# Patient Record
Sex: FEMALE | Race: WHITE | NOT HISPANIC OR LATINO | Employment: OTHER | URBAN - METROPOLITAN AREA
[De-identification: names, ages, dates, MRNs, and addresses within clinical notes are randomized per-mention and may not be internally consistent; named-entity substitution may affect disease eponyms.]

---

## 2023-09-07 ENCOUNTER — HOSPITAL ENCOUNTER (OUTPATIENT)
Dept: DATA CONVERSION | Facility: HOSPITAL | Age: 88
Discharge: HOME | End: 2023-09-07
Payer: MEDICARE

## 2023-09-07 DIAGNOSIS — R30.0 DYSURIA: ICD-10-CM

## 2023-09-07 DIAGNOSIS — E03.9 HYPOTHYROIDISM, UNSPECIFIED: ICD-10-CM

## 2023-09-07 DIAGNOSIS — E11.65 TYPE 2 DIABETES MELLITUS WITH HYPERGLYCEMIA (MULTI): ICD-10-CM

## 2023-09-07 DIAGNOSIS — Z01.89 ENCOUNTER FOR OTHER SPECIFIED SPECIAL EXAMINATIONS: ICD-10-CM

## 2023-09-07 LAB
ALBUMIN SERPL-MCNC: 4.2 GM/DL (ref 3.5–5)
ALBUMIN/GLOB SERPL: 1.6 RATIO (ref 1.5–3)
ALP BLD-CCNC: 58 U/L (ref 35–125)
ALT SERPL-CCNC: 10 U/L (ref 5–40)
ANION GAP SERPL CALCULATED.3IONS-SCNC: 15 MMOL/L (ref 0–19)
AST SERPL-CCNC: 15 U/L (ref 5–40)
BASOPHILS # BLD AUTO: 0.12 K/UL (ref 0–0.22)
BASOPHILS NFR BLD AUTO: 1.1 % (ref 0–1)
BILIRUB DIRECT SERPL-MCNC: 0.2 MG/DL (ref 0–0.2)
BILIRUB INDIRECT SERPL-MCNC: 0.5 MG/DL (ref 0–0.8)
BILIRUB SERPL-MCNC: 0.7 MG/DL (ref 0.1–1.2)
BUN SERPL-MCNC: 17 MG/DL (ref 8–25)
BUN/CREAT SERPL: 21.3 RATIO (ref 8–21)
CALCIUM SERPL-MCNC: 9.8 MG/DL (ref 8.5–10.4)
CHLORIDE SERPL-SCNC: 98 MMOL/L (ref 97–107)
CO2 SERPL-SCNC: 25 MMOL/L (ref 24–31)
CREAT SERPL-MCNC: 0.8 MG/DL (ref 0.4–1.6)
DEPRECATED RDW RBC AUTO: 46.3 FL (ref 37–54)
DIFFERENTIAL METHOD BLD: ABNORMAL
EOSINOPHIL # BLD AUTO: 0.4 K/UL (ref 0–0.45)
EOSINOPHIL NFR BLD: 3.5 % (ref 0–3)
ERYTHROCYTE [DISTWIDTH] IN BLOOD BY AUTOMATED COUNT: 13.7 % (ref 11.7–15)
GFR SERPL CREATININE-BSD FRML MDRD: 68 ML/MIN/1.73 M2
GLOBULIN SER-MCNC: 2.6 G/DL (ref 1.9–3.7)
GLUCOSE SERPL-MCNC: 144 MG/DL (ref 65–99)
HBA1C MFR BLD: 6.7 % (ref 4–6)
HCT VFR BLD AUTO: 41 % (ref 36–44)
HGB BLD-MCNC: 12.9 GM/DL (ref 12–15)
IMM GRANULOCYTES # BLD AUTO: 0.05 K/UL (ref 0–0.1)
LYMPHOCYTES # BLD AUTO: 1.2 K/UL (ref 1.2–3.2)
LYMPHOCYTES NFR BLD MANUAL: 10.6 % (ref 20–40)
MCH RBC QN AUTO: 28.7 PG (ref 26–34)
MCHC RBC AUTO-ENTMCNC: 31.5 % (ref 31–37)
MCV RBC AUTO: 91.1 FL (ref 80–100)
MONOCYTES # BLD AUTO: 0.57 K/UL (ref 0–0.8)
MONOCYTES NFR BLD MANUAL: 5 % (ref 0–8)
NEUTROPHILS # BLD AUTO: 8.99 K/UL
NEUTROPHILS # BLD AUTO: 8.99 K/UL (ref 1.8–7.7)
NEUTROPHILS.IMMATURE NFR BLD: 0.4 % (ref 0–1)
NEUTS SEG NFR BLD: 79.4 % (ref 50–70)
NRBC BLD-RTO: 0 /100 WBC
PLATELET # BLD AUTO: 181 K/UL (ref 150–450)
PMV BLD AUTO: 12.6 CU (ref 7–12.6)
POTASSIUM SERPL-SCNC: 4.4 MMOL/L (ref 3.4–5.1)
PROT SERPL-MCNC: 6.8 G/DL (ref 5.9–7.9)
RBC # BLD AUTO: 4.5 M/UL (ref 4–4.9)
SODIUM SERPL-SCNC: 138 MMOL/L (ref 133–145)
TSH SERPL DL<=0.05 MIU/L-ACNC: 1.7 MIU/L (ref 0.27–4.2)
WBC # BLD AUTO: 11.3 K/UL (ref 4.5–11)

## 2023-09-08 LAB
AMOXICILLIN+CLAV SUSC ISLT: NORMAL
AMPICILLIN+SULBAC SUSC ISLT: NORMAL
BACTERIA ISLT: NORMAL
BACTERIA SPEC CULT: NORMAL
BACTERIA UR QL AUTO: POSITIVE
BILIRUB UR QL STRIP.AUTO: NEGATIVE
CC # UR: NORMAL /UL
CEFAZOLIN SUSC ISLT: NORMAL
CIPROFLOXACIN SUSC ISLT: NORMAL
CLARITY UR: ABNORMAL
COLOR UR: ABNORMAL
GENTAMICIN ISLT MLC: NORMAL
GLUCOSE UR STRIP.AUTO-MCNC: NEGATIVE MG/DL
HGB UR QL STRIP.AUTO: 3 /HPF (ref 0–3)
HGB UR QL: ABNORMAL
HYALINE CASTS UR QL AUTO: ABNORMAL /LPF
KETONES UR QL STRIP.AUTO: NEGATIVE
LEUKOCYTE ESTERASE UR QL STRIP.AUTO: ABNORMAL
LEVOFLOXACIN SUSC ISLT: NORMAL
MEROPENEM SUSC ISLT: NORMAL
MIC (SUSCEPTIBILITY): NORMAL
NITRITE UR QL STRIP.AUTO: POSITIVE
NITROFURANTOIN SUSC ISLT: NORMAL
PH UR STRIP.AUTO: 6 [PH] (ref 4.6–8)
PIP+TAZO SUSC ISLT: NORMAL
PROT UR STRIP.AUTO-MCNC: ABNORMAL MG/DL
REFLEX MICROSCOPIC (UA): ABNORMAL
REPORT STATUS -LH SQ DATA CONVERSION: NORMAL
SERVICE CMNT-IMP: NORMAL
SP GR UR STRIP.AUTO: 1.01 (ref 1–1.03)
SPECIMEN SOURCE: NORMAL
SQUAMOUS UR QL AUTO: ABNORMAL /HPF
TETRACYCLINE SUSC ISLT: NORMAL
TMP SMX SUSC ISLT: NORMAL
UROBILINOGEN UR QL STRIP.AUTO: NORMAL MG/DL (ref 0–1)
WBC #/AREA URNS AUTO: 578 /HPF (ref 0–3)

## 2023-09-19 ENCOUNTER — DOCUMENTATION (OUTPATIENT)
Dept: PRIMARY CARE | Facility: CLINIC | Age: 88
End: 2023-09-19
Payer: MEDICARE

## 2023-09-21 ENCOUNTER — HOSPITAL ENCOUNTER (OUTPATIENT)
Dept: DATA CONVERSION | Facility: HOSPITAL | Age: 88
Discharge: HOME | End: 2023-09-21
Payer: MEDICARE

## 2023-09-21 DIAGNOSIS — R30.0 DYSURIA: ICD-10-CM

## 2023-09-21 LAB
AMOXICILLIN+CLAV SUSC ISLT: NORMAL
AMPICILLIN+SULBAC SUSC ISLT: NORMAL
BACTERIA ISLT: NORMAL
BACTERIA SPEC CULT: NORMAL
BACTERIA UR QL AUTO: POSITIVE
BILIRUB UR QL STRIP.AUTO: NEGATIVE
CC # UR: NORMAL /UL
CEFAZOLIN SUSC ISLT: NORMAL
CEFOTETAN SUSC ISLT: NORMAL
CEFTAZIDIME SUSC ISLT: NORMAL
CEFTRIAXONE SUSC ISLT: NORMAL
CIPROFLOXACIN SUSC ISLT: NORMAL
CLARITY UR: ABNORMAL
COLOR UR: ABNORMAL
GENTAMICIN ISLT MLC: NORMAL
GLUCOSE UR STRIP.AUTO-MCNC: NEGATIVE MG/DL
HGB UR QL STRIP.AUTO: 6 /HPF (ref 0–3)
HGB UR QL: ABNORMAL
HYALINE CASTS UR QL AUTO: ABNORMAL /LPF
KETONES UR QL STRIP.AUTO: NEGATIVE
LEUKOCYTE ESTERASE UR QL STRIP.AUTO: ABNORMAL
LEVOFLOXACIN SUSC ISLT: NORMAL
MEROPENEM SUSC ISLT: NORMAL
MIC (SUSCEPTIBILITY): NORMAL
MICROSCOPIC (UA): ABNORMAL
NITRITE UR QL STRIP.AUTO: NEGATIVE
NITROFURANTOIN SUSC ISLT: NORMAL
PH UR STRIP.AUTO: 6.5 [PH] (ref 4.6–8)
PIP+TAZO SUSC ISLT: NORMAL
PROT UR STRIP.AUTO-MCNC: ABNORMAL MG/DL
REPORT STATUS -LH SQ DATA CONVERSION: NORMAL
SERVICE CMNT-IMP: NORMAL
SP GR UR STRIP.AUTO: 1.01 (ref 1–1.03)
SPECIMEN SOURCE: NORMAL
SQUAMOUS UR QL AUTO: ABNORMAL /HPF
TETRACYCLINE SUSC ISLT: NORMAL
TMP SMX SUSC ISLT: NORMAL
URINE CULTURE: ABNORMAL
UROBILINOGEN UR QL STRIP.AUTO: NORMAL MG/DL (ref 0–1)
WBC #/AREA URNS AUTO: 83 /HPF (ref 0–3)

## 2023-10-31 ENCOUNTER — DOCUMENTATION (OUTPATIENT)
Dept: PRIMARY CARE | Facility: CLINIC | Age: 88
End: 2023-10-31
Payer: MEDICARE

## 2023-10-31 DIAGNOSIS — E13.9 DIABETES 1.5, MANAGED AS TYPE 2 (MULTI): ICD-10-CM

## 2023-10-31 DIAGNOSIS — I10 HYPERTENSION, UNSPECIFIED TYPE: ICD-10-CM

## 2023-10-31 DIAGNOSIS — H35.30 MACULAR DEGENERATION, UNSPECIFIED LATERALITY, UNSPECIFIED TYPE: ICD-10-CM

## 2023-10-31 PROCEDURE — 99490 CHRNC CARE MGMT STAFF 1ST 20: CPT | Performed by: STUDENT IN AN ORGANIZED HEALTH CARE EDUCATION/TRAINING PROGRAM

## 2023-11-06 DIAGNOSIS — E13.9 DIABETES 1.5, MANAGED AS TYPE 2 (MULTI): ICD-10-CM

## 2023-11-07 RX ORDER — GLIMEPIRIDE 2 MG/1
2 TABLET ORAL
Qty: 180 TABLET | Refills: 3 | Status: SHIPPED | OUTPATIENT
Start: 2023-11-07 | End: 2024-03-21 | Stop reason: SDUPTHER

## 2023-11-28 ENCOUNTER — PATIENT OUTREACH (OUTPATIENT)
Dept: PRIMARY CARE | Facility: CLINIC | Age: 88
End: 2023-11-28
Payer: MEDICARE

## 2023-11-28 NOTE — PROGRESS NOTES
Attempted to reach patient for monthly follow up call & was not successful. Left voicemail message with  info & request to return my call. If no response, will call again within the week.

## 2023-12-04 ENCOUNTER — DOCUMENTATION (OUTPATIENT)
Dept: PRIMARY CARE | Facility: CLINIC | Age: 88
End: 2023-12-04
Payer: MEDICARE

## 2023-12-04 DIAGNOSIS — E13.9 DIABETES 1.5, MANAGED AS TYPE 2 (MULTI): ICD-10-CM

## 2023-12-04 DIAGNOSIS — H35.30 MACULAR DEGENERATION, UNSPECIFIED LATERALITY, UNSPECIFIED TYPE: ICD-10-CM

## 2023-12-04 DIAGNOSIS — I10 HYPERTENSION, UNSPECIFIED TYPE: ICD-10-CM

## 2023-12-04 NOTE — PROGRESS NOTES
Patient returned my call & states is doing fairly well. Mentioned an upcoming appointment with Dermatology for 2 areas of skin cancer on cheeks, appointment is Thursday, 12/7. Has been treated for skin cancer before & does regular follow up with Dermatology. Continues to take all medications as prescribed & has not had any falls. Good family support & provide transportation to all appointments. Very hard of hearing, remains alert, lives independently & manages all ADL's. Agreeable to monthly follow up calls.

## 2023-12-04 NOTE — PROGRESS NOTES
Second attempt to reach patient for monthly outreach & was not successful. Left voicemail message with request to return my call. Awaiting response.

## 2024-01-05 ENCOUNTER — PATIENT OUTREACH (OUTPATIENT)
Dept: PRIMARY CARE | Facility: CLINIC | Age: 89
End: 2024-01-05
Payer: MEDICARE

## 2024-01-05 DIAGNOSIS — E13.9 DIABETES 1.5, MANAGED AS TYPE 2 (MULTI): ICD-10-CM

## 2024-01-05 DIAGNOSIS — I10 HYPERTENSION, UNSPECIFIED TYPE: ICD-10-CM

## 2024-01-05 DIAGNOSIS — H35.30 MACULAR DEGENERATION, UNSPECIFIED LATERALITY, UNSPECIFIED TYPE: ICD-10-CM

## 2024-01-05 PROCEDURE — 99490 CHRNC CARE MGMT STAFF 1ST 20: CPT | Performed by: STUDENT IN AN ORGANIZED HEALTH CARE EDUCATION/TRAINING PROGRAM

## 2024-01-05 NOTE — PROGRESS NOTES
Attempted to reach patient for monthly outreach & was not successful. Left voicemail message with this 's contact info & request to return my call. If no response, will try again within a week. Patient does have visit scheduled in March with primary care physician.

## 2024-01-05 NOTE — PROGRESS NOTES
Patient returned my call &  is doing fine with the exception of the area on her face recently treated for skin cancer.  has been using cream prescribed by Dermatology & thinks one area looks worse. This  encouraged patient to contact provider to report concerns. \[facility]\"" will wait one more week. Offered to assist with contacting provider if needed. No falls, remains independent with good family support. Agreeable to follow up calls.

## 2024-02-05 ENCOUNTER — PATIENT OUTREACH (OUTPATIENT)
Dept: PRIMARY CARE | Facility: CLINIC | Age: 89
End: 2024-02-05
Payer: MEDICARE

## 2024-02-05 DIAGNOSIS — E13.9 DIABETES 1.5, MANAGED AS TYPE 2 (MULTI): ICD-10-CM

## 2024-02-05 DIAGNOSIS — I10 HYPERTENSION, UNSPECIFIED TYPE: ICD-10-CM

## 2024-02-05 DIAGNOSIS — H35.30 MACULAR DEGENERATION, UNSPECIFIED LATERALITY, UNSPECIFIED TYPE: ICD-10-CM

## 2024-02-05 PROCEDURE — 99490 CHRNC CARE MGMT STAFF 1ST 20: CPT | Performed by: STUDENT IN AN ORGANIZED HEALTH CARE EDUCATION/TRAINING PROGRAM

## 2024-02-05 NOTE — PROGRESS NOTES
Attempted to reach patient for monthly outreach & was not successful. Left voicemail message with  information & request to return my call. If no response, will reach out again within a week.
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
## 2024-03-05 ENCOUNTER — LAB (OUTPATIENT)
Dept: LAB | Facility: LAB | Age: 89
End: 2024-03-05
Payer: MEDICARE

## 2024-03-05 DIAGNOSIS — E03.9 HYPOTHYROIDISM, UNSPECIFIED: ICD-10-CM

## 2024-03-05 DIAGNOSIS — Z01.89 ENCOUNTER FOR OTHER SPECIFIED SPECIAL EXAMINATIONS: Primary | ICD-10-CM

## 2024-03-05 DIAGNOSIS — E11.65 TYPE 2 DIABETES MELLITUS WITH HYPERGLYCEMIA (MULTI): ICD-10-CM

## 2024-03-05 LAB
ALBUMIN SERPL-MCNC: 4.7 G/DL (ref 3.5–5)
ALP BLD-CCNC: 57 U/L (ref 35–125)
ALT SERPL-CCNC: 10 U/L (ref 5–40)
ANION GAP SERPL CALC-SCNC: 12 MMOL/L
AST SERPL-CCNC: 16 U/L (ref 5–40)
BASOPHILS # BLD AUTO: 0.12 X10*3/UL (ref 0–0.1)
BASOPHILS NFR BLD AUTO: 1.2 %
BILIRUB DIRECT SERPL-MCNC: 0.2 MG/DL (ref 0–0.2)
BILIRUB SERPL-MCNC: 0.7 MG/DL (ref 0.1–1.2)
BUN SERPL-MCNC: 17 MG/DL (ref 8–25)
CALCIUM SERPL-MCNC: 9.7 MG/DL (ref 8.5–10.4)
CHLORIDE SERPL-SCNC: 100 MMOL/L (ref 97–107)
CO2 SERPL-SCNC: 28 MMOL/L (ref 24–31)
CREAT SERPL-MCNC: 0.8 MG/DL (ref 0.4–1.6)
EGFRCR SERPLBLD CKD-EPI 2021: 68 ML/MIN/1.73M*2
EOSINOPHIL # BLD AUTO: 0.25 X10*3/UL (ref 0–0.4)
EOSINOPHIL NFR BLD AUTO: 2.5 %
ERYTHROCYTE [DISTWIDTH] IN BLOOD BY AUTOMATED COUNT: 14 % (ref 11.5–14.5)
EST. AVERAGE GLUCOSE BLD GHB EST-MCNC: 143 MG/DL
GLUCOSE SERPL-MCNC: 123 MG/DL (ref 65–99)
HBA1C MFR BLD: 6.6 %
HCT VFR BLD AUTO: 41.5 % (ref 36–46)
HGB BLD-MCNC: 13.1 G/DL (ref 12–16)
IMM GRANULOCYTES # BLD AUTO: 0.03 X10*3/UL (ref 0–0.5)
IMM GRANULOCYTES NFR BLD AUTO: 0.3 % (ref 0–0.9)
LYMPHOCYTES # BLD AUTO: 1.41 X10*3/UL (ref 0.8–3)
LYMPHOCYTES NFR BLD AUTO: 13.9 %
MCH RBC QN AUTO: 28.9 PG (ref 26–34)
MCHC RBC AUTO-ENTMCNC: 31.6 G/DL (ref 32–36)
MCV RBC AUTO: 92 FL (ref 80–100)
MONOCYTES # BLD AUTO: 0.62 X10*3/UL (ref 0.05–0.8)
MONOCYTES NFR BLD AUTO: 6.1 %
NEUTROPHILS # BLD AUTO: 7.71 X10*3/UL (ref 1.6–5.5)
NEUTROPHILS NFR BLD AUTO: 76 %
NRBC BLD-RTO: 0 /100 WBCS (ref 0–0)
PLATELET # BLD AUTO: 198 X10*3/UL (ref 150–450)
POTASSIUM SERPL-SCNC: 5.2 MMOL/L (ref 3.4–5.1)
PROT SERPL-MCNC: 6.9 G/DL (ref 5.9–7.9)
RBC # BLD AUTO: 4.53 X10*6/UL (ref 4–5.2)
SODIUM SERPL-SCNC: 140 MMOL/L (ref 133–145)
WBC # BLD AUTO: 10.1 X10*3/UL (ref 4.4–11.3)

## 2024-03-05 PROCEDURE — 80053 COMPREHEN METABOLIC PANEL: CPT

## 2024-03-05 PROCEDURE — 36415 COLL VENOUS BLD VENIPUNCTURE: CPT

## 2024-03-05 PROCEDURE — 82248 BILIRUBIN DIRECT: CPT

## 2024-03-05 PROCEDURE — 83036 HEMOGLOBIN GLYCOSYLATED A1C: CPT

## 2024-03-05 PROCEDURE — 85025 COMPLETE CBC W/AUTO DIFF WBC: CPT

## 2024-03-11 ENCOUNTER — PATIENT OUTREACH (OUTPATIENT)
Dept: PRIMARY CARE | Facility: CLINIC | Age: 89
End: 2024-03-11
Payer: MEDICARE

## 2024-03-11 DIAGNOSIS — I10 HYPERTENSION, UNSPECIFIED TYPE: ICD-10-CM

## 2024-03-11 DIAGNOSIS — E13.9 DIABETES 1.5, MANAGED AS TYPE 2 (MULTI): ICD-10-CM

## 2024-03-11 DIAGNOSIS — H35.30 MACULAR DEGENERATION, UNSPECIFIED LATERALITY, UNSPECIFIED TYPE: ICD-10-CM

## 2024-03-11 PROCEDURE — 99490 CHRNC CARE MGMT STAFF 1ST 20: CPT | Performed by: STUDENT IN AN ORGANIZED HEALTH CARE EDUCATION/TRAINING PROGRAM

## 2024-03-11 NOTE — PROGRESS NOTES
Contacted patient for monthly outreach & patient states is doing well. Some work to kitchen has been done, still more to do. Is anxious to get the rest of work done. Continues to live independently with help from son who lives a block away & daughter who lives locally. Patient does not drive & depends on family for transportation. Taking all medications as prescribed, biggest concern is losing her vision. Being treated for Glaucoma & has vision out of one eye due to previous history of detached retina. Ophthalmology appointment scheduled later this month as well as primary care appointment next week. Looking forward to going to lunch with neighbors on [employer], tries to keep busy.  No falls & states moves slowly especially since house has been under construction. Agreeable to monthly calls & has  information for additional questions/concerns.

## 2024-03-19 PROBLEM — I10 HYPERTENSIVE DISORDER: Status: ACTIVE | Noted: 2024-03-19

## 2024-03-19 PROBLEM — E11.9 DIABETES MELLITUS (MULTI): Status: ACTIVE | Noted: 2022-07-03
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
## 2024-03-19 PROBLEM — H40.9 UNSPECIFIED GLAUCOMA: Status: ACTIVE | Noted: 2022-07-03

## 2024-03-19 PROBLEM — R60.0 EDEMA OF LOWER EXTREMITY: Status: ACTIVE | Noted: 2024-03-19

## 2024-03-19 PROBLEM — R53.81 PHYSICAL DEBILITY: Status: ACTIVE | Noted: 2024-03-19

## 2024-03-19 PROBLEM — R32 URINARY INCONTINENCE: Status: ACTIVE | Noted: 2024-03-19

## 2024-03-19 PROBLEM — I99.8 VASCULAR INSUFFICIENCY: Status: ACTIVE | Noted: 2024-03-19

## 2024-03-19 PROBLEM — M79.604 PAIN IN RIGHT LEG: Status: ACTIVE | Noted: 2024-03-19

## 2024-03-19 PROBLEM — Z78.9 MEDICAL HOME PATIENT: Status: ACTIVE | Noted: 2024-03-19

## 2024-03-19 PROBLEM — H35.30 MACULAR DEGENERATION: Status: ACTIVE | Noted: 2024-03-19

## 2024-03-19 PROBLEM — L03.116 CELLULITIS OF LEFT LOWER LIMB: Status: ACTIVE | Noted: 2024-03-19

## 2024-03-19 PROBLEM — S32.9XXA FRACTURE OF PELVIS (MULTI): Status: RESOLVED | Noted: 2024-03-19 | Resolved: 2024-03-19

## 2024-03-19 PROBLEM — S09.90XA INJURY OF HEAD: Status: RESOLVED | Noted: 2024-03-19 | Resolved: 2024-03-19

## 2024-03-19 PROBLEM — R55 SYNCOPE: Status: RESOLVED | Noted: 2024-03-19 | Resolved: 2024-03-19

## 2024-03-19 PROBLEM — Z66 DO NOT RESUSCITATE: Status: ACTIVE | Noted: 2022-07-03

## 2024-03-19 PROBLEM — E78.2 MIXED HYPERLIPIDEMIA: Status: ACTIVE | Noted: 2024-03-19

## 2024-03-19 PROBLEM — M79.89 SYMPTOM OF LEG SWELLING: Status: ACTIVE | Noted: 2024-03-19

## 2024-03-19 PROBLEM — R26.9 ABNORMAL GAIT: Status: ACTIVE | Noted: 2024-03-19

## 2024-03-19 PROBLEM — H91.90 HEARING DISORDER: Status: ACTIVE | Noted: 2024-03-19

## 2024-03-19 PROBLEM — E11.65 HYPERGLYCEMIA DUE TO TYPE 2 DIABETES MELLITUS (MULTI): Status: ACTIVE | Noted: 2024-03-19

## 2024-03-19 PROBLEM — R30.0 DYSURIA: Status: ACTIVE | Noted: 2023-05-01
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
## 2024-03-19 PROBLEM — M62.82 RHABDOMYOLYSIS: Status: ACTIVE | Noted: 2024-03-19

## 2024-03-19 PROBLEM — E03.9 HYPOTHYROIDISM: Status: ACTIVE | Noted: 2024-03-19

## 2024-03-19 PROBLEM — S32.592A: Status: RESOLVED | Noted: 2022-07-03 | Resolved: 2024-03-19

## 2024-03-19 PROBLEM — R29.6 REPEATED FALLS: Status: RESOLVED | Noted: 2022-07-03 | Resolved: 2024-03-19

## 2024-03-19 RX ORDER — SIMVASTATIN 40 MG/1
TABLET, FILM COATED ORAL EVERY 24 HOURS
COMMUNITY
End: 2024-03-21 | Stop reason: SDUPTHER

## 2024-03-19 RX ORDER — LEVOTHYROXINE SODIUM 100 UG/1
TABLET ORAL EVERY 24 HOURS
COMMUNITY
End: 2024-03-21 | Stop reason: SDUPTHER

## 2024-03-19 RX ORDER — FUROSEMIDE 40 MG/1
TABLET ORAL EVERY 24 HOURS
COMMUNITY
Start: 2021-08-09 | End: 2024-03-21 | Stop reason: SDUPTHER

## 2024-03-19 RX ORDER — TIMOLOL MALEATE 5 MG/ML
SOLUTION/ DROPS OPHTHALMIC
COMMUNITY
Start: 2023-11-22

## 2024-03-19 RX ORDER — ATENOLOL 100 MG/1
TABLET ORAL EVERY 24 HOURS
COMMUNITY
Start: 2018-04-04 | End: 2024-03-21 | Stop reason: SDUPTHER

## 2024-03-19 RX ORDER — METFORMIN HYDROCHLORIDE 500 MG/1
TABLET ORAL EVERY 12 HOURS
COMMUNITY
Start: 2017-07-18 | End: 2024-03-21 | Stop reason: ALTCHOICE

## 2024-03-19 RX ORDER — HYDROCHLOROTHIAZIDE 12.5 MG/1
TABLET ORAL
COMMUNITY
End: 2024-03-21 | Stop reason: SDUPTHER

## 2024-03-19 RX ORDER — PREDNISOLONE ACETATE 10 MG/ML
SUSPENSION/ DROPS OPHTHALMIC
COMMUNITY
Start: 2023-07-27

## 2024-03-19 RX ORDER — BRIMONIDINE TARTRATE 2 MG/ML
SOLUTION/ DROPS OPHTHALMIC EVERY 8 HOURS
COMMUNITY

## 2024-03-19 RX ORDER — CLONIDINE HYDROCHLORIDE 0.2 MG/1
TABLET ORAL
COMMUNITY
Start: 2011-09-21 | End: 2024-03-21 | Stop reason: SDUPTHER

## 2024-03-19 RX ORDER — FERROUS SULFATE, DRIED 160(50) MG
TABLET, EXTENDED RELEASE ORAL EVERY 12 HOURS
COMMUNITY

## 2024-03-19 RX ORDER — FLUOROURACIL 50 MG/G
CREAM TOPICAL
COMMUNITY
Start: 2023-12-07

## 2024-03-19 RX ORDER — LATANOPROST 50 UG/ML
SOLUTION/ DROPS OPHTHALMIC EVERY 24 HOURS
COMMUNITY

## 2024-03-19 RX ORDER — AMLODIPINE BESYLATE 5 MG/1
TABLET ORAL
COMMUNITY
End: 2024-03-21 | Stop reason: SDUPTHER

## 2024-03-21 ENCOUNTER — OFFICE VISIT (OUTPATIENT)
Dept: PRIMARY CARE | Facility: CLINIC | Age: 89
End: 2024-03-21
Payer: MEDICARE

## 2024-03-21 VITALS
DIASTOLIC BLOOD PRESSURE: 72 MMHG | HEIGHT: 67 IN | WEIGHT: 122 LBS | BODY MASS INDEX: 19.15 KG/M2 | HEART RATE: 68 BPM | SYSTOLIC BLOOD PRESSURE: 124 MMHG | TEMPERATURE: 97.2 F | OXYGEN SATURATION: 98 %

## 2024-03-21 DIAGNOSIS — Z71.89 COUNSELING REGARDING ADVANCED CARE PLANNING AND GOALS OF CARE: ICD-10-CM

## 2024-03-21 DIAGNOSIS — I99.8 VASCULAR INSUFFICIENCY: ICD-10-CM

## 2024-03-21 DIAGNOSIS — E11.69 TYPE 2 DIABETES MELLITUS WITH OTHER SPECIFIED COMPLICATION, WITHOUT LONG-TERM CURRENT USE OF INSULIN (MULTI): ICD-10-CM

## 2024-03-21 DIAGNOSIS — E03.8 HYPOTHYROIDISM DUE TO HASHIMOTO'S THYROIDITIS: ICD-10-CM

## 2024-03-21 DIAGNOSIS — Z00.00 ANNUAL PHYSICAL EXAM: Primary | ICD-10-CM

## 2024-03-21 DIAGNOSIS — I10 PRIMARY HYPERTENSION: Primary | ICD-10-CM

## 2024-03-21 DIAGNOSIS — I10 PRIMARY HYPERTENSION: ICD-10-CM

## 2024-03-21 DIAGNOSIS — Z01.89 ENCOUNTER FOR ROUTINE LABORATORY TESTING: ICD-10-CM

## 2024-03-21 DIAGNOSIS — R53.81 PHYSICAL DEBILITY: ICD-10-CM

## 2024-03-21 DIAGNOSIS — E06.3 HYPOTHYROIDISM DUE TO HASHIMOTO'S THYROIDITIS: ICD-10-CM

## 2024-03-21 DIAGNOSIS — Z23 ENCOUNTER FOR IMMUNIZATION: ICD-10-CM

## 2024-03-21 DIAGNOSIS — E78.2 MIXED HYPERLIPIDEMIA: ICD-10-CM

## 2024-03-21 PROBLEM — R30.0 DYSURIA: Status: RESOLVED | Noted: 2023-05-01 | Resolved: 2024-03-21

## 2024-03-21 PROBLEM — M62.82 RHABDOMYOLYSIS: Status: RESOLVED | Noted: 2024-03-19 | Resolved: 2024-03-21

## 2024-03-21 PROBLEM — R60.0 EDEMA OF LOWER EXTREMITY: Status: RESOLVED | Noted: 2024-03-19 | Resolved: 2024-03-21

## 2024-03-21 PROBLEM — M79.89 SYMPTOM OF LEG SWELLING: Status: RESOLVED | Noted: 2024-03-19 | Resolved: 2024-03-21

## 2024-03-21 PROBLEM — M79.604 PAIN IN RIGHT LEG: Status: RESOLVED | Noted: 2024-03-19 | Resolved: 2024-03-21

## 2024-03-21 PROBLEM — E78.5 HLD (HYPERLIPIDEMIA): Status: ACTIVE | Noted: 2024-03-19

## 2024-03-21 PROBLEM — E11.65 HYPERGLYCEMIA DUE TO TYPE 2 DIABETES MELLITUS (MULTI): Status: RESOLVED | Noted: 2024-03-19 | Resolved: 2024-03-21

## 2024-03-21 PROBLEM — R26.9 ABNORMAL GAIT: Status: RESOLVED | Noted: 2024-03-19 | Resolved: 2024-03-21

## 2024-03-21 PROBLEM — Z78.9 MEDICAL HOME PATIENT: Status: RESOLVED | Noted: 2024-03-19 | Resolved: 2024-03-21

## 2024-03-21 PROBLEM — L03.116 CELLULITIS OF LEFT LOWER LIMB: Status: RESOLVED | Noted: 2024-03-19 | Resolved: 2024-03-21

## 2024-03-21 PROCEDURE — 1126F AMNT PAIN NOTED NONE PRSNT: CPT | Performed by: STUDENT IN AN ORGANIZED HEALTH CARE EDUCATION/TRAINING PROGRAM

## 2024-03-21 PROCEDURE — 99214 OFFICE O/P EST MOD 30 MIN: CPT | Performed by: STUDENT IN AN ORGANIZED HEALTH CARE EDUCATION/TRAINING PROGRAM

## 2024-03-21 PROCEDURE — 1036F TOBACCO NON-USER: CPT | Performed by: STUDENT IN AN ORGANIZED HEALTH CARE EDUCATION/TRAINING PROGRAM

## 2024-03-21 PROCEDURE — G0439 PPPS, SUBSEQ VISIT: HCPCS | Performed by: STUDENT IN AN ORGANIZED HEALTH CARE EDUCATION/TRAINING PROGRAM

## 2024-03-21 PROCEDURE — 1123F ACP DISCUSS/DSCN MKR DOCD: CPT | Performed by: STUDENT IN AN ORGANIZED HEALTH CARE EDUCATION/TRAINING PROGRAM

## 2024-03-21 PROCEDURE — 99215 OFFICE O/P EST HI 40 MIN: CPT | Performed by: STUDENT IN AN ORGANIZED HEALTH CARE EDUCATION/TRAINING PROGRAM

## 2024-03-21 PROCEDURE — 3074F SYST BP LT 130 MM HG: CPT | Performed by: STUDENT IN AN ORGANIZED HEALTH CARE EDUCATION/TRAINING PROGRAM

## 2024-03-21 PROCEDURE — 1160F RVW MEDS BY RX/DR IN RCRD: CPT | Performed by: STUDENT IN AN ORGANIZED HEALTH CARE EDUCATION/TRAINING PROGRAM

## 2024-03-21 PROCEDURE — 1158F ADVNC CARE PLAN TLK DOCD: CPT | Performed by: STUDENT IN AN ORGANIZED HEALTH CARE EDUCATION/TRAINING PROGRAM

## 2024-03-21 PROCEDURE — 3078F DIAST BP <80 MM HG: CPT | Performed by: STUDENT IN AN ORGANIZED HEALTH CARE EDUCATION/TRAINING PROGRAM

## 2024-03-21 PROCEDURE — 1159F MED LIST DOCD IN RCRD: CPT | Performed by: STUDENT IN AN ORGANIZED HEALTH CARE EDUCATION/TRAINING PROGRAM

## 2024-03-21 RX ORDER — LEVOTHYROXINE SODIUM 100 UG/1
100 TABLET ORAL
Qty: 90 TABLET | Refills: 3 | Status: SHIPPED | OUTPATIENT
Start: 2024-03-21 | End: 2025-03-21

## 2024-03-21 RX ORDER — FUROSEMIDE 40 MG/1
40 TABLET ORAL DAILY
Qty: 90 TABLET | Refills: 3 | Status: SHIPPED | OUTPATIENT
Start: 2024-03-21 | End: 2025-03-21

## 2024-03-21 RX ORDER — CLONIDINE HYDROCHLORIDE 0.2 MG/1
0.4 TABLET ORAL NIGHTLY
Qty: 180 TABLET | Refills: 3 | Status: SHIPPED | OUTPATIENT
Start: 2024-03-21 | End: 2025-03-21

## 2024-03-21 RX ORDER — ATENOLOL 100 MG/1
100 TABLET ORAL DAILY
Qty: 90 TABLET | Refills: 3 | Status: SHIPPED | OUTPATIENT
Start: 2024-03-21 | End: 2025-03-21

## 2024-03-21 RX ORDER — CLONIDINE HYDROCHLORIDE 0.2 MG/1
0.4 TABLET ORAL NIGHTLY
Qty: 180 TABLET | Refills: 3 | Status: SHIPPED | OUTPATIENT
Start: 2024-03-21

## 2024-03-21 RX ORDER — SIMVASTATIN 40 MG/1
40 TABLET, FILM COATED ORAL NIGHTLY
Qty: 90 TABLET | Refills: 3 | Status: SHIPPED | OUTPATIENT
Start: 2024-03-21 | End: 2025-03-21

## 2024-03-21 RX ORDER — METFORMIN HYDROCHLORIDE 500 MG/1
500 TABLET, EXTENDED RELEASE ORAL
Qty: 90 TABLET | Refills: 3 | Status: SHIPPED | OUTPATIENT
Start: 2024-03-21 | End: 2025-03-21

## 2024-03-21 RX ORDER — AMLODIPINE BESYLATE 5 MG/1
5 TABLET ORAL DAILY
Qty: 90 TABLET | Refills: 3 | Status: SHIPPED | OUTPATIENT
Start: 2024-03-21 | End: 2024-03-27 | Stop reason: ALTCHOICE

## 2024-03-21 RX ORDER — HYDROCHLOROTHIAZIDE 12.5 MG/1
12.5 TABLET ORAL DAILY
Qty: 90 TABLET | Refills: 3 | Status: SHIPPED | OUTPATIENT
Start: 2024-03-21 | End: 2024-03-27 | Stop reason: ALTCHOICE

## 2024-03-21 RX ORDER — GLIMEPIRIDE 2 MG/1
2 TABLET ORAL
Qty: 180 TABLET | Refills: 3 | Status: SHIPPED | OUTPATIENT
Start: 2024-03-21 | End: 2025-03-21

## 2024-03-21 ASSESSMENT — ENCOUNTER SYMPTOMS
DEPRESSION: 0
HEMATOLOGIC/LYMPHATIC NEGATIVE: 1
LOSS OF SENSATION IN FEET: 0
CARDIOVASCULAR NEGATIVE: 1
EYES NEGATIVE: 1
GASTROINTESTINAL NEGATIVE: 1
NEUROLOGICAL NEGATIVE: 1
RESPIRATORY NEGATIVE: 1
PSYCHIATRIC NEGATIVE: 1
CONSTITUTIONAL NEGATIVE: 1
MUSCULOSKELETAL NEGATIVE: 1
ALLERGIC/IMMUNOLOGIC NEGATIVE: 1
ENDOCRINE NEGATIVE: 1
OCCASIONAL FEELINGS OF UNSTEADINESS: 1

## 2024-03-21 ASSESSMENT — PAIN SCALES - GENERAL: PAINLEVEL: 0-NO PAIN

## 2024-03-21 ASSESSMENT — PATIENT HEALTH QUESTIONNAIRE - PHQ9
1. LITTLE INTEREST OR PLEASURE IN DOING THINGS: NOT AT ALL
2. FEELING DOWN, DEPRESSED OR HOPELESS: NOT AT ALL
SUM OF ALL RESPONSES TO PHQ9 QUESTIONS 1 AND 2: 0

## 2024-03-21 NOTE — PROGRESS NOTES
[facility]: [facility]  MEDICARE WELLNESS EXAM      Apoorva Sanchez is a 89 y.o. female that is presenting today for Annual Exam.    Assessment/Plan    Diagnoses and all orders for this visit:  Annual physical exam  -     Follow Up In Primary Care; Future  Counseling regarding advanced care planning and goals of care  Encounter for routine laboratory testing  -     CBC and Auto Differential; Future  -     Basic Metabolic Panel; Future  -     Hepatic Function Panel; Future  -     Hemoglobin A1C; Future  -     TSH with reflex to Free T4 if abnormal; Future  -     Albumin , Urine Random; Future  -     Lipid Panel; Future  Encounter for immunization  Type 2 diabetes mellitus with other specified complication, without long-term current use of insulin (CMS/[facility])  -     glimepiride (Amaryl) 2 mg tablet; Take 1 tablet (2 mg) by mouth 2 times a day with meals.  -     metFORMIN XR (Glucophage-XR) 500 mg 24 hr tablet; Take 1 tablet (500 mg) by mouth once daily in the evening. Take with meals. Do not crush, chew, or split.  -     Hemoglobin A1C; Future  -     Albumin , Urine Random; Future  Hypothyroidism due to Hashimoto's thyroiditis  -     levothyroxine (Synthroid, Levoxyl) 100 mcg tablet; Take 1 tablet (100 mcg) by mouth once daily in the morning. Take before meals.  -     TSH with reflex to Free T4 if abnormal; Future  Mixed hyperlipidemia  -     Hepatic Function Panel; Future  -     simvastatin (Zocor) 40 mg tablet; Take 1 tablet (40 mg) by mouth once daily at bedtime.  -     Lipid Panel; Future  Primary hypertension  -     amLODIPine (Norvasc) 5 mg tablet; Take 1 tablet (5 mg) by mouth once daily.  -     cloNIDine (Catapres) 0.2 mg tablet; Take 2 tablets (0.4 mg) by mouth once daily at bedtime.  -     hydroCHLOROthiazide (Microzide) 12.5 mg tablet; Take 1 tablet (12.5 mg) by mouth once daily.  -     CBC and Auto Differential; Future  -     Basic Metabolic Panel; Future  -     atenolol (Tenormin) 100 mg  tablet; Take 1 tablet (100 mg) by mouth once daily.  Vascular insufficiency  -     furosemide (Lasix) 40 mg tablet; Take 1 tablet (40 mg) by mouth once daily.  Physical debility    Discussed routine and/or preventative care with the patient as outlined below:  - Labwork:   - Patient had labwork done for this appointment. Discussed today.   - Patient's labs mostly look great. Only abnormality is that the patient's potassium was very mildly elevated. Does not require further evaluation at this time.  - Will order labwork for the patient to get one week prior to the next appointment.  - Imaging:   - Patient does not appear to be due for routine imaging at this time.  - Immunizations:   - Discussed seasonal immunizations, including the influenza and COVID-19 immunizations.  - Patient does not appear to be due for routine immunizations at this time.   - Significant medication and problem list reconciliation done today.  - Patient's sugars look great. Do not need to make changes at this time.  - Patient's blood pressure looks great. Do not need to make any changes at this time.   - Overall, the patient feels well. Do not need to make significant changes at this time.    ADVANCED CARE PLANNING  Advanced Care Planning was discussed with patient:  The patient has an active advanced care plan on file. The patient has an active surrogate decision-maker on file.  Encouraged the patient to confirm that Living Will and Healthcare Power of  (HCPoA) are accurate and up to date.  Encouraged the patient to confirm that our office be provided a copy of any documentation in the event that anything changes.    ACTIVITIES OF DAILY LIVING  Basic ADLs:  Bathing: Independent, Dressing: Independent, Toileting: Independent, Transferring: Independent, Continence: Independent, Feeding: Independent.    Instrumental ADLs:  Ability to use phone: Independent, Shopping: Independent, Cooking: Independent, House-keeping: Independent, Laundry:  Independent, Transportation: Independent, Medication Management: Independent, Finance Management: Independent.    Subjective   - The patient otherwise feels well and denies any acute symptoms or concerns at this time.  - The patient denies any changes or progression of their chronic medical problems.  - The patient denies any problems or concerns with their medications.      Review of Systems   Constitutional: Negative.    HENT: Negative.     Eyes: Negative.    Respiratory: Negative.     Cardiovascular: Negative.    Gastrointestinal: Negative.    Endocrine: Negative.    Genitourinary: Negative.    Musculoskeletal: Negative.    Skin: Negative.    Allergic/Immunologic: Negative.    Neurological: Negative.    Hematological: Negative.    Psychiatric/Behavioral: Negative.     All other systems reviewed and are negative.    Objective   Vitals:    03/21/24 1303   BP: 124/72   Pulse: 68   Temp: 36.2 °C (97.2 °F)   SpO2: 98%      Body mass index is 19.11 kg/m².  Physical Exam  Vitals and nursing note reviewed.   Constitutional:       General: She is not in acute distress.     Appearance: Normal appearance. She is not ill-appearing.   HENT:      Head: Normocephalic and atraumatic.      Right Ear: Tympanic membrane, ear canal and external ear normal. There is no impacted cerumen.      Left Ear: Tympanic membrane, ear canal and external ear normal. There is no impacted cerumen.      Nose: Nose normal.      Mouth/Throat:      Mouth: Mucous membranes are moist.      Pharynx: Oropharynx is clear. No oropharyngeal exudate or posterior oropharyngeal erythema.   Eyes:      General: No scleral icterus.        Right eye: No discharge.         Left eye: No discharge.      Extraocular Movements: Extraocular movements intact.      Conjunctiva/sclera: Conjunctivae normal.      Pupils: Pupils are equal, round, and reactive to light.   Neck:      Vascular: No carotid bruit.   Cardiovascular:      Rate and Rhythm: Normal rate and regular  rhythm.      Pulses: Normal pulses.      Heart sounds: Normal heart sounds. No murmur heard.     No friction rub. No gallop.   Pulmonary:      Effort: Pulmonary effort is normal. No respiratory distress.      Breath sounds: Normal breath sounds.   Abdominal:      General: Abdomen is flat. Bowel sounds are normal. There is no distension.      Palpations: Abdomen is soft.      Tenderness: There is no abdominal tenderness.      Hernia: No hernia is present.   Musculoskeletal:         General: No swelling or tenderness. Normal range of motion.   Lymphadenopathy:      Cervical: No cervical adenopathy.   Skin:     General: Skin is warm and dry.      Capillary Refill: Capillary refill takes less than 2 seconds.      Coloration: Skin is not jaundiced.      Findings: No rash.   Neurological:      General: No focal deficit present.      Mental Status: She is alert and oriented to person, place, and time. Mental status is at baseline.   Psychiatric:         Mood and Affect: Mood normal.         Behavior: Behavior normal.       Diagnostic Results   Lab Results   Component Value Date    GLUCOSE 123 (H) 03/05/2024    CALCIUM 9.7 03/05/2024     03/05/2024    K 5.2 (H) 03/05/2024    CO2 28 03/05/2024     03/05/2024    BUN 17 03/05/2024    CREATININE 0.80 03/05/2024     Lab Results   Component Value Date    ALT 10 03/05/2024    AST 16 03/05/2024    ALKPHOS 57 03/05/2024    BILITOT 0.7 03/05/2024     Lab Results   Component Value Date    WBC 10.1 03/05/2024    HGB 13.1 03/05/2024    HCT 41.5 03/05/2024    MCV 92 03/05/2024     03/05/2024     Lab Results   Component Value Date    CHOL 94 (L) 03/06/2023    CHOL 96 (L) 07/01/2022    CHOL 99 (L) 05/02/2022     Lab Results   Component Value Date    HDL 48 (L) 03/06/2023    HDL 56 07/01/2022    HDL 51 05/02/2022     Lab Results   Component Value Date    LDLCALC 32 (L) 03/06/2023    LDLCALC 29 (L) 07/01/2022    LDLCALC 35 (L) 05/02/2022     Lab Results   Component Value  "Date    TRIG 72 03/06/2023    TRIG 53 07/01/2022    TRIG 67 05/02/2022     No components found for: \"CHOLHDL\"  Lab Results   Component Value Date    HGBA1C 6.6 (H) 03/05/2024     Other labs not included in the list above reviewed either before or during this encounter.    History   Past Medical History:   Diagnosis Date    Accidental fall 03/19/2024    Fracture of pelvis (CMS/[facility]) 03/19/2024    Injury of head 03/19/2024    Oth fracture of left pubis, init encntr for closed fracture (CMS/[facility]) 07/03/2022    Syncope 03/19/2024     History reviewed. No pertinent surgical history.  No family history on file.  Social History     Socioeconomic History    Marital status:      Spouse name: Not on file    Number of children: Not on file    Years of education: Not on file    Highest education level: Not on file   Occupational History    Not on file   Tobacco Use    Smoking status: Never     Passive exposure: Never    Smokeless tobacco: Never   Vaping Use    Vaping Use: Never used   Substance and Sexual Activity    Alcohol use: Never    Drug use: Never    Sexual activity: Not on file   Other Topics Concern    Not on file   Social History Narrative    Not on file     Social Determinants of Health     Financial Resource Strain: Not on file   Food Insecurity: Not on file   Transportation Needs: Not on file   Physical Activity: Not on file   Stress: Not on file   Social Connections: Not on file   Intimate Partner Violence: Not on file   Housing Stability: Not on file     Allergies   Allergen Reactions    Sulfa (Sulfonamide Antibiotics) Hives     Current Outpatient Medications on File Prior to Visit   Medication Sig Dispense Refill    brimonidine (AlphaGAN) 0.2 % ophthalmic solution Administer into affected eye(s) every 8 hours.      calcium carbonate-vitamin D3 (Oyster Shell Calcium-Vit D3) 500 mg-5 mcg (200 unit) tablet Take by mouth every 12 hours.      fluorouracil (Efudex) 5 % cream APPLY TWICE DAILY TO AFFECTED AREA FOR " 4 WEEKS. WASH HANDS IMMEDIATELY AFTER APPLICATION      folic acid/multivit-min/lutein (CENTRUM SILVER ORAL) Take by mouth once every 24 hours.      latanoprost (Xalatan) 0.005 % ophthalmic solution Administer into affected eye(s) once every 24 hours.      metroNIDAZOLE (Metrogel) 0.75 % gel APPLY EXTERNALLY TWICE DAILY 45 g 3    multivit,calc,mins/iron/folic (MULTIVIT-IRON-FA-CALCIUM-MINS ORAL) Take by mouth.      prednisoLONE acetate (Pred-Forte) 1 % ophthalmic suspension INSTILL 1 DROP INTO LEFT EYE TWICE DAILY FOR 7 DAYS FOLLOWING LASER PROCEDURE      timolol (Timoptic) 0.5 % ophthalmic solution       vits A,C,E/lutein/minerals (VIT A,C AND E-LUTEIN-MINERALS ORAL) Take by mouth once every 24 hours.      [DISCONTINUED] amLODIPine (Norvasc) 5 mg tablet Take by mouth.      [DISCONTINUED] atenolol (Tenormin) 100 mg tablet once every 24 hours.      [DISCONTINUED] cloNIDine (Catapres) 0.2 mg tablet TAKE 2 TABLETS AT BEDTIME for 90      [DISCONTINUED] furosemide (Lasix) 40 mg tablet once every 24 hours.      [DISCONTINUED] glimepiride (Amaryl) 2 mg tablet TAKE 1 TABLET TWICE DAILY WITH FOOD 180 tablet 3    [DISCONTINUED] hydroCHLOROthiazide (Microzide) 12.5 mg tablet Take by mouth.      [DISCONTINUED] levothyroxine (Synthroid, Levoxyl) 100 mcg tablet once every 24 hours.      [DISCONTINUED] metFORMIN (Glucophage) 500 mg tablet every 12 hours.      [DISCONTINUED] simvastatin (Zocor) 40 mg tablet once every 24 hours.      [DISCONTINUED] [facility] Years as directed for 5 years 09 Aug, 2019 Active       No current facility-administered medications on file prior to visit.     Immunization History   Administered Date(s) Administered    Flu vaccine, quadrivalent, high-dose, preservative free, age 65y+ (FLUZONE) 10/22/2020, 10/20/2021, 11/07/2022, 09/18/2023    Influenza, High Dose Seasonal, Preservative Free 10/31/2018, 10/03/2019    Influenza, injectable, quadrivalent 11/22/2016,  11/28/2017    Influenza, seasonal, injectable 01/07/2011, 09/21/2012, 12/03/2013, 10/07/2014, 10/01/2015    Moderna SARS-CoV-2 Vaccination 03/25/2021, 04/22/2021    Pfizer Purple Cap SARS-CoV-2 12/20/2021    Pneumococcal conjugate vaccine, 13-valent (PREVNAR 13) 11/28/2017    Pneumococcal polysaccharide vaccine, 23-valent, age 2 years and older (PNEUMOVAX 23) 10/31/2018    Td (adult), unspecified 08/23/2002     Patient's medical history was reviewed and updated either before or during this encounter.     Man Mccracken MD

## 2024-03-21 NOTE — PATIENT INSTRUCTIONS
Diagnoses and all orders for this visit:  Annual physical exam  -     Follow Up In Primary Care; Future  Counseling regarding advanced care planning and goals of care  Comments:  Encouraged the patient to confirm that Living Will and Healthcare Power of  (HCPoA) are accurate and up to date.  Encounter for routine laboratory testing  -     CBC and Auto Differential; Future  -     Basic Metabolic Panel; Future  -     Hepatic Function Panel; Future  -     Hemoglobin A1C; Future  -     TSH with reflex to Free T4 if abnormal; Future  -     Albumin , Urine Random; Future  -     Lipid Panel; Future  Encounter for immunization  Type 2 diabetes mellitus with other specified complication, without long-term current use of insulin (CMS/[facility])  -     glimepiride (Amaryl) 2 mg tablet; Take 1 tablet (2 mg) by mouth 2 times a day with meals.  -     metFORMIN XR (Glucophage-XR) 500 mg 24 hr tablet; Take 1 tablet (500 mg) by mouth once daily in the evening. Take with meals. Do not crush, chew, or split.  -     Hemoglobin A1C; Future  -     Albumin , Urine Random; Future  Hypothyroidism due to Hashimoto's thyroiditis  -     levothyroxine (Synthroid, Levoxyl) 100 mcg tablet; Take 1 tablet (100 mcg) by mouth once daily in the morning. Take before meals.  -     TSH with reflex to Free T4 if abnormal; Future  Mixed hyperlipidemia  -     Hepatic Function Panel; Future  -     simvastatin (Zocor) 40 mg tablet; Take 1 tablet (40 mg) by mouth once daily at bedtime.  -     Lipid Panel; Future  Primary hypertension  -     amLODIPine (Norvasc) 5 mg tablet; Take 1 tablet (5 mg) by mouth once daily.  -     cloNIDine (Catapres) 0.2 mg tablet; Take 2 tablets (0.4 mg) by mouth once daily at bedtime.  -     hydroCHLOROthiazide (Microzide) 12.5 mg tablet; Take 1 tablet (12.5 mg) by mouth once daily.  -     CBC and Auto Differential; Future  -     Basic Metabolic Panel; Future  -     atenolol (Tenormin) 100 mg tablet; Take 1 tablet (100 mg) by  mouth once daily.  Vascular insufficiency  -     furosemide (Lasix) 40 mg tablet; Take 1 tablet (40 mg) by mouth once daily.  Physical debility     Discussed routine and/or preventative care with the patient as outlined below:  - Labwork:   - Patient had labwork done for this appointment. Discussed today.   - Patient's labs mostly look great. Only abnormality is that the patient's potassium was very mildly elevated. Does not require further evaluation at this time.  - Will order labwork for the patient to get one week prior to the next appointment.  - Imaging:   - Patient does not appear to be due for routine imaging at this time.  - Immunizations:   - Discussed seasonal immunizations, including the influenza and COVID-19 immunizations.  - Patient does not appear to be due for routine immunizations at this time.   - Significant medication and problem list reconciliation done today.  - Patient's sugars look great. Do not need to make changes at this time.  - Patient's blood pressure looks great. Do not need to make any changes at this time.   - Overall, the patient feels well. Do not need to make significant changes at this time.

## 2024-03-27 ENCOUNTER — TELEPHONE (OUTPATIENT)
Dept: PRIMARY CARE | Facility: CLINIC | Age: 89
End: 2024-03-27
Payer: MEDICARE

## 2024-03-27 DIAGNOSIS — I10 PRIMARY HYPERTENSION: ICD-10-CM

## 2024-03-27 NOTE — TELEPHONE ENCOUNTER
Patient states that she has been taking clonidine and atenolol but has never taken amlodipine or hydrochlorothiazide. Patient would like to clarify whether dr would like her to start these new meds? Please advise.

## 2024-04-12 ENCOUNTER — PATIENT OUTREACH (OUTPATIENT)
Dept: PRIMARY CARE | Facility: CLINIC | Age: 89
End: 2024-04-12
Payer: MEDICARE

## 2024-04-12 DIAGNOSIS — E11.69 TYPE 2 DIABETES MELLITUS WITH OTHER SPECIFIED COMPLICATION, WITHOUT LONG-TERM CURRENT USE OF INSULIN (MULTI): ICD-10-CM

## 2024-04-12 DIAGNOSIS — I10 PRIMARY HYPERTENSION: ICD-10-CM

## 2024-04-12 PROCEDURE — 99490 CHRNC CARE MGMT STAFF 1ST 20: CPT | Performed by: STUDENT IN AN ORGANIZED HEALTH CARE EDUCATION/TRAINING PROGRAM

## 2024-04-12 NOTE — LETTER
April 12, 2024      Dear Apoorva:      Enclosed please find a some dietary information I think you may find useful in following a low potassium diet.    If you should have any questions, please feel to contact me at [phone]    Sincerely,      Dneae Zhao LPN  [facility] Care Management Program
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
## 2024-05-13 ENCOUNTER — PATIENT OUTREACH (OUTPATIENT)
Dept: PRIMARY CARE | Facility: CLINIC | Age: 89
End: 2024-05-13
Payer: MEDICARE

## 2024-05-13 DIAGNOSIS — E78.2 MIXED HYPERLIPIDEMIA: ICD-10-CM

## 2024-05-13 DIAGNOSIS — I10 PRIMARY HYPERTENSION: ICD-10-CM

## 2024-05-13 DIAGNOSIS — E11.69 TYPE 2 DIABETES MELLITUS WITH OTHER SPECIFIED COMPLICATION, WITHOUT LONG-TERM CURRENT USE OF INSULIN (MULTI): ICD-10-CM

## 2024-05-13 PROCEDURE — 99490 CHRNC CARE MGMT STAFF 1ST 20: CPT | Performed by: STUDENT IN AN ORGANIZED HEALTH CARE EDUCATION/TRAINING PROGRAM

## 2024-05-13 NOTE — PROGRESS NOTES
Contacted patient for monthly outreach & patient reports is doing well. Patient received copy of of low potassium diet sent to her last month & mentioned that she has limited some items from her diet, orange juice, bananas & broccoli. States favorite vegetables are peas, green beans & enjoys eating salads. Will be interested if her Potassium levels decrease based on these changes to her diet. Next appointment with primary care physician is 10/1. Today, daughter is visiting to celebrate Mother's day a day late. Patient will be celebrating her 89th birthday on Thursday, 5/16. States is amazed that she is reaching this age. Apoorva remains independent in all daily activities & has good family support. Taking all other medications as prescribed  including Vitamin D & Centrum Silver. No falls or recent inpatient admits or ER visits. Agreeable to follow up calls & has contact information for this .

## 2024-06-24 ENCOUNTER — PATIENT OUTREACH (OUTPATIENT)
Dept: PRIMARY CARE | Facility: CLINIC | Age: 89
End: 2024-06-24
Payer: MEDICARE

## 2024-06-24 DIAGNOSIS — I10 PRIMARY HYPERTENSION: ICD-10-CM

## 2024-06-24 DIAGNOSIS — E11.69 TYPE 2 DIABETES MELLITUS WITH OTHER SPECIFIED COMPLICATION, WITHOUT LONG-TERM CURRENT USE OF INSULIN (MULTI): ICD-10-CM

## 2024-06-24 NOTE — PROGRESS NOTES
Contacted patient for monthly outreach & patient reports is doing well.  Only concern is that she noted some type of on her arm, then looked like a boil, was itchy, & now is round & tan in color. Previous history of skin cancer & has Dermatology appointment scheduled for tomorrow to assess. Daughter will transport although patient still drives short distances. Apoorva remains independent in all daily activities & has good family support. Taking all medications as prescribed including Vitamin D & Centrum Silver. No falls, recent inpatient admits or ER visits. Next appointment with primary care physician is 10/1. Agreeable to follow up calls & has contact information for this .

## 2024-07-01 ENCOUNTER — APPOINTMENT (OUTPATIENT)
Dept: RADIOLOGY | Facility: HOSPITAL | Age: 89
End: 2024-07-01
Payer: MEDICARE

## 2024-07-01 ENCOUNTER — HOSPITAL ENCOUNTER (EMERGENCY)
Facility: HOSPITAL | Age: 89
Discharge: HOME | End: 2024-07-01
Payer: MEDICARE

## 2024-07-01 VITALS
HEART RATE: 56 BPM | OXYGEN SATURATION: 100 % | TEMPERATURE: 97.4 F | WEIGHT: 120 LBS | BODY MASS INDEX: 18.79 KG/M2 | DIASTOLIC BLOOD PRESSURE: 104 MMHG | RESPIRATION RATE: 16 BRPM | SYSTOLIC BLOOD PRESSURE: 189 MMHG
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
## 2024-07-01 DIAGNOSIS — S62.514A NONDISPLACED FRACTURE OF PROXIMAL PHALANX OF RIGHT THUMB, INITIAL ENCOUNTER FOR CLOSED FRACTURE: ICD-10-CM

## 2024-07-01 DIAGNOSIS — S09.90XA CLOSED HEAD INJURY, INITIAL ENCOUNTER: ICD-10-CM

## 2024-07-01 PROCEDURE — 73130 X-RAY EXAM OF HAND: CPT | Mod: RT

## 2024-07-01 PROCEDURE — 90471 IMMUNIZATION ADMIN: CPT

## 2024-07-01 PROCEDURE — 70450 CT HEAD/BRAIN W/O DYE: CPT

## 2024-07-01 PROCEDURE — 2500000004 HC RX 250 GENERAL PHARMACY W/ HCPCS (ALT 636 FOR OP/ED)

## 2024-07-01 PROCEDURE — 73590 X-RAY EXAM OF LOWER LEG: CPT | Mod: RIGHT SIDE | Performed by: STUDENT IN AN ORGANIZED HEALTH CARE EDUCATION/TRAINING PROGRAM

## 2024-07-01 PROCEDURE — 72125 CT NECK SPINE W/O DYE: CPT

## 2024-07-01 PROCEDURE — 99285 EMERGENCY DEPT VISIT HI MDM: CPT | Mod: 25

## 2024-07-01 PROCEDURE — 90715 TDAP VACCINE 7 YRS/> IM: CPT

## 2024-07-01 PROCEDURE — 73590 X-RAY EXAM OF LOWER LEG: CPT | Mod: RT

## 2024-07-01 RX ORDER — ACETAMINOPHEN 325 MG/1
650 TABLET ORAL ONCE
Status: COMPLETED | OUTPATIENT
Start: 2024-07-01 | End: 2024-07-01

## 2024-07-01 ASSESSMENT — PAIN SCALES - GENERAL: PAINLEVEL_OUTOF10: 5 - MODERATE PAIN

## 2024-07-01 ASSESSMENT — COLUMBIA-SUICIDE SEVERITY RATING SCALE - C-SSRS
1. IN THE PAST MONTH, HAVE YOU WISHED YOU WERE DEAD OR WISHED YOU COULD GO TO SLEEP AND NOT WAKE UP?: NO
2. HAVE YOU ACTUALLY HAD ANY THOUGHTS OF KILLING YOURSELF?: NO
6. HAVE YOU EVER DONE ANYTHING, STARTED TO DO ANYTHING, OR PREPARED TO DO ANYTHING TO END YOUR LIFE?: NO

## 2024-07-01 ASSESSMENT — PAIN - FUNCTIONAL ASSESSMENT: PAIN_FUNCTIONAL_ASSESSMENT: 0-10

## 2024-07-01 NOTE — ED PROVIDER NOTES
HPI   Chief Complaint   Patient presents with    Fall     Patient has come to the ED via [facility] for a mechanical fall at home. Patient alert and oriented x 4. Right lower extremity scrape observed. Dressing applied by medics and is intact upon arrival        Patient is a 89-year-old female presenting via EMS with a mechanical fall.  She states that she was cleaning her home when her  showed up.  States that she did have a mechanical trip and fall when she turned to answer the door.  States she did hit her head on the pillow.  Endorsing right hand pain.  There is a superficial abrasion present on the right leg.  She states she did not lose consciousness.  Patient is not currently on blood thinners.  Patient denies fevers, chills, cough, sore throat, runny nose, chest pain, shortness of breath, abdominal pain, nausea, vomiting, diarrhea or urinary complaints.                          Douglas Coma Scale Score: 15                     Patient History   Past Medical History:   Diagnosis Date    Accidental fall 03/19/2024    Fracture of pelvis (Multi) 03/19/2024    Injury of head 03/19/2024    Oth fracture of left pubis, init encntr for closed fracture (Multi) 07/03/2022    Syncope 03/19/2024     History reviewed. No pertinent surgical history.  No family history on file.  Social History     Tobacco Use    Smoking status: Never     Passive exposure: Never    Smokeless tobacco: Never   Vaping Use    Vaping status: Never Used   Substance Use Topics    Alcohol use: Never    Drug use: Never       Physical Exam   ED Triage Vitals [07/01/24 1344]   Temperature Heart Rate Respirations BP   36.3 °C (97.4 °F) 63 16 (!) 183/114      Pulse Ox Temp Source Heart Rate Source Patient Position   99 % Oral Monitor --      BP Location FiO2 (%)     -- --       Physical Exam  Vitals and nursing note reviewed.   Constitutional:       General: She is not in acute distress.     Appearance: Normal appearance. She is well-developed.       Comments: Awake, sitting in examination chair   HENT:      Head: Normocephalic and atraumatic.      Nose: Nose normal.      Mouth/Throat:      Mouth: Mucous membranes are moist.      Pharynx: Oropharynx is clear.   Eyes:      Extraocular Movements: Extraocular movements intact.      Conjunctiva/sclera: Conjunctivae normal.      Pupils: Pupils are equal, round, and reactive to light.   Cardiovascular:      Rate and Rhythm: Normal rate and regular rhythm.      Pulses: Normal pulses.      Heart sounds: Normal heart sounds. No murmur heard.  Pulmonary:      Effort: Pulmonary effort is normal. No respiratory distress.      Breath sounds: Normal breath sounds.   Abdominal:      General: Abdomen is flat.      Palpations: Abdomen is soft.      Tenderness: There is no abdominal tenderness.   Musculoskeletal:         General: Swelling present.      Cervical back: Normal range of motion and neck supple.      Comments: Ecchymosis present around the right thumb.  Swelling present around the right thumb.   Skin:     General: Skin is warm and dry.      Capillary Refill: Capillary refill takes less than 2 seconds.      Comments: Superficial abrasions, actively bleeding on the right lower extremity   Neurological:      General: No focal deficit present.      Mental Status: She is alert and oriented to person, place, and time.   Psychiatric:         Mood and Affect: Mood normal.         Behavior: Behavior normal.         ED Course & MDM   Diagnoses as of 07/01/24 1621   Fall, initial encounter   Closed head injury, initial encounter   Nondisplaced fracture of proximal phalanx of right thumb, initial encounter for closed fracture       Medical Decision Making  Patient is a 89-year-old female presenting via EMS with a mechanical fall.  CT head, CT C-spine, x-ray right hand x-ray right tib-fib ordered.  Tdap ordered, Tylenol ordered.  Condition considered include but are not limited to: Contusion, fracture, hemorrhage.    X-ray of  right hand does show an acute minimally displaced first metacarpal base fracture.  X-ray of right tib-fib is without acute findings.  CT head is without acute findings.  CT C-spine without acute findings.  I believe this patient is at low risk for complication, and a disoposition of discharge is acceptable.  Return to the Emergency Department if new or worsening symptoms including headache, fever, chills, chest pain, shortness of breath, syncope, near syncope, abdominal pain, nausea, vomiting,  diarrhea, or worsening pain.  Patient placed in a thumb spica splint by nursing staff.  Neurovascular exam after splint application was within normal limits.  Of note, patient's neurovascular exam prior to splint function was also within normal limits.  Patient is agreeable to disposition discharge with follow-up with primary care and orthopedics.  I did recommend taking Tylenol as needed for pain control.    Portions of this note made with Dragon software, please be mindful of potential grammatical errors.        Medications   diphth,pertus(acell),tetanus (BoostRIX) 2.5-8-5 Lf-mcg-Lf/0.5mL vaccine 0.5 mL (0.5 mL intramuscular Given 7/1/24 1548)   acetaminophen (Tylenol) tablet 650 mg (650 mg oral Given 7/1/24 1548)         XR hand right 3+ views   Final Result   Acute minimally displaced 1st metacarpal base fracture. 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        MACRO   None        Signed by: José Yan 7/1/2024 2:57 PM   Dictation workstation:   [id number]      XR tibia fibula right 2 views   Final Result   No acute fracture in the tibia or fibula.        MACRO   None        Signed by: José Yan 7/1/2024 2:59 PM   Dictation workstation:   [id number]      CT head wo IV contrast   Final Result   No acute intracranial hemorrhage or calvarial fracture.        MACRO   None        Signed by: José Yan 7/1/2024 2:48 PM   Dictation workstation:   [id number]      CT cervical spine wo IV contrast   Final Result   No  acute fracture or traumatic malalignment.        Stable 2.3 cm left thyroid nodule. Consider outpatient ultrasound   evaluation if not already performed.        Signed by: José Yan 7/1/2024 2:55 PM   Dictation workstation:   [id number]            Procedure  Procedures     Eligio Valdez PA-C  07/01/24 4603

## 2024-07-05 ENCOUNTER — DOCUMENTATION (OUTPATIENT)
Dept: PRIMARY CARE | Facility: CLINIC | Age: 89
End: 2024-07-05
Payer: MEDICARE

## 2024-07-05 NOTE — PROGRESS NOTES
Attempted to reach patient to follow up on recent ER visit on 7/1 to [facility] ([place], [place]) after sustaining a fall at home. Left voicemail message with my contact information & request to return my call. If no response, will follow up within a week.  Update: patient returned my call & states is doing fair, frustrated with limitations due to fractured thumb & is on right hand. Son has to come over to put eye drops in. Also difficulty getting in & out of shower & signing name on checks. Has follow up ortho appointment on 7/15 & states splint they put on in the emergency room is causing more pain. Removes it from time to time. Mentioned a wound on right lower leg that keeps draining & she must reapply dressing.. Advised the importance of follow up with primary care. Apoorva stated she tried to reach office 2x after her ER visit & phone was disconnected both times, frustrated & states was not going to call again. Advised that I would send message to her physician & have them contact patient. Appreciated the follow up call. Has my contact information for additional questions/concerns.   Update: patient called to report that the wound on rt lower leg is bleeding profusely & she is unable to stop it. Advised to go to Urgent Care or Emergency room for assessment & treatment. States son will be coming to take her. Will continue to monitor.

## 2024-07-08 ENCOUNTER — DOCUMENTATION (OUTPATIENT)
Dept: PRIMARY CARE | Facility: CLINIC | Age: 89
End: 2024-07-08
Payer: MEDICARE

## 2024-07-08 NOTE — PROGRESS NOTES
Received call from patient & daughter stating that Apoorva was seen on Friday, 7/5 at the Urgent Care due to a wound from a fall on 7/1. Right lower leg was bleeding, & per patient would not stop. Advised to go to Urgent care or Emergency room for treatment. Daughter got on the phone & states that mom need daily complex dressing changes which she is unable to do. Has fractured right thumb from fall & hand is in a splint. Asking about homecare services to assist with needed dressing changes. Will message PCP to request.

## 2024-07-09 NOTE — PROGRESS NOTES
Attempted to contact patient to follow up on concerns regarding dressing changes to right lower leg. This  sent message to primary care physician yesterday. Appointment is now scheduled for patient on Thursday, 7/11, with Nurse Practitioner to assess. Left voicemail message advising patient to bring paperwork she received from Urgent Care visit on 7/5/24.

## 2024-07-11 ENCOUNTER — OFFICE VISIT (OUTPATIENT)
Dept: PRIMARY CARE | Facility: CLINIC | Age: 89
End: 2024-07-11
Payer: MEDICARE

## 2024-07-11 ENCOUNTER — TELEPHONE (OUTPATIENT)
Dept: HOME HEALTH SERVICES | Facility: HOME HEALTH | Age: 89
End: 2024-07-11

## 2024-07-11 VITALS
OXYGEN SATURATION: 99 % | RESPIRATION RATE: 18 BRPM | HEART RATE: 61 BPM | SYSTOLIC BLOOD PRESSURE: 148 MMHG | WEIGHT: 120 LBS | DIASTOLIC BLOOD PRESSURE: 70 MMHG | TEMPERATURE: 97.2 F | BODY MASS INDEX: 18.79 KG/M2

## 2024-07-11 DIAGNOSIS — S81.801A WOUND OF RIGHT LOWER EXTREMITY, INITIAL ENCOUNTER: Primary | ICD-10-CM

## 2024-07-11 PROCEDURE — 1036F TOBACCO NON-USER: CPT | Performed by: FAMILY MEDICINE

## 2024-07-11 PROCEDURE — 3078F DIAST BP <80 MM HG: CPT | Performed by: FAMILY MEDICINE

## 2024-07-11 PROCEDURE — 1160F RVW MEDS BY RX/DR IN RCRD: CPT | Performed by: FAMILY MEDICINE

## 2024-07-11 PROCEDURE — 99213 OFFICE O/P EST LOW 20 MIN: CPT | Performed by: FAMILY MEDICINE

## 2024-07-11 PROCEDURE — 1159F MED LIST DOCD IN RCRD: CPT | Performed by: FAMILY MEDICINE

## 2024-07-11 PROCEDURE — 3077F SYST BP >= 140 MM HG: CPT | Performed by: FAMILY MEDICINE

## 2024-07-11 ASSESSMENT — ENCOUNTER SYMPTOMS
OCCASIONAL FEELINGS OF UNSTEADINESS: 1
WEAKNESS: 0
COLOR CHANGE: 0
APPETITE CHANGE: 0
MYALGIAS: 0
WOUND: 1
DEPRESSION: 0
FATIGUE: 0
BRUISES/BLEEDS EASILY: 1
ACTIVITY CHANGE: 0
FEVER: 0
LOSS OF SENSATION IN FEET: 0

## 2024-07-11 ASSESSMENT — PATIENT HEALTH QUESTIONNAIRE - PHQ9
SUM OF ALL RESPONSES TO PHQ9 QUESTIONS 1 AND 2: 0
2. FEELING DOWN, DEPRESSED OR HOPELESS: NOT AT ALL
1. LITTLE INTEREST OR PLEASURE IN DOING THINGS: NOT AT ALL

## 2024-07-11 NOTE — PATIENT INSTRUCTIONS
-referral for home health care  -education on daily drsg changes given.   -follow up if wound becomes red, hot, green or purulent drainage, develop fever.

## 2024-07-11 NOTE — TELEPHONE ENCOUNTER
Unfortunately, [facility] does not provide non-skilled services. Due to lack of skilled needs, we have made the referral for Apoorva Sanchez a Non-Admit. If the patient requires skilled services (SN, PT, ST), please resubmit the referral indicating the patient's needs. Other services (OT, HHA, MSW, RD) can be added if one of these qualifying services are ordered.     Thank you,  Intake department.

## 2024-07-11 NOTE — PROGRESS NOTES
Subjective   Patient ID: Apoorva Sanchez is a 89 y.o. female who presents for Wound Check (7/1 fell and scraped leg on walker.  Seen at ED wrapped at ED.  Went to  7/5 and instructed to use surgifoam until it stopped draining.  ).    Pt presents with daughter for concerns of right leg wound. Was seen in ED. Given wound care instructions and sent home. Daughter concerned because pt is unable to care for wound on her own and daughter lives far away. Denied fever, chills, warmth at wound site. Serous drainage. Tender. Denies numbness or tingling     Wound Check  She was originally treated 10 to 14 days ago. Previous treatment included wound cleansing or irrigation. There has been colored (pale yellow) discharge from the wound. Redness Status: mild. Swelling Status: mild. Pain course: right shin. She has no difficulty moving the affected extremity or digit.        Review of Systems   Constitutional:  Negative for activity change, appetite change, fatigue and fever.   Musculoskeletal:  Negative for myalgias.   Skin:  Positive for wound. Negative for color change.   Neurological:  Negative for weakness.   Hematological:  Bruises/bleeds easily.       Objective   /70   Pulse 61   Temp 36.2 °C (97.2 °F)   Resp 18   Wt 54.4 kg (120 lb)   SpO2 99%   BMI 18.79 kg/m²     Physical Exam  Vitals and nursing note reviewed.   Constitutional:       Appearance: She is normal weight.   Skin:     General: Skin is warm.      Capillary Refill: Capillary refill takes less than 2 seconds.      Findings: Lesion (large skin tear to right shin. serosanguanous drainage. surrounding skin mild erythema.) present.   Neurological:      General: No focal deficit present.      Mental Status: She is alert. Mental status is at baseline.   Psychiatric:         Mood and Affect: Mood normal.         Behavior: Behavior normal.         Thought Content: Thought content normal.         Judgment: Judgment normal.         Assessment/Plan   Diagnoses and  all orders for this visit:  Wound of right lower extremity, initial encounter  -     Referral to Home Care; Future    -referral for home health care  -education on daily drsg changes given.   -follow up if wound becomes red, hot, green or purulent drainage, develop fever.

## 2024-07-16 ENCOUNTER — DOCUMENTATION (OUTPATIENT)
Dept: PRIMARY CARE | Facility: CLINIC | Age: 89
End: 2024-07-16
Payer: MEDICARE

## 2024-07-16 DIAGNOSIS — S81.811D NONINFECTED SKIN TEAR OF RIGHT LOWER EXTREMITY, SUBSEQUENT ENCOUNTER: Primary | ICD-10-CM

## 2024-07-16 NOTE — PROGRESS NOTES
Received a message from Patient yesterday asking when a nurse would be coming to help with dressing changes for skin tear wound Rt lower extremity. Based on a review of medical records,  Home Care did not approve referral stating it does not meet criteria as a skilled service.  Apoorva also has a fracture of the right thumb making it difficult to manage dressing changes. Family is providing some support but according to patient, dressing keeps falling off. This  contacted the Wound Care Center at [facility] & explained the situation to the Director. She suggested having patient's wound assessed by their staff who can offer multiple options providing a better solution for patient. I contacted son Glenn with the information & number for the Wound Center. Also notified patient that son would be willing to provide transportation. Will send message to Leda Renee CNP who saw patient on 7/11 to assess injury & advise of Wound Center recommendation.

## 2024-07-22 ENCOUNTER — APPOINTMENT (OUTPATIENT)
Dept: WOUND CARE | Facility: HOSPITAL | Age: 89
End: 2024-07-22
Payer: MEDICARE

## 2024-07-23 ENCOUNTER — DOCUMENTATION (OUTPATIENT)
Dept: PRIMARY CARE | Facility: CLINIC | Age: 89
End: 2024-07-23
Payer: MEDICARE

## 2024-07-23 ENCOUNTER — APPOINTMENT (OUTPATIENT)
Dept: RADIOLOGY | Facility: HOSPITAL | Age: 89
DRG: 573 | End: 2024-07-23
Payer: MEDICARE

## 2024-07-23 ENCOUNTER — HOSPITAL ENCOUNTER (INPATIENT)
Facility: HOSPITAL | Age: 89
DRG: 573 | End: 2024-07-23
Attending: EMERGENCY MEDICINE | Admitting: STUDENT IN AN ORGANIZED HEALTH CARE EDUCATION/TRAINING PROGRAM
Payer: MEDICARE

## 2024-07-23 DIAGNOSIS — L97.913 ULCER OF RIGHT LEG, WITH NECROSIS OF MUSCLE (MULTI): ICD-10-CM

## 2024-07-23 DIAGNOSIS — L03.115 CELLULITIS OF RIGHT LOWER EXTREMITY: Primary | ICD-10-CM

## 2024-07-23 DIAGNOSIS — I73.9 PERIPHERAL VASCULAR DISEASE (CMS-HCC): ICD-10-CM

## 2024-07-23 DIAGNOSIS — I99.8 VASCULAR INSUFFICIENCY: ICD-10-CM

## 2024-07-23 DIAGNOSIS — E11.621 TYPE 2 DIABETES MELLITUS WITH FOOT ULCER (CODE): ICD-10-CM

## 2024-07-23 DIAGNOSIS — S81.801A WOUND OF RIGHT LOWER EXTREMITY, INITIAL ENCOUNTER: ICD-10-CM

## 2024-07-23 LAB
ALBUMIN SERPL-MCNC: 3.9 G/DL (ref 3.5–5)
ALP BLD-CCNC: 71 U/L (ref 35–125)
ALT SERPL-CCNC: 10 U/L (ref 5–40)
ANION GAP SERPL CALC-SCNC: 10 MMOL/L
APPEARANCE UR: CLEAR
AST SERPL-CCNC: 16 U/L (ref 5–40)
BASOPHILS # BLD AUTO: 0.06 X10*3/UL (ref 0–0.1)
BASOPHILS NFR BLD AUTO: 0.7 %
BILIRUB SERPL-MCNC: 0.3 MG/DL (ref 0.1–1.2)
BILIRUB UR STRIP.AUTO-MCNC: NEGATIVE MG/DL
BUN SERPL-MCNC: 16 MG/DL (ref 8–25)
CALCIUM SERPL-MCNC: 9.3 MG/DL (ref 8.5–10.4)
CHLORIDE SERPL-SCNC: 94 MMOL/L (ref 97–107)
CO2 SERPL-SCNC: 27 MMOL/L (ref 24–31)
COLOR UR: ABNORMAL
CREAT SERPL-MCNC: 0.8 MG/DL (ref 0.4–1.6)
EGFRCR SERPLBLD CKD-EPI 2021: 68 ML/MIN/1.73M*2
EOSINOPHIL # BLD AUTO: 0.08 X10*3/UL (ref 0–0.4)
EOSINOPHIL NFR BLD AUTO: 0.9 %
ERYTHROCYTE [DISTWIDTH] IN BLOOD BY AUTOMATED COUNT: 13.9 % (ref 11.5–14.5)
GLUCOSE BLD MANUAL STRIP-MCNC: 108 MG/DL (ref 74–99)
GLUCOSE SERPL-MCNC: 199 MG/DL (ref 65–99)
GLUCOSE UR STRIP.AUTO-MCNC: NORMAL MG/DL
HCT VFR BLD AUTO: 33.4 % (ref 36–46)
HGB BLD-MCNC: 10.7 G/DL (ref 12–16)
IMM GRANULOCYTES # BLD AUTO: 0.04 X10*3/UL (ref 0–0.5)
IMM GRANULOCYTES NFR BLD AUTO: 0.4 % (ref 0–0.9)
KETONES UR STRIP.AUTO-MCNC: NEGATIVE MG/DL
LACTATE BLDV-SCNC: 1.2 MMOL/L (ref 0.4–2)
LEUKOCYTE ESTERASE UR QL STRIP.AUTO: ABNORMAL
LYMPHOCYTES # BLD AUTO: 1.17 X10*3/UL (ref 0.8–3)
LYMPHOCYTES NFR BLD AUTO: 13.1 %
MCH RBC QN AUTO: 28 PG (ref 26–34)
MCHC RBC AUTO-ENTMCNC: 32 G/DL (ref 32–36)
MCV RBC AUTO: 87 FL (ref 80–100)
MONOCYTES # BLD AUTO: 0.65 X10*3/UL (ref 0.05–0.8)
MONOCYTES NFR BLD AUTO: 7.3 %
NEUTROPHILS # BLD AUTO: 6.94 X10*3/UL (ref 1.6–5.5)
NEUTROPHILS NFR BLD AUTO: 77.6 %
NITRITE UR QL STRIP.AUTO: NEGATIVE
NRBC BLD-RTO: 0 /100 WBCS (ref 0–0)
PH UR STRIP.AUTO: 7 [PH]
PLATELET # BLD AUTO: 312 X10*3/UL (ref 150–450)
POTASSIUM SERPL-SCNC: 4.2 MMOL/L (ref 3.4–5.1)
PROT SERPL-MCNC: 7.1 G/DL (ref 5.9–7.9)
PROT UR STRIP.AUTO-MCNC: NEGATIVE MG/DL
RBC # BLD AUTO: 3.82 X10*6/UL (ref 4–5.2)
RBC # UR STRIP.AUTO: ABNORMAL /UL
RBC #/AREA URNS AUTO: ABNORMAL /HPF
SODIUM SERPL-SCNC: 131 MMOL/L (ref 133–145)
SP GR UR STRIP.AUTO: 1.01
TRANS CELLS #/AREA UR COMP ASSIST: ABNORMAL /HPF
UROBILINOGEN UR STRIP.AUTO-MCNC: NORMAL MG/DL
WBC # BLD AUTO: 8.9 X10*3/UL (ref 4.4–11.3)
WBC #/AREA URNS AUTO: >50 /HPF
WBC CLUMPS #/AREA URNS AUTO: ABNORMAL /HPF

## 2024-07-23 PROCEDURE — 99285 EMERGENCY DEPT VISIT HI MDM: CPT | Mod: 25

## 2024-07-23 PROCEDURE — 73701 CT LOWER EXTREMITY W/DYE: CPT | Mod: RIGHT SIDE | Performed by: RADIOLOGY

## 2024-07-23 PROCEDURE — 1100000001 HC PRIVATE ROOM DAILY

## 2024-07-23 PROCEDURE — 36415 COLL VENOUS BLD VENIPUNCTURE: CPT | Performed by: PHYSICIAN ASSISTANT

## 2024-07-23 PROCEDURE — 80053 COMPREHEN METABOLIC PANEL: CPT | Performed by: PHYSICIAN ASSISTANT

## 2024-07-23 PROCEDURE — 82947 ASSAY GLUCOSE BLOOD QUANT: CPT

## 2024-07-23 PROCEDURE — 73590 X-RAY EXAM OF LOWER LEG: CPT | Mod: RT

## 2024-07-23 PROCEDURE — 87040 BLOOD CULTURE FOR BACTERIA: CPT | Mod: TRILAB | Performed by: PHYSICIAN ASSISTANT

## 2024-07-23 PROCEDURE — 2500000002 HC RX 250 W HCPCS SELF ADMINISTERED DRUGS (ALT 637 FOR MEDICARE OP, ALT 636 FOR OP/ED): Performed by: NURSE PRACTITIONER

## 2024-07-23 PROCEDURE — 2550000001 HC RX 255 CONTRASTS: Performed by: EMERGENCY MEDICINE

## 2024-07-23 PROCEDURE — 96365 THER/PROPH/DIAG IV INF INIT: CPT

## 2024-07-23 PROCEDURE — 83605 ASSAY OF LACTIC ACID: CPT | Performed by: PHYSICIAN ASSISTANT

## 2024-07-23 PROCEDURE — 96361 HYDRATE IV INFUSION ADD-ON: CPT

## 2024-07-23 PROCEDURE — 2500000001 HC RX 250 WO HCPCS SELF ADMINISTERED DRUGS (ALT 637 FOR MEDICARE OP): Performed by: NURSE PRACTITIONER

## 2024-07-23 PROCEDURE — 81003 URINALYSIS AUTO W/O SCOPE: CPT | Performed by: PHYSICIAN ASSISTANT

## 2024-07-23 PROCEDURE — 93971 EXTREMITY STUDY: CPT

## 2024-07-23 PROCEDURE — 87070 CULTURE OTHR SPECIMN AEROBIC: CPT | Mod: TRILAB,WESLAB | Performed by: PHYSICIAN ASSISTANT

## 2024-07-23 PROCEDURE — 73701 CT LOWER EXTREMITY W/DYE: CPT | Mod: RT

## 2024-07-23 PROCEDURE — 2500000004 HC RX 250 GENERAL PHARMACY W/ HCPCS (ALT 636 FOR OP/ED): Performed by: NURSE PRACTITIONER

## 2024-07-23 PROCEDURE — 85025 COMPLETE CBC W/AUTO DIFF WBC: CPT | Performed by: PHYSICIAN ASSISTANT

## 2024-07-23 PROCEDURE — 87086 URINE CULTURE/COLONY COUNT: CPT | Mod: TRILAB,WESLAB | Performed by: PHYSICIAN ASSISTANT

## 2024-07-23 PROCEDURE — 2500000004 HC RX 250 GENERAL PHARMACY W/ HCPCS (ALT 636 FOR OP/ED): Performed by: PHYSICIAN ASSISTANT

## 2024-07-23 PROCEDURE — 93971 EXTREMITY STUDY: CPT | Performed by: RADIOLOGY

## 2024-07-23 PROCEDURE — 2500000004 HC RX 250 GENERAL PHARMACY W/ HCPCS (ALT 636 FOR OP/ED): Mod: JZ

## 2024-07-23 PROCEDURE — 73590 X-RAY EXAM OF LOWER LEG: CPT | Mod: RIGHT SIDE | Performed by: RADIOLOGY

## 2024-07-23 RX ORDER — INSULIN LISPRO 100 [IU]/ML
0-5 INJECTION, SOLUTION INTRAVENOUS; SUBCUTANEOUS
Status: DISCONTINUED | OUTPATIENT
Start: 2024-07-24 | End: 2024-07-29 | Stop reason: HOSPADM

## 2024-07-23 RX ORDER — VANCOMYCIN 1 G/200ML
1000 INJECTION, SOLUTION INTRAVENOUS EVERY 24 HOURS
Status: DISCONTINUED | OUTPATIENT
Start: 2024-07-24 | End: 2024-07-27

## 2024-07-23 RX ORDER — CLONIDINE HYDROCHLORIDE 0.2 MG/1
0.4 TABLET ORAL NIGHTLY
Status: DISCONTINUED | OUTPATIENT
Start: 2024-07-23 | End: 2024-07-24

## 2024-07-23 RX ORDER — TIMOLOL MALEATE 5 MG/ML
1 SOLUTION/ DROPS OPHTHALMIC DAILY
Status: DISCONTINUED | OUTPATIENT
Start: 2024-07-24 | End: 2024-07-24

## 2024-07-23 RX ORDER — SIMVASTATIN 40 MG/1
40 TABLET, FILM COATED ORAL NIGHTLY
Status: DISCONTINUED | OUTPATIENT
Start: 2024-07-23 | End: 2024-07-29 | Stop reason: HOSPADM

## 2024-07-23 RX ORDER — LATANOPROST 50 UG/ML
1 SOLUTION/ DROPS OPHTHALMIC EVERY 24 HOURS
Status: DISCONTINUED | OUTPATIENT
Start: 2024-07-23 | End: 2024-07-29 | Stop reason: HOSPADM

## 2024-07-23 RX ORDER — GLIMEPIRIDE 2 MG/1
2 TABLET ORAL
Status: DISCONTINUED | OUTPATIENT
Start: 2024-07-24 | End: 2024-07-29 | Stop reason: HOSPADM

## 2024-07-23 RX ORDER — ATENOLOL 50 MG/1
100 TABLET ORAL DAILY
Status: DISCONTINUED | OUTPATIENT
Start: 2024-07-24 | End: 2024-07-24

## 2024-07-23 RX ORDER — PREDNISOLONE ACETATE 10 MG/ML
1 SUSPENSION/ DROPS OPHTHALMIC 2 TIMES DAILY
Status: DISCONTINUED | OUTPATIENT
Start: 2024-07-23 | End: 2024-07-24

## 2024-07-23 RX ORDER — ACETAMINOPHEN 325 MG/1
650 TABLET ORAL EVERY 6 HOURS PRN
Status: DISCONTINUED | OUTPATIENT
Start: 2024-07-23 | End: 2024-07-29 | Stop reason: HOSPADM

## 2024-07-23 RX ORDER — VANCOMYCIN 1 G/200ML
1 INJECTION, SOLUTION INTRAVENOUS ONCE
Status: COMPLETED | OUTPATIENT
Start: 2024-07-23 | End: 2024-07-23

## 2024-07-23 RX ORDER — LEVOTHYROXINE SODIUM 100 UG/1
100 TABLET ORAL
Status: DISCONTINUED | OUTPATIENT
Start: 2024-07-24 | End: 2024-07-29 | Stop reason: HOSPADM

## 2024-07-23 RX ORDER — VANCOMYCIN HYDROCHLORIDE 1 G/20ML
INJECTION, POWDER, LYOPHILIZED, FOR SOLUTION INTRAVENOUS DAILY PRN
Status: DISCONTINUED | OUTPATIENT
Start: 2024-07-23 | End: 2024-07-23 | Stop reason: ALTCHOICE

## 2024-07-23 RX ORDER — BRIMONIDINE TARTRATE 2 MG/ML
1 SOLUTION/ DROPS OPHTHALMIC 3 TIMES DAILY
Status: DISCONTINUED | OUTPATIENT
Start: 2024-07-23 | End: 2024-07-24

## 2024-07-23 RX ORDER — DEXTROSE 50 % IN WATER (D50W) INTRAVENOUS SYRINGE
12.5
Status: DISCONTINUED | OUTPATIENT
Start: 2024-07-23 | End: 2024-07-29 | Stop reason: HOSPADM

## 2024-07-23 RX ORDER — VANCOMYCIN HYDROCHLORIDE 1 G/20ML
INJECTION, POWDER, LYOPHILIZED, FOR SOLUTION INTRAVENOUS DAILY PRN
Status: DISCONTINUED | OUTPATIENT
Start: 2024-07-23 | End: 2024-07-27

## 2024-07-23 RX ORDER — DEXTROSE 50 % IN WATER (D50W) INTRAVENOUS SYRINGE
25
Status: DISCONTINUED | OUTPATIENT
Start: 2024-07-23 | End: 2024-07-29 | Stop reason: HOSPADM

## 2024-07-23 RX ORDER — FUROSEMIDE 40 MG/1
40 TABLET ORAL DAILY
Status: DISCONTINUED | OUTPATIENT
Start: 2024-07-24 | End: 2024-07-29 | Stop reason: HOSPADM

## 2024-07-23 RX ORDER — HEPARIN SODIUM 5000 [USP'U]/ML
5000 INJECTION, SOLUTION INTRAVENOUS; SUBCUTANEOUS EVERY 12 HOURS SCHEDULED
Status: DISCONTINUED | OUTPATIENT
Start: 2024-07-23 | End: 2024-07-29 | Stop reason: HOSPADM

## 2024-07-23 RX ADMIN — LATANOPROST 1 DROP: 50 SOLUTION OPHTHALMIC at 22:51

## 2024-07-23 RX ADMIN — SODIUM CHLORIDE 500 ML: 900 INJECTION, SOLUTION INTRAVENOUS at 18:58

## 2024-07-23 RX ADMIN — PIPERACILLIN SODIUM AND TAZOBACTAM SODIUM 3.38 G: 3; .375 INJECTION, SOLUTION INTRAVENOUS at 18:35

## 2024-07-23 RX ADMIN — VANCOMYCIN 1 G: 1 INJECTION, SOLUTION INTRAVENOUS at 20:53

## 2024-07-23 RX ADMIN — HEPARIN SODIUM 5000 UNITS: 5000 INJECTION, SOLUTION INTRAVENOUS; SUBCUTANEOUS at 22:37

## 2024-07-23 RX ADMIN — ACETAMINOPHEN 650 MG: 325 TABLET ORAL at 22:27

## 2024-07-23 RX ADMIN — BRIMONIDINE TARTRATE 1 DROP: 2 SOLUTION/ DROPS OPHTHALMIC at 22:38

## 2024-07-23 RX ADMIN — CLONIDINE HYDROCHLORIDE 0.4 MG: 0.2 TABLET ORAL at 22:37

## 2024-07-23 RX ADMIN — SIMVASTATIN 40 MG: 40 TABLET, FILM COATED ORAL at 22:37

## 2024-07-23 RX ADMIN — IOHEXOL 75 ML: 350 INJECTION, SOLUTION INTRAVENOUS at 21:18

## 2024-07-23 SDOH — SOCIAL STABILITY: SOCIAL INSECURITY: WERE YOU ABLE TO COMPLETE ALL THE BEHAVIORAL HEALTH SCREENINGS?: YES

## 2024-07-23 SDOH — SOCIAL STABILITY: SOCIAL INSECURITY: ARE THERE ANY APPARENT SIGNS OF INJURIES/BEHAVIORS THAT COULD BE RELATED TO ABUSE/NEGLECT?: NO

## 2024-07-23 SDOH — SOCIAL STABILITY: SOCIAL INSECURITY: ABUSE: ADULT

## 2024-07-23 SDOH — SOCIAL STABILITY: SOCIAL INSECURITY: HAS ANYONE EVER THREATENED TO HURT YOUR FAMILY OR YOUR PETS?: NO

## 2024-07-23 SDOH — SOCIAL STABILITY: SOCIAL INSECURITY: ARE YOU OR HAVE YOU BEEN THREATENED OR ABUSED PHYSICALLY, EMOTIONALLY, OR SEXUALLY BY ANYONE?: NO

## 2024-07-23 SDOH — SOCIAL STABILITY: SOCIAL INSECURITY: HAVE YOU HAD THOUGHTS OF HARMING ANYONE ELSE?: NO

## 2024-07-23 SDOH — SOCIAL STABILITY: SOCIAL INSECURITY: DOES ANYONE TRY TO KEEP YOU FROM HAVING/CONTACTING OTHER FRIENDS OR DOING THINGS OUTSIDE YOUR HOME?: NO

## 2024-07-23 SDOH — SOCIAL STABILITY: SOCIAL INSECURITY: DO YOU FEEL UNSAFE GOING BACK TO THE PLACE WHERE YOU ARE LIVING?: NO

## 2024-07-23 SDOH — SOCIAL STABILITY: SOCIAL INSECURITY: DO YOU FEEL ANYONE HAS EXPLOITED OR TAKEN ADVANTAGE OF YOU FINANCIALLY OR OF YOUR PERSONAL PROPERTY?: NO

## 2024-07-23 ASSESSMENT — ENCOUNTER SYMPTOMS
CARDIOVASCULAR NEGATIVE: 1
ENDOCRINE NEGATIVE: 1
CONSTITUTIONAL NEGATIVE: 1
ALLERGIC/IMMUNOLOGIC NEGATIVE: 1
MUSCULOSKELETAL NEGATIVE: 1
ROS SKIN COMMENTS: RIGHT SHIN WOUND
NEUROLOGICAL NEGATIVE: 1
GASTROINTESTINAL NEGATIVE: 1
PSYCHIATRIC NEGATIVE: 1
HEMATOLOGIC/LYMPHATIC NEGATIVE: 1
RESPIRATORY NEGATIVE: 1
EYES NEGATIVE: 1

## 2024-07-23 ASSESSMENT — LIFESTYLE VARIABLES
AUDIT-C TOTAL SCORE: 0
AUDIT-C TOTAL SCORE: 0
HOW OFTEN DO YOU HAVE A DRINK CONTAINING ALCOHOL: NEVER
SKIP TO QUESTIONS 9-10: 1
HOW MANY STANDARD DRINKS CONTAINING ALCOHOL DO YOU HAVE ON A TYPICAL DAY: PATIENT DOES NOT DRINK
HOW OFTEN DO YOU HAVE 6 OR MORE DRINKS ON ONE OCCASION: NEVER

## 2024-07-23 ASSESSMENT — ACTIVITIES OF DAILY LIVING (ADL)
WALKS IN HOME: INDEPENDENT
TOILETING: INDEPENDENT
ASSISTIVE_DEVICE: WALKER
LACK_OF_TRANSPORTATION: NO
GROOMING: INDEPENDENT
DRESSING YOURSELF: INDEPENDENT
FEEDING YOURSELF: INDEPENDENT
BATHING: INDEPENDENT
ADEQUATE_TO_COMPLETE_ADL: NO
PATIENT'S MEMORY ADEQUATE TO SAFELY COMPLETE DAILY ACTIVITIES?: NO
JUDGMENT_ADEQUATE_SAFELY_COMPLETE_DAILY_ACTIVITIES: YES
HEARING - LEFT EAR: HEARING AID
HEARING - RIGHT EAR: HEARING AID

## 2024-07-23 ASSESSMENT — PAIN SCALES - GENERAL
PAINLEVEL_OUTOF10: 0 - NO PAIN
PAINLEVEL_OUTOF10: 3
PAINLEVEL_OUTOF10: 0 - NO PAIN

## 2024-07-23 ASSESSMENT — COGNITIVE AND FUNCTIONAL STATUS - GENERAL
STANDING UP FROM CHAIR USING ARMS: A LITTLE
WALKING IN HOSPITAL ROOM: A LITTLE
TOILETING: A LITTLE
MOBILITY SCORE: 19
DRESSING REGULAR LOWER BODY CLOTHING: A LITTLE
CLIMB 3 TO 5 STEPS WITH RAILING: A LITTLE
MOVING TO AND FROM BED TO CHAIR: A LITTLE
PATIENT BASELINE BEDBOUND: NO
TURNING FROM BACK TO SIDE WHILE IN FLAT BAD: A LITTLE
HELP NEEDED FOR BATHING: A LITTLE
DAILY ACTIVITIY SCORE: 21

## 2024-07-23 ASSESSMENT — PATIENT HEALTH QUESTIONNAIRE - PHQ9
1. LITTLE INTEREST OR PLEASURE IN DOING THINGS: NOT AT ALL
SUM OF ALL RESPONSES TO PHQ9 QUESTIONS 1 & 2: 0
2. FEELING DOWN, DEPRESSED OR HOPELESS: NOT AT ALL

## 2024-07-23 ASSESSMENT — PAIN DESCRIPTION - DESCRIPTORS: DESCRIPTORS: ACHING;DISCOMFORT

## 2024-07-23 ASSESSMENT — PAIN - FUNCTIONAL ASSESSMENT
PAIN_FUNCTIONAL_ASSESSMENT: 0-10

## 2024-07-23 NOTE — ED PROVIDER NOTES
HPI   Chief Complaint   Patient presents with    Wound Infection     Pt has wound to LLE that is draining copious amount of fluid.        HPI  This is a 89-year-old female presenting to the emergency room with complaints of right lower extremity wound, and redness.  Patient states that she fell on July 1.  She scraped her right anterior aspect of her leg.  Of note, patient was seen in the emergency department on July 1, 2024.  ED chart was reviewed.  CT scan of head and cervical spine showed no acute findings at that time.  She did have fracture of her right thumb.  At the time she had a scrape on her right leg.  Patient states that since she has been having increasing wound of the right lower anterior aspect of her leg that is draining.  She has not been seen by a provider for this.  She was never started on the oral antibiotics.  Patient states that over the last few days she has been feeling weak.  No subjective fever or chills.  Patient did go to urgent care today who prompted her to go to the emergency department for evaluation.    Please see HPI for pertinent positive and negative ROS.     Patient History   Past Medical History:   Diagnosis Date    Accidental fall 03/19/2024    Diabetes mellitus (Multi)     Disease of thyroid gland     Fracture of pelvis (Multi) 03/19/2024    Hypertension     Injury of head 03/19/2024    Oth fracture of left pubis, init encntr for closed fracture (Multi) 07/03/2022    Syncope 03/19/2024     History reviewed. No pertinent surgical history.  No family history on file.  Social History     Tobacco Use    Smoking status: Never     Passive exposure: Never    Smokeless tobacco: Never   Vaping Use    Vaping status: Never Used   Substance Use Topics    Alcohol use: Never    Drug use: Never       Physical Exam   ED Triage Vitals [07/23/24 1701]   Temperature Heart Rate Respirations BP   36.9 °C (98.4 °F) 70 15 148/64      Pulse Ox Temp Source Heart Rate Source Patient Position   94 %  Oral Monitor Lying      BP Location FiO2 (%)     Left arm --       Physical Exam  GENERAL APPEARANCE: Awake and alert. No acute respiratory distress.   VITAL SIGNS: As per the nurses' triage record.  HEENT: Normocephalic, atraumatic. Extraocular muscles are intact. Conjunctiva are pink. Negative scleral icterus. Mucous membranes are moist.   NECK: Soft, nontender and supple  CHEST: Nontender to palpation. Clear to auscultation bilaterally. Symmetric rise and fall of chest wall.   HEART: Clear S1 and S2. Regular rate and rhythm. Strong and equal pulses in the extremities.  2+ pedal pulses bilaterally.  ABDOMEN: Soft, nontender, nondistended  MUSCULOSKELETAL: Patient does have calf tenderness of the right calf.  Full gross active range of motion. Ambulating on own with no acute difficulties  NEUROLOGICAL: Awake, alert and oriented x 3. Motor power intact in the upper and lower extremities. Sensation is intact to light touch in the upper and lower extremities. Patient answering questions appropriately.   IMMUNOLOGICAL: No lymphatic streaking noted  DERMATOLOGIC: Patient has a large wound with granulation type tissue over the anterior aspect of her right leg with surrounding erythema and edema extending from right knee down to right foot.  PYSCH: Cooperative with appropriate mood and affect.    ED Course & MDM   ED Course as of 07/23/24 2047   Tue Jul 23, 2024 1959 The patient does have a history of congestive heart failure.  She does not have leukocytosis, her lactic acid is 1.2, she has no tachycardia, no tachypnea or fever.  Therefore, patient is not meeting SIRS criteria.  Upon initial evaluation, due to impressive right leg wound with cellulitis, I did initiate sepsis workup including lactic acid and blood cultures.  She was treated with broad-spectrum antibiotics including IV Zosyn and IV vancomycin.  She does have a history of congestive heart failure, therefore the 30 mL/kg IV fluid bolus would be more harm  than benefit to patient.  Therefore I did order IV normal saline 500 mL.  She is not in shock. [SC]      ED Course User Index  [SC] Shira Ochoa PA-C         Diagnoses as of 07/23/24 2047   Cellulitis of right lower extremity   Wound of right lower extremity, initial encounter                       Vielka Coma Scale Score: 15                        Medical Decision Making  Parts of this chart have been completed using voice recognition software. Please excuse any errors of transcription.  My thought process and reason for plan has been formulated from the time that I saw the patient until the time of disposition and is not specific to one specific moment during their visit and furthermore my MDM encompasses this entire chart and not only this text box.      HPI: Detailed above.    Exam: A medically appropriate exam performed, outlined above, given the known history and presentation.    History obtained from: Patient    Medications given during visit:  Medications   vancomycin (Xellia) 1 g in diluent combination  mL (has no administration in time range)   piperacillin-tazobactam (Zosyn) 3.375 g in dextrose (iso) IV 50 mL (0 g intravenous Stopped 7/23/24 1854)   sodium chloride 0.9 % bolus 500 mL (500 mL intravenous New Bag 7/23/24 1858)        Diagnostic/tests  Labs Reviewed   CBC WITH AUTO DIFFERENTIAL - Abnormal       Result Value    WBC 8.9      nRBC 0.0      RBC 3.82 (*)     Hemoglobin 10.7 (*)     Hematocrit 33.4 (*)     MCV 87      MCH 28.0      MCHC 32.0      RDW 13.9      Platelets 312      Neutrophils % 77.6      Immature Granulocytes %, Automated 0.4      Lymphocytes % 13.1      Monocytes % 7.3      Eosinophils % 0.9      Basophils % 0.7      Neutrophils Absolute 6.94 (*)     Immature Granulocytes Absolute, Automated 0.04      Lymphocytes Absolute 1.17      Monocytes Absolute 0.65      Eosinophils Absolute 0.08      Basophils Absolute 0.06     COMPREHENSIVE METABOLIC PANEL - Abnormal     Glucose 199 (*)     Sodium 131 (*)     Potassium 4.2      Chloride 94 (*)     Bicarbonate 27      Urea Nitrogen 16      Creatinine 0.80      eGFR 68      Calcium 9.3      Albumin 3.9      Alkaline Phosphatase 71      Total Protein 7.1      AST 16      Bilirubin, Total 0.3      ALT 10      Anion Gap 10     URINALYSIS WITH REFLEX CULTURE AND MICROSCOPIC - Abnormal    Color, Urine Light-Yellow      Appearance, Urine Clear      Specific Gravity, Urine 1.009      pH, Urine 7.0      Protein, Urine NEGATIVE      Glucose, Urine Normal      Blood, Urine 0.06 (1+) (*)     Ketones, Urine NEGATIVE      Bilirubin, Urine NEGATIVE      Urobilinogen, Urine Normal      Nitrite, Urine NEGATIVE      Leukocyte Esterase, Urine 500 Valerie/µL (*)    MICROSCOPIC ONLY, URINE - Abnormal    WBC, Urine >50 (*)     WBC Clumps, Urine OCCASIONAL      RBC, Urine 6-10 (*)     Transitional Epithelial Cells, Urine 1-2 (FEW)     BLOOD GAS LACTIC ACID, VENOUS - Normal    POCT Lactate, Venous 1.2     BLOOD CULTURE   BLOOD CULTURE   TISSUE/WOUND CULTURE/SMEAR   URINE CULTURE   URINALYSIS WITH REFLEX CULTURE AND MICROSCOPIC    Narrative:     The following orders were created for panel order Urinalysis with Reflex Culture and Microscopic.  Procedure                               Abnormality         Status                     ---------                               -----------         ------                     Urinalysis with Reflex C...[[id number]]  Abnormal            Final result               Extra Urine Gray Tube[[id number]]                                                         Please view results for these tests on the individual orders.   EXTRA URINE GRAY TUBE      Lower extremity venous duplex right   Final Result   Suboptimal visualization of the calf veins. No sonographic evidence   of acute DVT in the visualized vessels of the right lower extremity.        MACRO:   None        Signed by: Angelica Aguila 7/23/2024 6:44 PM   Dictation workstation:    [id number]      XR tibia fibula right 2 views   Final Result   No radiographic evidence of osteomyelitis. Soft tissue edema and soft   tissue wound at the anterior aspect of the cuff             MACRO:   None        Signed by: Hira Lawrence 7/23/2024 6:05 PM   Dictation workstation:   [id number]           Considerations/further MDM:  Patient was seen in conjucntion with my supervising physician,  Dr. Harvey. Please refer to her note.    Patient presents with stable vital signs.  Differential diagnosis includes was not limited to sepsis versus cellulitis versus DVT versus osteomyelitis    CBC shows no leukocytosis, lactic acid 1.2, urinalysis does show evidence of urinary tract infection.  CMP shows mild hyponatremia but otherwise unremarkable.  Ultrasound of the right lower extremity shows no evidence of DVT.  X-ray of the right tibia-fibula shows no radiographic evidence of osteomyelitis.     Patient was treated with IV fluids, IV vancomycin and IV Zosyn.  Due to patient having impressive cellulitis with wound on the right lower extremity, plan for admission    The patient was evaluated in the emergency department and an etiology requiring emergent treatment or admission to hospital was identified.  The patient/family was counseled on clinical expression, expectations, and plan along with recommendations for admission.  All questions were answered and involved parties were understanding and agreeable to course of treatment.  Case was discussed with admitting physician, Dr. Marie.  Bed type ED treatment and further ED work-up decided by joint decision making with admitting team and any consultants.  Patient stable for admission per my assessment and further management of patient will be deferred to the inpatient setting.      Procedure  Procedures     Shira Ochoa PA-C  07/23/24 2017       Shira Ochoa PA-C  07/23/24 2047

## 2024-07-23 NOTE — PROGRESS NOTES
Patient called to report that she was supposed to have Wound Care appointment today at [facility]. Was very unhappy with waiting area, was crowded & extremely long wait times. After almost an hour with no one assessing her, left without being seen. States daughter issued a complaint & now patient will be seen at Urgent Care in [place] this afternoon. Patient states continues to have significant drainage from rt. lower leg & has significant difficulty applying dressing due to fractured thumb. Will send message to PCP & Karla Renee CNP with update.   UPDATE: Received call from patient stating she went to Urgent Care & was referred to emergency room due to wound being badly infected. Will message PCP & CNP.

## 2024-07-23 NOTE — CONSULTS
Vancomycin Dosing by Pharmacy- EMERGENCY DEPARTMENT    Apoorva Sanchez is a 89 y.o. year old female who Pharmacy has been consulted to give a ONE TIME ONLY vancomycin dose in the Emergency Department for cellulitis, skin and soft tissue.     Visit Vitals  /64 (BP Location: Left arm, Patient Position: Lying)   Pulse 70   Temp 36.9 °C (98.4 °F) (Oral)   Resp 15        Lab Results   Component Value Date    CREATININE 0.80 03/05/2024    CREATININE 0.8 09/07/2023    CREATININE 0.8 03/06/2023    CREATININE 0.8 10/05/2022    CREATININE 0.7 07/05/2022        Patient weight is   Wt Readings from Last 1 Encounters:   07/23/24 54.5 kg (120 lb 2.4 oz)        Assessment/Plan     Patient will be given a one time dose of 1000 mg based on a 15-20 mg/kg/dose dosing regimen.  Pharmacy will sign off at this time. If vancomycin is to be continued, please re-consult Pharmacy.       Schuyler Ridley, Jorge AlbertoD

## 2024-07-24 ENCOUNTER — APPOINTMENT (OUTPATIENT)
Dept: CARDIOLOGY | Facility: HOSPITAL | Age: 89
DRG: 573 | End: 2024-07-24
Payer: MEDICARE

## 2024-07-24 ENCOUNTER — APPOINTMENT (OUTPATIENT)
Dept: RADIOLOGY | Facility: HOSPITAL | Age: 89
DRG: 573 | End: 2024-07-24
Payer: MEDICARE

## 2024-07-24 LAB
ANION GAP SERPL CALC-SCNC: 8 MMOL/L
BUN SERPL-MCNC: 11 MG/DL (ref 8–25)
CALCIUM SERPL-MCNC: 8.8 MG/DL (ref 8.5–10.4)
CHLORIDE SERPL-SCNC: 99 MMOL/L (ref 97–107)
CO2 SERPL-SCNC: 29 MMOL/L (ref 24–31)
CREAT SERPL-MCNC: 0.8 MG/DL (ref 0.4–1.6)
EGFRCR SERPLBLD CKD-EPI 2021: 68 ML/MIN/1.73M*2
ERYTHROCYTE [DISTWIDTH] IN BLOOD BY AUTOMATED COUNT: 13.9 % (ref 11.5–14.5)
EST. AVERAGE GLUCOSE BLD GHB EST-MCNC: 157 MG/DL
GLUCOSE BLD MANUAL STRIP-MCNC: 100 MG/DL (ref 74–99)
GLUCOSE BLD MANUAL STRIP-MCNC: 112 MG/DL (ref 74–99)
GLUCOSE BLD MANUAL STRIP-MCNC: 159 MG/DL (ref 74–99)
GLUCOSE BLD MANUAL STRIP-MCNC: 173 MG/DL (ref 74–99)
GLUCOSE SERPL-MCNC: 120 MG/DL (ref 65–99)
HBA1C MFR BLD: 7.1 %
HCT VFR BLD AUTO: 28.8 % (ref 36–46)
HGB BLD-MCNC: 9.1 G/DL (ref 12–16)
MCH RBC QN AUTO: 28 PG (ref 26–34)
MCHC RBC AUTO-ENTMCNC: 31.6 G/DL (ref 32–36)
MCV RBC AUTO: 89 FL (ref 80–100)
NRBC BLD-RTO: 0 /100 WBCS (ref 0–0)
PLATELET # BLD AUTO: 272 X10*3/UL (ref 150–450)
POTASSIUM SERPL-SCNC: 4.3 MMOL/L (ref 3.4–5.1)
RBC # BLD AUTO: 3.25 X10*6/UL (ref 4–5.2)
SODIUM SERPL-SCNC: 136 MMOL/L (ref 133–145)
VANCOMYCIN SERPL-MCNC: 8 UG/ML (ref 10–20)
WBC # BLD AUTO: 8.5 X10*3/UL (ref 4.4–11.3)

## 2024-07-24 PROCEDURE — 93005 ELECTROCARDIOGRAM TRACING: CPT

## 2024-07-24 PROCEDURE — 1100000001 HC PRIVATE ROOM DAILY

## 2024-07-24 PROCEDURE — 2500000001 HC RX 250 WO HCPCS SELF ADMINISTERED DRUGS (ALT 637 FOR MEDICARE OP): Performed by: NURSE PRACTITIONER

## 2024-07-24 PROCEDURE — 83036 HEMOGLOBIN GLYCOSYLATED A1C: CPT | Performed by: NURSE PRACTITIONER

## 2024-07-24 PROCEDURE — 2500000001 HC RX 250 WO HCPCS SELF ADMINISTERED DRUGS (ALT 637 FOR MEDICARE OP): Performed by: REGISTERED NURSE

## 2024-07-24 PROCEDURE — 97535 SELF CARE MNGMENT TRAINING: CPT | Mod: GO

## 2024-07-24 PROCEDURE — 85027 COMPLETE CBC AUTOMATED: CPT | Performed by: NURSE PRACTITIONER

## 2024-07-24 PROCEDURE — 93922 UPR/L XTREMITY ART 2 LEVELS: CPT

## 2024-07-24 PROCEDURE — 99222 1ST HOSP IP/OBS MODERATE 55: CPT | Performed by: INTERNAL MEDICINE

## 2024-07-24 PROCEDURE — 36415 COLL VENOUS BLD VENIPUNCTURE: CPT | Performed by: NURSE PRACTITIONER

## 2024-07-24 PROCEDURE — 2500000004 HC RX 250 GENERAL PHARMACY W/ HCPCS (ALT 636 FOR OP/ED): Mod: JZ | Performed by: NURSE PRACTITIONER

## 2024-07-24 PROCEDURE — 97530 THERAPEUTIC ACTIVITIES: CPT | Mod: GP

## 2024-07-24 PROCEDURE — 97161 PT EVAL LOW COMPLEX 20 MIN: CPT | Mod: GP

## 2024-07-24 PROCEDURE — 2500000001 HC RX 250 WO HCPCS SELF ADMINISTERED DRUGS (ALT 637 FOR MEDICARE OP): Performed by: INTERNAL MEDICINE

## 2024-07-24 PROCEDURE — 80048 BASIC METABOLIC PNL TOTAL CA: CPT | Performed by: NURSE PRACTITIONER

## 2024-07-24 PROCEDURE — 80202 ASSAY OF VANCOMYCIN: CPT | Performed by: NURSE PRACTITIONER

## 2024-07-24 PROCEDURE — 97165 OT EVAL LOW COMPLEX 30 MIN: CPT | Mod: GO

## 2024-07-24 PROCEDURE — 99223 1ST HOSP IP/OBS HIGH 75: CPT | Performed by: NURSE PRACTITIONER

## 2024-07-24 PROCEDURE — 2500000002 HC RX 250 W HCPCS SELF ADMINISTERED DRUGS (ALT 637 FOR MEDICARE OP, ALT 636 FOR OP/ED): Performed by: NURSE PRACTITIONER

## 2024-07-24 PROCEDURE — 82947 ASSAY GLUCOSE BLOOD QUANT: CPT

## 2024-07-24 PROCEDURE — 87205 SMEAR GRAM STAIN: CPT | Mod: TRILAB,WESLAB | Performed by: STUDENT IN AN ORGANIZED HEALTH CARE EDUCATION/TRAINING PROGRAM

## 2024-07-24 RX ORDER — ATENOLOL 50 MG/1
50 TABLET ORAL DAILY
Status: DISCONTINUED | OUTPATIENT
Start: 2024-07-25 | End: 2024-07-29 | Stop reason: HOSPADM

## 2024-07-24 RX ORDER — CLONIDINE HYDROCHLORIDE 0.2 MG/1
0.2 TABLET ORAL NIGHTLY
Status: DISCONTINUED | OUTPATIENT
Start: 2024-07-24 | End: 2024-07-29 | Stop reason: HOSPADM

## 2024-07-24 RX ORDER — BRIMONIDINE TARTRATE 2 MG/ML
1 SOLUTION/ DROPS OPHTHALMIC 2 TIMES DAILY
Status: DISCONTINUED | OUTPATIENT
Start: 2024-07-24 | End: 2024-07-29 | Stop reason: HOSPADM

## 2024-07-24 RX ORDER — TIMOLOL MALEATE 5 MG/ML
1 SOLUTION/ DROPS OPHTHALMIC 2 TIMES DAILY
Status: DISCONTINUED | OUTPATIENT
Start: 2024-07-24 | End: 2024-07-29 | Stop reason: HOSPADM

## 2024-07-24 RX ADMIN — BRIMONIDINE TARTRATE 1 DROP: 2 SOLUTION/ DROPS OPHTHALMIC at 21:15

## 2024-07-24 RX ADMIN — TIMOLOL MALEATE 1 DROP: 5 SOLUTION/ DROPS OPHTHALMIC at 09:00

## 2024-07-24 RX ADMIN — HEPARIN SODIUM 5000 UNITS: 5000 INJECTION, SOLUTION INTRAVENOUS; SUBCUTANEOUS at 09:01

## 2024-07-24 RX ADMIN — SIMVASTATIN 40 MG: 40 TABLET, FILM COATED ORAL at 21:15

## 2024-07-24 RX ADMIN — BRIMONIDINE TARTRATE 1 DROP: 2 SOLUTION/ DROPS OPHTHALMIC at 09:00

## 2024-07-24 RX ADMIN — PIPERACILLIN SODIUM AND TAZOBACTAM SODIUM 3.38 G: 3; .375 INJECTION, SOLUTION INTRAVENOUS at 12:00

## 2024-07-24 RX ADMIN — TIMOLOL MALEATE 1 DROP: 5 SOLUTION/ DROPS OPHTHALMIC at 02:30

## 2024-07-24 RX ADMIN — GLIMEPIRIDE 2 MG: 2 TABLET ORAL at 17:14

## 2024-07-24 RX ADMIN — VANCOMYCIN 1000 MG: 1 INJECTION, SOLUTION INTRAVENOUS at 21:15

## 2024-07-24 RX ADMIN — CLONIDINE HYDROCHLORIDE 0.2 MG: 0.2 TABLET ORAL at 21:15

## 2024-07-24 RX ADMIN — PIPERACILLIN SODIUM AND TAZOBACTAM SODIUM 3.38 G: 3; .375 INJECTION, SOLUTION INTRAVENOUS at 17:14

## 2024-07-24 RX ADMIN — LATANOPROST 1 DROP: 50 SOLUTION OPHTHALMIC at 21:18

## 2024-07-24 RX ADMIN — PIPERACILLIN SODIUM AND TAZOBACTAM SODIUM 3.38 G: 3; .375 INJECTION, SOLUTION INTRAVENOUS at 00:02

## 2024-07-24 RX ADMIN — HEPARIN SODIUM 5000 UNITS: 5000 INJECTION, SOLUTION INTRAVENOUS; SUBCUTANEOUS at 21:15

## 2024-07-24 RX ADMIN — ATENOLOL 100 MG: 50 TABLET ORAL at 09:01

## 2024-07-24 RX ADMIN — LEVOTHYROXINE SODIUM 100 MCG: 0.1 TABLET ORAL at 05:27

## 2024-07-24 RX ADMIN — TIMOLOL MALEATE 1 DROP: 5 SOLUTION/ DROPS OPHTHALMIC at 21:15

## 2024-07-24 RX ADMIN — PIPERACILLIN SODIUM AND TAZOBACTAM SODIUM 3.38 G: 3; .375 INJECTION, SOLUTION INTRAVENOUS at 05:27

## 2024-07-24 RX ADMIN — GLIMEPIRIDE 2 MG: 2 TABLET ORAL at 09:01

## 2024-07-24 RX ADMIN — FUROSEMIDE 40 MG: 40 TABLET ORAL at 09:01

## 2024-07-24 SDOH — ECONOMIC STABILITY: INCOME INSECURITY: IN THE LAST 12 MONTHS, WAS THERE A TIME WHEN YOU WERE NOT ABLE TO PAY THE MORTGAGE OR RENT ON TIME?: NO

## 2024-07-24 SDOH — HEALTH STABILITY: MENTAL HEALTH: HOW OFTEN DO YOU HAVE A DRINK CONTAINING ALCOHOL?: NEVER

## 2024-07-24 SDOH — SOCIAL STABILITY: SOCIAL INSECURITY
WITHIN THE LAST YEAR, HAVE YOU BEEN RAPED OR FORCED TO HAVE ANY KIND OF SEXUAL ACTIVITY BY YOUR PARTNER OR EX-PARTNER?: NO

## 2024-07-24 SDOH — HEALTH STABILITY: MENTAL HEALTH
HOW OFTEN DO YOU NEED TO HAVE SOMEONE HELP YOU WHEN YOU READ INSTRUCTIONS, PAMPHLETS, OR OTHER WRITTEN MATERIAL FROM YOUR DOCTOR OR PHARMACY?: NEVER

## 2024-07-24 SDOH — ECONOMIC STABILITY: HOUSING INSECURITY: IN THE PAST 12 MONTHS, HOW MANY TIMES HAVE YOU MOVED WHERE YOU WERE LIVING?: 1

## 2024-07-24 SDOH — ECONOMIC STABILITY: FOOD INSECURITY: HOW HARD IS IT FOR YOU TO PAY FOR THE VERY BASICS LIKE FOOD, HOUSING, MEDICAL CARE, AND HEATING?: NOT HARD AT ALL

## 2024-07-24 SDOH — HEALTH STABILITY: MENTAL HEALTH: HOW MANY STANDARD DRINKS CONTAINING ALCOHOL DO YOU HAVE ON A TYPICAL DAY?: PATIENT DOES NOT DRINK

## 2024-07-24 SDOH — SOCIAL STABILITY: SOCIAL INSECURITY: WITHIN THE LAST YEAR, HAVE YOU BEEN AFRAID OF YOUR PARTNER OR EX-PARTNER?: NO

## 2024-07-24 SDOH — ECONOMIC STABILITY: FOOD INSECURITY: WITHIN THE PAST 12 MONTHS, YOU WORRIED THAT YOUR FOOD WOULD RUN OUT BEFORE YOU GOT THE MONEY TO BUY MORE.: NEVER TRUE

## 2024-07-24 SDOH — HEALTH STABILITY: MENTAL HEALTH: HOW OFTEN DO YOU HAVE 6 OR MORE DRINKS ON ONE OCCASION?: NEVER

## 2024-07-24 SDOH — SOCIAL STABILITY: SOCIAL NETWORK
DO YOU BELONG TO ANY CLUBS OR ORGANIZATIONS SUCH AS CHURCH GROUPS UNIONS, FRATERNAL OR ATHLETIC GROUPS, OR SCHOOL GROUPS?: NO

## 2024-07-24 SDOH — HEALTH STABILITY: PHYSICAL HEALTH: ON AVERAGE, HOW MANY DAYS PER WEEK DO YOU ENGAGE IN MODERATE TO STRENUOUS EXERCISE (LIKE A BRISK WALK)?: 5 DAYS

## 2024-07-24 SDOH — SOCIAL STABILITY: SOCIAL INSECURITY
WITHIN THE LAST YEAR, HAVE YOU BEEN KICKED, HIT, SLAPPED, OR OTHERWISE PHYSICALLY HURT BY YOUR PARTNER OR EX-PARTNER?: NO

## 2024-07-24 SDOH — ECONOMIC STABILITY: HOUSING INSECURITY: AT ANY TIME IN THE PAST 12 MONTHS, WERE YOU HOMELESS OR LIVING IN A SHELTER (INCLUDING NOW)?: NO

## 2024-07-24 SDOH — ECONOMIC STABILITY: FOOD INSECURITY: WITHIN THE PAST 12 MONTHS, THE FOOD YOU BOUGHT JUST DIDN'T LAST AND YOU DIDN'T HAVE MONEY TO GET MORE.: NEVER TRUE

## 2024-07-24 SDOH — SOCIAL STABILITY: SOCIAL NETWORK
IN A TYPICAL WEEK, HOW MANY TIMES DO YOU TALK ON THE PHONE WITH FAMILY, FRIENDS, OR NEIGHBORS?: MORE THAN THREE TIMES A WEEK

## 2024-07-24 SDOH — HEALTH STABILITY: MENTAL HEALTH
DO YOU FEEL STRESS - TENSE, RESTLESS, NERVOUS, OR ANXIOUS, OR UNABLE TO SLEEP AT NIGHT BECAUSE YOUR MIND IS TROUBLED ALL THE TIME - THESE DAYS?: NOT AT ALL

## 2024-07-24 SDOH — ECONOMIC STABILITY: TRANSPORTATION INSECURITY
IN THE PAST 12 MONTHS, HAS THE LACK OF TRANSPORTATION KEPT YOU FROM MEDICAL APPOINTMENTS OR FROM GETTING MEDICATIONS?: NO

## 2024-07-24 SDOH — ECONOMIC STABILITY: INCOME INSECURITY: IN THE PAST 12 MONTHS HAS THE ELECTRIC, GAS, OIL, OR WATER COMPANY THREATENED TO SHUT OFF SERVICES IN YOUR HOME?: NO

## 2024-07-24 SDOH — HEALTH STABILITY: MENTAL HEALTH: HOW MANY DRINKS CONTAINING ALCOHOL DO YOU HAVE ON A TYPICAL DAY WHEN YOU ARE DRINKING?: PATIENT DOES NOT DRINK

## 2024-07-24 SDOH — SOCIAL STABILITY: SOCIAL INSECURITY: WITHIN THE LAST YEAR, HAVE YOU BEEN HUMILIATED OR EMOTIONALLY ABUSED IN OTHER WAYS BY YOUR PARTNER OR EX-PARTNER?: NO

## 2024-07-24 SDOH — SOCIAL STABILITY: SOCIAL NETWORK
DO YOU BELONG TO ANY CLUBS OR ORGANIZATIONS SUCH AS CHURCH GROUPS, UNIONS, FRATERNAL OR ATHLETIC GROUPS, OR SCHOOL GROUPS?: NO

## 2024-07-24 SDOH — SOCIAL STABILITY: SOCIAL NETWORK: HOW OFTEN DO YOU GET TOGETHER WITH FRIENDS OR RELATIVES?: MORE THAN THREE TIMES A WEEK

## 2024-07-24 SDOH — ECONOMIC STABILITY: INCOME INSECURITY: IN THE PAST 12 MONTHS, HAS THE ELECTRIC, GAS, OIL, OR WATER COMPANY THREATENED TO SHUT OFF SERVICE IN YOUR HOME?: NO

## 2024-07-24 SDOH — SOCIAL STABILITY: SOCIAL INSECURITY
WITHIN THE LAST YEAR, HAVE TO BEEN RAPED OR FORCED TO HAVE ANY KIND OF SEXUAL ACTIVITY BY YOUR PARTNER OR EX-PARTNER?: NO

## 2024-07-24 SDOH — SOCIAL STABILITY: SOCIAL NETWORK: HOW OFTEN DO YOU ATTEND CHURCH OR RELIGIOUS SERVICES?: NEVER

## 2024-07-24 SDOH — SOCIAL STABILITY: SOCIAL INSECURITY: ARE YOU MARRIED, WIDOWED, DIVORCED, SEPARATED, NEVER MARRIED, OR LIVING WITH A PARTNER?: WIDOWED

## 2024-07-24 SDOH — SOCIAL STABILITY: SOCIAL NETWORK: ARE YOU MARRIED, WIDOWED, DIVORCED, SEPARATED, NEVER MARRIED, OR LIVING WITH A PARTNER?: WIDOWED

## 2024-07-24 SDOH — ECONOMIC STABILITY: FOOD INSECURITY: WITHIN THE PAST 12 MONTHS, YOU WORRIED THAT YOUR FOOD WOULD RUN OUT BEFORE YOU GOT MONEY TO BUY MORE.: NEVER TRUE

## 2024-07-24 SDOH — ECONOMIC STABILITY: HOUSING INSECURITY: IN THE LAST 12 MONTHS, WAS THERE A TIME WHEN YOU WERE NOT ABLE TO PAY THE MORTGAGE OR RENT ON TIME?: NO

## 2024-07-24 SDOH — HEALTH STABILITY: MENTAL HEALTH
STRESS IS WHEN SOMEONE FEELS TENSE, NERVOUS, ANXIOUS, OR CAN'T SLEEP AT NIGHT BECAUSE THEIR MIND IS TROUBLED. HOW STRESSED ARE YOU?: NOT AT ALL

## 2024-07-24 SDOH — SOCIAL STABILITY: SOCIAL NETWORK: HOW OFTEN DO YOU ATTEND MEETINGS OF THE CLUBS OR ORGANIZATIONS YOU BELONG TO?: NEVER

## 2024-07-24 SDOH — HEALTH STABILITY: MENTAL HEALTH: HOW OFTEN DO YOU HAVE SIX OR MORE DRINKS ON ONE OCCASION?: NEVER

## 2024-07-24 SDOH — ECONOMIC STABILITY: TRANSPORTATION INSECURITY: IN THE PAST 12 MONTHS, HAS LACK OF TRANSPORTATION KEPT YOU FROM MEDICAL APPOINTMENTS OR FROM GETTING MEDICATIONS?: NO

## 2024-07-24 SDOH — ECONOMIC STABILITY: INCOME INSECURITY: HOW HARD IS IT FOR YOU TO PAY FOR THE VERY BASICS LIKE FOOD, HOUSING, MEDICAL CARE, AND HEATING?: NOT HARD AT ALL

## 2024-07-24 SDOH — ECONOMIC STABILITY: TRANSPORTATION INSECURITY
IN THE PAST 12 MONTHS, HAS LACK OF TRANSPORTATION KEPT YOU FROM MEETINGS, WORK, OR FROM GETTING THINGS NEEDED FOR DAILY LIVING?: NO

## 2024-07-24 SDOH — SOCIAL STABILITY: SOCIAL NETWORK: HOW OFTEN DO YOU ATTENT MEETINGS OF THE CLUB OR ORGANIZATION YOU BELONG TO?: NEVER

## 2024-07-24 ASSESSMENT — COGNITIVE AND FUNCTIONAL STATUS - GENERAL
HELP NEEDED FOR BATHING: A LITTLE
EATING MEALS: A LITTLE
MOBILITY SCORE: 20
TOILETING: A LITTLE
WALKING IN HOSPITAL ROOM: A LITTLE
HELP NEEDED FOR BATHING: A LITTLE
PERSONAL GROOMING: A LITTLE
EATING MEALS: A LITTLE
DAILY ACTIVITIY SCORE: 17
WALKING IN HOSPITAL ROOM: A LITTLE
DAILY ACTIVITIY SCORE: 17
MOBILITY SCORE: 19
TOILETING: A LITTLE
MOVING TO AND FROM BED TO CHAIR: A LITTLE
DRESSING REGULAR LOWER BODY CLOTHING: A LOT
DRESSING REGULAR UPPER BODY CLOTHING: A LITTLE
STANDING UP FROM CHAIR USING ARMS: A LITTLE
DRESSING REGULAR LOWER BODY CLOTHING: A LOT
CLIMB 3 TO 5 STEPS WITH RAILING: A LITTLE
DRESSING REGULAR UPPER BODY CLOTHING: A LITTLE
STANDING UP FROM CHAIR USING ARMS: A LITTLE
PERSONAL GROOMING: A LITTLE
TURNING FROM BACK TO SIDE WHILE IN FLAT BAD: A LITTLE
MOVING TO AND FROM BED TO CHAIR: A LITTLE
CLIMB 3 TO 5 STEPS WITH RAILING: A LITTLE

## 2024-07-24 ASSESSMENT — ACTIVITIES OF DAILY LIVING (ADL)
HOME_MANAGEMENT_TIME_ENTRY: 10
ADL_ASSISTANCE: INDEPENDENT
LACK_OF_TRANSPORTATION: NO
BATHING_ASSISTANCE: NOT PERFORMED
ADL_ASSISTANCE: INDEPENDENT
LACK_OF_TRANSPORTATION: NO

## 2024-07-24 ASSESSMENT — ENCOUNTER SYMPTOMS
ENDOCRINE NEGATIVE: 1
GASTROINTESTINAL NEGATIVE: 1
NEUROLOGICAL NEGATIVE: 1
CONSTITUTIONAL NEGATIVE: 1
WOUND: 1
EYES NEGATIVE: 1
RESPIRATORY NEGATIVE: 1
PSYCHIATRIC NEGATIVE: 1
MUSCULOSKELETAL NEGATIVE: 1

## 2024-07-24 ASSESSMENT — LIFESTYLE VARIABLES
AUDIT-C TOTAL SCORE: 0
SKIP TO QUESTIONS 9-10: 1

## 2024-07-24 ASSESSMENT — PAIN SCALES - GENERAL
PAINLEVEL_OUTOF10: 0 - NO PAIN
PAINLEVEL_OUTOF10: 0 - NO PAIN

## 2024-07-24 ASSESSMENT — PAIN - FUNCTIONAL ASSESSMENT
PAIN_FUNCTIONAL_ASSESSMENT: 0-10
PAIN_FUNCTIONAL_ASSESSMENT: 0-10

## 2024-07-24 NOTE — CONSULTS
Vancomycin Dosing by Pharmacy- INITIAL    Apoorva Sanchez is a 89 y.o. year old female who Pharmacy has been consulted for vancomycin dosing for other diabetic foot infection . Based on the patient's indication and renal status this patient will be dosed based on a goal AUC of 400-600.     Renal function is currently stable.    Visit Vitals  /75   Pulse 66   Temp 36.9 °C (98.4 °F) (Oral)   Resp (!) 21        Lab Results   Component Value Date    CREATININE 0.80 2024    CREATININE 0.80 2024    CREATININE 0.8 2023    CREATININE 0.8 2023    CREATININE 0.8 10/05/2022    CREATININE 0.7 2022        Patient weight is as follows:   Vitals:    24 1701   Weight: 54.5 kg (120 lb 2.4 oz)       Cultures:  No results found for the encounter in last 14 days.        I/O last 3 completed shifts:  In: 50 (0.9 mL/kg) [IV Piggyback:50]  Out: - (0 mL/kg)   Weight: 54.5 kg   I/O during current shift:  No intake/output data recorded.    Temp (24hrs), Av.9 °C (98.4 °F), Min:36.9 °C (98.4 °F), Max:36.9 °C (98.4 °F)         Assessment/Plan     Patient has already been given a loading dose of 1000 mg.  Will initiate vancomycin maintenance,  1000 mg every 24 hours.    This dosing regimen is predicted by [employer] to result in the following pharmacokinetic parameters:  Loading dose: N/A  Regimen: 1000 mg IV every 24 hours.  Start time: 08:53 on 2024  Exposure target: AUC24 (range)400-600 mg/L.hr   AUC24,ss: 553 mg/L.hr  Probability of AUC24 > 400: 84 %  Ctrough,ss: 17.2 mg/L  Probability of Ctrough,ss > 20: 35 %  Probability of nephrotoxicity (Lodise ANTHONY ): 13 %    Follow-up level will be ordered on  at 0500 unless clinically indicated sooner.  Will continue to monitor renal function daily while on vancomycin and order serum creatinine at least every 48 hours if not already ordered.  Follow for continued vancomycin needs, clinical response, and signs/symptoms of toxicity.       CARMEN PRICE  MONCADA, PharmD

## 2024-07-24 NOTE — CONSULTS
Inpatient consult to Podiatry  Consult performed by: Joe Proctor DPM  Consult ordered by: ALICIA Camargo-CNP          Reason For Consult  Right lower leg wound    History Of Present Illness  Apoorva Sanchez is a 89 y.o. female presenting with right lower leg wound.  She said she sustained this when she fell on July 1.  She noticed that it looked like a rug burn.  She went to urgent care and they gave her some wound care instructions.  She says she was performing the wound care instructions accurately but the wound seem to get bigger and looked worse.  She went to her PCP who, according to her, recommended she continue with wound care instructions and did not feel the need for her to have a home health care nurse.  She was advised to go to the [facility] wound care center.  However when she went she had to wait a long time and so grew inpatient and elected to not pursue care at the wound care center.  She presented today because she saw another provider yesterday who stated she needed to get into the emergency department for evaluation.  She says the wound is not painful.  Denies nausea vomiting fever chills.  Says there is a lot of drainage on her bandages daily and that it smells bad.  She does confirm she is diabetic.         Past Medical History  She has a past medical history of Accidental fall (03/19/2024), Diabetes mellitus (Multi), Disease of thyroid gland, Fracture of pelvis (Multi) (03/19/2024), Hypertension, Injury of head (03/19/2024), Oth fracture of left pubis, init encntr for closed fracture (Multi) (07/03/2022), and Syncope (03/19/2024).    Surgical History  She has no past surgical history on file.     Social History  She reports that she has never smoked. She has never been exposed to tobacco smoke. She has never used smokeless tobacco. She reports that she does not drink alcohol and does not use drugs.    Family History  No family history on file.     Allergies  Sulfa (sulfonamide  antibiotics)    Review of Systems    REVIEW OF SYSTEMS  GENERAL:  Negative for malaise, significant weight loss, fever  HEENT:  No changes in hearing or vision, no nose bleeds or other nasal problems and Negative for frequent or significant headaches  NECK:  Negative for lumps, goiter, pain and significant neck swelling  RESPIRATORY:  Negative for cough, wheezing and shortness of breath  CARDIOVASCULAR:  Negative for chest pain, leg swelling and palpitations  GI:  Negative for abdominal discomfort, blood in stools or black stools and change in bowel habits  :  Negative for dysuria, frequency and incontinence  MUSCULOSKELETAL:  Negative for joint pain or swelling, back pain, and muscle pain.  SKIN: Positive for large hematoma/ischemic wound to the right lower leg   PSYCH:  Negative for sleep disturbance, mood disorder and recent psychosocial stressors  HEMATOLOGY/LYMPHOLOGY:  Negative for prolonged bleeding, bruising easily, and swollen nodes.  ENDOCRINE:  Negative for cold or heat intolerance, polyuria, polydipsia and goiter  NEURO: Negative, denies any burning, tingling or numbness     Objective:   Vasc: DP and PT pulses are not palpable bilateral.  CFT is less than 5 seconds bilateral.  Skin temperature is warm to cool proximal to distal bilateral.  There is increased warmth around the right lower leg wound.  There is erythema to this area.    Neuro:  Light touch is intact to the foot bilateral.  Protective sensation is intact to the foot when tested.  There is no clonus noted.  The hallux is downgoing bilateral.      Derm: There is a large right anterior lower leg wound.  This measures approximately 10 x 5 cm.  Please see images below.  There is necrotic central portion and granular/fibrotic outside layer.  Overall there is no depth to bone.  It appears this may have begun as hematoma/large venous wound but has been unable to close.  There is seeping serous drainage.  There are a few satellite wounds as well to  the medial and lateral aspect with serous drainage.  There is foul odor.  There is no purulence.  No frankly infected tissue.  However there is surrounding erythema and taut lower leg edema.              Ortho: Patient does have all 10 toes.  The lower leg wound is not necessarily painful.     Physical Exam     Last Recorded Vitals  Blood pressure 138/67, pulse 65, temperature 36.3 °C (97.3 °F), temperature source Oral, resp. rate 17, height 1.524 m (5'), weight 56.8 kg (125 lb 3.5 oz), SpO2 100%.    Relevant Results  No leukocytosis    Radiographs: No evidence of osteomyelitis, soft tissue emphysema.  No acute fracture or dislocation.  CT right tib-fib: I personally reviewed these images and reviewed radiologist findings.  I do agree there is a large high density collection likely suggestive of hematoma to the anterior medial lower leg which measures 5.4 x 2.0 x 11.5 cm.  It does appear to be close to tibia bone.  In general there is diffuse subcutaneous soft tissue edema suggestive of cellulitis.  No evidence of osteomyelitis.     Assessment/Plan     1.  Right lower leg traumatic ulceration  2.  Right lower leg hematoma  3.  Right lower leg cellulitis  4.  Right leg venous stasis  5.  Right leg peripheral vascular disease    -Wound inspected.  Bandage changed.  -Xeroform, gauze, loose Kerlix, Ace.  Order placed for twice daily bandage changes.  -This wound does appear to be infected with surrounding erythema edema, seeping drainage.  -Continue empiric IV antibiotics.  Agree with ID consultation.  -Have consulted vascular surgery in setting of this wound with nonpalpable pedal pulses, venous stasis, and arterial calcifications.  Appreciate recommendation.  -This wound will need debridement.  Whether enzymatic or surgical.  My preference would be surgical given the extent of the wound.  I discussed with patient's daughter today over the phone different options.  Would like to await cardiology evaluation to determine  preoperative risk stratification and safety of surgery in this elderly patient.  Further plan pending cardiology input and my discussion following that with patient and her daughter.      I spent 70 minutes in the professional and overall care of this patient.      Joe Proctor D.P.M.

## 2024-07-24 NOTE — CONSULTS
"Reason For Consult  Nonpalpable pedal pulses, arterial calcifications on radiograph    History Of Present Illness  Apoorva Sanchez is a 89 y.o. female presenting with a traumatic hematoma.  Patient had a fall toward the beginning of the month and suffered a \"carpet burn\".  She came to the hospital for evaluation as the wound has worsened over the last several weeks.  Patient is very hard of hearing and difficult to communicate with.  Much of the history was taken from the chart.     Past Medical History  She has a past medical history of Accidental fall (03/19/2024), Diabetes mellitus (Multi), Disease of thyroid gland, Fracture of pelvis (Multi) (03/19/2024), Hypertension, Injury of head (03/19/2024), Oth fracture of left pubis, init encntr for closed fracture (Multi) (07/03/2022), and Syncope (03/19/2024).    Surgical History  She has no past surgical history on file.     Social History  She reports that she has never smoked. She has never been exposed to tobacco smoke. She has never used smokeless tobacco. She reports that she does not drink alcohol and does not use drugs.    Family History  No family history on file.     Allergies  Sulfa (sulfonamide antibiotics)    Review of Systems    CONSTITUTIONAL: Denies weight loss, fever and chills.    HEENT: Denies changes in vision.  Positive for being hard of hearing    RESPIRATORY: Denies SOB and cough.    CV: Denies palpitations and CP.    GI: Denies abdominal pain, nausea, vomiting and diarrhea.    : Denies dysuria and urinary frequency.    MSK: Denies myalgia and joint pain.    SKIN: Denies rash and pruritus.    VASC: Denies claudication, ischemic rest pain.  Right leg wound with necrotic tissue    NEUROLOGICAL: Denies headache and syncope.    PSYCHIATRIC: Denies recent changes in mood. Denies anxiety and depression.     Physical Exam  General: Pt is alert and oriented x 3. Pleasant, conversive.  Communication barrier as the patient is unable to hear me even when I am " essentially yelling.  HEENT: Head is atraumatic, normocephalic.  Cardiac: Normal S1-S2.  Regular rate and rhythm.  No murmurs.  Respiratory: Lungs clear to auscultation.  No adventitious sounds.  Abdomen: Soft, nondistended, nontender.  Bowel sounds x4 quadrants.  Pulse exam: Palpable brachial and radial pulses bilaterall.  Patient has popliteal pulses that are palpable bilaterally.  Difficult to palpate right lower extremity pulses as she has significant edema and erythema  Extremities: Wound with a dressing in place.  This was not removed today.  I did examine the pictures taken by podiatry.  There is a large anterior lower leg ulceration with necrotic tissue present.  I was able to smell a foul odor emanating from the dressing.  Neuro: Moves all extremities spontaneously.  No focal deficits.  Psych: Appropriate affect.  Answers questions appropriately.     Last Recorded Vitals  Blood pressure 156/75, pulse 59, temperature 37.2 °C (99 °F), temperature source Oral, resp. rate 18, height 1.524 m (5'), weight 56.8 kg (125 lb 3.5 oz), SpO2 100%.    Relevant Results  Vascular US ankle brachial index (MEETA) without exercise    Result Date: 7/24/2024  Preliminary Cardiology Report            [facility] [address], [place] [address]             Phone [phone]  Preliminary Vascular Lab Report   VASC US PVR WITHOUT EXERCISE  Patient Name:     DESTINI Marie Physician:  [id number] Linda Sotelo MD Study Date:       7/24/2024    Ordering Provider:  [id number] ROMERO LÓPEZ MRN/PID:          [id number]     Fellow: Accession#:       [id number] Technologist:       Leslie Fu RDMS, RVT YOB: 1929    Technologist 2: Gender:           F            Encounter#:         [id number] Admission Status: Inpatient    Location Performed: [facility]  Diagnosis/ICD: Type 2 diabetes mellitus (DM) with foot ulcer-E11.621  PRELIMINARY CONCLUSIONS:  Right Lower PVR: Right pressures of >220  mmHg suggest no compressibility of vessels and may make absolute Segmental Limb Pressures (SLP) unreliable. Monophasic flow is noted in the right dorsalis pedis artery. Multiphasic flow is noted in the right posterior tibial artery. Due to a large draining wound and significant errythemia to the right lower extremity, a cuff was not able to be placed over the calf or ankle. Low thigh was used to obtain MEETA. Left Lower PVR: Left pressures of >220 mmHg suggest no compressibility of vessels and may make absolute Segmental Limb Pressures (SLP) unreliable. Multiphasic flow is noted in the left posterior tibial artery and left dorsalis pedis artery. Full PVR was not obtainable due to patients sensitivity to the pressure of the Cuffs. unable to tolerate full compression of the arteries.  Imaging & Doppler Findings:  RIGHT Lower PVR                Pressures Ratios Right Posterior Tibial (Ankle) 255 mmHg  1.41 Right Dorsalis Pedis (Ankle)   255 mmHg  1.41   LEFT Lower PVR                Pressures Ratios Left Posterior Tibial (Ankle) 255 mmHg  1.41 Left Dorsalis Pedis (Ankle)   255 mmHg  1.41                      Right     Left Brachial Pressure 176 mmHg 181 mmHg   VASCULAR PRELIMINARY REPORT completed by Leslie Fu RDMS, AB, RVT, VT on 7/24/2024 at 3:39:46 PM  ** Final **     CT tibia fibula right w IV contrast    Result Date: 7/23/2024  Interpreted By:  Denny Oliveira, STUDY: CT TIBIA FIBULA RIGHT W IV CONTRAST;  7/23/2024 9:25 pm   INDICATION: Signs/Symptoms:RLE infection r/o abscess, OM.   COMPARISON: Radiographs of the right tibia and fibula 07/22/2024   ACCESSION NUMBER(S): [id number]   ORDERING CLINICIAN: BANDAR RAMOS   TECHNIQUE: CT imaging of the right tibia and fibula was obtained after the intravenous administration of 75 mL Omnipaque 350 contrast. Coronal and sagittal reformatted images were performed.   FINDINGS: OSSEOUS STRUCTURES:   Severe degenerative changes of the right knee with tricompartmental joint  space narrowing and large marginal osteophytes. Meniscal calcifications suggesting calcium pyrophosphate deposition arthropathy. Intra-articular ossific bodies in the posterior aspect of the knee. No fracture identified. No definite erosive change or periosteal reaction to suggest osteomyelitis.     SOFT TISSUES:   There is extensive subcutaneous soft tissue edema throughout the lower leg most pronounced within laterally. The deeper anterior an intramuscular fat planes are preserved. No definite peripherally enhancing fluid collection identified. There is a crescentic similar ill-defined high density collection along the anteromedial aspect of the two-view which measures 5.4 x 2.0 x 11.5 cm. The appearance is most suggestive of the hematoma although the mass or infectious collection can not be entirely excluded. Extensive vascular calcifications are noted throughout the leg. No subcutaneous air identified.       1. Ill-defined relatively high density collection along the anteromedial aspect of the lower leg measuring 5.4 x 2.0 x 11.5 cm. The appearance is suggestive of the hematoma with infection or mass not entirely excluded. 2. Diffuse nonspecific subcutaneous soft tissue edema throughout the lower leg most pronounced along the lateral aspect. No definite peripherally enhancing fluid collection is noted. The deeper fascial planes appear preserved. Correlate for clinical evidence of infection. 3. No osseous erosion to suggest osteomyelitis. If there is ongoing clinical concern then MRI may be performed in further assessment.   MACRO: None   Signed by: Denny Oliveira 7/23/2024 9:44 PM Dictation workstation:   [id number]    Lower extremity venous duplex right    Result Date: 7/23/2024  Interpreted By:  Angelica Aguila, STUDY: [facility] LOWER EXTREMITY VENOUS DUPLEX RIGHT;  7/23/2024 6:30 pm   INDICATION: Signs/Symptoms:r/o DVT.   COMPARISON: None.   ACCESSION NUMBER(S): [id number]   ORDERING CLINICIAN: AMELIE  RIMA   TECHNIQUE: Grayscale, color and spectral Doppler sonographic images of the right lower extremity deep venous system. The left common femoral vein was imaged for comparison.   FINDINGS: There is normal compressibility of the right common femoral vein, saphenous femoral junction, femoral vein and popliteal vein. The posterior tibial and peroneal veins are suboptimally visualized. There is normal spontaneous and phasic variation throughout the leg by spectral doppler.   The left common femoral vein is patent.   OTHER FINDINGS: None.       Suboptimal visualization of the calf veins. No sonographic evidence of acute DVT in the visualized vessels of the right lower extremity.   MACRO: None   Signed by: Angelica Aguila 7/23/2024 6:44 PM Dictation workstation:   [id number]    XR tibia fibula right 2 views    Result Date: 7/23/2024  Interpreted By:  Hira Lawrence, STUDY: XR TIBIA FIBULA RIGHT 2 VIEWS; ;  7/23/2024 5:39 pm   INDICATION: Signs/Symptoms:wound right anterior tibia.   COMPARISON: None.   ACCESSION NUMBER(S): [id number]   ORDERING CLINICIAN: AMELIE ABARCA   FINDINGS: Right tibia/fibula, two views   Soft tissue edema about the calf with irregularity anteriorly corresponding to known wound. No osseous destruction or erosion seen radiographically. Diffuse osteopenia present. Severe degenerative change of the knee       No radiographic evidence of osteomyelitis. Soft tissue edema and soft tissue wound at the anterior aspect of the cuff     MACRO: None   Signed by: Hira Lawrence 7/23/2024 6:05 PM Dictation workstation:   [id number]    XR tibia fibula right 2 views    Result Date: 7/1/2024  Interpreted By:  José Yan, STUDY: XR TIBIA FIBULA RIGHT 2 VIEWS;  7/1/2024 2:39 pm   INDICATION: Signs/Symptoms:fall.   COMPARISON: None.   ACCESSION NUMBER(S): [id number]   ORDERING CLINICIAN: ZARIA CONDON   FINDINGS: No acute fracture in the tibia or fibula. Moderate to severe knee  osteoarthritis with chondrocalcinosis.       No acute fracture in the tibia or fibula.   MACRO None   Signed by: José Yan 7/1/2024 2:59 PM Dictation workstation:   [id number]    XR hand right 3+ views    Result Date: 7/1/2024  Interpreted By:  José Yan, STUDY: XR HAND RIGHT 3+ VIEWS;  7/1/2024 2:39 pm   INDICATION: Signs/Symptoms:fall.   COMPARISON: None.   ACCESSION NUMBER(S): [id number]   ORDERING CLINICIAN: ZARIA CONDNO   FINDINGS: Acute oblique fracture through proximal 1st metacarpal metadiaphysis with minimal displacement. Severe 1st CMC osteoarthritis.       Acute minimally displaced 1st metacarpal base fracture. Severe 1st CMC osteoarthritis.   MACRO None   Signed by: José Yan 7/1/2024 2:57 PM Dictation workstation:   [id number]    CT cervical spine wo IV contrast    Result Date: 7/1/2024  Interpreted By:  José Yan, STUDY: CT CERVICAL SPINE WO IV CONTRAST;  7/1/2024 2:19 pm   INDICATION: Signs/Symptoms:fall.   COMPARISON: C-spine 07/01/2022.   ACCESSION NUMBER(S): [id number]   ORDERING CLINICIAN: ZARIA CONDON   TECHNIQUE: Thin section axial images were obtained from the skull base down through the thoracic inlet. Sagittal and coronal reconstruction images were generated. Soft tissue, lung, and bone windows were reviewed.   FINDINGS: Prevertebral/Paraspinal Soft Tissues: Stable 2.4 cm heterogeneous left thyroid nodule.   CERVICAL SPINE: Hardware: None. Fracture: None. Vertebral Body Heights: Normal. Alignment: No traumatic listhesis. Spinal canal and neural foramina: Moderate to severe canal stenosis at C3-C4 and C6-C7, not significantly changed. No severe neural foraminal narrowing.       No acute fracture or traumatic malalignment.   Stable 2.3 cm left thyroid nodule. Consider outpatient ultrasound evaluation if not already performed.   Signed by: José Yan 7/1/2024 2:55 PM Dictation workstation:   [id number]    CT head wo IV contrast    Result Date:  7/1/2024  Interpreted By:  José Yan, STUDY: CT HEAD WO IV CONTRAST;  7/1/2024 2:19 pm   INDICATION: Signs/Symptoms:fall.   COMPARISON: CT head 04/01/2020.   ACCESSION NUMBER(S): [id number]   ORDERING CLINICIAN: ZARIA CONDON   TECHNIQUE: Noncontrast axial CT scan of head was performed.   FINDINGS: Parenchyma: No intracranial hemorrhage. The grey-white differentiation is intact. No mass effect or midline shift. Patchy periventricular white matter hypodensities, likely moderate chronic microvascular ischemic change. Severe diffuse parenchymal atrophy.   CSF Spaces: The ventricles, sulci and basal cisterns are within normal limits for age.   Extra-Axial Fluid: No extraaxial fluid collection.   Calvarium: No acute fracture.   Paranasal sinuses: Visualized paranasal sinuses are clear.   Mastoids: Clear.   Orbits: No acute abnormality.   Soft tissues: No acute abnormality.       No acute intracranial hemorrhage or calvarial fracture.   MACRO None   Signed by: José Yan 7/1/2024 2:48 PM Dictation workstation:   [id number]       Assessment/Plan   Traumatic hematoma, right anterior lower leg  Peripheral arterial disease    Traumatic hematoma right leg-the patient likely developed a hematoma after her initial fall toward the beginning of July.  Unfortunately, the skin overlying the hematoma has become necrosis and this will require debridement.  There is significant associated cellulitis with the hematoma.  For this reason, I would not delay debridement.  Wound VAC may be beneficial    Peripheral arterial disease-patient's arterial studies were technically difficult due to calcific vessels and falsely elevated ABIs.  She has palpable popliteal pulses bilaterally and I am unable to palpate pedal pulses bilaterally.  However, this is likely due to body habitus/edema.  Would recommend a close follow-up of the woundto make sure that it is healing appropriately.        I spent 35 minutes in the professional and  overall care of this patient.      Denny Syed, APRN-CNP

## 2024-07-24 NOTE — H&P
"History Of Present Illness  Apoorva Sanchez is a 89 y.o. female, known to have DM, HTN, and Hypothyroidism, who presents to the ED for evaluation of right shin wound. The patient lives alone and reports a fall she suffered at the beginning of July while vacuuming. She states initially had a \"carpet burn\" however the wound has worsened over the last few weeks. She was given a referral to the wound care center at [facility] per her PCP. She went to the wound care center however she was unhappy with the waiting area and wait time so she left. The patient denies fever, chills or pain. U/s was done in ED and negative for DVT. Xray is negative for OM. She is noted to have a UTI otherwise labs are fairly unremarkable.            Past Medical History  Past Medical History:   Diagnosis Date    Accidental fall 03/19/2024    Diabetes mellitus (Multi)     Disease of thyroid gland     Fracture of pelvis (Multi) 03/19/2024    Hypertension     Injury of head 03/19/2024    Oth fracture of left pubis, init encntr for closed fracture (Multi) 07/03/2022    Syncope 03/19/2024       Surgical History  History reviewed. No pertinent surgical history.     Social History  She reports that she has never smoked. She has never been exposed to tobacco smoke. She has never used smokeless tobacco. She reports that she does not drink alcohol and does not use drugs.    Family History  No family history on file.     Allergies  Sulfa (sulfonamide antibiotics)    Review of Systems   Constitutional: Negative.    HENT: Negative.     Eyes: Negative.    Respiratory: Negative.     Cardiovascular: Negative.    Gastrointestinal: Negative.    Endocrine: Negative.    Genitourinary: Negative.    Musculoskeletal: Negative.    Skin: Negative.         Right shin wound   Allergic/Immunologic: Negative.    Neurological: Negative.    Hematological: Negative.    Psychiatric/Behavioral: Negative.          Physical Exam  HENT:      Head: Normocephalic and atraumatic.      " Ears:      Comments: Extremely [place]     Nose: Nose normal.      Mouth/Throat:      Mouth: Mucous membranes are moist.      Pharynx: Oropharynx is clear.   Eyes:      Extraocular Movements: Extraocular movements intact.      Pupils: Pupils are equal, round, and reactive to light.   Cardiovascular:      Rate and Rhythm: Normal rate and regular rhythm.      Pulses: Normal pulses.      Comments: 1+ edema bilaterally >right  Pulmonary:      Effort: Pulmonary effort is normal.      Breath sounds: Normal breath sounds.   Abdominal:      General: Abdomen is flat. Bowel sounds are normal.      Palpations: Abdomen is soft.   Musculoskeletal:         General: Normal range of motion.   Skin:     General: Skin is warm and dry.      Capillary Refill: Capillary refill takes less than 2 seconds.      Comments: Right shin wound with blackened center. Small amount bloody sero-sang drainage.    Neurological:      General: No focal deficit present.      Mental Status: She is oriented to person, place, and time.   Psychiatric:         Mood and Affect: Mood normal.          Last Recorded Vitals  Blood pressure 179/75, pulse 66, temperature 36.9 °C (98.4 °F), temperature source Oral, resp. rate (!) 21, height 1.524 m (5'), weight 54.5 kg (120 lb 2.4 oz), SpO2 96%.    Relevant Results  Lower extremity venous duplex right    Result Date: 7/23/2024  Interpreted By:  Angelica Aguila, STUDY: [facility] LOWER EXTREMITY VENOUS DUPLEX RIGHT;  7/23/2024 6:30 pm   INDICATION: Signs/Symptoms:r/o DVT.   COMPARISON: None.   ACCESSION NUMBER(S): [id number]   ORDERING CLINICIAN: AMELIE ABARCA   TECHNIQUE: Grayscale, color and spectral Doppler sonographic images of the right lower extremity deep venous system. The left common femoral vein was imaged for comparison.   FINDINGS: There is normal compressibility of the right common femoral vein, saphenous femoral junction, femoral vein and popliteal vein. The posterior tibial and peroneal veins are  suboptimally visualized. There is normal spontaneous and phasic variation throughout the leg by spectral doppler.   The left common femoral vein is patent.   OTHER FINDINGS: None.       Suboptimal visualization of the calf veins. No sonographic evidence of acute DVT in the visualized vessels of the right lower extremity.   MACRO: None   Signed by: Angelica Aguila 7/23/2024 6:44 PM Dictation workstation:   [id number]    XR tibia fibula right 2 views    Result Date: 7/23/2024  Interpreted By:  Hira Lawrence, STUDY: XR TIBIA FIBULA RIGHT 2 VIEWS; ;  7/23/2024 5:39 pm   INDICATION: Signs/Symptoms:wound right anterior tibia.   COMPARISON: None.   ACCESSION NUMBER(S): [id number]   ORDERING CLINICIAN: AMELIE ABARCA   FINDINGS: Right tibia/fibula, two views   Soft tissue edema about the calf with irregularity anteriorly corresponding to known wound. No osseous destruction or erosion seen radiographically. Diffuse osteopenia present. Severe degenerative change of the knee       No radiographic evidence of osteomyelitis. Soft tissue edema and soft tissue wound at the anterior aspect of the cuff     MACRO: None   Signed by: Hira Lawrence 7/23/2024 6:05 PM Dictation workstation:   [id number]    XR tibia fibula right 2 views    Result Date: 7/1/2024  Interpreted By:  José Yan, STUDY: XR TIBIA FIBULA RIGHT 2 VIEWS;  7/1/2024 2:39 pm   INDICATION: Signs/Symptoms:fall.   COMPARISON: None.   ACCESSION NUMBER(S): [id number]   ORDERING CLINICIAN: ZARIA CONDON   FINDINGS: No acute fracture in the tibia or fibula. Moderate to severe knee osteoarthritis with chondrocalcinosis.       No acute fracture in the tibia or fibula.   MACRO None   Signed by: José Yan 7/1/2024 2:59 PM Dictation workstation:   [id number]    XR hand right 3+ views    Result Date: 7/1/2024  Interpreted By:  José Yan, STUDY: XR HAND RIGHT 3+ VIEWS;  7/1/2024 2:39 pm   INDICATION: Signs/Symptoms:fall.   COMPARISON: None.    ACCESSION NUMBER(S): [id number]   ORDERING CLINICIAN: ZARIA CONDON   FINDINGS: Acute oblique fracture through proximal 1st metacarpal metadiaphysis with minimal displacement. Severe 1st CMC osteoarthritis.       Acute minimally displaced 1st metacarpal base fracture. Severe 1st CMC osteoarthritis.   MACRO None   Signed by: José Yan 7/1/2024 2:57 PM Dictation workstation:   [id number]    CT cervical spine wo IV contrast    Result Date: 7/1/2024  Interpreted By:  José Yan, STUDY: CT CERVICAL SPINE WO IV CONTRAST;  7/1/2024 2:19 pm   INDICATION: Signs/Symptoms:fall.   COMPARISON: C-spine 07/01/2022.   ACCESSION NUMBER(S): [id number]   ORDERING CLINICIAN: ZARIA CONDON   TECHNIQUE: Thin section axial images were obtained from the skull base down through the thoracic inlet. Sagittal and coronal reconstruction images were generated. Soft tissue, lung, and bone windows were reviewed.   FINDINGS: Prevertebral/Paraspinal Soft Tissues: Stable 2.4 cm heterogeneous left thyroid nodule.   CERVICAL SPINE: Hardware: None. Fracture: None. Vertebral Body Heights: Normal. Alignment: No traumatic listhesis. Spinal canal and neural foramina: Moderate to severe canal stenosis at C3-C4 and C6-C7, not significantly changed. No severe neural foraminal narrowing.       No acute fracture or traumatic malalignment.   Stable 2.3 cm left thyroid nodule. Consider outpatient ultrasound evaluation if not already performed.   Signed by: José Yan 7/1/2024 2:55 PM Dictation workstation:   [id number]    CT head wo IV contrast    Result Date: 7/1/2024  Interpreted By:  José Yan, STUDY: CT HEAD WO IV CONTRAST;  7/1/2024 2:19 pm   INDICATION: Signs/Symptoms:fall.   COMPARISON: CT head 04/01/2020.   ACCESSION NUMBER(S): [id number]   ORDERING CLINICIAN: ZARIA CONDON   TECHNIQUE: Noncontrast axial CT scan of head was performed.   FINDINGS: Parenchyma: No intracranial hemorrhage. The grey-white differentiation  is intact. No mass effect or midline shift. Patchy periventricular white matter hypodensities, likely moderate chronic microvascular ischemic change. Severe diffuse parenchymal atrophy.   CSF Spaces: The ventricles, sulci and basal cisterns are within normal limits for age.   Extra-Axial Fluid: No extraaxial fluid collection.   Calvarium: No acute fracture.   Paranasal sinuses: Visualized paranasal sinuses are clear.   Mastoids: Clear.   Orbits: No acute abnormality.   Soft tissues: No acute abnormality.       No acute intracranial hemorrhage or calvarial fracture.   MACRO None   Signed by: José Yan 7/1/2024 2:48 PM Dictation workstation:   [id number]       Results for orders placed or performed during the hospital encounter of 07/23/24 (from the past 24 hour(s))   CBC and Auto Differential   Result Value Ref Range    WBC 8.9 4.4 - 11.3 x10*3/uL    nRBC 0.0 0.0 - 0.0 /100 WBCs    RBC 3.82 (L) 4.00 - 5.20 x10*6/uL    Hemoglobin 10.7 (L) 12.0 - 16.0 g/dL    Hematocrit 33.4 (L) 36.0 - 46.0 %    MCV 87 80 - 100 fL    MCH 28.0 26.0 - 34.0 pg    MCHC 32.0 32.0 - 36.0 g/dL    RDW 13.9 11.5 - 14.5 %    Platelets 312 150 - 450 x10*3/uL    Neutrophils % 77.6 40.0 - 80.0 %    Immature Granulocytes %, Automated 0.4 0.0 - 0.9 %    Lymphocytes % 13.1 13.0 - 44.0 %    Monocytes % 7.3 2.0 - 10.0 %    Eosinophils % 0.9 0.0 - 6.0 %    Basophils % 0.7 0.0 - 2.0 %    Neutrophils Absolute 6.94 (H) 1.60 - 5.50 x10*3/uL    Immature Granulocytes Absolute, Automated 0.04 0.00 - 0.50 x10*3/uL    Lymphocytes Absolute 1.17 0.80 - 3.00 x10*3/uL    Monocytes Absolute 0.65 0.05 - 0.80 x10*3/uL    Eosinophils Absolute 0.08 0.00 - 0.40 x10*3/uL    Basophils Absolute 0.06 0.00 - 0.10 x10*3/uL   Comprehensive Metabolic Panel   Result Value Ref Range    Glucose 199 (H) 65 - 99 mg/dL    Sodium 131 (L) 133 - 145 mmol/L    Potassium 4.2 3.4 - 5.1 mmol/L    Chloride 94 (L) 97 - 107 mmol/L    Bicarbonate 27 24 - 31 mmol/L    Urea Nitrogen 16 8 - 25  mg/dL    Creatinine 0.80 0.40 - 1.60 mg/dL    eGFR 68 >60 mL/min/1.73m*2    Calcium 9.3 8.5 - 10.4 mg/dL    Albumin 3.9 3.5 - 5.0 g/dL    Alkaline Phosphatase 71 35 - 125 U/L    Total Protein 7.1 5.9 - 7.9 g/dL    AST 16 5 - 40 U/L    Bilirubin, Total 0.3 0.1 - 1.2 mg/dL    ALT 10 5 - 40 U/L    Anion Gap 10 <=19 mmol/L   Blood Gas Lactic Acid, Venous   Result Value Ref Range    POCT Lactate, Venous 1.2 0.4 - 2.0 mmol/L   Urinalysis with Reflex Culture and Microscopic   Result Value Ref Range    Color, Urine Light-Yellow Light-Yellow, Yellow, Dark-Yellow    Appearance, Urine Clear Clear    Specific Gravity, Urine 1.009 1.005 - 1.035    pH, Urine 7.0 5.0, 5.5, 6.0, 6.5, 7.0, 7.5, 8.0    Protein, Urine NEGATIVE NEGATIVE, 10 (TRACE), 20 (TRACE) mg/dL    Glucose, Urine Normal Normal mg/dL    Blood, Urine 0.06 (1+) (A) NEGATIVE    Ketones, Urine NEGATIVE NEGATIVE mg/dL    Bilirubin, Urine NEGATIVE NEGATIVE    Urobilinogen, Urine Normal Normal mg/dL    Nitrite, Urine NEGATIVE NEGATIVE    Leukocyte Esterase, Urine 500 Valerie/µL (A) NEGATIVE   Microscopic Only, Urine   Result Value Ref Range    WBC, Urine >50 (A) 1-5, NONE /HPF    WBC Clumps, Urine OCCASIONAL Reference range not established. /HPF    RBC, Urine 6-10 (A) NONE, 1-2, 3-5 /HPF    Transitional Epithelial Cells, Urine 1-2 (FEW) Reference range not established. /HPF     Assessment/Plan   Right leg wound  -S/p fall on carpet  -IV Vanc/Zosyn  -Will check CT to r/o abscess   -Wound culture  -Blood culture  -Consult Podiatry  -Consult ID    UTI  -IV Abx pending culture    DM  -Hold Metformin post IV contrast  -Continue Glimepiride  -Insulin SS  -Monitor blood glucose  -Hgb A1C    Hypertension  -Continue home meds  -BP slightly elevated, monitor      Hypothyroidism  -Synthroid    Vascular Insufficiency  -Continue oral Lasix as she is a bit hypervolemic    DVT Prophylaxis  -Heparin subcutaneous    Plan  Plan d/w patient and she is agreeable. Advance directives d/w patient  and she wishes to be a full code.         Marlyn Connelly, APRN-CNP

## 2024-07-24 NOTE — PROGRESS NOTES
Physical Therapy    Physical Therapy Evaluation & Treatment    Patient Name: Apoorva Sanchez  MRN: [id number]  Today's Date: 7/24/2024   Time Calculation  Start Time: 0802  Stop Time: 0827  Time Calculation (min): 25 min    Assessment/Plan   PT Assessment  PT Assessment Results: Decreased strength, Decreased range of motion, Decreased endurance, Impaired balance, Decreased mobility, Decreased safety awareness, Impaired vision, Impaired hearing, Decreased skin integrity  Rehab Prognosis: Good  Evaluation/Treatment Tolerance: Patient tolerated treatment well  Medical Staff Made Aware: Yes  Strengths: Ability to acquire knowledge, Living arrangement secure, Support of extended family/friends, Premorbid level of function  Barriers to Participation: Comorbidities  End of Session Communication: Bedside nurse  Assessment Comment: pt demonstrated need for close supervision to contact guard of 1 for the above mobility; pt demonstrates decreased strength, balance, endurance, mobility safety, + chronic fall risk. Pt is functioning close to baseline but woiuld benefit from skilled PT while in hospital, low int rehab upon d/c home with intermittant S/A from family and cane vs FWW.  End of Session Patient Position: Up in chair, Alarm on (call button in reach)   IP OR SWING BED PT PLAN  Inpatient or Swing Bed: Inpatient  PT Plan  Treatment/Interventions: Bed mobility, Transfer training, Gait training, Stair training, Neuromuscular re-education, Strengthening, Endurance training, Range of motion, Therapeutic exercise, Therapeutic activity, Home exercise program  PT Plan: Ongoing PT  PT Frequency: 4 times per week  Equipment Recommended upon Discharge: Wheeled walker, Straight cane  PT Recommended Transfer Status: Stand by assist, Assistive device (FWw)  PT - OK to Discharge: Yes      Subjective     General Visit Information:  General  Reason for Referral: impaired mobility; right LE cellulitis; s/p fall  Referred By: Anita Harvey MD  Past  Medical History Relevant to Rehab: DM, HTN, falls, pelvic fx, macular degeneration  Family/Caregiver Present: No  Prior to Session Communication: Bedside nurse  Patient Position Received: Up in chair, Alarm on  Preferred Learning Style: verbal, auditory, visual  General Comment: 89 yo WF admitted to [facility] via Ed 7/23/24 after a fall while vacuuming, + right shin wound. Pt c/o right anterior lower leg redness, edema, pain. Also + UTI noted. Cleared by nurse for therapy; pt agreeable to therapy.  Home Living:  Home Living  Type of Home: House  Lives With: Alone  Home Adaptive Equipment: Cane, Walker rolling or standard  Home Layout: Two level  Alternate Level Stairs-Rails: Both  Alternate Level Stairs-Number of Steps: 13 steps up to bedroom, full bath  Home Access: Stairs to enter with rails  Entrance Stairs-Rails:  (2 posts pt holds onto)  Entrance Stairs-Number of Steps: 1  Bathroom Shower/Tub: Walk-in shower  Bathroom Toilet: Standard  Bathroom Equipment: Grab bars in shower  Bathroom Accessibility: full bath 2nd floor; 1/2 bath main floor  Prior Level of Function:  Prior Function Per Pt/Caregiver Report  Level of [place]: Independent with ADLs and functional transfers, Independent with homemaking with ambulation  Receives Help From: Family (adult son/daughter live in [place] and provide transportation)  ADL Assistance: Independent  Homemaking Assistance: Independent  Ambulatory Assistance:  (with cane vs FWW vs furniture walks)  Vocational: Retired  Prior Function Comments: pt manages her own meds; + recent fall  Precautions:  Precautions  Hearing/Visual Limitations: bilateral hearing aides---pt still mod [place]; glasses for TV, poor right vision.  Medical Precautions: Fall precautions  Vital Signs:  Vital Signs  Heart Rate: 80  SpO2: 98 %  Patient Position: 3 minute standing    Objective   Pain:  Pain Assessment  Pain Assessment: 0-10  0-10 (Numeric) Pain Score: 0 - No pain  Cognition:  Cognition  Overall  Cognitive Status: Within Functional Limits  Orientation Level: Appropriate for developmental age, Oriented X4  Safety/Judgement:  (decreased safety insight during functional mobility)  Insight: Mild    General Assessments:  General Observation  General Observation: pleasant, cooperative, right lower leg bandaged/gauze; mild to mod redness/edema right lower leg; chronic skin color changes left lower leg               Activity Tolerance  Endurance: Decreased tolerance for upright activites  Activity Tolerance Comments: good -    Sensation  Light Touch: No apparent deficits    Strength  Strength Comments: ricardo hips 3+/5, knees 4-/5, ankle 2+/5 with limited DF > PF  Coordination  Movements are Fluid and Coordinated: Yes  Heel to [place]: Intact (age appropriate)    Postural Control  Posture Comment: mild to mod forward head with right sidebending, protracted shldrs, mild T-spine kyphoscoliosos    Static Sitting Balance  Static Sitting-Balance Support: Feet supported  Static Sitting-Level of Assistance: Close supervision  Static Sitting-Comment/Number of Minutes: 3-4 min with good balance  Dynamic Sitting Balance  Dynamic Sitting-Balance Support: Feet supported  Dynamic Sitting-Comments: close supervision with good - balance    Static Standing Balance  Static Standing-Balance Support: Bilateral upper extremity supported  Static Standing-Level of Assistance: Close supervision  Static Standing-Comment/Number of Minutes: 1-2 min with good balance, FWW  Dynamic Standing Balance  Dynamic Standing-Balance Support: Bilateral upper extremity supported  Dynamic Standing-Comments: FWW, close supervision, good - balance  Functional Assessments:  Bed Mobility  Bed Mobility: No    Transfers  Transfer: Yes  Transfer 1  Transfer From 1: Sit to  Transfer to 1: Stand  Technique 1: Sit to stand  Transfer Device 1: Walker  Transfer Level of Assistance 1: Contact guard, Minimal verbal cues  Trials/Comments 1: verbal cues for proper ricardo hand  placement on arms of chair  Transfers 2  Transfer From 2: Stand to  Transfer to 2: Sit  Technique 2: Stand to sit  Transfer Device 2: Walker  Transfer Level of Assistance 2: Contact guard, Minimal verbal cues  Trials/Comments 2: verbal cues for reaching back with both hands for arms of chair    Ambulation/Gait Training  Ambulation/Gait Training Performed: Yes  Ambulation/Gait Training 1  Surface 1: Level tile  Device 1: Rolling walker  Assistance 1: Contact guard, Minimal verbal cues  Quality of Gait 1: Forward flexed posture, Diminished heel strike  Comments/Distance (ft) 1: pt amb 65 ft x 2 with FWW, + turns, contact guard of 1, verbal cues for staying close to FWW , erect posture as able;  Ambulation/Gait Training 2  Surface 2: Level tile  Device 2: No device  Assistance 2: Contact guard  Quality of Gait 2: Decreased step length, Shuffling gait, Diminished heel strike, Forward flexed posture  Comments/Distance (ft) 2: 16 ft x 1, + turn, contact guard of 1 with fair + balance    Stairs  Stairs: No  Extremity/Trunk Assessments:  Cervical Spine   Cervical Spine:  (mild to mod forward head with right sidebending)  RLE   RLE :  (see above strength comments)  LLE   LLE :  (see above strength comments)  Treatments:  Therapeutic Activity  Therapeutic Activity Performed: Yes (see transfers, amb with and without FWW comments)  Outcome Measures:  AMPAC Basic Mobility  Turning from your back to your side while in a flat bed without using bedrails: None  Moving from lying on your back to sitting on the side of a flat bed without using bedrails: A little  Moving to and from bed to chair (including a wheelchair): A little  Standing up from a chair using your arms (e.g. wheelchair or bedside chair): A little  To walk in hospital room: A little  Climbing 3-5 steps with railing: A little  Basic Mobility - Total Score: 19    Encounter Problems       Encounter Problems (Active)       Balance       STG - Maintains dynamic standing  balance with upper extremity support (Progressing)       Start:  07/24/24    Expected End:  08/21/24       INTERVENTIONS:  1. Practice standing with minimal support.  2. Educate patient about standing tolerance.  3. Educate patient about independence with gait, transfers, and ADL's.  4. Educate patient about use of assistive device.  5. Educate patient about self-directed care.            Mobility       STG - Patient will ambulate 300 ft, + turns, LRAD, distant supervision of 1 (Progressing)       Start:  07/24/24    Expected End:  08/21/24            pt ascends/descends 13 steps, bilateral rails, reciprocally with distant supervision of 1 (Progressing)       Start:  07/24/24    Expected End:  08/21/24               PT Transfers       STG - Patient to transfer to and from sit to supine mod Ind (Progressing)       Start:  07/24/24    Expected End:  08/21/24            STG - Patient will transfer sit to and from stand mod Ind (Progressing)       Start:  07/24/24    Expected End:  08/21/24                   Education Documentation  Mobility Training, taught by Shanika Chaney, PT at 7/24/2024  8:50 AM.  Learner: Patient  Readiness: Acceptance  Method: Explanation, Demonstration  Response: Needs Reinforcement

## 2024-07-24 NOTE — PROGRESS NOTES
07/24/24 1618   Discharge Planning   Living Arrangements Alone   Support Systems Friends/neighbors;Children   Assistance Needed Level of [place]: Independent with ADLs and functional transfers, Independent with homemaking with ambulation  Receives Help From: Family (adult son/daughter live in [place] and provide transportation)  ADL Assistance: Independent  Homemaking Assistance: Independent  Ambulatory Assistance:  (with cane vs FWW vs furniture walks)  Vocational: Retired  Prior Function Comments: pt manages her own meds; + recent fall   Type of Residence Private residence  (2 level home with: full bath 2nd floor; 1/2 bath main floor)   Number of Stairs to Enter Residence 1   Number of Stairs Within Residence 13   Do you have animals or pets at home? No   Who is requesting discharge planning? Provider   Home or Post Acute Services None   Type of Home Care Services Safety alert   Expected Discharge Disposition  Services  (patient may need home care services)   Does the patient need discharge transport arranged? No   Financial Resource Strain   How hard is it for you to pay for the very basics like food, housing, medical care, and heating? Not hard   Housing Stability   In the last 12 months, was there a time when you were not able to pay the mortgage or rent on time? N   In the past 12 months, how many times have you moved where you were living? 1   At any time in the past 12 months, were you homeless or living in a shelter (including now)? N   Transportation Needs   In the past 12 months, has lack of transportation kept you from medical appointments or from getting medications? no   In the past 12 months, has lack of transportation kept you from meetings, work, or from getting things needed for daily living? No     89 yo WF admitted to [facility] via Ed 7/23/24 after a fall while vacuuming, + right shin wound. Pt c/o right anterior lower leg redness, edema, pain. Also + UTI noted.      Patient currently  lives alone in a 2 story home, son lives near by, street away and checks on her daily. Patient does have a safety alert. Uses a walker when first getting up until she gets her balance. Independent with ADLs and functional transfers, Independent with homemaking with ambulation. Living arrangement secure, Support of extended family/friends.   Receives help from her family (adult son/daughter live in [place] and provide transportation)  ADL Assistance: Independent  Homemaking Assistance: Independent  Ambulatory Assistance:  (with cane vs FWW vs furniture walks)  Vocational: Retired  Prior Function Comments: pt manages her own meds; + recent fall  Hearing/Visual Limitations: bilateral hearing aides---pt still mod [place], wears hearing aides; glasses for TV, poor right vision. Patient able to read a book. Patient also enjoys cooking and finding new receipts.     PLAN: Discharge to home with potentially home health care. Currently patient feels she does not need it but is open to it if she does need HHC.

## 2024-07-24 NOTE — CONSULTS
Inpatient consult to Infectious Diseases  Consult performed by: ALICIA Bush-CNP  Consult ordered by: ALICIA Camargo-CNP  Reason for consult: Right skin wound        Primary MD: Man Mccracken MD        History Of Present Illness  Apoorva Sanchez is a 89 y.o. female past medical history of diabetes mellitus.  She presented to the emergency room for evaluation of right leg wound.  She reports sustaining fall on July 1 and noticed discoloration of right leg.  She reports she was seen by urgent care who instructed her on wound care.  She reports progressive worsening which prompted her to see her PCP, who recommended wound care center.  He reports she did not continue to follow-up with wound care center.  She reported progressive worsening of wound with interval enlargement, increased drainage, malodor.  She denies pain.  She reports right leg swelling, erythema.  She denies fever chills nausea vomiting or diarrhea.  Labs with no leukocytosis.  Urinalysis with pyuria.  Story of tibia-fibula with no evidence of osteomyelitis, soft tissue edema and soft tissue wound anterior aspect.  CT of tibia-fibula showed ill-defined high density collection anterior medial aspect of lower leg, infected hematoma versus mass. No osseous erosion to suggest osteomyelitis.   She is on IV vancomycin and zosyn.     Past Medical History  She has a past medical history of Accidental fall (03/19/2024), Diabetes mellitus (Multi), Disease of thyroid gland, Fracture of pelvis (Multi) (03/19/2024), Hypertension, Injury of head (03/19/2024), Oth fracture of left pubis, init encntr for closed fracture (Multi) (07/03/2022), and Syncope (03/19/2024).    Surgical History  She has no past surgical history on file.     Social History     Occupational History    Not on file   Tobacco Use    Smoking status: Never     Passive exposure: Never    Smokeless tobacco: Never   Vaping Use    Vaping status: Never Used   Substance and Sexual Activity     Alcohol use: Never    Drug use: Never    Sexual activity: Not on file     Travel History   Travel since 06/24/24    No documented travel since 06/24/24            Family History  No family history on file.  Allergies  Sulfa (sulfonamide antibiotics)     Immunization History   Administered Date(s) Administered    Flu vaccine, quadrivalent, high-dose, preservative free, age 65y+ (FLUZONE) 10/22/2020, 10/20/2021, 11/07/2022, 09/18/2023    Influenza, High Dose Seasonal, Preservative Free 10/31/2018, 10/03/2019    Influenza, injectable, quadrivalent 11/22/2016, 11/28/2017    Influenza, seasonal, injectable 01/07/2011, 09/21/2012, 12/03/2013, 10/07/2014, 10/01/2015    Moderna SARS-CoV-2 Vaccination 03/25/2021, 04/22/2021    Pfizer Purple Cap SARS-CoV-2 12/20/2021    Pneumococcal conjugate vaccine, 13-valent (PREVNAR 13) 11/28/2017    Pneumococcal polysaccharide vaccine, 23-valent, age 2 years and older (PNEUMOVAX 23) 10/31/2018    Td (adult), unspecified 08/23/2002    Tdap vaccine, age 7 year and older (BOOSTRIX, ADACEL) 07/01/2024     Medications  Home medications:  Medications Prior to Admission   Medication Sig Dispense Refill Last Dose    atenolol (Tenormin) 100 mg tablet Take 1 tablet (100 mg) by mouth once daily. (Patient taking differently: Take 1 tablet (100 mg) by mouth once daily. At bedtime) 90 tablet 3 7/22/2024    brimonidine (AlphaGAN) 0.2 % ophthalmic solution Administer 1 drop into both eyes 2 times a day.   7/22/2024    cloNIDine (Catapres) 0.2 mg tablet Take 2 tablets (0.4 mg) by mouth once daily at bedtime. 180 tablet 3 7/22/2024    furosemide (Lasix) 40 mg tablet Take 1 tablet (40 mg) by mouth once daily. 90 tablet 3 7/22/2024    glimepiride (Amaryl) 2 mg tablet Take 1 tablet (2 mg) by mouth 2 times a day with meals. 180 tablet 3 7/23/2024    latanoprost (Xalatan) 0.005 % ophthalmic solution Administer into affected eye(s) once every 24 hours. bedtime   7/23/2024    levothyroxine (Synthroid,  Levoxyl) 100 mcg tablet Take 1 tablet (100 mcg) by mouth once daily in the morning. Take before meals. 90 tablet 3 7/23/2024    metFORMIN XR (Glucophage-XR) 500 mg 24 hr tablet Take 1 tablet (500 mg) by mouth once daily in the evening. Take with meals. Do not crush, chew, or split. (Patient taking differently: Take 1 tablet (500 mg) by mouth 2 times a day. Do not crush, chew, or split.) 90 tablet 3 7/23/2024    simvastatin (Zocor) 40 mg tablet Take 1 tablet (40 mg) by mouth once daily at bedtime. 90 tablet 3 7/22/2024    timolol (Timoptic) 0.5 % ophthalmic solution Administer 1 drop into both eyes 2 times a day.   7/22/2024    prednisoLONE acetate (Pred-Forte) 1 % ophthalmic suspension Not yet to take   Unknown     Current medications:  Scheduled medications  atenolol, 100 mg, oral, Daily  brimonidine, 1 drop, ophthalmic (eye), BID  cloNIDine, 0.4 mg, oral, Nightly  furosemide, 40 mg, oral, Daily  glimepiride, 2 mg, oral, BID  heparin, 5,000 Units, subcutaneous, q12h AMY  insulin lispro, 0-5 Units, subcutaneous, TID  latanoprost, 1 drop, Both Eyes, q24h  levothyroxine, 100 mcg, oral, Daily before breakfast  piperacillin-tazobactam, 3.375 g, intravenous, q6h  simvastatin, 40 mg, oral, Nightly  timolol, 1 drop, Both Eyes, BID  vancomycin, 1,000 mg, intravenous, q24h      Continuous medications     PRN medications  PRN medications: acetaminophen, dextrose, dextrose, glucagon, glucagon, vancomycin    Review of Systems   Constitutional: Negative.    HENT: Negative.     Eyes: Negative.    Respiratory: Negative.     Cardiovascular:  Positive for leg swelling.   Gastrointestinal: Negative.    Endocrine: Negative.    Genitourinary: Negative.    Musculoskeletal: Negative.    Skin:  Positive for rash and wound.   Neurological: Negative.    Psychiatric/Behavioral: Negative.          Objective  Range of Vitals (last 24 hours)  Heart Rate:  [60-80]   Temp:  [36.3 °C (97.3 °F)-36.9 °C (98.4 °F)]   Resp:  [15-21]   BP:  (113-192)/(56-79)   Height:  [152.4 cm (5')]   Weight:  [54.5 kg (120 lb 2.4 oz)-56.8 kg (125 lb 3.5 oz)]   SpO2:  [94 %-100 %]   Daily Weight  07/23/24 : 56.8 kg (125 lb 3.5 oz)    Body mass index is 24.46 kg/m².     Physical Exam  Constitutional:       Appearance: Normal appearance.   HENT:      Head: Normocephalic and atraumatic.      Nose: Nose normal.      Mouth/Throat:      Mouth: Mucous membranes are moist.      Pharynx: Oropharynx is clear.   Eyes:      General: No scleral icterus.  Cardiovascular:      Rate and Rhythm: Normal rate and regular rhythm.   Pulmonary:      Effort: Pulmonary effort is normal.      Breath sounds: Normal breath sounds.   Abdominal:      General: Bowel sounds are normal.      Palpations: Abdomen is soft.   Musculoskeletal:         General: Normal range of motion.      Cervical back: Normal range of motion and neck supple.      Right lower leg: Edema present.      Left lower leg: Edema present.   Skin:     General: Skin is warm and dry.      Comments: large right anterior lower leg wound with necrotic tissue  Right leg erythema    Neurological:      General: No focal deficit present.      Mental Status: She is alert and oriented to person, place, and time. Mental status is at baseline.   Psychiatric:         Mood and Affect: Mood normal.         Behavior: Behavior normal.        Relevant Results  Outside Hospital Results  No  Labs  Results from last 72 hours   Lab Units 07/24/24 0422 07/23/24  1747   WBC AUTO x10*3/uL 8.5 8.9   HEMOGLOBIN g/dL 9.1* 10.7*   HEMATOCRIT % 28.8* 33.4*   PLATELETS AUTO x10*3/uL 272 312   NEUTROS PCT AUTO %  --  77.6   LYMPHS PCT AUTO %  --  13.1   MONOS PCT AUTO %  --  7.3   EOS PCT AUTO %  --  0.9     Results from last 72 hours   Lab Units 07/24/24 0422 07/23/24  1747   SODIUM mmol/L 136 131*   POTASSIUM mmol/L 4.3 4.2   CHLORIDE mmol/L 99 94*   CO2 mmol/L 29 27   BUN mg/dL 11 16   CREATININE mg/dL 0.80 0.80   GLUCOSE mg/dL 120* 199*   CALCIUM mg/dL  "8.8 9.3   ANION GAP mmol/L 8 10   EGFR mL/min/1.73m*2 68 68     Results from last 72 hours   Lab Units 07/23/24  1747   ALK PHOS U/L 71   BILIRUBIN TOTAL mg/dL 0.3   PROTEIN TOTAL g/dL 7.1   ALT U/L 10   AST U/L 16   ALBUMIN g/dL 3.9     Estimated Creatinine Clearance: 33.2 mL/min (by C-G formula based on SCr of 0.8 mg/dL).  No results found for: \"CRP\", \"SEDRATE\"  No results found for: \"HIV1X2\", \"HIVCONF\", \"TIMPVK5PE\"  No results found for: \"HEPCABINIT\", \"HEPCAB\", \"HCVPCRQUANT\"  Microbiology  Blood Culture pending  Wound culture pending  Urine culture pending        Imaging  CT tibia fibula right w IV contrast    Result Date: 7/23/2024  Interpreted By:  Denny Oliveira, STUDY: CT TIBIA FIBULA RIGHT W IV CONTRAST;  7/23/2024 9:25 pm   INDICATION: Signs/Symptoms:RLE infection r/o abscess, OM.   COMPARISON: Radiographs of the right tibia and fibula 07/22/2024   ACCESSION NUMBER(S): [id number]   ORDERING CLINICIAN: BANDAR RAMOS   TECHNIQUE: CT imaging of the right tibia and fibula was obtained after the intravenous administration of 75 mL Omnipaque 350 contrast. Coronal and sagittal reformatted images were performed.   FINDINGS: OSSEOUS STRUCTURES:   Severe degenerative changes of the right knee with tricompartmental joint space narrowing and large marginal osteophytes. Meniscal calcifications suggesting calcium pyrophosphate deposition arthropathy. Intra-articular ossific bodies in the posterior aspect of the knee. No fracture identified. No definite erosive change or periosteal reaction to suggest osteomyelitis.     SOFT TISSUES:   There is extensive subcutaneous soft tissue edema throughout the lower leg most pronounced within laterally. The deeper anterior an intramuscular fat planes are preserved. No definite peripherally enhancing fluid collection identified. There is a crescentic similar ill-defined high density collection along the anteromedial aspect of the two-view which measures 5.4 x 2.0 x 11.5 cm. The " appearance is most suggestive of the hematoma although the mass or infectious collection can not be entirely excluded. Extensive vascular calcifications are noted throughout the leg. No subcutaneous air identified.       1. Ill-defined relatively high density collection along the anteromedial aspect of the lower leg measuring 5.4 x 2.0 x 11.5 cm. The appearance is suggestive of the hematoma with infection or mass not entirely excluded. 2. Diffuse nonspecific subcutaneous soft tissue edema throughout the lower leg most pronounced along the lateral aspect. No definite peripherally enhancing fluid collection is noted. The deeper fascial planes appear preserved. Correlate for clinical evidence of infection. 3. No osseous erosion to suggest osteomyelitis. If there is ongoing clinical concern then MRI may be performed in further assessment.   MACRO: None   Signed by: Denny Oliveira 7/23/2024 9:44 PM Dictation workstation:   [id number]    Lower extremity venous duplex right    Result Date: 7/23/2024  Interpreted By:  Angelica Aguila, STUDY: [facility] US LOWER EXTREMITY VENOUS DUPLEX RIGHT;  7/23/2024 6:30 pm   INDICATION: Signs/Symptoms:r/o DVT.   COMPARISON: None.   ACCESSION NUMBER(S): [id number]   ORDERING CLINICIAN: AMELIE ABARCA   TECHNIQUE: Grayscale, color and spectral Doppler sonographic images of the right lower extremity deep venous system. The left common femoral vein was imaged for comparison.   FINDINGS: There is normal compressibility of the right common femoral vein, saphenous femoral junction, femoral vein and popliteal vein. The posterior tibial and peroneal veins are suboptimally visualized. There is normal spontaneous and phasic variation throughout the leg by spectral doppler.   The left common femoral vein is patent.   OTHER FINDINGS: None.       Suboptimal visualization of the calf veins. No sonographic evidence of acute DVT in the visualized vessels of the right lower extremity.   MACRO: None    Signed by: Angelica Aguila 7/23/2024 6:44 PM Dictation workstation:   [id number]    XR tibia fibula right 2 views    Result Date: 7/23/2024  Interpreted By:  Hira Lawrence, STUDY: XR TIBIA FIBULA RIGHT 2 VIEWS; ;  7/23/2024 5:39 pm   INDICATION: Signs/Symptoms:wound right anterior tibia.   COMPARISON: None.   ACCESSION NUMBER(S): [id number]   ORDERING CLINICIAN: AMELIE ABARCA   FINDINGS: Right tibia/fibula, two views   Soft tissue edema about the calf with irregularity anteriorly corresponding to known wound. No osseous destruction or erosion seen radiographically. Diffuse osteopenia present. Severe degenerative change of the knee       No radiographic evidence of osteomyelitis. Soft tissue edema and soft tissue wound at the anterior aspect of the cuff     MACRO: None   Signed by: Hira Lawrence 7/23/2024 6:05 PM Dictation workstation:   [id number]    XR tibia fibula right 2 views    Result Date: 7/1/2024  Interpreted By:  José Yan, STUDY: XR TIBIA FIBULA RIGHT 2 VIEWS;  7/1/2024 2:39 pm   INDICATION: Signs/Symptoms:fall.   COMPARISON: None.   ACCESSION NUMBER(S): [id number]   ORDERING CLINICIAN: ZARIA CONDON   FINDINGS: No acute fracture in the tibia or fibula. Moderate to severe knee osteoarthritis with chondrocalcinosis.       No acute fracture in the tibia or fibula.   MACRO None   Signed by: José Yan 7/1/2024 2:59 PM Dictation workstation:   [id number]    XR hand right 3+ views    Result Date: 7/1/2024  Interpreted By:  José Yan, STUDY: XR HAND RIGHT 3+ VIEWS;  7/1/2024 2:39 pm   INDICATION: Signs/Symptoms:fall.   COMPARISON: None.   ACCESSION NUMBER(S): [id number]   ORDERING CLINICIAN: ZARIA CONDON   FINDINGS: Acute oblique fracture through proximal 1st metacarpal metadiaphysis with minimal displacement. Severe 1st CMC osteoarthritis.       Acute minimally displaced 1st metacarpal base fracture. Severe 1st CMC osteoarthritis.   MACRO None   Signed by: José Yan  7/1/2024 2:57 PM Dictation workstation:   [id number]    CT cervical spine wo IV contrast    Result Date: 7/1/2024  Interpreted By:  José Yan, STUDY: CT CERVICAL SPINE WO IV CONTRAST;  7/1/2024 2:19 pm   INDICATION: Signs/Symptoms:fall.   COMPARISON: C-spine 07/01/2022.   ACCESSION NUMBER(S): [id number]   ORDERING CLINICIAN: ZARIA CONDON   TECHNIQUE: Thin section axial images were obtained from the skull base down through the thoracic inlet. Sagittal and coronal reconstruction images were generated. Soft tissue, lung, and bone windows were reviewed.   FINDINGS: Prevertebral/Paraspinal Soft Tissues: Stable 2.4 cm heterogeneous left thyroid nodule.   CERVICAL SPINE: Hardware: None. Fracture: None. Vertebral Body Heights: Normal. Alignment: No traumatic listhesis. Spinal canal and neural foramina: Moderate to severe canal stenosis at C3-C4 and C6-C7, not significantly changed. No severe neural foraminal narrowing.       No acute fracture or traumatic malalignment.   Stable 2.3 cm left thyroid nodule. Consider outpatient ultrasound evaluation if not already performed.   Signed by: José Yan 7/1/2024 2:55 PM Dictation workstation:   [id number]    CT head wo IV contrast    Result Date: 7/1/2024  Interpreted By:  José Yan, STUDY: CT HEAD WO IV CONTRAST;  7/1/2024 2:19 pm   INDICATION: Signs/Symptoms:fall.   COMPARISON: CT head 04/01/2020.   ACCESSION NUMBER(S): [id number]   ORDERING CLINICIAN: ZARIA CONDON   TECHNIQUE: Noncontrast axial CT scan of head was performed.   FINDINGS: Parenchyma: No intracranial hemorrhage. The grey-white differentiation is intact. No mass effect or midline shift. Patchy periventricular white matter hypodensities, likely moderate chronic microvascular ischemic change. Severe diffuse parenchymal atrophy.   CSF Spaces: The ventricles, sulci and basal cisterns are within normal limits for age.   Extra-Axial Fluid: No extraaxial fluid collection.   Calvarium: No  acute fracture.   Paranasal sinuses: Visualized paranasal sinuses are clear.   Mastoids: Clear.   Orbits: No acute abnormality.   Soft tissues: No acute abnormality.       No acute intracranial hemorrhage or calvarial fracture.   MACRO None   Signed by: José Yan 7/1/2024 2:48 PM Dictation workstation:   [id number]      Assessment/Plan   Type 2 diabetes mellitus   Infected Right leg traumatic wound  Right leg hematoma  Right leg cellulitis  Peripheral vascular disease   Pyuria verses Urinary tract infection    IV vancomycin  IV Zosyn  Follow urine culture  Follow wound culture  Follow blood culture  Monitor temperature WBC  Podiatry follow-up-plan surgical debridement  Local care  Right leg elevation    Total time spent caring for the patient today was 30 minutes. This includes time spent before the visit reviewing the chart, time spent during the visit, and time spent after the visit on documentation    ALICIA Bush-CNP

## 2024-07-24 NOTE — PROGRESS NOTES
07/24/24 1631   Physical Activity   On average, how many days per week do you engage in moderate to strenuous exercise (like a brisk walk)? 5 days  (does leg exercises)   Financial Resource Strain   How hard is it for you to pay for the very basics like food, housing, medical care, and heating? Not hard   Housing Stability   In the last 12 months, was there a time when you were not able to pay the mortgage or rent on time? N   In the past 12 months, how many times have you moved where you were living? 1   At any time in the past 12 months, were you homeless or living in a shelter (including now)? N   Transportation Needs   In the past 12 months, has lack of transportation kept you from medical appointments or from getting medications? no   In the past 12 months, has lack of transportation kept you from meetings, work, or from getting things needed for daily living? No   Food Insecurity   Within the past 12 months, you worried that your food would run out before you got the money to buy more. Never true   Within the past 12 months, the food you bought just didn't last and you didn't have money to get more. Never true   Stress   Do you feel stress - tense, restless, nervous, or anxious, or unable to sleep at night because your mind is troubled all the time - these days? Not at all   Social Connections   In a typical week, how many times do you talk on the phone with family, friends, or neighbors? More than 3   How often do you get together with friends or relatives? More than 3   How often do you attend Sabianism or Hoahaoism services? Never   Do you belong to any clubs or organizations such as Sabianism groups, unions, fraternal or athletic groups, or school groups? No   How often do you attend meetings of the clubs or organizations you belong to? Never   Are you , , , , never , or living with a partner?    Intimate Partner Violence   Within the last year, have you been  afraid of your partner or ex-partner? No   Within the last year, have you been humiliated or emotionally abused in other ways by your partner or ex-partner? No   Within the last year, have you been kicked, hit, slapped, or otherwise physically hurt by your partner or ex-partner? No   Within the last year, have you been raped or forced to have any kind of sexual activity by your partner or ex-partner? No   Alcohol Use   Q1: How often do you have a drink containing alcohol? Never   Q2: How many drinks containing alcohol do you have on a typical day when you are drinking? None   Q3: How often do you have six or more drinks on one occasion? Never   Utilities   In the past 12 months has the electric, gas, oil, or water company threatened to shut off services in your home? No   Health Literacy   How often do you need to have someone help you when you read instructions, pamphlets, or other written material from your doctor or pharmacy? Never

## 2024-07-24 NOTE — PROGRESS NOTES
07/24/24 1630   [facility] Disability Status   Are you deaf or do you have serious difficulty hearing? Y   Are you blind or do you have serious difficulty seeing, even when wearing glasses? N   Because of a physical, mental, or emotional condition, do you have serious difficulty concentrating, remembering, or making decisions? (5 years old or older) N   Do you have serious difficulty walking or climbing stairs? N   Do you have serious difficulty dressing or bathing? N   Because of a physical, mental, or emotional condition, do you have serious difficulty doing errands alone such as visiting the doctor? N

## 2024-07-24 NOTE — CONSULTS
"Nutrition Assessement Note    Nutrition Assessment    Reason for Assessment: Admission nursing screening (Wt loss)    Reason for Hospital Admission:  Apoorva Sanchez is a 89 y.o. female who is admitted for cellulitis of right lower extremity. States she was 140# at a office visit around 6 months ago. Per chart review, pt office visit from 3/2024 has a recorded ht of 5'7\" a recorded wt of 122# and a recorded BMI of 19.11 kg/m². Question accuracy of records from today 7/24. She is agreeable to a nutritional supplement at this time, will order ensure high protein BID. Will continue to monitor.     Past Medical History:   Diagnosis Date    Accidental fall 03/19/2024    Diabetes mellitus (Multi)     Disease of thyroid gland     Fracture of pelvis (Multi) 03/19/2024    Hypertension     Injury of head 03/19/2024    Oth fracture of left pubis, init encntr for closed fracture (Multi) 07/03/2022    Syncope 03/19/2024      History reviewed. No pertinent surgical history.    Nutrition History:  Food and Nutrient History: Patient reports good appetite and intake. No changes from when at home. Baseline is 3 meals/day.  Energy Intake: Good > 75 %  Food Allergies/Intolerances:  None  GI Symptoms: Constipation and None  Oral Problems: None    Anthropometrics:  Ht: 152.4 cm (5'), Wt: 56.8 kg (125 lb 3.5 oz), BMI: 24.46  IBW/kg (Dietitian Calculated): 45.45 kg  Percent of IBW: 125 %     Weight Change:  Daily Weight  07/23/24 : 56.8 kg (125 lb 3.5 oz)  07/11/24 : 54.4 kg (120 lb)  07/01/24 : 54.4 kg (120 lb)  03/21/24 : 55.3 kg (122 lb)  09/18/23 : 57.6 kg (127 lb)  03/13/23 : 57.2 kg (126 lb)  01/18/23 : 57.6 kg (127 lb)  01/05/23 : 59.9 kg (132 lb)  09/27/22 : 58.1 kg (128 lb)  09/20/22 : 57.6 kg (127 lb)     Weight History / % Weight Change: Patient reports a UBW of 140#. States she has lost about 20# in the past 6 months d/t decreased ambulation. Per chart hx pt has maintained wt from 120-125# for the past 4 months, question accuracy of " recorded wts. Per patient reports, she has had a 15# (10.7%) wt loss in 6 months.  Significant Weight Loss: Yes  Interpretation of Weight Loss: >10% in 6 months (Patient reported)     Nutrition Focused Physical Exam Findings:   Subcutaneous Fat Loss  Orbital Fat Pads: Mild-Moderate (slight dark circles and slight hollowing)  Buccal Fat Pads: Mild-Moderate (flat cheeks, minimal bounce)    Muscle Wasting  Temporalis: Mild-Moderate (slight depression)    Edema  Edema Location: RLE     Nutrition Significant Labs:  Lab Results   Component Value Date    WBC 8.5 07/24/2024    HGB 9.1 (L) 07/24/2024    HCT 28.8 (L) 07/24/2024     07/24/2024    CHOL 94 (L) 03/06/2023    TRIG 72 03/06/2023    HDL 48 (L) 03/06/2023    ALT 10 07/23/2024    AST 16 07/23/2024     07/24/2024    K 4.3 07/24/2024    CL 99 07/24/2024    CREATININE 0.80 07/24/2024    BUN 11 07/24/2024    CO2 29 07/24/2024    TSH 1.70 09/07/2023    INR 1.0 04/01/2020    HGBA1C 7.1 (H) 07/24/2024     Nutrition Specific Medications:  atenolol, 100 mg, oral, Daily  brimonidine, 1 drop, ophthalmic (eye), BID  cloNIDine, 0.4 mg, oral, Nightly  furosemide, 40 mg, oral, Daily  glimepiride, 2 mg, oral, BID  heparin, 5,000 Units, subcutaneous, q12h AMY  insulin lispro, 0-5 Units, subcutaneous, TID  latanoprost, 1 drop, Both Eyes, q24h  levothyroxine, 100 mcg, oral, Daily before breakfast  piperacillin-tazobactam, 3.375 g, intravenous, q6h  simvastatin, 40 mg, oral, Nightly  timolol, 1 drop, Both Eyes, BID  vancomycin, 1,000 mg, intravenous, q24h       Dietary Orders (From admission, onward)       Start     Ordered    07/23/24 2100  Adult diet Regular  Diet effective now        Question:  Diet type  Answer:  Regular    07/23/24 2059                   Estimated Needs:   Estimated Energy Needs  Total Energy Estimated Needs (kCal):  (5231-8815)  Total Estimated Energy Need per Day (kCal/kg):  (30-35)  Method for Estimating Needs: actual wt    Estimated Protein  Needs  Total Protein Estimated Needs (g):  (68-85)  Total Protein Estimated Needs (g/kg):  (1.2-1.5)  Method for Estimating Needs: actual wt    Estimated Fluid Needs  Total Fluid Estimated Needs (mL):  (5369-5780)  Total Fluid Estimated Needs (mL/kg):  (25-30)  Method for Estimating Needs: 1 mL/kcal      Nutrition Diagnosis   Nutrition Diagnosis:  Malnutrition Diagnosis  Patient has Malnutrition Diagnosis: Yes  Diagnosis Status: New  Malnutrition Diagnosis: Severe malnutrition related to chronic disease or condition  As Evidenced by: mild/moderate subcutaneous fat loss and muscle wasting; significant reported 15# (10.7%) wt loss in 6 months.        Nutrition Interventions/Recommendations   Nutrition Interventions and Recommendations:    Nutrition Prescription:  Individualized Nutrition Prescription Provided for : 1769-1955 calories/day and 68-85g protein/day to be provided via diet & supplements.    Nutrition Interventions:   Food and/or Nutrient Delivery Interventions  Interventions: Meals and snacks, Medical food supplement  Meals and Snacks: General healthful diet  Goal: Provide as ordered  Medical Food Supplement: Commercial beverage  Goal: ensure plus high protein BID to provide 350 kcals and 20g protein (Any flavor)    Education Documentation  No documentation found.       Nutrition Monitoring and Evaluation   Monitoring/Evaluation:   Food/Nutrient Related History Monitoring  Monitoring and Evaluation Plan: Energy intake  Energy Intake: Estimated energy intake  Criteria: pt to consume >/= 75% estimated needs    Body Composition/Growth/Weight History  Monitoring and Evaluation Plan: Weight  Weight: Measured weight  Criteria: pt will maintain/gain wt        Time Spent/Follow-up:   Follow Up  Time Spent (min): 35 minutes  Last Date of Nutrition Visit: 07/24/24  Nutrition Follow-Up Needed?: 3-5 days  Follow up Comment: 7/26/24

## 2024-07-24 NOTE — CARE PLAN
The patient's goals for the shift include      The clinical goals for the shift include Pt will remain safe and free from harm throughout the shift      Problem: Diabetes  Goal: Achieve decreasing blood glucose levels by end of shift  Outcome: Progressing  Goal: Increase stability of blood glucose readings by end of shift  Outcome: Progressing  Goal: Decrease in ketones present in urine by end of shift  Outcome: Progressing  Goal: Maintain electrolyte levels within acceptable range throughout shift  Outcome: Progressing  Goal: Maintain glucose levels >70mg/dl to <250mg/dl throughout shift  Outcome: Progressing  Goal: No changes in neurological exam by end of shift  Outcome: Progressing  Goal: Learn about and adhere to nutrition recommendations by end of shift  Outcome: Progressing  Goal: Vital signs within normal range for age by end of shift  Outcome: Progressing  Goal: Increase self care and/or family involovement by end of shift  Outcome: Progressing  Goal: Receive DSME education by end of shift  Outcome: Progressing     Problem: Skin  Goal: Decreased wound size/increased tissue granulation at next dressing change  Outcome: Progressing  Flowsheets (Taken 7/24/2024 0249)  Decreased wound size/increased tissue granulation at next dressing change: Promote sleep for wound healing  Goal: Participates in plan/prevention/treatment measures  Outcome: Progressing  Flowsheets (Taken 7/24/2024 0249)  Participates in plan/prevention/treatment measures:   Discuss with provider PT/OT consult   Elevate heels   Increase activity/out of bed for meals  Goal: Prevent/manage excess moisture  Outcome: Progressing  Flowsheets (Taken 7/24/2024 0249)  Prevent/manage excess moisture:   Cleanse incontinence/protect with barrier cream   Moisturize dry skin  Goal: Prevent/minimize sheer/friction injuries  Outcome: Progressing  Flowsheets (Taken 7/24/2024 0249)  Prevent/minimize sheer/friction injuries:   Increase activity/out of bed for  meals   Use pull sheet  Goal: Promote/optimize nutrition  Outcome: Progressing  Flowsheets (Taken 7/24/2024 0249)  Promote/optimize nutrition:   Consume > 50% meals/supplements   Monitor/record intake including meals   Offer water/supplements/favorite foods  Goal: Promote skin healing  Outcome: Progressing  Flowsheets (Taken 7/24/2024 0249)  Promote skin healing:   Assess skin/pad under line(s)/device(s)   Turn/reposition every 2 hours/use positioning/transfer devices     Problem: Fall/Injury  Goal: Not fall by end of shift  Outcome: Progressing  Goal: Be free from injury by end of the shift  Outcome: Progressing  Goal: Verbalize understanding of personal risk factors for fall in the hospital  Outcome: Progressing  Goal: Verbalize understanding of risk factor reduction measures to prevent injury from fall in the home  Outcome: Progressing  Goal: Use assistive devices by end of the shift  Outcome: Progressing  Goal: Pace activities to prevent fatigue by end of the shift  Outcome: Progressing

## 2024-07-24 NOTE — PROGRESS NOTES
Vancomycin Dosing by Pharmacy- FOLLOW UP    Apoorva Sanchez is a 89 y.o. year old female who Pharmacy has been consulted for vancomycin dosing for other diabetic foot infection . Based on the patient's indication and renal status this patient is being dosed based on a goal AUC of 400-600.     Renal function is currently stable.    Current vancomycin dose: 1000 mg given every 24 hours    Estimated vancomycin AUC on current dose: 514 mg/L.hr     Visit Vitals  /56 (BP Location: Left arm, Patient Position: Lying)   Pulse 60   Temp 36.7 °C (98 °F) (Oral)   Resp 18        Lab Results   Component Value Date    CREATININE 0.80 2024    CREATININE 0.80 2024    CREATININE 0.80 2024    CREATININE 0.8 2023    CREATININE 0.8 2023    CREATININE 0.8 10/05/2022    CREATININE 0.7 2022        Patient weight is as follows:   Vitals:    24 2201   Weight: 56.8 kg (125 lb 3.5 oz)       Cultures:  No results found for the encounter in last 14 days.       I/O last 3 completed shifts:  In: 50 (0.9 mL/kg) [IV Piggyback:50]  Out: - (0 mL/kg)   Weight: 54.5 kg   I/O during current shift:  No intake/output data recorded.    Temp (24hrs), Av.7 °C (98 °F), Min:36.5 °C (97.7 °F), Max:36.9 °C (98.4 °F)      Assessment/Plan    Within goal AUC range. Continue current vancomycin regimen.    This dosing regimen is predicted by [employer] to result in the following pharmacokinetic parameters:  Loading dose: N/A  Regimen: 1000 mg IV every 24 hours.  Start time: 08:53 on 2024  Exposure target: AUC24 (range)400-600 mg/L.hr   AUC24,ss: 514 mg/L.hr  Probability of AUC24 > 400: 88 %  Ctrough,ss: 17 mg/L  Probability of Ctrough,ss > 20: 27 %  Probability of nephrotoxicity (Lodise ANTHONY ): 13 %    The next level will be obtained on  at 0500. May be obtained sooner if clinically indicated.   Will continue to monitor renal function daily while on vancomycin and order serum creatinine at least every 48 hours  if not already ordered.  Follow for continued vancomycin needs, clinical response, and signs/symptoms of toxicity.       CARMEN MONCADA, PharmD

## 2024-07-24 NOTE — PROGRESS NOTES
Occupational Therapy    Evaluation/Treatment    Patient Name: Apoorva Sanchez  MRN: [id number]  Today's Date: 7/24/2024  Time Calculation  Start Time: 0715  Stop Time: 0740  Time Calculation (min): 25 min    Assessment  IP OT Assessment  OT Assessment: 89 y/o. female here with Rt shin wound, UTI. On eval, she requires slightly increased assist for xfers, mobility and self care d/t decreased strength, balance, activity tolerance. Would benefit from skilled OT while in house to maximize safety/IND prior to DC.  Prognosis: Good  Barriers to Discharge: Other (Comment) (lives alone, full flight of stairs to bed/bath, fall risk)  Evaluation/Treatment Tolerance: Patient tolerated treatment well  Medical Staff Made Aware: Yes  End of Session Communication: Bedside nurse  End of Session Patient Position: Up in chair, Alarm on  Plan:  Treatment Interventions: ADL retraining, Functional transfer training, UE strengthening/ROM, Endurance training, Patient/family training, Equipment evaluation/education, Neuromuscular reeducation, Compensatory technique education  OT Frequency: 3 times per week  OT Discharge Recommendations: Low intensity level of continued care  Equipment Recommended upon Discharge: Wheeled walker  OT Recommended Transfer Status: Assist of 1  OT - OK to Discharge: Yes    Subjective   Current Problem:  1. Cellulitis of right lower extremity        2. Wound of right lower extremity, initial encounter          General:  General  Reason for Referral: Decreased ADLs, Rt shin wound, UTI  Referred By: Dr. Marie  Past Medical History Relevant to Rehab: fall, syncope, DM, pelvic fx, HTN, head injury  Family/Caregiver Present: No  Prior to Session Communication: Bedside nurse  Patient Position Received: Bed, 3 rail up, Alarm on  Preferred Learning Style: visual, verbal  General Comment: Cleared by nsg, pt met in supine, agreeable to OT session.    Precautions:  Hearing/Visual Limitations: + very [place], b/l hearing aides  "donned  Medical Precautions: Fall precautions    Pain:  Pain Assessment  Pain Assessment: 0-10  0-10 (Numeric) Pain Score: 0 - No pain    Objective   Cognition:  Overall Cognitive Status: Within Functional Limits  Cognition Comments: limited primarily by hearing loss. somewhat questionable historian at times    Home Living:  Type of Home: House  Lives With: Alone  Home Adaptive Equipment: Walker rolling or standard, Cane  Home Layout: Multi-level, Stairs to alternate level with rails  Alternate Level Stairs-Rails:  (unilateral)  Alternate Level Stairs-Number of Steps: full flight upstairs  Home Access: Stairs to enter without rails  Entrance Stairs-Rails: None  Entrance Stairs-Number of Steps: threshold  Home Living Comments: reports the stairs are \"no problem\"     Prior Function:  Level of [place]: Independent with homemaking with ambulation, Independent with ADLs and functional transfers  Receives Help From: Family (dtr assists intermittently)  ADL Assistance: Independent  Homemaking Assistance: Independent (pt states \"I do it all\")  Ambulatory Assistance: Independent (FWW for prolonged distances, cane intermittently for short distances)  Vocational: Retired  Hand Dominance: Right  Prior Function Comments: pt denies recent falls? (per MR pt suffered Rt shin wound after fall while vaccuuming). states she still drives?    ADL:  Eating Assistance: Stand by  Eating Deficit: Setup  Grooming Assistance: Minimal  Grooming Deficit: Steadying, Increased time to complete, Wash/dry hands, Wash/dry face, Teeth care (standing sinkside grooming tasks)  Bathing Assistance: Not performed  UE Dressing Assistance: Minimal  UE Dressing Deficit: Pull around back  LE Dressing Assistance: Moderate  LE Dressing Deficit: Don/doff R sock, Don/doff L sock  Toileting Assistance with Device: Not performed  ADL Comments: slightly unsteady at times requiring CGA-min A posteriorly    Activity Tolerance:  Endurance: Decreased tolerance for " upright activites  Activity Tolerance Comments: mildly decreased d/t overall decline in function    Bed Mobility/Transfers:   Bed Mobility  Bed Mobility: Yes  Bed Mobility 1  Bed Mobility 1: Supine to sitting  Level of Assistance 1: Close supervision  Bed Mobility Comments 1: HOB slightly elevated    Transfers  Transfer: Yes  Transfer 1  Transfer From 1: Bed to  Transfer to 1: Stand  Technique 1: Sit to stand  Transfer Device 1: Walker  Transfer Level of Assistance 1: Contact guard  Trials/Comments 1: cues for walker mgmt and hand placement  Transfers 2  Transfer From 2: Stand to  Transfer to 2: Chair with arms  Technique 2: Stand to sit  Transfer Device 2: Walker  Transfer Level of Assistance 2: Contact guard  Trials/Comments 2: requires cues for hand placement and body positioning prior to descent to chair    Functional Mobility:  Functional Mobility  Functional Mobility Performed: Yes  Functional Mobility 1  Surface 1: Level tile  Device 1: Rolling walker  Assistance 1: Minimum assistance, Contact guard  Comments 1: pt completes short household distance mobility to/from restroom with FWW, MIN A-CGA for stability with cues to maintain proximity to device and for pacing    Vision: Vision - Basic Assessment  Current Vision: No visual deficits  Sensation:  Light Touch: No apparent deficits (denies numbness/tingling)  Strength:  Strength Comments: BUEs at least >/= 3/5, observed functionally  Hand Function:  Hand Function  Gross Grasp: Functional  Coordination: Functional  Extremities: RUE   RUE : Within Functional Limits and LUE   LUE: Within Functional Limits    Outcome Measures: [facility] Daily Activity  Putting on and taking off regular lower body clothing: A lot  Bathing (including washing, rinsing, drying): A little  Putting on and taking off regular upper body clothing: A little  Toileting, which includes using toilet, bedpan or urinal: A little  Taking care of personal grooming such as brushing teeth: A  little  Eating Meals: A little  Daily Activity - Total Score: 17      Education Documentation  Body Mechanics, taught by Campos Isaac OT at 7/24/2024  8:49 AM.  Learner: Patient  Readiness: Acceptance  Method: Explanation  Response: Needs Reinforcement    Precautions, taught by Campos Isaac OT at 7/24/2024  8:49 AM.  Learner: Patient  Readiness: Acceptance  Method: Explanation  Response: Needs Reinforcement    ADL Training, taught by Campos Isaac OT at 7/24/2024  8:49 AM.  Learner: Patient  Readiness: Acceptance  Method: Explanation  Response: Needs Reinforcement    Education Comments  No comments found.      Goals:   Encounter Problems       Encounter Problems (Active)       OT Goals       ADLs (Progressing)       Start:  07/24/24    Expected End:  07/31/24       Patient will perform ADLs with MOD I using AE/compensatory techniques as needed.          Functional Transfers (Progressing)       Start:  07/24/24    Expected End:  07/31/24       Patient will perform functional transfers including but not limited to: bed, chair, toilet with MOD I using LRD.          UE Strengthening (Progressing)       Start:  07/24/24    Expected End:  07/31/24       Patient will perform UE therapeutic exercises with supervision to improve overall strength/activity tolerance in preparation for ADLS.

## 2024-07-24 NOTE — PROGRESS NOTES
Apoorva Sanchez is a 89 y.o. female on day 1 of admission presenting with Cellulitis of right lower extremity.      Subjective   Patient seen and examined. Patient resting in chair A&Ox4. No documented concerns/events overnight from nursing. Patient is afebrile (97.3 F). Patient denies CP, SOB, Fever, Chills, Nights sweats, RLE pain, dysuria, urinary frequency, nocturia.        Objective     Last Recorded Vitals  /67 (BP Location: Left arm, Patient Position: Lying)   Pulse 65   Temp 36.3 °C (97.3 °F) (Oral)   Resp 17   Wt 56.8 kg (125 lb 3.5 oz)   SpO2 100%   Intake/Output last 3 Shifts:    Intake/Output Summary (Last 24 hours) at 7/24/2024 1340  Last data filed at 7/24/2024 1027  Gross per 24 hour   Intake 290 ml   Output --   Net 290 ml       Admission Weight  Weight: 54.5 kg (120 lb 2.4 oz) (07/23/24 1701)    Daily Weight  07/23/24 : 56.8 kg (125 lb 3.5 oz)    Image Results  CT tibia fibula right w IV contrast  Narrative: Interpreted By:  Denny Oliveira,   STUDY:  CT TIBIA FIBULA RIGHT W IV CONTRAST;  7/23/2024 9:25 pm      INDICATION:  Signs/Symptoms:RLE infection r/o abscess, OM.      COMPARISON:  Radiographs of the right tibia and fibula 07/22/2024      ACCESSION NUMBER(S):  [id number]      ORDERING CLINICIAN:  BANDAR RAMOS      TECHNIQUE:  CT imaging of the right tibia and fibula was obtained after the  intravenous administration of 75 mL Omnipaque 350 contrast. Coronal  and sagittal reformatted images were performed.      FINDINGS:  OSSEOUS STRUCTURES:      Severe degenerative changes of the right knee with tricompartmental  joint space narrowing and large marginal osteophytes. Meniscal  calcifications suggesting calcium pyrophosphate deposition  arthropathy. Intra-articular ossific bodies in the posterior aspect  of the knee. No fracture identified. No definite erosive change or  periosteal reaction to suggest osteomyelitis.          SOFT TISSUES:      There is extensive subcutaneous soft tissue  edema throughout the  lower leg most pronounced within laterally. The deeper anterior an  intramuscular fat planes are preserved. No definite peripherally  enhancing fluid collection identified. There is a crescentic similar  ill-defined high density collection along the anteromedial aspect of  the two-view which measures 5.4 x 2.0 x 11.5 cm. The appearance is  most suggestive of the hematoma although the mass or infectious  collection can not be entirely excluded. Extensive vascular  calcifications are noted throughout the leg. No subcutaneous air  identified.      Impression: 1. Ill-defined relatively high density collection along the  anteromedial aspect of the lower leg measuring 5.4 x 2.0 x 11.5 cm.  The appearance is suggestive of the hematoma with infection or mass  not entirely excluded.  2. Diffuse nonspecific subcutaneous soft tissue edema throughout the  lower leg most pronounced along the lateral aspect. No definite  peripherally enhancing fluid collection is noted. The deeper fascial  planes appear preserved. Correlate for clinical evidence of infection.  3. No osseous erosion to suggest osteomyelitis. If there is ongoing  clinical concern then MRI may be performed in further assessment.      MACRO:  None      Signed by: Denny Oliveira 7/23/2024 9:44 PM  Dictation workstation:   [id number]  Lower extremity venous duplex right  Narrative: Interpreted By:  Angelica Aguila,   STUDY:  [facility] LOWER EXTREMITY VENOUS DUPLEX RIGHT;  7/23/2024 6:30 pm      INDICATION:  Signs/Symptoms:r/o DVT.      COMPARISON:  None.      ACCESSION NUMBER(S):  [id number]      ORDERING CLINICIAN:  AMELIE ABARCA      TECHNIQUE:  Grayscale, color and spectral Doppler sonographic images of the right  lower extremity deep venous system. The left common femoral vein was  imaged for comparison.      FINDINGS:  There is normal compressibility of the right common femoral vein,  saphenous femoral junction, femoral vein and popliteal  vein. The  posterior tibial and peroneal veins are suboptimally visualized.  There is normal spontaneous and phasic variation throughout the leg  by spectral doppler.      The left common femoral vein is patent.      OTHER FINDINGS: None.      Impression: Suboptimal visualization of the calf veins. No sonographic evidence  of acute DVT in the visualized vessels of the right lower extremity.      MACRO:  None      Signed by: Angelica Aguila 7/23/2024 6:44 PM  Dictation workstation:   [id number]  XR tibia fibula right 2 views  Narrative: Interpreted By:  Hira Lawrence,   STUDY:  XR TIBIA FIBULA RIGHT 2 VIEWS; ;  7/23/2024 5:39 pm      INDICATION:  Signs/Symptoms:wound right anterior tibia.      COMPARISON:  None.      ACCESSION NUMBER(S):  [id number]      ORDERING CLINICIAN:  AMELIE ABARCA      FINDINGS:  Right tibia/fibula, two views      Soft tissue edema about the calf with irregularity anteriorly  corresponding to known wound. No osseous destruction or erosion seen  radiographically. Diffuse osteopenia present. Severe degenerative  change of the knee      Impression: No radiographic evidence of osteomyelitis. Soft tissue edema and soft  tissue wound at the anterior aspect of the cuff          MACRO:  None      Signed by: Hira Lawrence 7/23/2024 6:05 PM  Dictation workstation:   [id number]      Physical Exam  Constitutional:       General: She is not in acute distress.     Appearance: She is ill-appearing. She is not toxic-appearing.   HENT:      Ears:      Comments: [place]  Cardiovascular:      Rate and Rhythm: Normal rate and regular rhythm.      Pulses: Normal pulses.      Heart sounds: Normal heart sounds.   Pulmonary:      Effort: Pulmonary effort is normal. No respiratory distress.      Breath sounds: Normal breath sounds. No wheezing or rales.   Abdominal:      General: Bowel sounds are normal.      Palpations: Abdomen is soft.   Musculoskeletal:         General: Normal range of motion.       Cervical back: Normal range of motion.      Right lower leg: Edema present.      Left lower leg: Edema present.   Skin:     General: Skin is warm and dry.      Capillary Refill: BLE     Findings: Erythema (RLE) and wound present.      Comments: Drsg to RLE dry and intact  LLE brown discoloration   Neurological:      Mental Status: She is alert and oriented to person, place, and time.   Psychiatric:         Mood and Affect: Mood normal.         Relevant Results  Scheduled medications  atenolol, 100 mg, oral, Daily  brimonidine, 1 drop, ophthalmic (eye), BID  cloNIDine, 0.4 mg, oral, Nightly  furosemide, 40 mg, oral, Daily  glimepiride, 2 mg, oral, BID  heparin, 5,000 Units, subcutaneous, q12h AMY  insulin lispro, 0-5 Units, subcutaneous, TID  latanoprost, 1 drop, Both Eyes, q24h  levothyroxine, 100 mcg, oral, Daily before breakfast  piperacillin-tazobactam, 3.375 g, intravenous, q6h  simvastatin, 40 mg, oral, Nightly  timolol, 1 drop, Both Eyes, BID  vancomycin, 1,000 mg, intravenous, q24h      Continuous medications     PRN medications  PRN medications: acetaminophen, dextrose, dextrose, glucagon, glucagon, vancomycin       Results for orders placed or performed during the hospital encounter of 07/23/24 (from the past 24 hour(s))   CBC and Auto Differential   Result Value Ref Range    WBC 8.9 4.4 - 11.3 x10*3/uL    nRBC 0.0 0.0 - 0.0 /100 WBCs    RBC 3.82 (L) 4.00 - 5.20 x10*6/uL    Hemoglobin 10.7 (L) 12.0 - 16.0 g/dL    Hematocrit 33.4 (L) 36.0 - 46.0 %    MCV 87 80 - 100 fL    MCH 28.0 26.0 - 34.0 pg    MCHC 32.0 32.0 - 36.0 g/dL    RDW 13.9 11.5 - 14.5 %    Platelets 312 150 - 450 x10*3/uL    Neutrophils % 77.6 40.0 - 80.0 %    Immature Granulocytes %, Automated 0.4 0.0 - 0.9 %    Lymphocytes % 13.1 13.0 - 44.0 %    Monocytes % 7.3 2.0 - 10.0 %    Eosinophils % 0.9 0.0 - 6.0 %    Basophils % 0.7 0.0 - 2.0 %    Neutrophils Absolute 6.94 (H) 1.60 - 5.50 x10*3/uL    Immature Granulocytes Absolute, Automated 0.04  0.00 - 0.50 x10*3/uL    Lymphocytes Absolute 1.17 0.80 - 3.00 x10*3/uL    Monocytes Absolute 0.65 0.05 - 0.80 x10*3/uL    Eosinophils Absolute 0.08 0.00 - 0.40 x10*3/uL    Basophils Absolute 0.06 0.00 - 0.10 x10*3/uL   Comprehensive Metabolic Panel   Result Value Ref Range    Glucose 199 (H) 65 - 99 mg/dL    Sodium 131 (L) 133 - 145 mmol/L    Potassium 4.2 3.4 - 5.1 mmol/L    Chloride 94 (L) 97 - 107 mmol/L    Bicarbonate 27 24 - 31 mmol/L    Urea Nitrogen 16 8 - 25 mg/dL    Creatinine 0.80 0.40 - 1.60 mg/dL    eGFR 68 >60 mL/min/1.73m*2    Calcium 9.3 8.5 - 10.4 mg/dL    Albumin 3.9 3.5 - 5.0 g/dL    Alkaline Phosphatase 71 35 - 125 U/L    Total Protein 7.1 5.9 - 7.9 g/dL    AST 16 5 - 40 U/L    Bilirubin, Total 0.3 0.1 - 1.2 mg/dL    ALT 10 5 - 40 U/L    Anion Gap 10 <=19 mmol/L   Blood Gas Lactic Acid, Venous   Result Value Ref Range    POCT Lactate, Venous 1.2 0.4 - 2.0 mmol/L   Blood Culture    Specimen: Peripheral Venipuncture; Blood culture   Result Value Ref Range    Blood Culture Loaded on Instrument - Culture in progress    Blood Culture    Specimen: Peripheral Venipuncture; Blood culture   Result Value Ref Range    Blood Culture Loaded on Instrument - Culture in progress    Urinalysis with Reflex Culture and Microscopic   Result Value Ref Range    Color, Urine Light-Yellow Light-Yellow, Yellow, Dark-Yellow    Appearance, Urine Clear Clear    Specific Gravity, Urine 1.009 1.005 - 1.035    pH, Urine 7.0 5.0, 5.5, 6.0, 6.5, 7.0, 7.5, 8.0    Protein, Urine NEGATIVE NEGATIVE, 10 (TRACE), 20 (TRACE) mg/dL    Glucose, Urine Normal Normal mg/dL    Blood, Urine 0.06 (1+) (A) NEGATIVE    Ketones, Urine NEGATIVE NEGATIVE mg/dL    Bilirubin, Urine NEGATIVE NEGATIVE    Urobilinogen, Urine Normal Normal mg/dL    Nitrite, Urine NEGATIVE NEGATIVE    Leukocyte Esterase, Urine 500 Valerie/µL (A) NEGATIVE   Microscopic Only, Urine   Result Value Ref Range    WBC, Urine >50 (A) 1-5, NONE /HPF    WBC Clumps, Urine OCCASIONAL  Reference range not established. /HPF    RBC, Urine 6-10 (A) NONE, 1-2, 3-5 /HPF    Transitional Epithelial Cells, Urine 1-2 (FEW) Reference range not established. /HPF   POCT GLUCOSE   Result Value Ref Range    POCT Glucose 108 (H) 74 - 99 mg/dL   Hemoglobin A1C   Result Value Ref Range    Hemoglobin A1C 7.1 (H) See below %    Estimated Average Glucose 157 Not Established mg/dL   Basic metabolic panel   Result Value Ref Range    Glucose 120 (H) 65 - 99 mg/dL    Sodium 136 133 - 145 mmol/L    Potassium 4.3 3.4 - 5.1 mmol/L    Chloride 99 97 - 107 mmol/L    Bicarbonate 29 24 - 31 mmol/L    Urea Nitrogen 11 8 - 25 mg/dL    Creatinine 0.80 0.40 - 1.60 mg/dL    eGFR 68 >60 mL/min/1.73m*2    Calcium 8.8 8.5 - 10.4 mg/dL    Anion Gap 8 <=19 mmol/L   CBC   Result Value Ref Range    WBC 8.5 4.4 - 11.3 x10*3/uL    nRBC 0.0 0.0 - 0.0 /100 WBCs    RBC 3.25 (L) 4.00 - 5.20 x10*6/uL    Hemoglobin 9.1 (L) 12.0 - 16.0 g/dL    Hematocrit 28.8 (L) 36.0 - 46.0 %    MCV 89 80 - 100 fL    MCH 28.0 26.0 - 34.0 pg    MCHC 31.6 (L) 32.0 - 36.0 g/dL    RDW 13.9 11.5 - 14.5 %    Platelets 272 150 - 450 x10*3/uL   Vancomycin   Result Value Ref Range    Vancomycin 8.0 (L) 10.0 - 20.0 ug/mL   POCT GLUCOSE   Result Value Ref Range    POCT Glucose 112 (H) 74 - 99 mg/dL   POCT GLUCOSE   Result Value Ref Range    POCT Glucose 159 (H) 74 - 99 mg/dL              Assessment/Plan            Principal Problem:    Cellulitis of right lower extremity    Right lower leg traumatic ulceration  -Dressing change twice daily, Xeroform, gauze, loose Kerlix, Ace wrap per podiatry recommendations  -IV Vanc/Zosyn  -CT suggestive of hematoma with infection, mass not entirely excluded  -F/U Wound cx  -F/U Blood cx   -Vascular surgery consult  -Podiatry following, debridement recommended  -ID following  -Cardiology consult for cardiac clearance    UTI  -Urine culture pending     DM  -Hold Metformin post IV contrast  -Continue Glimepiride  -Insulin SS  -Monitor blood  glucose  -A1C 7.1      Hypertension  -Continue home meds     Hypothyroidism  -Continue home meds     PVD/venous stasis  -consult vascular       Plan  Above plan discussed with patient and daughter and they are in agreement.          KELSY Parada CNP

## 2024-07-24 NOTE — PROGRESS NOTES
Spiritual Care Visit    Clinical Encounter Type  Visited With: Patient  Routine Visit: Introduction    Pentecostal Encounters  Pentecostal Needs: Spiritual care brochure     Annotation:  provided support for the patient. Patient was sleeping in her chair when the  arrived. Patient was awakened when she was called by her name. Patient was very hard of hearing, patient looked at my id to obtain information about my role. Patient stated that her hearing aids are not functioning and a visit would not work due to not bhavya able to participate.  provided support presence and will remain able for another visit when the patient's able to communicate.

## 2024-07-24 NOTE — PROGRESS NOTES
Attestation/Supervisory note for JEFFREY Ochoa      The patient is a 89-year-old female presenting to the emergency department for evaluation of a wound with pain and swelling of her right lower leg.  She states that she tripped and fell at home on 1 July 2024.  She states that she was seen in the emergency room and had a fracture of her right thumb and she had a cut to her right lower leg.  She states that she has had worsening pain and swelling in her right leg since then.  She is afraid that it is infected.  She did have an appointment with wound care today but she states that she was not happy because the waiting time was long so she left without being seen.  She then went to urgent care and was directed to the emergency room.  She denies any use of blood thinners.  She states that she did not hit her head or lose consciousness at the time of her fall.  She states that she does not have any focal weakness or numbness.  No fever or chills.  No chest pain or shortness of breath.  No abdominal pain.  No nausea or vomiting.  No diarrhea or constipation.  No urinary complaints.  All pertinent positives and negatives are recorded above.  All other systems reviewed and otherwise negative.  Vital signs with hypertension but otherwise within normal limits.  Physical exam with a well-nourished well-developed female in no acute distress.  HEENT exam with dry mucous membranes but otherwise unremarkable.  She has no evidence of airway compromise or respiratory distress.  Abdominal exam is benign.  She does not have any gross motor, neurologic or vascular deficits on exam but she does have a large area of erythema with a hematoma and tenderness to palpation of the right calf.  She also has some pain with palpation and range of motion of the right thumb.  She has no visible or palpable bony deformity.      Cultures were obtained and IV fluids and IV antibiotics were ordered.      Diagnostic labs with evidence of urinary tract  infection, electrolyte imbalance, mild hyperglycemia, anemia but otherwise unremarkable.      Initial lactic of 1.2.  Repeat lactic pending at the time of admission      Lower extremity venous duplex right   Final Result   Suboptimal visualization of the calf veins. No sonographic evidence   of acute DVT in the visualized vessels of the right lower extremity.        MACRO:   None        Signed by: Angelica Aguila 7/23/2024 6:44 PM   Dictation workstation:   [id number]      XR tibia fibula right 2 views   Final Result   No radiographic evidence of osteomyelitis. Soft tissue edema and soft   tissue wound at the anterior aspect of the cuff             MACRO:   None        Signed by: Hira Lawrence 7/23/2024 6:05 PM   Dictation workstation:   [id number]      CT tibia fibula right w IV contrast    (Results Pending)        The patient does not have any evidence of hemodynamic instability.  She does have evidence of urinary tract infection.  IV antibiotics were ordered for the treatment of her urinary tract infection and the wound on her right lower extremity.  X-ray of the right tib-fib does not show any evidence of osteomyelitis or fracture/dislocation.  The duplex of the right lower extremity did not show evidence of DVT.      The patient was admitted for further management of her cellulitis.      Impression/diagnosis:  1.  Right lower extremity cellulitis  2.  Hypertension, unspecified      Critical care time is not indicated at this time      I personally saw the patient and made/approve the management plan and take responsibility for the patient management.      I independently interpreted the following study (S) diagnostic labs      I personally discussed the patient's management with the patient      I reviewed the results of the diagnostic labs and diagnostic imaging.  Formal radiology read was completed by the radiologist.      Anita Harvey MD

## 2024-07-25 PROBLEM — L97.913: Status: ACTIVE | Noted: 2024-07-23

## 2024-07-25 PROBLEM — S81.801A WOUND OF RIGHT LEG: Status: ACTIVE | Noted: 2024-07-23

## 2024-07-25 LAB
BACTERIA UR CULT: NO GROWTH
GLUCOSE BLD MANUAL STRIP-MCNC: 158 MG/DL (ref 74–99)
GLUCOSE BLD MANUAL STRIP-MCNC: 197 MG/DL (ref 74–99)
GLUCOSE BLD MANUAL STRIP-MCNC: 300 MG/DL (ref 74–99)
GLUCOSE BLD MANUAL STRIP-MCNC: 65 MG/DL (ref 74–99)

## 2024-07-25 PROCEDURE — 82947 ASSAY GLUCOSE BLOOD QUANT: CPT

## 2024-07-25 PROCEDURE — 2500000002 HC RX 250 W HCPCS SELF ADMINISTERED DRUGS (ALT 637 FOR MEDICARE OP, ALT 636 FOR OP/ED): Performed by: NURSE PRACTITIONER

## 2024-07-25 PROCEDURE — 2500000001 HC RX 250 WO HCPCS SELF ADMINISTERED DRUGS (ALT 637 FOR MEDICARE OP): Performed by: INTERNAL MEDICINE

## 2024-07-25 PROCEDURE — 97530 THERAPEUTIC ACTIVITIES: CPT | Mod: GP

## 2024-07-25 PROCEDURE — 97110 THERAPEUTIC EXERCISES: CPT | Mod: GP

## 2024-07-25 PROCEDURE — 97535 SELF CARE MNGMENT TRAINING: CPT | Mod: GO

## 2024-07-25 PROCEDURE — 2500000001 HC RX 250 WO HCPCS SELF ADMINISTERED DRUGS (ALT 637 FOR MEDICARE OP): Performed by: NURSE PRACTITIONER

## 2024-07-25 PROCEDURE — 1100000001 HC PRIVATE ROOM DAILY

## 2024-07-25 PROCEDURE — 2500000004 HC RX 250 GENERAL PHARMACY W/ HCPCS (ALT 636 FOR OP/ED): Performed by: NURSE PRACTITIONER

## 2024-07-25 PROCEDURE — 99233 SBSQ HOSP IP/OBS HIGH 50: CPT | Performed by: INTERNAL MEDICINE

## 2024-07-25 RX ADMIN — SIMVASTATIN 40 MG: 40 TABLET, FILM COATED ORAL at 21:48

## 2024-07-25 RX ADMIN — PIPERACILLIN SODIUM AND TAZOBACTAM SODIUM 3.38 G: 3; .375 INJECTION, SOLUTION INTRAVENOUS at 12:23

## 2024-07-25 RX ADMIN — GLIMEPIRIDE 2 MG: 2 TABLET ORAL at 16:22

## 2024-07-25 RX ADMIN — LEVOTHYROXINE SODIUM 100 MCG: 0.1 TABLET ORAL at 06:44

## 2024-07-25 RX ADMIN — PIPERACILLIN SODIUM AND TAZOBACTAM SODIUM 3.38 G: 3; .375 INJECTION, SOLUTION INTRAVENOUS at 17:30

## 2024-07-25 RX ADMIN — VANCOMYCIN 1000 MG: 1 INJECTION, SOLUTION INTRAVENOUS at 21:48

## 2024-07-25 RX ADMIN — ATENOLOL 50 MG: 50 TABLET ORAL at 09:07

## 2024-07-25 RX ADMIN — BRIMONIDINE TARTRATE 1 DROP: 2 SOLUTION/ DROPS OPHTHALMIC at 21:48

## 2024-07-25 RX ADMIN — TIMOLOL MALEATE 1 DROP: 5 SOLUTION/ DROPS OPHTHALMIC at 21:48

## 2024-07-25 RX ADMIN — PIPERACILLIN SODIUM AND TAZOBACTAM SODIUM 3.38 G: 3; .375 INJECTION, SOLUTION INTRAVENOUS at 06:44

## 2024-07-25 RX ADMIN — HEPARIN SODIUM 5000 UNITS: 5000 INJECTION, SOLUTION INTRAVENOUS; SUBCUTANEOUS at 09:07

## 2024-07-25 RX ADMIN — TIMOLOL MALEATE 1 DROP: 5 SOLUTION/ DROPS OPHTHALMIC at 09:00

## 2024-07-25 RX ADMIN — INSULIN LISPRO 3 UNITS: 100 INJECTION, SOLUTION INTRAVENOUS; SUBCUTANEOUS at 16:20

## 2024-07-25 RX ADMIN — FUROSEMIDE 40 MG: 40 TABLET ORAL at 09:07

## 2024-07-25 RX ADMIN — HEPARIN SODIUM 5000 UNITS: 5000 INJECTION, SOLUTION INTRAVENOUS; SUBCUTANEOUS at 21:48

## 2024-07-25 RX ADMIN — PIPERACILLIN SODIUM AND TAZOBACTAM SODIUM 3.38 G: 3; .375 INJECTION, SOLUTION INTRAVENOUS at 01:54

## 2024-07-25 RX ADMIN — CLONIDINE HYDROCHLORIDE 0.2 MG: 0.2 TABLET ORAL at 21:48

## 2024-07-25 RX ADMIN — BRIMONIDINE TARTRATE 1 DROP: 2 SOLUTION/ DROPS OPHTHALMIC at 09:00

## 2024-07-25 RX ADMIN — LATANOPROST 1 DROP: 50 SOLUTION OPHTHALMIC at 21:48

## 2024-07-25 ASSESSMENT — COGNITIVE AND FUNCTIONAL STATUS - GENERAL
PERSONAL GROOMING: A LITTLE
PERSONAL GROOMING: A LITTLE
STANDING UP FROM CHAIR USING ARMS: A LITTLE
DAILY ACTIVITIY SCORE: 18
DAILY ACTIVITIY SCORE: 16
STANDING UP FROM CHAIR USING ARMS: A LITTLE
CLIMB 3 TO 5 STEPS WITH RAILING: A LITTLE
STANDING UP FROM CHAIR USING ARMS: A LITTLE
TOILETING: A LITTLE
MOBILITY SCORE: 20
MOBILITY SCORE: 19
DAILY ACTIVITIY SCORE: 17
MOVING TO AND FROM BED TO CHAIR: A LITTLE
EATING MEALS: A LITTLE
DRESSING REGULAR LOWER BODY CLOTHING: A LITTLE
CLIMB 3 TO 5 STEPS WITH RAILING: A LITTLE
DRESSING REGULAR LOWER BODY CLOTHING: A LOT
WALKING IN HOSPITAL ROOM: A LITTLE
WALKING IN HOSPITAL ROOM: A LITTLE
EATING MEALS: A LITTLE
EATING MEALS: A LITTLE
MOVING TO AND FROM BED TO CHAIR: A LITTLE
TURNING FROM BACK TO SIDE WHILE IN FLAT BAD: A LITTLE
WALKING IN HOSPITAL ROOM: A LITTLE
DRESSING REGULAR LOWER BODY CLOTHING: A LITTLE
STANDING UP FROM CHAIR USING ARMS: A LITTLE
TURNING FROM BACK TO SIDE WHILE IN FLAT BAD: A LITTLE
MOVING TO AND FROM BED TO CHAIR: A LITTLE
HELP NEEDED FOR BATHING: A LOT
TOILETING: A LITTLE
CLIMB 3 TO 5 STEPS WITH RAILING: A LITTLE
PERSONAL GROOMING: A LITTLE
DRESSING REGULAR UPPER BODY CLOTHING: A LITTLE
DRESSING REGULAR UPPER BODY CLOTHING: A LITTLE
MOVING TO AND FROM BED TO CHAIR: A LITTLE
MOBILITY SCORE: 20
DRESSING REGULAR UPPER BODY CLOTHING: A LITTLE
DRESSING REGULAR LOWER BODY CLOTHING: A LOT
TOILETING: A LITTLE
WALKING IN HOSPITAL ROOM: A LITTLE
MOBILITY SCORE: 19
DAILY ACTIVITIY SCORE: 18
EATING MEALS: A LITTLE
HELP NEEDED FOR BATHING: A LITTLE
CLIMB 3 TO 5 STEPS WITH RAILING: A LITTLE
PERSONAL GROOMING: A LITTLE
DRESSING REGULAR UPPER BODY CLOTHING: A LITTLE
HELP NEEDED FOR BATHING: A LITTLE
TOILETING: A LITTLE
HELP NEEDED FOR BATHING: A LITTLE

## 2024-07-25 ASSESSMENT — PAIN SCALES - GENERAL
PAINLEVEL_OUTOF10: 0 - NO PAIN
PAINLEVEL_OUTOF10: 1
PAINLEVEL_OUTOF10: 0 - NO PAIN
PAINLEVEL_OUTOF10: 0 - NO PAIN

## 2024-07-25 ASSESSMENT — ACTIVITIES OF DAILY LIVING (ADL)
HOME_MANAGEMENT_TIME_ENTRY: 26
BATHING_LEVEL_OF_ASSISTANCE: MINIMUM ASSISTANCE
BATHING_WHERE_ASSESSED: SITTING SINKSIDE

## 2024-07-25 ASSESSMENT — PAIN - FUNCTIONAL ASSESSMENT
PAIN_FUNCTIONAL_ASSESSMENT: 0-10
PAIN_FUNCTIONAL_ASSESSMENT: 0-10

## 2024-07-25 ASSESSMENT — PAIN DESCRIPTION - DESCRIPTORS: DESCRIPTORS: ACHING

## 2024-07-25 NOTE — CARE PLAN
The patient's goals for the shift include      The clinical goals for the shift include safety, no falls      Problem: Skin  Goal: Decreased wound size/increased tissue granulation at next dressing change  Outcome: Progressing  Goal: Participates in plan/prevention/treatment measures  Outcome: Progressing  Goal: Prevent/manage excess moisture  Outcome: Progressing  Flowsheets (Taken 7/25/2024 0637)  Prevent/manage excess moisture: Follow provider orders for dressing changes  Goal: Prevent/minimize sheer/friction injuries  Outcome: Progressing  Goal: Promote/optimize nutrition  Outcome: Progressing  Goal: Promote skin healing  Outcome: Progressing     Problem: Fall/Injury  Goal: Verbalize understanding of risk factor reduction measures to prevent injury from fall in the home  Outcome: Progressing  Goal: Pace activities to prevent fatigue by end of the shift  Outcome: Progressing     Problem: Diabetes  Goal: Maintain glucose levels >70mg/dl to <250mg/dl throughout shift  Outcome: Met  Goal: No changes in neurological exam by end of shift  Outcome: Met     Problem: Fall/Injury  Goal: Not fall by end of shift  Outcome: Met  Goal: Be free from injury by end of the shift  Outcome: Met  Goal: Verbalize understanding of personal risk factors for fall in the hospital  Outcome: Met  Goal: Use assistive devices by end of the shift  Outcome: Met

## 2024-07-25 NOTE — PROGRESS NOTES
Apoorva Sanchez is a 89 y.o. female on day 2 of admission presenting with Cellulitis of right lower extremity.    Subjective   Patient seen at bedside.  I did discuss that cardiology has cleared her for surgery.  Also discussed her PVR results.  She is understanding.  She states she feels fine without nausea vomiting fever chills.  Again pain is controlled.  She does inform me that she makes her own medical decisions.       Physical Exam    Objective     REVIEW OF SYSTEMS  GENERAL:  Negative for malaise, significant weight loss, fever  HEENT:  No changes in hearing or vision, no nose bleeds or other nasal problems and Negative for frequent or significant headaches  NECK:  Negative for lumps, goiter, pain and significant neck swelling  RESPIRATORY:  Negative for cough, wheezing and shortness of breath  CARDIOVASCULAR:  Negative for chest pain, leg swelling and palpitations  GI:  Negative for abdominal discomfort, blood in stools or black stools and change in bowel habits  :  Negative for dysuria, frequency and incontinence  MUSCULOSKELETAL:  Negative for joint pain or swelling, back pain, and muscle pain.  SKIN: Positive for large hematoma/ischemic wound to the right lower leg   PSYCH:  Negative for sleep disturbance, mood disorder and recent psychosocial stressors  HEMATOLOGY/LYMPHOLOGY:  Negative for prolonged bleeding, bruising easily, and swollen nodes.  ENDOCRINE:  Negative for cold or heat intolerance, polyuria, polydipsia and goiter  NEURO: Negative, denies any burning, tingling or numbness      Objective:   Vasc: DP and PT pulses are not palpable bilateral.  CFT is less than 5 seconds bilateral.  Skin temperature is warm to cool proximal to distal bilateral.  There is increased warmth around the right lower leg wound.  There is erythema to this area.     Neuro:  Light touch is intact to the foot bilateral.  Protective sensation is intact to the foot when tested.  There is no clonus noted.  The hallux is downgoing  bilateral.       Derm: There is a large right anterior lower leg wound.  This measures approximately 10 x 5 cm.  Please see images below.  There is necrotic central portion and granular/fibrotic outside layer.  Overall there is no depth to bone.  It appears this may have begun as hematoma/large venous wound but has been unable to close.  There is seeping serous drainage.  There are a few satellite wounds as well to the medial and lateral aspect with serous drainage.  There is foul odor.  There is no purulence.  No frankly infected tissue.  However there is surrounding erythema and taut lower leg edema.                Last Recorded Vitals  Blood pressure 152/64, pulse 63, temperature 36.4 °C (97.5 °F), temperature source Oral, resp. rate 16, height 1.524 m (5'), weight 56.8 kg (125 lb 3.5 oz), SpO2 98%.    Intake/Output last 3 Shifts:  I/O last 3 completed shifts:  In: 290 (5.1 mL/kg) [P.O.:240; IV Piggyback:50]  Out: - (0 mL/kg)   Weight: 56.8 kg     Relevant Results       No leukocytosis     Radiographs: No evidence of osteomyelitis, soft tissue emphysema.  No acute fracture or dislocation.  CT right tib-fib: I personally reviewed these images and reviewed radiologist findings.  I do agree there is a large high density collection likely suggestive of hematoma to the anterior medial lower leg which measures 5.4 x 2.0 x 11.5 cm.  It does appear to be close to tibia bone.  In general there is diffuse subcutaneous soft tissue edema suggestive of cellulitis.  No evidence of osteomyelitis.    PVR: Falsely elevated ABIs secondary to noncompressible vessels.    Assessment/Plan   Principal Problem:    Cellulitis of right lower extremity    1.  Right lower leg traumatic ulceration  2.  Right lower leg hematoma  3.  Right lower leg cellulitis  4.  Right leg venous stasis  5.  Right leg peripheral vascular disease     -Wound inspected.  Bandage changed.  -Xeroform, gauze, loose Kerlix, Ace.  Order placed for twice daily  bandage changes.  -This wound does appear to be infected with surrounding erythema edema, seeping drainage.  -Continue empiric IV antibiotics.   -Patient cleared from cardiology standpoint for surgery.  Appreciate their thorough preoperative evaluation.  -Patient seen and evaluated by vascular surgery.  Appreciate their recommendations.    Discussed findings with patient and daughter.  I recommend surgical debridement of the wound, possible skin substitute application, possible wound VAC application.  They are amenable to this plan.  Patient does make her own medical decisions and is sound of mind.  I will schedule surgery for tomorrow.  N.p.o. at midnight.       Joe Proctor DPM

## 2024-07-25 NOTE — PROGRESS NOTES
Physical Therapy    Physical Therapy Treatment    Patient Name: Apoorva Sanchez  MRN: [id number]  Today's Date: 7/25/2024  Time Calculation  Start Time: 1204  Stop Time: 1227  Time Calculation (min): 23 min    Assessment/Plan   PT Assessment  End of Session Communication: Bedside nurse  Assessment Comment: pt demonstrating improved amb tolerance; Improved amb safety with useof FWW; pt is a chronic fall risk due to hearing loss and age related changes  End of Session Patient Position: Up in chair, Alarm on (call button in reach)  PT Plan  Inpatient/Swing Bed or Outpatient: Inpatient  PT Plan  Treatment/Interventions: Bed mobility, Transfer training, Gait training, Stair training, Neuromuscular re-education, Strengthening, Endurance training, Range of motion, Therapeutic exercise, Therapeutic activity, Home exercise program  PT Plan: Ongoing PT  PT Frequency: 4 times per week  PT Discharge Recommendations: Low intensity level of continued care  Equipment Recommended upon Discharge: Wheeled walker, Straight cane  PT Recommended Transfer Status: Stand by assist, Assistive device (FWW)  PT - OK to Discharge: Yes      General Visit Information:   PT  Visit  PT Received On: 07/25/24  General  Family/Caregiver Present: No  Prior to Session Communication: Bedside nurse  Patient Position Received: Alarm on, Up in chair  Preferred Learning Style: verbal, visual  General Comment: cleared by nurse for therapy; pt agreeable to therapy;    Subjective   Precautions:  Precautions  Hearing/Visual Limitations: bilateral hearing aides---pt still mod [place]; glasses for TV, poor right vision.  Medical Precautions: Fall precautions  Vital Signs:  Vital Signs  Heart Rate: 63  SpO2: 98 %  Patient Position: Sitting    Objective   Pain:  Pain Assessment  Pain Assessment: 0-10  0-10 (Numeric) Pain Score: 1  Pain Type: Acute pain  Pain Location: Leg  Pain Orientation: Right, Anterior  Pain Descriptors: Aching  Pain Interventions:  (pain meds per nurse as  needed, FWW with amb, elevation/positioning for comfort)  Cognition:  Cognition  Overall Cognitive Status: Within Functional Limits  Orientation Level: Appropriate for developmental age, Oriented X4  Cognition Comments: limited by hearing loss  Safety/Judgement:  (decreased safety insight during functional mobility)  Insight: Mild  Coordination:  Movements are Fluid and Coordinated: Yes  Heel to Shin: Intact (agr appropriate)  Postural Control:  Postural Control  Posture Comment: mild to mod forward head with right sidebending, protracted shldrs, mild T-spine kyphoscoliosos  Extremity/Trunk Assessments:    Activity Tolerance:  Activity Tolerance  Endurance: Decreased tolerance for upright activites  Activity Tolerance Comments: good -.good  Treatments:  Therapeutic Exercise  Therapeutic Exercise Performed: Yes  Therapeutic Exercise Activity 1: seated ricardo AP/heel raisesx 20 reps  Therapeutic Exercise Activity 2: seated ricardo LAQ's x 20reps  Therapeutic Exercise Activity 3: seated ricardo marching x 15 reps  Therapeutic Exercise Activity 4: seated ricardo hip abd/add x 15 reps    Therapeutic Activity  Therapeutic Activity Performed: Yes (see transfers, amb with FWW comments)    Bed Mobility  Bed Mobility: No    Ambulation/Gait Training  Ambulation/Gait Training Performed: Yes  Ambulation/Gait Training 1  Surface 1: Level tile  Device 1: Rolling walker  Assistance 1: Close supervision, Contact guard, Minimal verbal cues  Quality of Gait 1: Forward flexed posture, Diminished heel strike  Comments/Distance (ft) 1: 80 ft x 2, FWW, +turns, close supervision to contact guard of 1, verbal cues for erect posture, staying close to FWW at all times; Overall balance good -  Transfers  Transfer: Yes  Transfer 1  Transfer From 1: Sit to  Transfer to 1: Stand  Technique 1: Sit to stand  Transfer Device 1: Walker  Transfer Level of Assistance 1: Close supervision, Minimal verbal cues  Trials/Comments 1: verbal cues for proper ricardo hand  placement  Transfers 2  Transfer From 2: Stand to  Transfer to 2: Sit  Technique 2: Stand to sit  Transfer Device 2: Walker  Transfer Level of Assistance 2: Close supervision, Minimum assistance  Trials/Comments 2: close supervision of 1 for trunk down, verbal cues for reaching back with both hands for arms of chair    Stairs  Stairs: No    Outcome Measures:  [facility] Basic Mobility  Turning from your back to your side while in a flat bed without using bedrails: None  Moving from lying on your back to sitting on the side of a flat bed without using bedrails: A little  Moving to and from bed to chair (including a wheelchair): A little  Standing up from a chair using your arms (e.g. wheelchair or bedside chair): A little  To walk in hospital room: A little  Climbing 3-5 steps with railing: A little  Basic Mobility - Total Score: 19    Education Documentation  Mobility Training, taught by Shanika Chaney, PT at 7/25/2024 12:50 PM.  Learner: Patient  Readiness: Acceptance  Method: Explanation, Demonstration  Response: Needs Reinforcement, Verbalizes Understanding    Precautions, taught by Shanika Chaney PT at 7/25/2024 12:50 PM.  Learner: Patient  Readiness: Acceptance  Method: Explanation, Demonstration  Response: Needs Reinforcement, Verbalizes Understanding                 Encounter Problems       Encounter Problems (Active)       Balance       STG - Maintains dynamic standing balance with upper extremity support (Progressing)       Start:  07/24/24    Expected End:  08/21/24       INTERVENTIONS:  1. Practice standing with minimal support.  2. Educate patient about standing tolerance.  3. Educate patient about independence with gait, transfers, and ADL's.  4. Educate patient about use of assistive device.  5. Educate patient about self-directed care.            Mobility       STG - Patient will ambulate 300 ft, + turns, LRAD, distant supervision of 1 (Progressing)       Start:  07/24/24    Expected End:  08/21/24             pt ascends/descends 13 steps, bilateral rails, reciprocally with distant supervision of 1 (Progressing)       Start:  07/24/24    Expected End:  08/21/24               PT Transfers       STG - Patient to transfer to and from sit to supine mod Ind (Progressing)       Start:  07/24/24    Expected End:  08/21/24            STG - Patient will transfer sit to and from stand mod Ind (Progressing)       Start:  07/24/24    Expected End:  08/21/24

## 2024-07-25 NOTE — PROGRESS NOTES
Apoorva Sanchez is a 89 y.o. female on day 2 of admission presenting with Cellulitis of right lower extremity.    Subjective   Interval History:   Afebrile, no chills  Remains on room air  No shortness of breath or chest pain  No nausea, vomiting or diarrhea   Interval evaluation by vascular surgery-note reviewed           Objective   Range of Vitals (last 24 hours)  Heart Rate:  [57-63]   Temp:  [36.4 °C (97.5 °F)-37.2 °C (99 °F)]   Resp:  [16-18]   BP: (113-156)/(60-75)   SpO2:  [98 %-100 %]   Daily Weight  07/23/24 : 56.8 kg (125 lb 3.5 oz)    Body mass index is 24.46 kg/m².    Physical Exam  Constitutional:       Appearance: Normal appearance.   HENT:      Head: Normocephalic and atraumatic.      Nose: Nose normal.      Mouth/Throat:      Mouth: Mucous membranes are moist.      Pharynx: Oropharynx is clear.   Eyes:      General: No scleral icterus.  Cardiovascular:      Rate and Rhythm: Normal rate and regular rhythm.   Pulmonary:      Effort: Pulmonary effort is normal.      Breath sounds: Normal breath sounds.   Abdominal:      General: Bowel sounds are normal.      Palpations: Abdomen is soft.   Musculoskeletal:         General: Normal range of motion.      Cervical back: Normal range of motion and neck supple.      Right lower leg: Edema present.      Left lower leg: Edema present.   Skin:     General: Skin is warm and dry.      Comments: large right anterior lower leg wound with necrotic tissue  Right leg erythema    Neurological:      General: No focal deficit present.      Mental Status: She is alert and oriented to person, place, and time. Mental status is at baseline.   Psychiatric:         Mood and Affect: Mood normal.         Behavior: Behavior normal.        Antibiotics  piperacillin-tazobactam - 3.375 gram/50 mL  vancomycin - 1 gram/200 mL    Relevant Results  Labs  Results from last 72 hours   Lab Units 07/24/24  0422 07/23/24  1747   WBC AUTO x10*3/uL 8.5 8.9   HEMOGLOBIN g/dL 9.1* 10.7*   HEMATOCRIT %  "28.8* 33.4*   PLATELETS AUTO x10*3/uL 272 312   NEUTROS PCT AUTO %  --  77.6   LYMPHS PCT AUTO %  --  13.1   MONOS PCT AUTO %  --  7.3   EOS PCT AUTO %  --  0.9     Results from last 72 hours   Lab Units 07/24/24  0422 07/23/24  1747   SODIUM mmol/L 136 131*   POTASSIUM mmol/L 4.3 4.2   CHLORIDE mmol/L 99 94*   CO2 mmol/L 29 27   BUN mg/dL 11 16   CREATININE mg/dL 0.80 0.80   GLUCOSE mg/dL 120* 199*   CALCIUM mg/dL 8.8 9.3   ANION GAP mmol/L 8 10   EGFR mL/min/1.73m*2 68 68     Results from last 72 hours   Lab Units 07/23/24  1747   ALK PHOS U/L 71   BILIRUBIN TOTAL mg/dL 0.3   PROTEIN TOTAL g/dL 7.1   ALT U/L 10   AST U/L 16   ALBUMIN g/dL 3.9     Estimated Creatinine Clearance: 33.2 mL/min (by C-G formula based on SCr of 0.8 mg/dL).  No results found for: \"CRP\"  Microbiology  Susceptibility data from last 14 days.  Collected Specimen Info Organism   07/23/24 Tissue/Biopsy from Skin/Superficial Abscess Pseudomonas aeruginosa     Mixed Gram-Positive and Gram-Negative Bacteria       Imaging  Vascular US ankle brachial index (MEETA) without exercise    Result Date: 7/24/2024  Preliminary Cardiology Report            [facility] [address], [place] [address]             Phone [phone]  Preliminary Vascular Lab Report   VASC US PVR WITHOUT EXERCISE  Patient Name:     DESTINI Marie Physician:  [id number] Linda Sotelo MD Study Date:       7/24/2024    Ordering Provider:  [id number] ROMERO LÓPEZ MRN/PID:          [id number]     Fellow: Accession#:       [id number] Technologist:       Leslie Fu RDMS, RVT YOB: 1929    Technologist 2: Gender:           F            Encounter#:         [id number] Admission Status: Inpatient    Location Performed: [facility]  Diagnosis/ICD: Type 2 diabetes mellitus (DM) with foot ulcer-E11.621  PRELIMINARY CONCLUSIONS:  Right Lower PVR: Right pressures of >220 mmHg suggest no compressibility of vessels and may make absolute Segmental " Limb Pressures (SLP) unreliable. Monophasic flow is noted in the right dorsalis pedis artery. Multiphasic flow is noted in the right posterior tibial artery. Due to a large draining wound and significant errythemia to the right lower extremity, a cuff was not able to be placed over the calf or ankle. Low thigh was used to obtain MEETA. Left Lower PVR: Left pressures of >220 mmHg suggest no compressibility of vessels and may make absolute Segmental Limb Pressures (SLP) unreliable. Multiphasic flow is noted in the left posterior tibial artery and left dorsalis pedis artery. Full PVR was not obtainable due to patients sensitivity to the pressure of the Cuffs. unable to tolerate full compression of the arteries.  Imaging & Doppler Findings:  RIGHT Lower PVR                Pressures Ratios Right Posterior Tibial (Ankle) 255 mmHg  1.41 Right Dorsalis Pedis (Ankle)   255 mmHg  1.41   LEFT Lower PVR                Pressures Ratios Left Posterior Tibial (Ankle) 255 mmHg  1.41 Left Dorsalis Pedis (Ankle)   255 mmHg  1.41                      Right     Left Brachial Pressure 176 mmHg 181 mmHg   VASCULAR PRELIMINARY REPORT completed by Leslie Fu RDMS, AB, RVT, VT on 7/24/2024 at 3:39:46 PM  ** Final **     CT tibia fibula right w IV contrast    Result Date: 7/23/2024  Interpreted By:  Denny Oliveira, STUDY: CT TIBIA FIBULA RIGHT W IV CONTRAST;  7/23/2024 9:25 pm   INDICATION: Signs/Symptoms:RLE infection r/o abscess, OM.   COMPARISON: Radiographs of the right tibia and fibula 07/22/2024   ACCESSION NUMBER(S): [id number]   ORDERING CLINICIAN: BANDAR RAMOS   TECHNIQUE: CT imaging of the right tibia and fibula was obtained after the intravenous administration of 75 mL Omnipaque 350 contrast. Coronal and sagittal reformatted images were performed.   FINDINGS: OSSEOUS STRUCTURES:   Severe degenerative changes of the right knee with tricompartmental joint space narrowing and large marginal osteophytes. Meniscal calcifications  suggesting calcium pyrophosphate deposition arthropathy. Intra-articular ossific bodies in the posterior aspect of the knee. No fracture identified. No definite erosive change or periosteal reaction to suggest osteomyelitis.     SOFT TISSUES:   There is extensive subcutaneous soft tissue edema throughout the lower leg most pronounced within laterally. The deeper anterior an intramuscular fat planes are preserved. No definite peripherally enhancing fluid collection identified. There is a crescentic similar ill-defined high density collection along the anteromedial aspect of the two-view which measures 5.4 x 2.0 x 11.5 cm. The appearance is most suggestive of the hematoma although the mass or infectious collection can not be entirely excluded. Extensive vascular calcifications are noted throughout the leg. No subcutaneous air identified.       1. Ill-defined relatively high density collection along the anteromedial aspect of the lower leg measuring 5.4 x 2.0 x 11.5 cm. The appearance is suggestive of the hematoma with infection or mass not entirely excluded. 2. Diffuse nonspecific subcutaneous soft tissue edema throughout the lower leg most pronounced along the lateral aspect. No definite peripherally enhancing fluid collection is noted. The deeper fascial planes appear preserved. Correlate for clinical evidence of infection. 3. No osseous erosion to suggest osteomyelitis. If there is ongoing clinical concern then MRI may be performed in further assessment.   MACRO: None   Signed by: Denny Oliveira 7/23/2024 9:44 PM Dictation workstation:   [id number]    Lower extremity venous duplex right    Result Date: 7/23/2024  Interpreted By:  Angelica Aguila, STUDY: [facility] US LOWER EXTREMITY VENOUS DUPLEX RIGHT;  7/23/2024 6:30 pm   INDICATION: Signs/Symptoms:r/o DVT.   COMPARISON: None.   ACCESSION NUMBER(S): [id number]   ORDERING CLINICIAN: AMELIE ABARCA   TECHNIQUE: Grayscale, color and spectral Doppler sonographic images  of the right lower extremity deep venous system. The left common femoral vein was imaged for comparison.   FINDINGS: There is normal compressibility of the right common femoral vein, saphenous femoral junction, femoral vein and popliteal vein. The posterior tibial and peroneal veins are suboptimally visualized. There is normal spontaneous and phasic variation throughout the leg by spectral doppler.   The left common femoral vein is patent.   OTHER FINDINGS: None.       Suboptimal visualization of the calf veins. No sonographic evidence of acute DVT in the visualized vessels of the right lower extremity.   MACRO: None   Signed by: Angelica Aguila 7/23/2024 6:44 PM Dictation workstation:   [id number]    XR tibia fibula right 2 views    Result Date: 7/23/2024  Interpreted By:  Hira Lawrence, STUDY: XR TIBIA FIBULA RIGHT 2 VIEWS; ;  7/23/2024 5:39 pm   INDICATION: Signs/Symptoms:wound right anterior tibia.   COMPARISON: None.   ACCESSION NUMBER(S): [id number]   ORDERING CLINICIAN: AMELIE ABARCA   FINDINGS: Right tibia/fibula, two views   Soft tissue edema about the calf with irregularity anteriorly corresponding to known wound. No osseous destruction or erosion seen radiographically. Diffuse osteopenia present. Severe degenerative change of the knee       No radiographic evidence of osteomyelitis. Soft tissue edema and soft tissue wound at the anterior aspect of the cuff     MACRO: None   Signed by: Hira Lawrence 7/23/2024 6:05 PM Dictation workstation:   [id number]    XR tibia fibula right 2 views    Result Date: 7/1/2024  Interpreted By:  José Yan, STUDY: XR TIBIA FIBULA RIGHT 2 VIEWS;  7/1/2024 2:39 pm   INDICATION: Signs/Symptoms:fall.   COMPARISON: None.   ACCESSION NUMBER(S): [id number]   ORDERING CLINICIAN: ZARIA CONDON   FINDINGS: No acute fracture in the tibia or fibula. Moderate to severe knee osteoarthritis with chondrocalcinosis.       No acute fracture in the tibia or fibula.    MACRO None   Signed by: José Yan 7/1/2024 2:59 PM Dictation workstation:   [id number]    XR hand right 3+ views    Result Date: 7/1/2024  Interpreted By:  José Yan, STUDY: XR HAND RIGHT 3+ VIEWS;  7/1/2024 2:39 pm   INDICATION: Signs/Symptoms:fall.   COMPARISON: None.   ACCESSION NUMBER(S): [id number]   ORDERING CLINICIAN: ZARIA CONDON   FINDINGS: Acute oblique fracture through proximal 1st metacarpal metadiaphysis with minimal displacement. Severe 1st CMC osteoarthritis.       Acute minimally displaced 1st metacarpal base fracture. Severe 1st CMC osteoarthritis.   MACRO None   Signed by: José Yan 7/1/2024 2:57 PM Dictation workstation:   [id number]    CT cervical spine wo IV contrast    Result Date: 7/1/2024  Interpreted By:  José Yan, STUDY: CT CERVICAL SPINE WO IV CONTRAST;  7/1/2024 2:19 pm   INDICATION: Signs/Symptoms:fall.   COMPARISON: C-spine 07/01/2022.   ACCESSION NUMBER(S): [id number]   ORDERING CLINICIAN: ZARIA CONDON   TECHNIQUE: Thin section axial images were obtained from the skull base down through the thoracic inlet. Sagittal and coronal reconstruction images were generated. Soft tissue, lung, and bone windows were reviewed.   FINDINGS: Prevertebral/Paraspinal Soft Tissues: Stable 2.4 cm heterogeneous left thyroid nodule.   CERVICAL SPINE: Hardware: None. Fracture: None. Vertebral Body Heights: Normal. Alignment: No traumatic listhesis. Spinal canal and neural foramina: Moderate to severe canal stenosis at C3-C4 and C6-C7, not significantly changed. No severe neural foraminal narrowing.       No acute fracture or traumatic malalignment.   Stable 2.3 cm left thyroid nodule. Consider outpatient ultrasound evaluation if not already performed.   Signed by: José Yan 7/1/2024 2:55 PM Dictation workstation:   [id number]    CT head wo IV contrast    Result Date: 7/1/2024  Interpreted By:  José Yan, STUDY: CT HEAD WO IV CONTRAST;  7/1/2024 2:19 pm    INDICATION: Signs/Symptoms:fall.   COMPARISON: CT head 04/01/2020.   ACCESSION NUMBER(S): [id number]   ORDERING CLINICIAN: ZARIA CONDON   TECHNIQUE: Noncontrast axial CT scan of head was performed.   FINDINGS: Parenchyma: No intracranial hemorrhage. The grey-white differentiation is intact. No mass effect or midline shift. Patchy periventricular white matter hypodensities, likely moderate chronic microvascular ischemic change. Severe diffuse parenchymal atrophy.   CSF Spaces: The ventricles, sulci and basal cisterns are within normal limits for age.   Extra-Axial Fluid: No extraaxial fluid collection.   Calvarium: No acute fracture.   Paranasal sinuses: Visualized paranasal sinuses are clear.   Mastoids: Clear.   Orbits: No acute abnormality.   Soft tissues: No acute abnormality.       No acute intracranial hemorrhage or calvarial fracture.   MACRO None   Signed by: José Yan 7/1/2024 2:48 PM Dictation workstation:   [id number]        Assessment/Plan   Type 2 diabetes mellitus   Infected Right leg traumatic wound-culture with pseudomonas-pending  Right leg hematoma  Right leg cellulitis  Peripheral vascular disease   Pyuria verses Urinary tract infection-culture negative      IV vancomycin  IV Zosyn  Follow urine culture  Follow wound culture  Follow blood culture  Monitor temperature WBC  Podiatry follow-up-plan surgical debridement  Local care  Right leg elevation     Total time spent caring for the patient today was 20 minutes. This includes time spent before the visit reviewing the chart, time spent during the visit, and time spent after the visit on documentation        ALICIA Bush-CNP

## 2024-07-25 NOTE — CARE PLAN
The patient's goals for the shift include  IVATB    The clinical goals for the shift include safety, no falls

## 2024-07-25 NOTE — PROGRESS NOTES
Apoorva Sanchez is a 89 y.o. female on day 2 of admission presenting with Cellulitis of right lower extremity.    Subjective   Seen and examined.  No documented concerns/events overnight from nursing.  Afebrile overnight.  Denies chills.  Patient sitting up in the chair eating lunch. no Complaints voiced       Objective     Physical Exam  Constitutional:       General: She is not in acute distress.     Appearance: She is ill-appearing. She is not toxic-appearing.   HENT:      Ears:      Comments: [place]  Cardiovascular:      Rate and Rhythm: Normal rate and regular rhythm.      Pulses: Normal pulses.      Heart sounds: Normal heart sounds.   Pulmonary:      Effort: Pulmonary effort is normal. No respiratory distress.      Breath sounds: Normal breath sounds. No wheezing or rales.   Abdominal:      General: Bowel sounds are normal.      Palpations: Abdomen is soft.   Musculoskeletal:         General: Normal range of motion.      Cervical back: Normal range of motion.      Right lower leg: Edema present.      Left lower leg: Edema present.   Skin:     General: Skin is warm and dry.      Capillary Refill: BLE     Findings: Erythema (RLE) and wound present.      Comments: Drsg to RLE dry and intact  LLE brown discoloration   Neurological:      Mental Status: She is alert and oriented to person, place, and time.   Psychiatric:         Mood and Affect: Mood normal.        Last Recorded Vitals  Blood pressure 152/64, pulse 63, temperature 36.4 °C (97.5 °F), temperature source Oral, resp. rate 16, height 1.524 m (5'), weight 56.8 kg (125 lb 3.5 oz), SpO2 98%.  Intake/Output last 3 Shifts:  I/O last 3 completed shifts:  In: 290 (5.1 mL/kg) [P.O.:240; IV Piggyback:50]  Out: - (0 mL/kg)   Weight: 56.8 kg     Relevant Results    Scheduled medications  atenolol, 50 mg, oral, Daily  brimonidine, 1 drop, ophthalmic (eye), BID  cloNIDine, 0.2 mg, oral, Nightly  furosemide, 40 mg, oral, Daily  glimepiride, 2 mg, oral, BID  heparin, 5,000  Units, subcutaneous, q12h AMY  insulin lispro, 0-5 Units, subcutaneous, TID  latanoprost, 1 drop, Both Eyes, q24h  levothyroxine, 100 mcg, oral, Daily before breakfast  piperacillin-tazobactam, 3.375 g, intravenous, q6h  simvastatin, 40 mg, oral, Nightly  timolol, 1 drop, Both Eyes, BID  vancomycin, 1,000 mg, intravenous, q24h      Continuous medications     PRN medications  PRN medications: acetaminophen, dextrose, dextrose, glucagon, glucagon, vancomycin     following data reviewed                        Malnutrition Diagnosis Status: New  Malnutrition Diagnosis: Severe malnutrition related to chronic disease or condition  As Evidenced by: mild/moderate subcutaneous fat loss and muscle wasting; significant reported 15# (10.7%) wt loss in 6 months.  I agree with the dietitian's malnutrition diagnosis.      Assessment/Plan   Principal Problem:    Cellulitis of right lower extremity    Right lower leg traumatic ulceration  -Dressing change twice daily, Xeroform, gauze, loose Kerlix, Ace wrap per podiatry recommendations  -IV Vanc/Zosyn  -CT suggestive of hematoma with infection, mass not entirely excluded  -F/U Wound cx  -F/U Blood cx   -Vascular surgery following  -Podiatry following, debridement recommended  -ID following  -Cardiology consult for cardiac clearance     UTI  -Urine culture pending     DM  -Hold Metformin post IV contrast  -Continue Glimepiride  -Insulin SS  -Monitor blood glucose  -A1C 7.1      Hypertension  -Continue home meds     Hypothyroidism  -Continue home meds     PVD/venous stasis  -Vascular        Plan  Above plan discussed with patient and she is in agreement.     ALICIA Sheldon-CNP

## 2024-07-25 NOTE — PROGRESS NOTES
07/25/24 1724   Discharge Planning   Living Arrangements Alone   Support Systems Friends/neighbors;Children   Type of Residence Private residence   Who is requesting discharge planning? Provider   Home or Post Acute Services None   Type of Home Care Services Safety alert   Expected Discharge Disposition  Services   Does the patient need discharge transport arranged? No     Per MD/Pod Notes: recommend surgical debridement of the wound for possible skin substitute application, possible wound VAC application.   Schedule surgery for tomorrow. N.p.o. at midnight.     PLAN: Wound Vac set up with Home health care vs skilled care. To be determined.   Discharge plan NOT secure  DO NOT DC without speaking to care coordination

## 2024-07-25 NOTE — PROGRESS NOTES
"Subjective Data:  Patient is doing good.  Denies having chest pain.  No shortness of breath.  Sitting up in bed.    Overnight Events:    Telemetry overnight reviewed no events     Objective Data:  Last Recorded Vitals:  Vitals:    07/24/24 1610 07/24/24 1956 07/24/24 2307 07/25/24 0757   BP: 156/75 146/68 113/60 152/64   BP Location: Left arm Left arm Left arm Left arm   Patient Position: Sitting Lying Lying Lying   Pulse: 59 57 59 63   Resp: 18 16 18 16   Temp: 37.2 °C (99 °F) 36.9 °C (98.4 °F) 36.9 °C (98.4 °F) 36.4 °C (97.5 °F)   TempSrc: Oral Oral Axillary Oral   SpO2: 100% 99% 100% 98%   Weight:       Height:           Last Labs:  CBC - 7/24/2024:  4:22 AM  8.5 9.1 272    28.8      CMP - 7/24/2024:  4:22 AM  8.8 7.1 16 --- 0.3   _ 3.9 10 71      PTT - No results in last year.  _   _ _     HGBA1C   Date/Time Value Ref Range Status   07/24/2024 12:02 AM 7.1 See below % Final   03/05/2024 11:22 AM 6.6 See below % Final     LDLCALC   Date/Time Value Ref Range Status   03/06/2023 11:29 AM 32 65 - 130 MG/DL Final   07/01/2022 04:48 PM 29 65 - 130 MG/DL Final   05/02/2022 11:07 AM 35 65 - 130 MG/DL Final      Last I/O:  I/O last 3 completed shifts:  In: 290 (5.1 mL/kg) [P.O.:240; IV Piggyback:50]  Out: - (0 mL/kg)   Weight: 56.8 kg     Past Cardiology Tests (Last 3 Years):  EKG:  No results found for this or any previous visit from the past 1095 days.    Echo:  No results found for this or any previous visit from the past 1095 days.    Ejection Fractions:  No results found for: \"EF\"  Cath:  No results found for this or any previous visit from the past 1095 days.    Stress Test:  No results found for this or any previous visit from the past 1095 days.    Cardiac Imaging:  No results found for this or any previous visit from the past 1095 days.      Inpatient Medications:  Scheduled medications   Medication Dose Route Frequency    atenolol  50 mg oral Daily    brimonidine  1 drop ophthalmic (eye) BID    cloNIDine  0.2 " mg oral Nightly    furosemide  40 mg oral Daily    glimepiride  2 mg oral BID    heparin  5,000 Units subcutaneous q12h AMY    insulin lispro  0-5 Units subcutaneous TID    latanoprost  1 drop Both Eyes q24h    levothyroxine  100 mcg oral Daily before breakfast    piperacillin-tazobactam  3.375 g intravenous q6h    simvastatin  40 mg oral Nightly    timolol  1 drop Both Eyes BID    vancomycin  1,000 mg intravenous q24h     PRN medications   Medication    acetaminophen    dextrose    dextrose    glucagon    glucagon    vancomycin     Continuous Medications   Medication Dose Last Rate       Physical Exam:  General: Patient is in no acute distress.  Hard of hearing  HEENT: atraumatic normocephalic.  Neck: is supple jugular venous pressure within normal limits no thyromegaly.  Cardiovascular regular rate and rhythm normal heart sounds no murmurs rubs or gallops.  Lungs: clear to auscultation bilaterally.  Abdomen: is soft nontender.  Extremities warm to touch no edema.  Right lower extremity covered with dressing and compression Ace wrap.       Assessment/Plan  #1 preop for right lower extremity debridement and skin graft.  Patient with no prior cardiac history.  She may have a history of heart failure diastolic dysfunction.  She is on diuretics at home.  Last echo 2020 showed normal ejection fraction no major valve abnormalities.  From cardiac standpoint since patient has no active cardiac symptoms and no prior cardiac history she will be at low risk overall for surgery with general anesthesia.  Probably her advanced age highest risk but still do not see any indication for stress test or an echocardiogram.  EKG showed normal sinus rhythm nonspecific ST-T abnormalities.  Okay to proceed with surgery    2.  Hypertension.  Patient with history of hypertension.  She is on regimen of atenolol 100 mg oral daily and addition of clonidine point 4 mg at bedtime she is very high dose.  I will decrease atenolol to 50 mg oral  daily.  We will decrease clonidine to 0.2 mg at bedtime.  Will monitor blood pressure and heart rate.  If her blood pressure increases put her on small dose hydrochlorothiazide     3.  Hyperlipidemia continue simvastatin.     Thank you for allowing us to persuade in her care will follow-up in a.m.  Peripheral IV 07/23/24 20 G Right Forearm (Active)   Site Assessment Clean;Dry;Intact 07/25/24 0823   Dressing Status Clean;Dry 07/25/24 0823   Number of days: 2       Code Status:  Full Code    Clary Ward MD

## 2024-07-25 NOTE — PROGRESS NOTES
Occupational Therapy    Occupational Therapy Treatment    Name: Apoorva Sanchez  MRN: [id number]  : 1929  Date: 24  Time Calculation  Start Time: 1308  Stop Time: 1334  Time Calculation (min): 26 min    Assessment:  OT Assessment: Continues to progress towards OT POC  Prognosis: Good  Barriers to Discharge: Other (Comment) (lives alone, full flight of stairs to bed/bath, fall risk)  Evaluation/Treatment Tolerance: Patient tolerated treatment well  Medical Staff Made Aware: Yes  End of Session Communication: Bedside nurse  End of Session Patient Position: Up in chair, Alarm on  Plan:  Treatment Interventions: ADL retraining, Functional transfer training, UE strengthening/ROM, Endurance training, Equipment evaluation/education, Neuromuscular reeducation, Cognitive reorientation, Patient/family training, Compensatory technique education  OT Frequency: 3 times per week  OT Discharge Recommendations: Low intensity level of continued care  Equipment Recommended upon Discharge: Wheeled walker, Straight cane  OT Recommended Transfer Status: Assist of 1  OT - OK to Discharge: Yes    Subjective   Previous Visit Info:  OT Last Visit  OT Received On: 24  General:  General  Reason for Referral: Decreased ADLs. Rt shin wound  Referred By: Anita Harvey MD  Past Medical History Relevant to Rehab: DM, HTN, falls, pelvic fx, macular degeneration  Family/Caregiver Present: Yes  Caregiver Feedback: son present, supportive  Prior to Session Communication: Bedside nurse  Patient Position Received: Up in chair, Alarm on  Preferred Learning Style: verbal, visual  General Comment: Cleared by nsg, pt met seated supported in bedside chair, agreeable to OT session  Precautions:  Hearing/Visual Limitations: b/l hearing aides donned. very [place]  Medical Precautions: Fall precautions    Pain Assessment:  Pain Assessment  Pain Assessment: 0-10  0-10 (Numeric) Pain Score: 0 - No pain     Objective   Cognition:  Overall Cognitive Status:  Within Functional Limits  Orientation Level: Appropriate for developmental age, Oriented X4  Cognition Comments: limited by hearing loss primarily    Activities of Daily Living:    Grooming  Grooming Level of Assistance: Close supervision  Grooming Where Assessed: Standing sinkside  Grooming Comments: pt completes hand hygiene, oral care, face washing while standing sinkside, supervision for safety/stability and FWW mgmt    UE Bathing  UE Bathing Level of Assistance: Close supervision  UE Bathing Where Assessed: Sitting sinkside  UE Bathing Comments: cues for thoroughness and advancement of activity; sponge bath performed while seated sinkside    LE Bathing  LE Bathing Level of Assistance: Minimum assistance  LE Bathing Where Assessed: Sitting sinkside  LE Bathing Comments: min A for distal LEs while seated; pt performs posterior/howard area hygiene acts in stance with supervision for safety/stability.    UE Dressing  UE Dressing Level of Assistance: Moderate assistance  UE Dressing Where Assessed: Other (Comment), Edge of bed (standing EOB)  UE Dressing Comments: mod A for robe/nightgown mgmt    LE Dressing  LE Dressing: Yes  Adult Briefs Level of Assistance: Minimum assistance  LE Dressing Where Assessed: Toilet  LE Dressing Comments: min A to thread LEs into adult brief    Toileting  Toileting Level of Assistance: Minimum assistance  Where Assessed: Toilet  Toileting Comments: pt requires min A For clothing mgmt in dynamic standing, manages hygiene with lateral weight shifting while seated supported    Bed Mobility/Transfers:   Bed Mobility  Bed Mobility: No    Transfers  Transfer: Yes  Transfer 1  Transfer From 1: Chair with arms to  Transfer to 1: Stand  Technique 1: Sit to stand  Transfer Device 1: Walker  Transfer Level of Assistance 1: Close supervision  Trials/Comments 1: cues for hand placement, body positioning and walker mgmt    Toilet Transfers  Toilet Transfer From: Rolling walker  Toilet Transfer Type:  To and from  Toilet Transfer to: Standard toilet  Toilet Transfer Technique: Ambulating  Toilet Transfers: Contact guard  Toilet Transfers Comments: patient begins descent too early, increasing her risk for LOB d/t poor proximity to toilet.  requires mod verbal cues and CGA for safety    Functional Mobility:  Functional Mobility  Functional Mobility Performed: Yes  Functional Mobility 1  Surface 1: Level tile  Device 1: Rolling walker  Assistance 1: Close supervision  Comments 1: pt completes > household distance mobility, FWW in use, supervision with intermittent cues for maintaining proximity to device and for safety techniques    Outcome Measures:  [facility] Daily Activity  Putting on and taking off regular lower body clothing: A little  Bathing (including washing, rinsing, drying): A little  Putting on and taking off regular upper body clothing: A little  Toileting, which includes using toilet, bedpan or urinal: A little  Taking care of personal grooming such as brushing teeth: A little  Eating Meals: A little  Daily Activity - Total Score: 18      Education Documentation  No documentation found.  Education Comments  No comments found.      Goals:  Encounter Problems       Encounter Problems (Active)       OT Goals       ADLs (Progressing)       Start:  07/24/24    Expected End:  07/31/24       Patient will perform ADLs with MOD I using AE/compensatory techniques as needed.          Functional Transfers (Progressing)       Start:  07/24/24    Expected End:  07/31/24       Patient will perform functional transfers including but not limited to: bed, chair, toilet with MOD I using LRD.          UE Strengthening (Progressing)       Start:  07/24/24    Expected End:  07/31/24       Patient will perform UE therapeutic exercises with supervision to improve overall strength/activity tolerance in preparation for ADLS.

## 2024-07-26 ENCOUNTER — ANESTHESIA (OUTPATIENT)
Dept: OPERATING ROOM | Facility: HOSPITAL | Age: 89
End: 2024-07-26
Payer: MEDICARE

## 2024-07-26 ENCOUNTER — ANESTHESIA EVENT (OUTPATIENT)
Dept: OPERATING ROOM | Facility: HOSPITAL | Age: 89
End: 2024-07-26
Payer: MEDICARE

## 2024-07-26 LAB
ATRIAL RATE: 63 BPM
ATRIAL RATE: 66 BPM
GLUCOSE BLD MANUAL STRIP-MCNC: 235 MG/DL (ref 74–99)
GLUCOSE BLD MANUAL STRIP-MCNC: 303 MG/DL (ref 74–99)
GLUCOSE BLD MANUAL STRIP-MCNC: 86 MG/DL (ref 74–99)
GLUCOSE BLD MANUAL STRIP-MCNC: 91 MG/DL (ref 74–99)
P AXIS: 26 DEGREES
P AXIS: 52 DEGREES
P OFFSET: 205 MS
P OFFSET: 212 MS
P ONSET: 156 MS
P ONSET: 157 MS
PR INTERVAL: 140 MS
PR INTERVAL: 140 MS
Q ONSET: 226 MS
Q ONSET: 227 MS
QRS COUNT: 10 BEATS
QRS COUNT: 11 BEATS
QRS DURATION: 84 MS
QRS DURATION: 86 MS
QT INTERVAL: 420 MS
QT INTERVAL: 422 MS
QTC CALCULATION(BAZETT): 431 MS
QTC CALCULATION(BAZETT): 440 MS
QTC FREDERICIA: 429 MS
QTC FREDERICIA: 434 MS
R AXIS: -37 DEGREES
R AXIS: -38 DEGREES
T AXIS: -3 DEGREES
T AXIS: 0 DEGREES
T OFFSET: 436 MS
T OFFSET: 438 MS
VENTRICULAR RATE: 63 BPM
VENTRICULAR RATE: 66 BPM

## 2024-07-26 PROCEDURE — 3600000003 HC OR TIME - INITIAL BASE CHARGE - PROCEDURE LEVEL THREE: Performed by: STUDENT IN AN ORGANIZED HEALTH CARE EDUCATION/TRAINING PROGRAM

## 2024-07-26 PROCEDURE — 2500000001 HC RX 250 WO HCPCS SELF ADMINISTERED DRUGS (ALT 637 FOR MEDICARE OP): Performed by: STUDENT IN AN ORGANIZED HEALTH CARE EDUCATION/TRAINING PROGRAM

## 2024-07-26 PROCEDURE — 3600000008 HC OR TIME - EACH INCREMENTAL 1 MINUTE - PROCEDURE LEVEL THREE: Performed by: STUDENT IN AN ORGANIZED HEALTH CARE EDUCATION/TRAINING PROGRAM

## 2024-07-26 PROCEDURE — 93922 UPR/L XTREMITY ART 2 LEVELS: CPT | Performed by: INTERNAL MEDICINE

## 2024-07-26 PROCEDURE — 3700000001 HC GENERAL ANESTHESIA TIME - INITIAL BASE CHARGE: Performed by: STUDENT IN AN ORGANIZED HEALTH CARE EDUCATION/TRAINING PROGRAM

## 2024-07-26 PROCEDURE — 2500000004 HC RX 250 GENERAL PHARMACY W/ HCPCS (ALT 636 FOR OP/ED): Performed by: ANESTHESIOLOGY

## 2024-07-26 PROCEDURE — 2500000004 HC RX 250 GENERAL PHARMACY W/ HCPCS (ALT 636 FOR OP/ED): Performed by: STUDENT IN AN ORGANIZED HEALTH CARE EDUCATION/TRAINING PROGRAM

## 2024-07-26 PROCEDURE — 88305 TISSUE EXAM BY PATHOLOGIST: CPT | Performed by: PATHOLOGY

## 2024-07-26 PROCEDURE — 2500000002 HC RX 250 W HCPCS SELF ADMINISTERED DRUGS (ALT 637 FOR MEDICARE OP, ALT 636 FOR OP/ED): Performed by: STUDENT IN AN ORGANIZED HEALTH CARE EDUCATION/TRAINING PROGRAM

## 2024-07-26 PROCEDURE — 82947 ASSAY GLUCOSE BLOOD QUANT: CPT

## 2024-07-26 PROCEDURE — 7100000002 HC RECOVERY ROOM TIME - EACH INCREMENTAL 1 MINUTE: Performed by: STUDENT IN AN ORGANIZED HEALTH CARE EDUCATION/TRAINING PROGRAM

## 2024-07-26 PROCEDURE — 88305 TISSUE EXAM BY PATHOLOGIST: CPT | Mod: TC | Performed by: STUDENT IN AN ORGANIZED HEALTH CARE EDUCATION/TRAINING PROGRAM

## 2024-07-26 PROCEDURE — 2500000004 HC RX 250 GENERAL PHARMACY W/ HCPCS (ALT 636 FOR OP/ED): Performed by: NURSE PRACTITIONER

## 2024-07-26 PROCEDURE — 2720000007 HC OR 272 NO HCPCS: Performed by: STUDENT IN AN ORGANIZED HEALTH CARE EDUCATION/TRAINING PROGRAM

## 2024-07-26 PROCEDURE — 7100000001 HC RECOVERY ROOM TIME - INITIAL BASE CHARGE: Performed by: STUDENT IN AN ORGANIZED HEALTH CARE EDUCATION/TRAINING PROGRAM

## 2024-07-26 PROCEDURE — 99232 SBSQ HOSP IP/OBS MODERATE 35: CPT | Performed by: INTERNAL MEDICINE

## 2024-07-26 PROCEDURE — 0HBKXZZ EXCISION OF RIGHT LOWER LEG SKIN, EXTERNAL APPROACH: ICD-10-PCS | Performed by: STUDENT IN AN ORGANIZED HEALTH CARE EDUCATION/TRAINING PROGRAM

## 2024-07-26 PROCEDURE — 1100000001 HC PRIVATE ROOM DAILY

## 2024-07-26 PROCEDURE — 3700000002 HC GENERAL ANESTHESIA TIME - EACH INCREMENTAL 1 MINUTE: Performed by: STUDENT IN AN ORGANIZED HEALTH CARE EDUCATION/TRAINING PROGRAM

## 2024-07-26 PROCEDURE — 0QBG0ZX EXCISION OF RIGHT TIBIA, OPEN APPROACH, DIAGNOSTIC: ICD-10-PCS | Performed by: STUDENT IN AN ORGANIZED HEALTH CARE EDUCATION/TRAINING PROGRAM

## 2024-07-26 PROCEDURE — 0HRKXK4 REPLACEMENT OF RIGHT LOWER LEG SKIN WITH NONAUTOLOGOUS TISSUE SUBSTITUTE, PARTIAL THICKNESS, EXTERNAL APPROACH: ICD-10-PCS | Performed by: STUDENT IN AN ORGANIZED HEALTH CARE EDUCATION/TRAINING PROGRAM

## 2024-07-26 PROCEDURE — 0J9N0ZZ DRAINAGE OF RIGHT LOWER LEG SUBCUTANEOUS TISSUE AND FASCIA, OPEN APPROACH: ICD-10-PCS | Performed by: STUDENT IN AN ORGANIZED HEALTH CARE EDUCATION/TRAINING PROGRAM

## 2024-07-26 PROCEDURE — 6360000003 HC OR 636 NO HCPCS: Performed by: STUDENT IN AN ORGANIZED HEALTH CARE EDUCATION/TRAINING PROGRAM

## 2024-07-26 DEVICE — IMPLANTABLE DEVICE: Type: IMPLANTABLE DEVICE | Site: TIBIA | Status: FUNCTIONAL

## 2024-07-26 RX ORDER — ONDANSETRON HYDROCHLORIDE 2 MG/ML
4 INJECTION, SOLUTION INTRAVENOUS ONCE AS NEEDED
Status: DISCONTINUED | OUTPATIENT
Start: 2024-07-26 | End: 2024-07-26 | Stop reason: HOSPADM

## 2024-07-26 RX ORDER — SODIUM CHLORIDE, SODIUM LACTATE, POTASSIUM CHLORIDE, CALCIUM CHLORIDE 600; 310; 30; 20 MG/100ML; MG/100ML; MG/100ML; MG/100ML
100 INJECTION, SOLUTION INTRAVENOUS CONTINUOUS
Status: DISCONTINUED | OUTPATIENT
Start: 2024-07-26 | End: 2024-07-26 | Stop reason: HOSPADM

## 2024-07-26 RX ORDER — SODIUM CHLORIDE, SODIUM LACTATE, POTASSIUM CHLORIDE, CALCIUM CHLORIDE 600; 310; 30; 20 MG/100ML; MG/100ML; MG/100ML; MG/100ML
INJECTION, SOLUTION INTRAVENOUS CONTINUOUS PRN
Status: DISCONTINUED | OUTPATIENT
Start: 2024-07-26 | End: 2024-07-26

## 2024-07-26 RX ORDER — OXYCODONE HYDROCHLORIDE 5 MG/1
5 TABLET ORAL EVERY 6 HOURS PRN
Status: DISCONTINUED | OUTPATIENT
Start: 2024-07-26 | End: 2024-07-29 | Stop reason: HOSPADM

## 2024-07-26 RX ORDER — PROPOFOL 10 MG/ML
INJECTION, EMULSION INTRAVENOUS AS NEEDED
Status: DISCONTINUED | OUTPATIENT
Start: 2024-07-26 | End: 2024-07-26

## 2024-07-26 RX ORDER — LABETALOL HYDROCHLORIDE 5 MG/ML
5 INJECTION, SOLUTION INTRAVENOUS ONCE AS NEEDED
Status: DISCONTINUED | OUTPATIENT
Start: 2024-07-26 | End: 2024-07-26 | Stop reason: HOSPADM

## 2024-07-26 RX ORDER — BUPIVACAINE HYDROCHLORIDE 5 MG/ML
INJECTION, SOLUTION PERINEURAL AS NEEDED
Status: DISCONTINUED | OUTPATIENT
Start: 2024-07-26 | End: 2024-07-26 | Stop reason: HOSPADM

## 2024-07-26 RX ORDER — FENTANYL CITRATE 50 UG/ML
25 INJECTION, SOLUTION INTRAMUSCULAR; INTRAVENOUS EVERY 5 MIN PRN
Status: DISCONTINUED | OUTPATIENT
Start: 2024-07-26 | End: 2024-07-26 | Stop reason: HOSPADM

## 2024-07-26 RX ORDER — MEPERIDINE HYDROCHLORIDE 25 MG/ML
12.5 INJECTION INTRAMUSCULAR; INTRAVENOUS; SUBCUTANEOUS EVERY 10 MIN PRN
Status: DISCONTINUED | OUTPATIENT
Start: 2024-07-26 | End: 2024-07-26 | Stop reason: HOSPADM

## 2024-07-26 RX ORDER — FENTANYL CITRATE 50 UG/ML
INJECTION, SOLUTION INTRAMUSCULAR; INTRAVENOUS AS NEEDED
Status: DISCONTINUED | OUTPATIENT
Start: 2024-07-26 | End: 2024-07-26

## 2024-07-26 RX ORDER — LIDOCAINE HYDROCHLORIDE 10 MG/ML
0.1 INJECTION, SOLUTION INFILTRATION; PERINEURAL ONCE
Status: DISCONTINUED | OUTPATIENT
Start: 2024-07-26 | End: 2024-07-26 | Stop reason: HOSPADM

## 2024-07-26 RX ORDER — FENTANYL CITRATE 50 UG/ML
50 INJECTION, SOLUTION INTRAMUSCULAR; INTRAVENOUS EVERY 5 MIN PRN
Status: DISCONTINUED | OUTPATIENT
Start: 2024-07-26 | End: 2024-07-26 | Stop reason: HOSPADM

## 2024-07-26 RX ORDER — OXYCODONE HYDROCHLORIDE 5 MG/1
5 TABLET ORAL EVERY 4 HOURS PRN
Status: DISCONTINUED | OUTPATIENT
Start: 2024-07-26 | End: 2024-07-26 | Stop reason: HOSPADM

## 2024-07-26 RX ORDER — VANCOMYCIN HYDROCHLORIDE 1 G/20ML
INJECTION, POWDER, LYOPHILIZED, FOR SOLUTION INTRAVENOUS AS NEEDED
Status: DISCONTINUED | OUTPATIENT
Start: 2024-07-26 | End: 2024-07-26 | Stop reason: HOSPADM

## 2024-07-26 RX ADMIN — CLONIDINE HYDROCHLORIDE 0.2 MG: 0.2 TABLET ORAL at 20:34

## 2024-07-26 RX ADMIN — SIMVASTATIN 40 MG: 40 TABLET, FILM COATED ORAL at 20:34

## 2024-07-26 RX ADMIN — TIMOLOL MALEATE 1 DROP: 5 SOLUTION/ DROPS OPHTHALMIC at 20:36

## 2024-07-26 RX ADMIN — LATANOPROST 1 DROP: 50 SOLUTION OPHTHALMIC at 20:36

## 2024-07-26 RX ADMIN — ACETAMINOPHEN 650 MG: 325 TABLET ORAL at 20:34

## 2024-07-26 RX ADMIN — PIPERACILLIN SODIUM AND TAZOBACTAM SODIUM 3.38 G: 3; .375 INJECTION, SOLUTION INTRAVENOUS at 23:53

## 2024-07-26 RX ADMIN — BRIMONIDINE TARTRATE 1 DROP: 2 SOLUTION/ DROPS OPHTHALMIC at 20:36

## 2024-07-26 RX ADMIN — PIPERACILLIN SODIUM AND TAZOBACTAM SODIUM 3.38 G: 3; .375 INJECTION, SOLUTION INTRAVENOUS at 17:00

## 2024-07-26 RX ADMIN — PIPERACILLIN SODIUM AND TAZOBACTAM SODIUM 3.38 G: 3; .375 INJECTION, SOLUTION INTRAVENOUS at 11:14

## 2024-07-26 RX ADMIN — VANCOMYCIN 1000 MG: 1 INJECTION, SOLUTION INTRAVENOUS at 21:36

## 2024-07-26 RX ADMIN — GLIMEPIRIDE 2 MG: 2 TABLET ORAL at 17:00

## 2024-07-26 RX ADMIN — PIPERACILLIN SODIUM AND TAZOBACTAM SODIUM 3.38 G: 3; .375 INJECTION, SOLUTION INTRAVENOUS at 06:21

## 2024-07-26 RX ADMIN — HEPARIN SODIUM 5000 UNITS: 5000 INJECTION, SOLUTION INTRAVENOUS; SUBCUTANEOUS at 20:34

## 2024-07-26 RX ADMIN — INSULIN LISPRO 2 UNITS: 100 INJECTION, SOLUTION INTRAVENOUS; SUBCUTANEOUS at 17:00

## 2024-07-26 RX ADMIN — PIPERACILLIN SODIUM AND TAZOBACTAM SODIUM 3.38 G: 3; .375 INJECTION, SOLUTION INTRAVENOUS at 00:35

## 2024-07-26 ASSESSMENT — COGNITIVE AND FUNCTIONAL STATUS - GENERAL
TOILETING: A LITTLE
PERSONAL GROOMING: A LITTLE
MOVING TO AND FROM BED TO CHAIR: A LITTLE
EATING MEALS: A LITTLE
DAILY ACTIVITIY SCORE: 18
DRESSING REGULAR UPPER BODY CLOTHING: A LITTLE
WALKING IN HOSPITAL ROOM: A LITTLE
STANDING UP FROM CHAIR USING ARMS: A LITTLE
DRESSING REGULAR UPPER BODY CLOTHING: A LITTLE
WALKING IN HOSPITAL ROOM: A LITTLE
HELP NEEDED FOR BATHING: A LITTLE
STANDING UP FROM CHAIR USING ARMS: A LITTLE
CLIMB 3 TO 5 STEPS WITH RAILING: A LOT
TOILETING: A LITTLE
PERSONAL GROOMING: A LITTLE
HELP NEEDED FOR BATHING: A LITTLE
MOBILITY SCORE: 18
TURNING FROM BACK TO SIDE WHILE IN FLAT BAD: A LITTLE
DAILY ACTIVITIY SCORE: 19
DRESSING REGULAR LOWER BODY CLOTHING: A LITTLE
DRESSING REGULAR LOWER BODY CLOTHING: A LITTLE
MOVING TO AND FROM BED TO CHAIR: A LITTLE
MOBILITY SCORE: 19
TURNING FROM BACK TO SIDE WHILE IN FLAT BAD: A LITTLE
CLIMB 3 TO 5 STEPS WITH RAILING: A LITTLE

## 2024-07-26 ASSESSMENT — PAIN - FUNCTIONAL ASSESSMENT
PAIN_FUNCTIONAL_ASSESSMENT: 0-10

## 2024-07-26 ASSESSMENT — PAIN DESCRIPTION - LOCATION: LOCATION: LEG

## 2024-07-26 ASSESSMENT — PAIN SCALES - GENERAL
PAINLEVEL_OUTOF10: 3
PAINLEVEL_OUTOF10: 0 - NO PAIN

## 2024-07-26 ASSESSMENT — PAIN DESCRIPTION - ORIENTATION: ORIENTATION: RIGHT

## 2024-07-26 NOTE — ANESTHESIA POSTPROCEDURE EVALUATION
Patient: Apoorva Sanchez    Procedure Summary       Date: 07/26/24 Room / Location: [facility] / [facility]    Anesthesia Start: 0823 Anesthesia Stop: 0924    Procedures:       Debridement Lower Extremity (Right)      Application Skin Graft Lower Extremity (Right)      Wound Vac Application / Change (Right: Leg Lower) Diagnosis:       Cellulitis of right lower extremity      Wound of right lower extremity, initial encounter      Ulcer of right leg, with necrosis of muscle (Multi)      (Cellulitis of right lower extremity [L03.115])      (Wound of right lower extremity, initial encounter [S81.801A])      (Ulcer of right leg, with necrosis of muscle (Multi) [L97.913])    Surgeons: Joe Proctor DPM Responsible Provider: Osvaldo Dang MD    Anesthesia Type: general ASA Status: 3            Anesthesia Type: general    Vitals Value Taken Time   /66 07/26/24 0924   Temp 36.0 07/26/24 0924   Pulse 55 07/26/24 0924   Resp 19 07/26/24 0924   SpO2 100 07/26/24 0924       Anesthesia Post Evaluation    Patient location during evaluation: PACU  Patient participation: complete - patient participated  Level of consciousness: awake  Pain management: adequate  Airway patency: patent  Cardiovascular status: acceptable  Respiratory status: acceptable  Hydration status: acceptable  Postoperative Nausea and Vomiting: none        There were no known notable events for this encounter.

## 2024-07-26 NOTE — ANESTHESIA PREPROCEDURE EVALUATION
Patient: Apoorva Sanchez    Procedure Information       Date/Time: 07/26/24 1328    Procedures:       Debridement Lower Extremity (Right)      Application Skin Graft Lower Extremity (Right) - Possible Skin Substitute Application. Integra Bilayer - LARGE      Wound Vac Application / Change (Right)    Location: [facility] / [facility]    Surgeons: Joe Proctor DPM            Relevant Problems   Cardiac   (+) HLD (hyperlipidemia)   (+) HTN (hypertension)      Endocrine   (+) Hypothyroidism   (+) Type 2 diabetes mellitus (Multi)      HEENT   (+) Unspecified glaucoma     Echo 4/2020: Mild aortic stenosis, mild aortic regurgitation. EF 60%.    Clinical information reviewed:   Tobacco  Allergies  Meds   Med Hx  Surg Hx   Fam Hx  Soc Hx        NPO Detail:  No data recorded     Physical Exam    Airway  Mallampati: II  TM distance: >3 FB  Neck ROM: full     Cardiovascular    Dental    Pulmonary    Abdominal            Anesthesia Plan    History of general anesthesia?: yes  History of complications of general anesthesia?: no    ASA 3     general     intravenous induction   Anesthetic plan and risks discussed with patient.

## 2024-07-26 NOTE — PROGRESS NOTES
Apoorva Sanchez is a 89 y.o. female on day 3 of admission presenting with Cellulitis of right lower extremity.    Subjective   Interval History:   Afebrile, no chills  Denies any pain   Denies any trouble breathing  No nausea vomiting or diarrhea  Had surgery today by podiatry          Objective   Range of Vitals (last 24 hours)  Heart Rate:  [53-65]   Temp:  [36.2 °C (97.2 °F)-36.8 °C (98.3 °F)]   Resp:  [17-20]   BP: (100-166)/(61-94)   SpO2:  [98 %-100 %]   Daily Weight  07/23/24 : 56.8 kg (125 lb 3.5 oz)    Body mass index is 24.46 kg/m².    Physical Exam  Constitutional:       Appearance: Normal appearance.   HENT:      Head: Normocephalic and atraumatic.      Nose: Nose normal.      Mouth/Throat:      Mouth: Mucous membranes are moist.      Pharynx: Oropharynx is clear.   Eyes:      General: No scleral icterus.  Cardiovascular:      Rate and Rhythm: Normal rate and regular rhythm.   Pulmonary:      Effort: Pulmonary effort is normal.      Breath sounds: Normal breath sounds.   Abdominal:      General: Bowel sounds are normal.      Palpations: Abdomen is soft.   Musculoskeletal:         General: Normal range of motion.      Cervical back: Normal range of motion and neck supple.      Right lower leg: Edema present.      Left lower leg: Edema present.   Skin:     General: Skin is warm and dry.      Comments: right leg with NPWTD, wrapping in place  Neurological:      General: No focal deficit present.      Mental Status: She is alert and oriented to person, place, and time. Mental status is at baseline.   Psychiatric:         Mood and Affect: Mood normal.         Behavior: Behavior normal.        Antibiotics  piperacillin-tazobactam - 3.375 gram/50 mL  vancomycin - 1 gram/200 mL    Relevant Results  Labs  Results from last 72 hours   Lab Units 07/24/24  0422 07/23/24  1747   WBC AUTO x10*3/uL 8.5 8.9   HEMOGLOBIN g/dL 9.1* 10.7*   HEMATOCRIT % 28.8* 33.4*   PLATELETS AUTO x10*3/uL 272 312   NEUTROS PCT AUTO %  --  77.6  "  LYMPHS PCT AUTO %  --  13.1   MONOS PCT AUTO %  --  7.3   EOS PCT AUTO %  --  0.9     Results from last 72 hours   Lab Units 07/24/24  0422 07/23/24  1747   SODIUM mmol/L 136 131*   POTASSIUM mmol/L 4.3 4.2   CHLORIDE mmol/L 99 94*   CO2 mmol/L 29 27   BUN mg/dL 11 16   CREATININE mg/dL 0.80 0.80   GLUCOSE mg/dL 120* 199*   CALCIUM mg/dL 8.8 9.3   ANION GAP mmol/L 8 10   EGFR mL/min/1.73m*2 68 68     Results from last 72 hours   Lab Units 07/23/24  1747   ALK PHOS U/L 71   BILIRUBIN TOTAL mg/dL 0.3   PROTEIN TOTAL g/dL 7.1   ALT U/L 10   AST U/L 16   ALBUMIN g/dL 3.9     Estimated Creatinine Clearance: 33.2 mL/min (by C-G formula based on SCr of 0.8 mg/dL).  No results found for: \"CRP\"  Microbiology  Susceptibility data from last 14 days.  Collected Specimen Info Organism   07/24/24 Tissue/Biopsy from Wound/Tissue Pseudomonas aeruginosa     Mixed Gram-Positive and Gram-Negative Bacteria   07/23/24 Tissue/Biopsy from Skin/Superficial Abscess Pseudomonas aeruginosa     Mixed Gram-Positive and Gram-Negative Bacteria       Imaging  Electrocardiogram, 12-lead PRN ACS symptoms    Result Date: 7/25/2024  Poor data quality, interpretation may be adversely affected Normal sinus rhythm Left axis deviation Moderate voltage criteria for LVH, may be normal variant Cannot rule out Septal infarct (cited on or before 24-JUL-2024) Abnormal ECG When compared with ECG of 24-JUL-2024 19:36, (unconfirmed) No significant change was found    ECG 12 Lead    Result Date: 7/25/2024   Poor data quality, interpretation may be adversely affected Normal sinus rhythm Left axis deviation Moderate voltage criteria for LVH, may be normal variant Cannot rule out Septal infarct , age undetermined Abnormal ECG No previous ECGs available    Vascular US ankle brachial index (MEETA) without exercise    Result Date: 7/24/2024  Preliminary Cardiology Report            [facility] [address], [place] [address]             Phone " [phone]  Preliminary Vascular Lab Report   VASC US PVR WITHOUT EXERCISE  Patient Name:     DESTINI PRADHAN    Frank Physician:  [id number] Linda Sotelo MD Study Date:       7/24/2024    Ordering Provider:  [id number] ROMERO PRICE MARIBEL MRN/PID:          [id number]     Fellow: Accession#:       [id number] Technologist:       Leslie Fu RDMS,MAMTA YOB: 1929    Technologist 2: Gender:           F            Encounter#:         [id number] Admission Status: Inpatient    Location Performed: [facility]  Diagnosis/ICD: Type 2 diabetes mellitus (DM) with foot ulcer-E11.621  PRELIMINARY CONCLUSIONS:  Right Lower PVR: Right pressures of >220 mmHg suggest no compressibility of vessels and may make absolute Segmental Limb Pressures (SLP) unreliable. Monophasic flow is noted in the right dorsalis pedis artery. Multiphasic flow is noted in the right posterior tibial artery. Due to a large draining wound and significant errythemia to the right lower extremity, a cuff was not able to be placed over the calf or ankle. Low thigh was used to obtain MEETA. Left Lower PVR: Left pressures of >220 mmHg suggest no compressibility of vessels and may make absolute Segmental Limb Pressures (SLP) unreliable. Multiphasic flow is noted in the left posterior tibial artery and left dorsalis pedis artery. Full PVR was not obtainable due to patients sensitivity to the pressure of the Cuffs. unable to tolerate full compression of the arteries.  Imaging & Doppler Findings:  RIGHT Lower PVR                Pressures Ratios Right Posterior Tibial (Ankle) 255 mmHg  1.41 Right Dorsalis Pedis (Ankle)   255 mmHg  1.41   LEFT Lower PVR                Pressures Ratios Left Posterior Tibial (Ankle) 255 mmHg  1.41 Left Dorsalis Pedis (Ankle)   255 mmHg  1.41                      Right     Left Brachial Pressure 176 mmHg 181 mmHg   VASCULAR PRELIMINARY REPORT completed by Leslie Fu RDMS, AB, RVT, VT on 7/24/2024 at 3:39:46 PM  ** Final **     CT  tibia fibula right w IV contrast    Result Date: 7/23/2024  Interpreted By:  Denny Oliveira, STUDY: CT TIBIA FIBULA RIGHT W IV CONTRAST;  7/23/2024 9:25 pm   INDICATION: Signs/Symptoms:RLE infection r/o abscess, OM.   COMPARISON: Radiographs of the right tibia and fibula 07/22/2024   ACCESSION NUMBER(S): [id number]   ORDERING CLINICIAN: BANDAR RAMOS   TECHNIQUE: CT imaging of the right tibia and fibula was obtained after the intravenous administration of 75 mL Omnipaque 350 contrast. Coronal and sagittal reformatted images were performed.   FINDINGS: OSSEOUS STRUCTURES:   Severe degenerative changes of the right knee with tricompartmental joint space narrowing and large marginal osteophytes. Meniscal calcifications suggesting calcium pyrophosphate deposition arthropathy. Intra-articular ossific bodies in the posterior aspect of the knee. No fracture identified. No definite erosive change or periosteal reaction to suggest osteomyelitis.     SOFT TISSUES:   There is extensive subcutaneous soft tissue edema throughout the lower leg most pronounced within laterally. The deeper anterior an intramuscular fat planes are preserved. No definite peripherally enhancing fluid collection identified. There is a crescentic similar ill-defined high density collection along the anteromedial aspect of the two-view which measures 5.4 x 2.0 x 11.5 cm. The appearance is most suggestive of the hematoma although the mass or infectious collection can not be entirely excluded. Extensive vascular calcifications are noted throughout the leg. No subcutaneous air identified.       1. Ill-defined relatively high density collection along the anteromedial aspect of the lower leg measuring 5.4 x 2.0 x 11.5 cm. The appearance is suggestive of the hematoma with infection or mass not entirely excluded. 2. Diffuse nonspecific subcutaneous soft tissue edema throughout the lower leg most pronounced along the lateral aspect. No definite peripherally  enhancing fluid collection is noted. The deeper fascial planes appear preserved. Correlate for clinical evidence of infection. 3. No osseous erosion to suggest osteomyelitis. If there is ongoing clinical concern then MRI may be performed in further assessment.   MACRO: None   Signed by: Denny Oliveira 7/23/2024 9:44 PM Dictation workstation:   [id number]    Lower extremity venous duplex right    Result Date: 7/23/2024  Interpreted By:  Angelica Aguila, STUDY: [facility] US LOWER EXTREMITY VENOUS DUPLEX RIGHT;  7/23/2024 6:30 pm   INDICATION: Signs/Symptoms:r/o DVT.   COMPARISON: None.   ACCESSION NUMBER(S): [id number]   ORDERING CLINICIAN: AMELIE ABARCA   TECHNIQUE: Grayscale, color and spectral Doppler sonographic images of the right lower extremity deep venous system. The left common femoral vein was imaged for comparison.   FINDINGS: There is normal compressibility of the right common femoral vein, saphenous femoral junction, femoral vein and popliteal vein. The posterior tibial and peroneal veins are suboptimally visualized. There is normal spontaneous and phasic variation throughout the leg by spectral doppler.   The left common femoral vein is patent.   OTHER FINDINGS: None.       Suboptimal visualization of the calf veins. No sonographic evidence of acute DVT in the visualized vessels of the right lower extremity.   MACRO: None   Signed by: Angelica Aguila 7/23/2024 6:44 PM Dictation workstation:   [id number]    XR tibia fibula right 2 views    Result Date: 7/23/2024  Interpreted By:  Hira Lawrence, STUDY: XR TIBIA FIBULA RIGHT 2 VIEWS; ;  7/23/2024 5:39 pm   INDICATION: Signs/Symptoms:wound right anterior tibia.   COMPARISON: None.   ACCESSION NUMBER(S): [id number]   ORDERING CLINICIAN: AMELIE ABARCA   FINDINGS: Right tibia/fibula, two views   Soft tissue edema about the calf with irregularity anteriorly corresponding to known wound. No osseous destruction or erosion seen radiographically. Diffuse  osteopenia present. Severe degenerative change of the knee       No radiographic evidence of osteomyelitis. Soft tissue edema and soft tissue wound at the anterior aspect of the cuff     MACRO: None   Signed by: Hira Lawrence 7/23/2024 6:05 PM Dictation workstation:   [id number]    XR tibia fibula right 2 views    Result Date: 7/1/2024  Interpreted By:  José Yan, STUDY: XR TIBIA FIBULA RIGHT 2 VIEWS;  7/1/2024 2:39 pm   INDICATION: Signs/Symptoms:fall.   COMPARISON: None.   ACCESSION NUMBER(S): [id number]   ORDERING CLINICIAN: ZARIA CONDON   FINDINGS: No acute fracture in the tibia or fibula. Moderate to severe knee osteoarthritis with chondrocalcinosis.       No acute fracture in the tibia or fibula.   MACRO None   Signed by: José Yan 7/1/2024 2:59 PM Dictation workstation:   [id number]    XR hand right 3+ views    Result Date: 7/1/2024  Interpreted By:  José Yan, STUDY: XR HAND RIGHT 3+ VIEWS;  7/1/2024 2:39 pm   INDICATION: Signs/Symptoms:fall.   COMPARISON: None.   ACCESSION NUMBER(S): [id number]   ORDERING CLINICIAN: ZARIA CONDON   FINDINGS: Acute oblique fracture through proximal 1st metacarpal metadiaphysis with minimal displacement. Severe 1st CMC osteoarthritis.       Acute minimally displaced 1st metacarpal base fracture. Severe 1st CMC osteoarthritis.   MACRO None   Signed by: José Yan 7/1/2024 2:57 PM Dictation workstation:   [id number]    CT cervical spine wo IV contrast    Result Date: 7/1/2024  Interpreted By:  José Yan, STUDY: CT CERVICAL SPINE WO IV CONTRAST;  7/1/2024 2:19 pm   INDICATION: Signs/Symptoms:fall.   COMPARISON: C-spine 07/01/2022.   ACCESSION NUMBER(S): [id number]   ORDERING CLINICIAN: ZARIA CONDON   TECHNIQUE: Thin section axial images were obtained from the skull base down through the thoracic inlet. Sagittal and coronal reconstruction images were generated. Soft tissue, lung, and bone windows were reviewed.   FINDINGS:  Prevertebral/Paraspinal Soft Tissues: Stable 2.4 cm heterogeneous left thyroid nodule.   CERVICAL SPINE: Hardware: None. Fracture: None. Vertebral Body Heights: Normal. Alignment: No traumatic listhesis. Spinal canal and neural foramina: Moderate to severe canal stenosis at C3-C4 and C6-C7, not significantly changed. No severe neural foraminal narrowing.       No acute fracture or traumatic malalignment.   Stable 2.3 cm left thyroid nodule. Consider outpatient ultrasound evaluation if not already performed.   Signed by: José Yan 7/1/2024 2:55 PM Dictation workstation:   [id number]    CT head wo IV contrast    Result Date: 7/1/2024  Interpreted By:  José Yan, STUDY: CT HEAD WO IV CONTRAST;  7/1/2024 2:19 pm   INDICATION: Signs/Symptoms:fall.   COMPARISON: CT head 04/01/2020.   ACCESSION NUMBER(S): [id number]   ORDERING CLINICIAN: ZARIA CONDON   TECHNIQUE: Noncontrast axial CT scan of head was performed.   FINDINGS: Parenchyma: No intracranial hemorrhage. The grey-white differentiation is intact. No mass effect or midline shift. Patchy periventricular white matter hypodensities, likely moderate chronic microvascular ischemic change. Severe diffuse parenchymal atrophy.   CSF Spaces: The ventricles, sulci and basal cisterns are within normal limits for age.   Extra-Axial Fluid: No extraaxial fluid collection.   Calvarium: No acute fracture.   Paranasal sinuses: Visualized paranasal sinuses are clear.   Mastoids: Clear.   Orbits: No acute abnormality.   Soft tissues: No acute abnormality.       No acute intracranial hemorrhage or calvarial fracture.   MACRO None   Signed by: José Yan 7/1/2024 2:48 PM Dictation workstation:   [id number]        Assessment/Plan   Type 2 diabetes mellitus   Infected Right leg traumatic wound-culture with pseudomonas-pending  Right leg hematoma  Right leg cellulitis  Peripheral vascular disease   Pyuria-culture negative      IV vancomycin  IV Zosyn  Urine culture  negative  Follow-up pending cultures   Follow-up blood cultures- pending with no growth  Monitor temperature and WBC  Podiatry following-s/p right leg debridement with wound vac application - bone biopsy sent   Local care  Right leg elevation     Total time spent caring for the patient today was 20 minutes. This includes time spent before the visit reviewing the chart, time spent during the visit, and time spent after the visit on documentation        Santa Fonseca, APRN-CNP

## 2024-07-26 NOTE — CARE PLAN
The patient's goals for the shift include  OR    The clinical goals for the shift include Safety, no falls

## 2024-07-26 NOTE — NURSING NOTE
Patient returns from OR with dressing and wound vac to RLE, family at bedside, no c/o voiced,call light in reach.

## 2024-07-26 NOTE — PROGRESS NOTES
Apoorva Sanchez is a 89 y.o. female on day 3 of admission presenting with Cellulitis of right lower extremity.    Subjective   Pt seen and examined.  No documented concerns/events overnight from nursing.  Pt had debridement today in the OR.  She voices no complaints.         Objective     Physical Exam  Constitutional:       General: She is not in acute distress.     Appearance: She is ill-appearing. She is not toxic-appearing.   HENT:      Ears:      Comments: [place]  Cardiovascular:      Rate and Rhythm: Normal rate and regular rhythm.      Pulses: Normal pulses.      Heart sounds: Normal heart sounds.   Pulmonary:      Effort: Pulmonary effort is normal. No respiratory distress.      Breath sounds: Normal breath sounds. No wheezing or rales.   Abdominal:      General: Bowel sounds are normal.      Palpations: Abdomen is soft.   Musculoskeletal:         General: Normal range of motion.      Cervical back: Normal range of motion.      Right lower leg: Edema present.      Left lower leg: Edema present.   Skin:     General: Skin is warm and dry.      Capillary Refill: BLE     Findings: Erythema (RLE) and wound present.      Comments: Drsg to RLE dry and intact with wound vac  LLE brown discoloration   Neurological:      Mental Status: She is alert and oriented to person, place, and time.   Psychiatric:         Mood and Affect: Mood normal.   Last Recorded Vitals  Blood pressure 165/72, pulse 53, temperature 36.2 °C (97.2 °F), temperature source Temporal, resp. rate 17, height 1.524 m (5'), weight 56.8 kg (125 lb 3.5 oz), SpO2 100%.  Intake/Output last 3 Shifts:  I/O last 3 completed shifts:  In: 50 (0.9 mL/kg) [IV Piggyback:50]  Out: - (0 mL/kg)   Weight: 56.8 kg     Relevant Results    Scheduled medications  atenolol, 50 mg, oral, Daily  brimonidine, 1 drop, ophthalmic (eye), BID  cloNIDine, 0.2 mg, oral, Nightly  furosemide, 40 mg, oral, Daily  glimepiride, 2 mg, oral, BID  heparin, 5,000 Units, subcutaneous, q12h  AMY  insulin lispro, 0-5 Units, subcutaneous, TID  latanoprost, 1 drop, Both Eyes, q24h  levothyroxine, 100 mcg, oral, Daily before breakfast  piperacillin-tazobactam, 3.375 g, intravenous, q6h  simvastatin, 40 mg, oral, Nightly  timolol, 1 drop, Both Eyes, BID  vancomycin, 1,000 mg, intravenous, q24h      Continuous medications     PRN medications  PRN medications: acetaminophen, dextrose, dextrose, glucagon, glucagon, oxyCODONE, vancomycin                                Assessment/Plan   Principal Problem:    Cellulitis of right lower extremity  Active Problems:    Wound of right leg    Ulcer of right leg, with necrosis of muscle (Multi)    Right lower leg traumatic ulceration  -Dressing change twice daily, Xeroform, gauze, loose Kerlix, Ace wrap per podiatry recommendations  -IV Vanc/Zosyn  -CT suggestive of hematoma with infection, mass not entirely excluded  -Wound cx growing Pseudomonas  -Blood cx no growth to date  -Vascular surgery following  -Podiatry following, debridement recommended  -ID following  -Cardiology cleared pt for OR     UTI  -Urine culture no growth     DM  -Hold Metformin post IV contrast  -Continue Glimepiride  -Insulin SS  -Monitor blood glucose  -A1C 7.1      Hypertension  -Continue home meds     Hypothyroidism  -Continue home meds     PVD/venous stasis  -Vascular        Plan  Above plan discussed with patient and she is in agreement.          ALICIA Sheldon-CNP

## 2024-07-26 NOTE — CARE PLAN
The patient's goals for the shift include      The clinical goals for the shift include Safety, no falls      Problem: Diabetes  Goal: Achieve decreasing blood glucose levels by end of shift  Outcome: Progressing  Goal: Increase stability of blood glucose readings by end of shift  Outcome: Progressing  Goal: Learn about and adhere to nutrition recommendations by end of shift  Outcome: Progressing  Goal: Vital signs within normal range for age by end of shift  Outcome: Progressing  Goal: Increase self care and/or family involovement by end of shift  Outcome: Progressing  Goal: Receive DSME education by end of shift  Outcome: Progressing     Problem: Skin  Goal: Decreased wound size/increased tissue granulation at next dressing change  Outcome: Progressing  Goal: Prevent/manage excess moisture  Outcome: Progressing  Goal: Prevent/minimize sheer/friction injuries  Outcome: Progressing  Goal: Promote/optimize nutrition  Outcome: Progressing  Goal: Promote skin healing  Outcome: Progressing     Problem: Fall/Injury  Goal: Verbalize understanding of risk factor reduction measures to prevent injury from fall in the home  Outcome: Progressing  Goal: Pace activities to prevent fatigue by end of the shift  Outcome: Progressing     Problem: Skin  Goal: Participates in plan/prevention/treatment measures  Outcome: Met

## 2024-07-26 NOTE — INTERVAL H&P NOTE
H&P reviewed. The patient was examined and there are no changes to the H&P.  Patient has been cleared from Cardiology for Surgical Intervention.    Joe Proctor DPM

## 2024-07-26 NOTE — NURSING NOTE
Patient resting in bed, currently NPO for RLE debridement at 115pm today, no c/o voiced,call light in reach.

## 2024-07-26 NOTE — PROGRESS NOTES
"Subjective Data:  Patient is doing good.  Nuys having chest pain.  No shortness of breath    Overnight Events:    Telemetry overnight reviewed no events     Objective Data:  Last Recorded Vitals:  Vitals:    07/25/24 1204 07/25/24 1546 07/25/24 2329 07/26/24 0744   BP:  136/61 100/79 143/65   BP Location:  Left arm Left arm Left arm   Patient Position:  Sitting Lying Lying   Pulse: 63 62 59 61   Resp:  17 17 17   Temp:  36.8 °C (98.2 °F) 36.8 °C (98.3 °F) 36.7 °C (98.1 °F)   TempSrc:  Oral Oral Oral   SpO2: 98% 100% 100% 98%   Weight:       Height:           Last Labs:  CBC - 7/24/2024:  4:22 AM  8.5 9.1 272    28.8      CMP - 7/24/2024:  4:22 AM  8.8 7.1 16 --- 0.3   _ 3.9 10 71      PTT - No results in last year.  _   _ _     HGBA1C   Date/Time Value Ref Range Status   07/24/2024 12:02 AM 7.1 See below % Final   03/05/2024 11:22 AM 6.6 See below % Final     LDLCALC   Date/Time Value Ref Range Status   03/06/2023 11:29 AM 32 65 - 130 MG/DL Final   07/01/2022 04:48 PM 29 65 - 130 MG/DL Final   05/02/2022 11:07 AM 35 65 - 130 MG/DL Final      Last I/O:  I/O last 3 completed shifts:  In: 50 (0.9 mL/kg) [IV Piggyback:50]  Out: - (0 mL/kg)   Weight: 56.8 kg     Past Cardiology Tests (Last 3 Years):  EKG:  ECG 12 Lead 07/24/2024 (Preliminary)      Electrocardiogram, 12-lead PRN ACS symptoms 07/24/2024 (Preliminary)    Echo:  No results found for this or any previous visit from the past 1095 days.    Ejection Fractions:  No results found for: \"EF\"  Cath:  No results found for this or any previous visit from the past 1095 days.    Stress Test:  No results found for this or any previous visit from the past 1095 days.    Cardiac Imaging:  No results found for this or any previous visit from the past 1095 days.      Inpatient Medications:  Scheduled medications   Medication Dose Route Frequency    atenolol  50 mg oral Daily    brimonidine  1 drop ophthalmic (eye) BID    cloNIDine  0.2 mg oral Nightly    furosemide  40 mg oral " Daily    glimepiride  2 mg oral BID    heparin  5,000 Units subcutaneous q12h AMY    insulin lispro  0-5 Units subcutaneous TID    latanoprost  1 drop Both Eyes q24h    levothyroxine  100 mcg oral Daily before breakfast    piperacillin-tazobactam  3.375 g intravenous q6h    simvastatin  40 mg oral Nightly    timolol  1 drop Both Eyes BID    vancomycin  1,000 mg intravenous q24h     PRN medications   Medication    acetaminophen    dextrose    dextrose    glucagon    glucagon    vancomycin     Continuous Medications   Medication Dose Last Rate       Physical Exam:  General: Patient is in no acute distress.  Hard of hearing  HEENT: atraumatic normocephalic.  Neck: is supple jugular venous pressure within normal limits no thyromegaly.  Cardiovascular regular rate and rhythm normal heart sounds no murmurs rubs or gallops.  Lungs: clear to auscultation bilaterally.  Abdomen: is soft nontender.  Extremities warm to touch no edema.  Right lower extremity covered with dressing and compression Ace wrap.        Assessment/Plan  #1 preop for right lower extremity debridement and skin graft.  Patient with no prior cardiac history.  She may have a history of heart failure diastolic dysfunction.  She is on diuretics at home.  Last echo 2020 showed normal ejection fraction no major valve abnormalities.  From cardiac standpoint since patient has no active cardiac symptoms and no prior cardiac history she will be at low risk overall for surgery with general anesthesia.  Probably her advanced age highest risk but still do not see any indication for stress test or an echocardiogram.  EKG showed normal sinus rhythm nonspecific ST-T abnormalities.  Okay to proceed with surgery     2.  Hypertension.  Patient with history of hypertension.  She is on regimen of atenolol 100 mg oral daily and addition of clonidine point 4 mg at bedtime she is very high dose.  I will decrease atenolol to 50 mg oral daily.  We will decrease clonidine to 0.2 mg  at bedtime.  Will monitor blood pressure and heart rate.  If her blood pressure increases put her on small dose hydrochlorothiazide     3.  Hyperlipidemia continue simvastatin.     I will sign off.  Please call with any question.  Peripheral IV 07/23/24 20 G Right Forearm (Active)   Site Assessment Clean;Dry;Intact 07/25/24 2100   Dressing Status Clean;Dry 07/25/24 2100   Number of days: 3       Code Status:  Full Code        Clary Ward MD

## 2024-07-26 NOTE — OP NOTE
Debridement Lower Extremity (R), Application Skin Graft Lower Extremity (R), Wound Vac Application / Change (R) Operative Note     Date: 2024  OR Location: [facility]    Name: Apoorva Sanchez : 1929, Age: 89 y.o., MRN: [id number], Sex: female    Diagnosis  Pre-op Diagnosis      * Cellulitis of right lower extremity [L03.115]     * Wound of right lower extremity, initial encounter [S81.801A]     * Ulcer of right leg, with necrosis of muscle (Multi) [L97.913] Post-op Diagnosis     * Cellulitis of right lower extremity [L03.115]     * Wound of right lower extremity, initial encounter [S81.801A]     * Ulcer of right leg, with necrosis of muscle (Multi) [L97.913]     Procedures  1.  Right leg hematoma evacuation and skin excision, to the level of the tibia bone CPT 42065  2.  Right leg tibia bone biopsy CPT 04393  3.  Right leg skin graft substitute application 60 cm²-CPT 87955 and CPT 66690  4.  Right leg wound VAC application CPT 12429      Surgeons      * Joe Proctor - Primary    Resident/Fellow/Other Assistant:  Surgeons and Role:  * No surgeons found with a matching role *    Procedure Summary  Anesthesia: General  ASA: III  Anesthesia Staff: Anesthesiologist: Osvaldo Dang MD  Estimated Blood Loss: 40 mL  Intra-op Medications:   Administrations occurring from 0815 to 0910 on 24:   Medication Name Total Dose   BUPivacaine HCl (Marcaine) 0.5 % (5 mg/mL) injection 20 mL   atenolol (Tenormin) tablet 50 mg Cannot be calculated   brimonidine (AlphaGAN) 0.2 % ophthalmic solution 1 drop Cannot be calculated   heparin (porcine) injection 5,000 Units Cannot be calculated   timolol (Timoptic) 0.5 % ophthalmic solution 1 drop Cannot be calculated              Anesthesia Record               Intraprocedure I/O Totals          Intake    LR infusion 600.00 mL    Total Intake 600 mL       Output    Est. Blood Loss 10 mL    Total Output 10 mL       Net    Net Volume 590 mL          Specimen:   ID Type Source  Tests Collected by Time   1 : bone biopsy right tibia Tissue BONE BIOPSY (NO DECAL) SURGICAL PATHOLOGY EXAM Joe PRICE Hitesh, MERON 2024 0906        Staff:   Circulator: Cheko Alberto Person: Kiley         Drains and/or Catheters: * None in log *    Tourniquet Times:   * Missing tourniquet times found for documented tourniquets in lo *     Implants:  Implants       Type Name Action Serial No.      Graft WOUND MATRIX, INTEGRA, MESHED BILAYER, 4 X 5IN, 1 SHEET - [id number] Implanted               Findings: Large hematoma measuring 10 x 6 cm and extending to the tibia medial face.  Necrotic overlying skin which was excised.  No obvious evidence of infection.    Indications: Apoorva Sanchez is an 89 y.o. female who is having surgery for Cellulitis of right lower extremity [L03.115]  Wound of right lower extremity, initial encounter [S81.801A]  Ulcer of right leg, with necrosis of muscle (Multi) [L97.913].     This is a 89-year-old female patient of mine.  She sustained a mechanical fall 1 month ago.  She developed some bruising over her right leg.  She was managed at urgent care and by her PCP.  She was given some wound care instructions.  She was also advised to follow-up with the wound care center which she was unable to do.  She presented to [facility] for evaluation which is when I met her.  She had significant cellulitis surrounding a large wound and necrotic tissue on her right lower leg.  CT was obtained which demonstrated likely a large hematoma underlying the entirety of the large wound.  I recommended surgical debridement and have been discussing with the patient as well as her daughter Renata.  I did consult cardiology for their evaluation given patient's age and the did feel patient is safe for surgery.  Internal medicine, infectious disease, vascular surgery have also been consulted and have given their input.  I discussed debridement, skin graft substitute, and wound VAC with  patient and she is amenable.  Consent was signed and placed in the patient's chart.  No guarantees were made or given.      Procedure Details: Patient was seen in the PACU holding area.  Consent was signed and placed in patient's chart.  Patient was brought back from PACU to the OR and placed on the OR table and secured supine position.  The above-mentioned anesthesia was administered per the anesthesia team.  No tourniquet was applied.  Patient was first cleansed with an alcohol wash.  I then performed a sterile Betadine prep.  The patient was prepped and draped in the normal aseptic sterile technique.  A timeout was performed and all were in agreement according to patient identifiers laterality and surgical site.    Attention was drawn to the right lower leg.    Procedure #1  1.  Right leg hematoma evacuation and skin excision, to the level of the tibia bone CPT 37777    I utilized a curette to begin debriding the superficial aspect of the wound.  However very quickly the curette fell through into a large hematoma cavity.  I evacuated at least 40-50 cc if not more of grape jelly like thick hematoma.  There was no significant malodor and no purulence.  Unfortunately all the skin overlying this hematoma was incredibly thick necrosis and I did not feel this was viable.  I excised this skin.  I removed the remainder of the hematoma and did note that this was deep to the tibia bone.  I was able to clearly visualize the entire medial face of the tibia as well as the anterior and posterior muscle compartments.  This was thoroughly irrigated with 3 L of sterile saline spiked with 2 g of vancomycin.  I used a curette to remove any residual nonviable tissue.  Again this did not appear frankly infected.    Procedure #2  2.  Right leg tibia bone biopsy CPT 76646    Because of the depth of the hematoma down to bone and the evidence of cellulitis throughout the leg I did make the determination to procure right leg tibia bone  biopsies.  I utilized a Jamshidi needle to obtain tibia bone biopsy of the medial face of the tibia.  2 attempts were made however her cortex was very thin and the end biopsy was very minimal.  However I did not feel the need to continue attempting to obtain a biopsy and did not want to risk iatrogenic injury or fracture.  The specimen was sent for pathology evaluation to ensure no tibial osteomyelitis.    Procedure #3  3.  Right leg skin graft substitute application 60 cm²-CPT 73900 and CPT 60849    I then assessed this large deficit.  It appeared healthy at this point.  No sign of infection.  I cleansed this area 1 more time with saline.  Hemostasis was obtained with manual compression and electrocautery.  After appropriate hemostasis was obtained I did make the determination to utilize a split thickness skin graft substitute Integra meshed bilayer graft.  The wound measured 10 cm x 6 cm with a depth of 1 centimeter down to bone muscle and fascia.  Total 60 cm².  The graft site was clean and healthy.  I then applied the Integra meshed bilayer graft to the wound and secured with staples.    Procedure #4  4.  Right leg wound VAC application CPT 78316    I then applied Adaptic overlying the skin substitute graft.  I utilized a silver granular foam.  I applied Tegaderm overlying the Silver granular foam which was cut to size to fit the wound area.  After obtaining a appropriate seal with the Tegaderm we hooked this up to a wound VAC set at 125 mmHg continuous.  There was an appropriate seal here.      I then applied a light bandage over the wound VAC of ABDs Curlex and Ace wrap.    Patient tolerated the procedure well and tolerated anesthesia well.  There was no intraoperative or immediate postoperative complication with anesthesia or the surgery.      Prognosis: Patient now has a large right lower leg wound measuring 60 cm².  Skin substitute and wound VAC have been placed.  Will discharge with wound VAC.  She needs  discharge home with home health care with wound VAC changes 3 times per week.  She does have help at home with her son and daughter so I feel this would be appropriate.  Anticipate wound VAC for 3 weeks.  Given my operative findings I feel p.o. antibiotics would be appropriate at discharge.  Would recommend she remain in-house until home health care is secured.      Complications:  None; patient tolerated the procedure well.    Disposition: PACU - hemodynamically stable.  Condition: stable         Additional Details: None    Attending Attestation: I performed the procedure.    Joe Proctor  Phone Number: [phone]

## 2024-07-26 NOTE — PROGRESS NOTES
07/26/24 1706   Discharge Planning   Living Arrangements Alone   Support Systems Friends/neighbors;Children   Type of Residence Private residence   Number of Stairs to Enter Residence 1   Number of Stairs Within Residence 13   Do you have animals or pets at home? No   Who is requesting discharge planning? Provider   Home or Post Acute Services In home services   Type of Home Care Services Safety alert;Other (Comment)  (wound vac)   Expected Discharge Disposition HH Services     Per Podiatry note:   -Will discharge with wound VAC.   -She needs discharge home with home health care with wound VAC changes 3 times per week. -Anticipate wound VAC for 3 weeks.   -Given my operative findings,  p.o. antibiotics would be appropriate at discharge.   -has the support of son and daughter.   -Spoke with daughter and is open to having patient stay with her or daughter stay with patient if needing more supervision.   -Referral was placed to [facility]: Home with Home Health Care. Will need a Wound Vac set up. Referral was made to [facility].

## 2024-07-27 ENCOUNTER — APPOINTMENT (OUTPATIENT)
Dept: CARDIOLOGY | Facility: HOSPITAL | Age: 89
DRG: 573 | End: 2024-07-27
Payer: MEDICARE

## 2024-07-27 LAB
ANION GAP SERPL CALC-SCNC: 8 MMOL/L
B-LACTAMASE ORGANISM ISLT: POSITIVE
B-LACTAMASE ORGANISM ISLT: POSITIVE
BACTERIA BLD CULT: NORMAL
BACTERIA BLD CULT: NORMAL
BACTERIA SPEC CULT: ABNORMAL
BUN SERPL-MCNC: 12 MG/DL (ref 8–25)
CALCIUM SERPL-MCNC: 8.7 MG/DL (ref 8.5–10.4)
CHLORIDE SERPL-SCNC: 98 MMOL/L (ref 97–107)
CO2 SERPL-SCNC: 29 MMOL/L (ref 24–31)
CREAT SERPL-MCNC: 0.8 MG/DL (ref 0.4–1.6)
EGFRCR SERPLBLD CKD-EPI 2021: 68 ML/MIN/1.73M*2
ERYTHROCYTE [DISTWIDTH] IN BLOOD BY AUTOMATED COUNT: 13.9 % (ref 11.5–14.5)
GLUCOSE BLD MANUAL STRIP-MCNC: 106 MG/DL (ref 74–99)
GLUCOSE BLD MANUAL STRIP-MCNC: 158 MG/DL (ref 74–99)
GLUCOSE BLD MANUAL STRIP-MCNC: 229 MG/DL (ref 74–99)
GLUCOSE BLD MANUAL STRIP-MCNC: 255 MG/DL (ref 74–99)
GLUCOSE SERPL-MCNC: 164 MG/DL (ref 65–99)
GRAM STN SPEC: ABNORMAL
HCT VFR BLD AUTO: 28.5 % (ref 36–46)
HGB BLD-MCNC: 9.1 G/DL (ref 12–16)
MCH RBC QN AUTO: 28.3 PG (ref 26–34)
MCHC RBC AUTO-ENTMCNC: 31.9 G/DL (ref 32–36)
MCV RBC AUTO: 89 FL (ref 80–100)
NRBC BLD-RTO: 0 /100 WBCS (ref 0–0)
PLATELET # BLD AUTO: 257 X10*3/UL (ref 150–450)
POTASSIUM SERPL-SCNC: 3.7 MMOL/L (ref 3.4–5.1)
RBC # BLD AUTO: 3.21 X10*6/UL (ref 4–5.2)
SODIUM SERPL-SCNC: 135 MMOL/L (ref 133–145)
WBC # BLD AUTO: 12.2 X10*3/UL (ref 4.4–11.3)

## 2024-07-27 PROCEDURE — 2500000004 HC RX 250 GENERAL PHARMACY W/ HCPCS (ALT 636 FOR OP/ED): Performed by: STUDENT IN AN ORGANIZED HEALTH CARE EDUCATION/TRAINING PROGRAM

## 2024-07-27 PROCEDURE — 36569 INSJ PICC 5 YR+ W/O IMAGING: CPT

## 2024-07-27 PROCEDURE — 2500000001 HC RX 250 WO HCPCS SELF ADMINISTERED DRUGS (ALT 637 FOR MEDICARE OP): Performed by: STUDENT IN AN ORGANIZED HEALTH CARE EDUCATION/TRAINING PROGRAM

## 2024-07-27 PROCEDURE — 80048 BASIC METABOLIC PNL TOTAL CA: CPT | Performed by: NURSE PRACTITIONER

## 2024-07-27 PROCEDURE — 36415 COLL VENOUS BLD VENIPUNCTURE: CPT | Performed by: NURSE PRACTITIONER

## 2024-07-27 PROCEDURE — 1100000001 HC PRIVATE ROOM DAILY

## 2024-07-27 PROCEDURE — C1751 CATH, INF, PER/CENT/MIDLINE: HCPCS

## 2024-07-27 PROCEDURE — 2780000003 HC OR 278 NO HCPCS

## 2024-07-27 PROCEDURE — 2500000002 HC RX 250 W HCPCS SELF ADMINISTERED DRUGS (ALT 637 FOR MEDICARE OP, ALT 636 FOR OP/ED): Performed by: STUDENT IN AN ORGANIZED HEALTH CARE EDUCATION/TRAINING PROGRAM

## 2024-07-27 PROCEDURE — 82947 ASSAY GLUCOSE BLOOD QUANT: CPT

## 2024-07-27 PROCEDURE — 85027 COMPLETE CBC AUTOMATED: CPT | Performed by: NURSE PRACTITIONER

## 2024-07-27 RX ORDER — LIDOCAINE HYDROCHLORIDE 10 MG/ML
5 INJECTION, SOLUTION INFILTRATION; PERINEURAL ONCE
Status: DISCONTINUED | OUTPATIENT
Start: 2024-07-27 | End: 2024-07-29 | Stop reason: HOSPADM

## 2024-07-27 RX ADMIN — ACETAMINOPHEN 650 MG: 325 TABLET ORAL at 20:52

## 2024-07-27 RX ADMIN — PIPERACILLIN SODIUM AND TAZOBACTAM SODIUM 3.38 G: 3; .375 INJECTION, SOLUTION INTRAVENOUS at 12:24

## 2024-07-27 RX ADMIN — LATANOPROST 1 DROP: 50 SOLUTION OPHTHALMIC at 20:48

## 2024-07-27 RX ADMIN — LEVOTHYROXINE SODIUM 100 MCG: 0.1 TABLET ORAL at 05:15

## 2024-07-27 RX ADMIN — BRIMONIDINE TARTRATE 1 DROP: 2 SOLUTION/ DROPS OPHTHALMIC at 09:00

## 2024-07-27 RX ADMIN — CLONIDINE HYDROCHLORIDE 0.2 MG: 0.2 TABLET ORAL at 20:47

## 2024-07-27 RX ADMIN — PIPERACILLIN SODIUM AND TAZOBACTAM SODIUM 3.38 G: 3; .375 INJECTION, SOLUTION INTRAVENOUS at 23:47

## 2024-07-27 RX ADMIN — INSULIN LISPRO 2 UNITS: 100 INJECTION, SOLUTION INTRAVENOUS; SUBCUTANEOUS at 17:28

## 2024-07-27 RX ADMIN — FUROSEMIDE 40 MG: 40 TABLET ORAL at 08:46

## 2024-07-27 RX ADMIN — HEPARIN SODIUM 5000 UNITS: 5000 INJECTION, SOLUTION INTRAVENOUS; SUBCUTANEOUS at 20:48

## 2024-07-27 RX ADMIN — TIMOLOL MALEATE 1 DROP: 5 SOLUTION/ DROPS OPHTHALMIC at 20:48

## 2024-07-27 RX ADMIN — GLIMEPIRIDE 2 MG: 2 TABLET ORAL at 17:31

## 2024-07-27 RX ADMIN — BRIMONIDINE TARTRATE 1 DROP: 2 SOLUTION/ DROPS OPHTHALMIC at 20:47

## 2024-07-27 RX ADMIN — TIMOLOL MALEATE 1 DROP: 5 SOLUTION/ DROPS OPHTHALMIC at 09:00

## 2024-07-27 RX ADMIN — GLIMEPIRIDE 2 MG: 2 TABLET ORAL at 08:46

## 2024-07-27 RX ADMIN — HEPARIN SODIUM 5000 UNITS: 5000 INJECTION, SOLUTION INTRAVENOUS; SUBCUTANEOUS at 08:46

## 2024-07-27 RX ADMIN — SIMVASTATIN 40 MG: 40 TABLET, FILM COATED ORAL at 20:47

## 2024-07-27 RX ADMIN — PIPERACILLIN SODIUM AND TAZOBACTAM SODIUM 3.38 G: 3; .375 INJECTION, SOLUTION INTRAVENOUS at 05:15

## 2024-07-27 RX ADMIN — PIPERACILLIN SODIUM AND TAZOBACTAM SODIUM 3.38 G: 3; .375 INJECTION, SOLUTION INTRAVENOUS at 17:58

## 2024-07-27 RX ADMIN — ATENOLOL 50 MG: 50 TABLET ORAL at 08:46

## 2024-07-27 ASSESSMENT — COGNITIVE AND FUNCTIONAL STATUS - GENERAL
MOBILITY SCORE: 18
DRESSING REGULAR LOWER BODY CLOTHING: A LITTLE
DRESSING REGULAR UPPER BODY CLOTHING: A LITTLE
HELP NEEDED FOR BATHING: A LITTLE
PERSONAL GROOMING: A LITTLE
CLIMB 3 TO 5 STEPS WITH RAILING: A LOT
DAILY ACTIVITIY SCORE: 19
TOILETING: A LITTLE
TURNING FROM BACK TO SIDE WHILE IN FLAT BAD: A LITTLE
MOVING TO AND FROM BED TO CHAIR: A LITTLE
WALKING IN HOSPITAL ROOM: A LITTLE
STANDING UP FROM CHAIR USING ARMS: A LITTLE

## 2024-07-27 ASSESSMENT — PAIN SCALES - GENERAL
PAINLEVEL_OUTOF10: 0 - NO PAIN
PAINLEVEL_OUTOF10: 0 - NO PAIN
PAINLEVEL_OUTOF10: 3

## 2024-07-27 ASSESSMENT — PAIN - FUNCTIONAL ASSESSMENT
PAIN_FUNCTIONAL_ASSESSMENT: 0-10
PAIN_FUNCTIONAL_ASSESSMENT: 0-10

## 2024-07-27 ASSESSMENT — PAIN DESCRIPTION - LOCATION: LOCATION: LEG

## 2024-07-27 ASSESSMENT — PAIN DESCRIPTION - ORIENTATION: ORIENTATION: RIGHT

## 2024-07-27 NOTE — PROGRESS NOTES
Apoorva Sanchez is a 89 y.o. female on day 4 of admission presenting with Cellulitis of right lower extremity.    Subjective   POD 1 from right leg hematoma evacuation and skin excision, tibia bone biopsy, skin graft substitute application, wound VAC application    She says she is feeling well.  Denies any pain to the right leg.  Denies discomfort with the wound VAC.  Has had no complications with wound VAC since it was applied.  Denies nausea vomiting fever chills.       Physical Exam    Objective     REVIEW OF SYSTEMS  GENERAL:  Negative for malaise, significant weight loss, fever  HEENT:  No changes in hearing or vision, no nose bleeds or other nasal problems and Negative for frequent or significant headaches  NECK:  Negative for lumps, goiter, pain and significant neck swelling  RESPIRATORY:  Negative for cough, wheezing and shortness of breath  CARDIOVASCULAR:  Negative for chest pain, leg swelling and palpitations  GI:  Negative for abdominal discomfort, blood in stools or black stools and change in bowel habits  :  Negative for dysuria, frequency and incontinence  MUSCULOSKELETAL:  Negative for joint pain or swelling, back pain, and muscle pain.  SKIN: Positive for large wound right lower extremity  PSYCH:  Negative for sleep disturbance, mood disorder and recent psychosocial stressors  HEMATOLOGY/LYMPHOLOGY:  Negative for prolonged bleeding, bruising easily, and swollen nodes.  ENDOCRINE:  Negative for cold or heat intolerance, polyuria, polydipsia and goiter  NEURO: Negative, denies any burning, tingling or numbness     Objective:   Wound VAC intact and operational.  Appropriate seal.  Silver GranuFoam.  125 mmHg continuous.  There is approximately 100 mL of sanguinous drainage within the canister of the wound VAC.    Last Recorded Vitals  Blood pressure 147/58, pulse 70, temperature 36.8 °C (98.2 °F), temperature source Oral, resp. rate 17, height 1.524 m (5'), weight 56.8 kg (125 lb 3.5 oz), SpO2  99%.    Intake/Output last 3 Shifts:  I/O last 3 completed shifts:  In: 2040 (35.9 mL/kg) [P.O.:440; I.V.:600 (10.6 mL/kg); IV Piggyback:1000]  Out: 1160 (20.4 mL/kg) [Urine:1150 (0.6 mL/kg/hr); Blood:10]  Weight: 56.8 kg     Relevant Results       Mild leukocytosis this morning  Intraoperative tibia bone pathology: Pending  Wound culture 7/24 and 7/23: Pseudomonas    Assessment/Plan   Principal Problem:    Cellulitis of right lower extremity  Active Problems:    Wound of right leg    Ulcer of right leg, with necrosis of muscle (Multi)    1.  Right lower leg traumatic ulceration  2.  Right lower leg hematoma  3.  Right lower leg cellulitis  4.  Right leg venous stasis  5.  Right leg peripheral vascular disease    POD 1 s/p right leg hematoma and skin excision, tibia bone biopsy, skin substitute application, wound VAC application    -Wound VAC left intact.  Operational.  Appropriate seal.  -I will personally change wound VAC tomorrow.  -Agree with de-escalation of antibiotics per ID recommendation.  Zosyn is appropriate given her cultures growing Pseudomonas.  -Patient may WBAT.    I will change wound VAC tomorrow.  Patient needs to stay in-house until home health care is arranged for her to obtain wound VAC changes 3 times weekly by home health care nursing.  She will go home with IV Zosyn 2 weeks per ID.  I am okay with with her discharge as soon as home health care is secured.       Joe Proctor DPM

## 2024-07-27 NOTE — PROGRESS NOTES
Vancomycin Dosing by Pharmacy- Cessation of Therapy    Consult to pharmacy for vancomycin dosing has been discontinued by the prescriber, pharmacy will sign off at this time.    Please call pharmacy if there are further questions or re-enter a consult if vancomycin is resumed.     CARMEN MONCADA, PharmD

## 2024-07-27 NOTE — PROGRESS NOTES
Vancomycin Dosing by Pharmacy- FOLLOW UP    Apoorva Sanchez is a 89 y.o. year old female who Pharmacy has been consulted for vancomycin dosing for cellulitis, skin and soft tissue. Based on the patient's indication and renal status this patient is being dosed based on a goal AUC of 400-600.     Renal function is currently stable.    Current vancomycin dose: 1000 mg given every 24 hours    Estimated vancomycin AUC on current dose: 540 mg/L.hr     Visit Vitals  /78 (BP Location: Left arm, Patient Position: Lying)   Pulse 75   Temp 36.5 °C (97.7 °F) (Oral)   Resp 18        Lab Results   Component Value Date    CREATININE 0.80 2024    CREATININE 0.80 2024    CREATININE 0.80 2024    CREATININE 0.80 2024        Patient weight is as follows:   Vitals:    24   Weight: 56.8 kg (125 lb 3.5 oz)       Cultures:  Susceptibility data for the encounter in last 14 days.  Collected Specimen Info Organism   24 Tissue/Biopsy from Wound/Tissue Pseudomonas aeruginosa     Mixed Anaerobic Bacteria     Mixed Gram-Positive and Gram-Negative Bacteria   24 Tissue/Biopsy from Skin/Superficial Abscess Pseudomonas aeruginosa     Mixed Anaerobic Bacteria     Mixed Gram-Positive and Gram-Negative Bacteria        I/O last 3 completed shifts:  In: 2040 (35.9 mL/kg) [P.O.:440; I.V.:600 (10.6 mL/kg); IV Piggyback:1000]  Out: 1160 (20.4 mL/kg) [Urine:1150 (0.6 mL/kg/hr); Blood:10]  Weight: 56.8 kg   I/O during current shift:  No intake/output data recorded.    Temp (24hrs), Av.3 °C (97.3 °F), Min:36.2 °C (97.2 °F), Max:36.5 °C (97.7 °F)      Assessment/Plan    Within goal AUC range. Continue current vancomycin regimen.    This dosing regimen is predicted by [employer] to result in the following pharmacokinetic parameters:    Loading dose: N/A  Regimen: 1000 mg IV every 24 hours.  Start time: 21:36 on 2024  Exposure target: AUC24 (range)400-600 mg/L.hr   AUC24,ss: 540 mg/L.hr  Probability of AUC24  > 400: 88 %  Ctrough,ss: 17.9 mg/L  Probability of Ctrough,ss > 20: 37 %  Probability of nephrotoxicity (Lodise ANTHONY 2009): 14 %      The next level will be obtained on 7/31/24 at AM labs. May be obtained sooner if clinically indicated.   Will continue to monitor renal function daily while on vancomycin and order serum creatinine at least every 48 hours if not already ordered.  Follow for continued vancomycin needs, clinical response, and signs/symptoms of toxicity.       Moshe Edmonds, PharmD

## 2024-07-27 NOTE — CARE PLAN
Problem: Skin  Goal: Promote/optimize nutrition  Outcome: Progressing  Flowsheets (Taken 7/26/2024 2227)  Promote/optimize nutrition:   Monitor/record intake including meals   Consume > 50% meals/supplements  Goal: Promote skin healing  Outcome: Progressing  Flowsheets (Taken 7/26/2024 2227)  Promote skin healing:   Assess skin/pad under line(s)/device(s)   Protective dressings over bony prominences   The patient's goals for the shift include      The clinical goals for the shift include VSS

## 2024-07-27 NOTE — PROGRESS NOTES
Apoorva Sanchez is a 89 y.o. female on day 4 of admission presenting with Cellulitis of right lower extremity.    Subjective   Interval History:   Afebrile, no chills  Denies any pain   Denies shortness of breath or chest pain  No nausea vomiting or diarrhea            Objective   Range of Vitals (last 24 hours)  Heart Rate:  [65-79]   Temp:  [36.3 °C (97.3 °F)-36.5 °C (97.7 °F)]   Resp:  [18]   BP: (130-171)/(60-83)   SpO2:  [92 %-94 %]   Daily Weight  07/23/24 : 56.8 kg (125 lb 3.5 oz)    Body mass index is 24.46 kg/m².    Physical Exam  Constitutional:       Appearance: Normal appearance.   HENT:      Head: Normocephalic and atraumatic.      Nose: Nose normal.      Mouth/Throat:      Mouth: Mucous membranes are moist.      Pharynx: Oropharynx is clear.   Eyes:      General: No scleral icterus.  Cardiovascular:      Rate and Rhythm: Normal rate and regular rhythm.   Pulmonary:      Effort: Pulmonary effort is normal.      Breath sounds: Normal breath sounds.   Abdominal:      General: Bowel sounds are normal.      Palpations: Abdomen is soft.   Musculoskeletal:         General: Normal range of motion.      Cervical back: Normal range of motion and neck supple.      Right lower leg: Edema present.      Left lower leg: Edema present.   Skin:     General: Skin is warm and dry.      Comments: right leg with NPWTD, wrapping in place  Neurological:      General: No focal deficit present.      Mental Status: She is alert and oriented to person, place, and time. Mental status is at baseline.   Psychiatric:         Mood and Affect: Mood normal.         Behavior: Behavior normal.        Antibiotics  piperacillin-tazobactam - 3.375 gram/50 mL  vancomycin - 1 gram/200 mL    Relevant Results  Labs  Results from last 72 hours   Lab Units 07/27/24  0426   WBC AUTO x10*3/uL 12.2*   HEMOGLOBIN g/dL 9.1*   HEMATOCRIT % 28.5*   PLATELETS AUTO x10*3/uL 257     Results from last 72 hours   Lab Units 07/27/24  0426   SODIUM mmol/L 135  "  POTASSIUM mmol/L 3.7   CHLORIDE mmol/L 98   CO2 mmol/L 29   BUN mg/dL 12   CREATININE mg/dL 0.80   GLUCOSE mg/dL 164*   CALCIUM mg/dL 8.7   ANION GAP mmol/L 8   EGFR mL/min/1.73m*2 68           Estimated Creatinine Clearance: 33.2 mL/min (by C-G formula based on SCr of 0.8 mg/dL).  No results found for: \"CRP\"  Microbiology  Susceptibility data from last 14 days.  Collected Specimen Info Organism Aztreonam Cefepime Ceftazidime Ciprofloxacin Levofloxacin Piperacillin/Tazobactam Tobramycin   07/24/24 Tissue/Biopsy from Wound/Tissue Pseudomonas aeruginosa            Mixed Anaerobic Bacteria            Mixed Gram-Positive and Gram-Negative Bacteria          07/23/24 Tissue/Biopsy from Skin/Superficial Abscess Pseudomonas aeruginosa  S  S  S  S  S  S  S     Mixed Anaerobic Bacteria            Mixed Gram-Positive and Gram-Negative Bacteria              Imaging  Electrocardiogram, 12-lead PRN ACS symptoms    Result Date: 7/25/2024  Poor data quality, interpretation may be adversely affected Normal sinus rhythm Left axis deviation Moderate voltage criteria for LVH, may be normal variant Cannot rule out Septal infarct (cited on or before 24-JUL-2024) Abnormal ECG When compared with ECG of 24-JUL-2024 19:36, (unconfirmed) No significant change was found    ECG 12 Lead    Result Date: 7/25/2024   Poor data quality, interpretation may be adversely affected Normal sinus rhythm Left axis deviation Moderate voltage criteria for LVH, may be normal variant Cannot rule out Septal infarct , age undetermined Abnormal ECG No previous ECGs available    Vascular US ankle brachial index (MEETA) without exercise    Result Date: 7/24/2024  Preliminary Cardiology Report            [facility] [address] , [place] [id number]             Phone [phone]  Preliminary Vascular Lab Report   VASC US PVR WITHOUT EXERCISE  Patient Name:     DESTINI Marie Physician:  [id number] Linda Sotelo MD Study Date:       7/24/2024    " Ordering Provider:  [id number] ROMERO LÓPEZ MRN/PID:          [id number]     Fellow: Accession#:       [id number] Technologist:       Leslie Fu RDMS, RVT YOB: 1929    Technologist 2: Gender:           F            Encounter#:         [id number] Admission Status: Inpatient    Location Performed: [facility]  Diagnosis/ICD: Type 2 diabetes mellitus (DM) with foot ulcer-E11.621  PRELIMINARY CONCLUSIONS:  Right Lower PVR: Right pressures of >220 mmHg suggest no compressibility of vessels and may make absolute Segmental Limb Pressures (SLP) unreliable. Monophasic flow is noted in the right dorsalis pedis artery. Multiphasic flow is noted in the right posterior tibial artery. Due to a large draining wound and significant errythemia to the right lower extremity, a cuff was not able to be placed over the calf or ankle. Low thigh was used to obtain MEETA. Left Lower PVR: Left pressures of >220 mmHg suggest no compressibility of vessels and may make absolute Segmental Limb Pressures (SLP) unreliable. Multiphasic flow is noted in the left posterior tibial artery and left dorsalis pedis artery. Full PVR was not obtainable due to patients sensitivity to the pressure of the Cuffs. unable to tolerate full compression of the arteries.  Imaging & Doppler Findings:  RIGHT Lower PVR                Pressures Ratios Right Posterior Tibial (Ankle) 255 mmHg  1.41 Right Dorsalis Pedis (Ankle)   255 mmHg  1.41   LEFT Lower PVR                Pressures Ratios Left Posterior Tibial (Ankle) 255 mmHg  1.41 Left Dorsalis Pedis (Ankle)   255 mmHg  1.41                      Right     Left Brachial Pressure 176 mmHg 181 mmHg   VASCULAR PRELIMINARY REPORT completed by Leslie Fu RDMS, AB, RVT, VT on 7/24/2024 at 3:39:46 PM  ** Final **     CT tibia fibula right w IV contrast    Result Date: 7/23/2024  Interpreted By:  Denny Oliveira, STUDY: CT TIBIA FIBULA RIGHT W IV CONTRAST;  7/23/2024 9:25 pm   INDICATION:  Signs/Symptoms:RLE infection r/o abscess, OM.   COMPARISON: Radiographs of the right tibia and fibula 07/22/2024   ACCESSION NUMBER(S): [id number]   ORDERING CLINICIAN: BANDAR RAMOS   TECHNIQUE: CT imaging of the right tibia and fibula was obtained after the intravenous administration of 75 mL Omnipaque 350 contrast. Coronal and sagittal reformatted images were performed.   FINDINGS: OSSEOUS STRUCTURES:   Severe degenerative changes of the right knee with tricompartmental joint space narrowing and large marginal osteophytes. Meniscal calcifications suggesting calcium pyrophosphate deposition arthropathy. Intra-articular ossific bodies in the posterior aspect of the knee. No fracture identified. No definite erosive change or periosteal reaction to suggest osteomyelitis.     SOFT TISSUES:   There is extensive subcutaneous soft tissue edema throughout the lower leg most pronounced within laterally. The deeper anterior an intramuscular fat planes are preserved. No definite peripherally enhancing fluid collection identified. There is a crescentic similar ill-defined high density collection along the anteromedial aspect of the two-view which measures 5.4 x 2.0 x 11.5 cm. The appearance is most suggestive of the hematoma although the mass or infectious collection can not be entirely excluded. Extensive vascular calcifications are noted throughout the leg. No subcutaneous air identified.       1. Ill-defined relatively high density collection along the anteromedial aspect of the lower leg measuring 5.4 x 2.0 x 11.5 cm. The appearance is suggestive of the hematoma with infection or mass not entirely excluded. 2. Diffuse nonspecific subcutaneous soft tissue edema throughout the lower leg most pronounced along the lateral aspect. No definite peripherally enhancing fluid collection is noted. The deeper fascial planes appear preserved. Correlate for clinical evidence of infection. 3. No osseous erosion to suggest  osteomyelitis. If there is ongoing clinical concern then MRI may be performed in further assessment.   MACRO: None   Signed by: Denny Oliveira 7/23/2024 9:44 PM Dictation workstation:   [id number]    Lower extremity venous duplex right    Result Date: 7/23/2024  Interpreted By:  Angelica Aguila, STUDY: [facility] LOWER EXTREMITY VENOUS DUPLEX RIGHT;  7/23/2024 6:30 pm   INDICATION: Signs/Symptoms:r/o DVT.   COMPARISON: None.   ACCESSION NUMBER(S): [id number]   ORDERING CLINICIAN: AMELIE ABARCA   TECHNIQUE: Grayscale, color and spectral Doppler sonographic images of the right lower extremity deep venous system. The left common femoral vein was imaged for comparison.   FINDINGS: There is normal compressibility of the right common femoral vein, saphenous femoral junction, femoral vein and popliteal vein. The posterior tibial and peroneal veins are suboptimally visualized. There is normal spontaneous and phasic variation throughout the leg by spectral doppler.   The left common femoral vein is patent.   OTHER FINDINGS: None.       Suboptimal visualization of the calf veins. No sonographic evidence of acute DVT in the visualized vessels of the right lower extremity.   MACRO: None   Signed by: Angelica Aguila 7/23/2024 6:44 PM Dictation workstation:   [id number]    XR tibia fibula right 2 views    Result Date: 7/23/2024  Interpreted By:  Hira Lawrence, STUDY: XR TIBIA FIBULA RIGHT 2 VIEWS; ;  7/23/2024 5:39 pm   INDICATION: Signs/Symptoms:wound right anterior tibia.   COMPARISON: None.   ACCESSION NUMBER(S): [id number]   ORDERING CLINICIAN: AMELIE ABARCA   FINDINGS: Right tibia/fibula, two views   Soft tissue edema about the calf with irregularity anteriorly corresponding to known wound. No osseous destruction or erosion seen radiographically. Diffuse osteopenia present. Severe degenerative change of the knee       No radiographic evidence of osteomyelitis. Soft tissue edema and soft tissue wound at the anterior  aspect of the cuff     MACRO: None   Signed by: Hira Lawrence 7/23/2024 6:05 PM Dictation workstation:   [id number]    XR tibia fibula right 2 views    Result Date: 7/1/2024  Interpreted By:  José Yan, STUDY: XR TIBIA FIBULA RIGHT 2 VIEWS;  7/1/2024 2:39 pm   INDICATION: Signs/Symptoms:fall.   COMPARISON: None.   ACCESSION NUMBER(S): [id number]   ORDERING CLINICIAN: ZARIA CONDON   FINDINGS: No acute fracture in the tibia or fibula. Moderate to severe knee osteoarthritis with chondrocalcinosis.       No acute fracture in the tibia or fibula.   MACRO None   Signed by: José Yan 7/1/2024 2:59 PM Dictation workstation:   [id number]    XR hand right 3+ views    Result Date: 7/1/2024  Interpreted By:  José Yan, STUDY: XR HAND RIGHT 3+ VIEWS;  7/1/2024 2:39 pm   INDICATION: Signs/Symptoms:fall.   COMPARISON: None.   ACCESSION NUMBER(S): [id number]   ORDERING CLINICIAN: ZARIA CONDON   FINDINGS: Acute oblique fracture through proximal 1st metacarpal metadiaphysis with minimal displacement. Severe 1st CMC osteoarthritis.       Acute minimally displaced 1st metacarpal base fracture. Severe 1st CMC osteoarthritis.   MACRO None   Signed by: José Yan 7/1/2024 2:57 PM Dictation workstation:   [id number]    CT cervical spine wo IV contrast    Result Date: 7/1/2024  Interpreted By:  José Yan, STUDY: CT CERVICAL SPINE WO IV CONTRAST;  7/1/2024 2:19 pm   INDICATION: Signs/Symptoms:fall.   COMPARISON: C-spine 07/01/2022.   ACCESSION NUMBER(S): [id number]   ORDERING CLINICIAN: ZARIA CONDON   TECHNIQUE: Thin section axial images were obtained from the skull base down through the thoracic inlet. Sagittal and coronal reconstruction images were generated. Soft tissue, lung, and bone windows were reviewed.   FINDINGS: Prevertebral/Paraspinal Soft Tissues: Stable 2.4 cm heterogeneous left thyroid nodule.   CERVICAL SPINE: Hardware: None. Fracture: None. Vertebral Body Heights: Normal.  Alignment: No traumatic listhesis. Spinal canal and neural foramina: Moderate to severe canal stenosis at C3-C4 and C6-C7, not significantly changed. No severe neural foraminal narrowing.       No acute fracture or traumatic malalignment.   Stable 2.3 cm left thyroid nodule. Consider outpatient ultrasound evaluation if not already performed.   Signed by: José Yan 7/1/2024 2:55 PM Dictation workstation:   [id number]    CT head wo IV contrast    Result Date: 7/1/2024  Interpreted By:  José Yan, STUDY: CT HEAD WO IV CONTRAST;  7/1/2024 2:19 pm   INDICATION: Signs/Symptoms:fall.   COMPARISON: CT head 04/01/2020.   ACCESSION NUMBER(S): [id number]   ORDERING CLINICIAN: ZARIA CONDON   TECHNIQUE: Noncontrast axial CT scan of head was performed.   FINDINGS: Parenchyma: No intracranial hemorrhage. The grey-white differentiation is intact. No mass effect or midline shift. Patchy periventricular white matter hypodensities, likely moderate chronic microvascular ischemic change. Severe diffuse parenchymal atrophy.   CSF Spaces: The ventricles, sulci and basal cisterns are within normal limits for age.   Extra-Axial Fluid: No extraaxial fluid collection.   Calvarium: No acute fracture.   Paranasal sinuses: Visualized paranasal sinuses are clear.   Mastoids: Clear.   Orbits: No acute abnormality.   Soft tissues: No acute abnormality.       No acute intracranial hemorrhage or calvarial fracture.   MACRO None   Signed by: José Yan 7/1/2024 2:48 PM Dictation workstation:   [id number]        Assessment/Plan   Type 2 diabetes mellitus   Infected Right leg traumatic wound-culture with pseudomonas  Right leg hematoma  Right leg cellulitis  Peripheral vascular disease   Pyuria-culture negative      Discontinue IV vancomycin  IV Zosyn  Urine culture negative  Follow-up blood cultures- pending with no growth  Monitor temperature and WBC  Podiatry following-s/p right leg debridement with wound vac application -  bone biopsy sent-follow up   Local care  Right leg elevation       Long term plan IV zosyn 3.375 grams every 6 hours for total 14 days post op till 8/9/2024-may extend if positive bone biopsy   Weekly CBC with diff, CMP  PICC line placement    Total time spent caring for the patient today was 20 minutes. This includes time spent before the visit reviewing the chart, time spent during the visit, and time spent after the visit on documentation        Inez Brand, ALICIA-CNP

## 2024-07-27 NOTE — PROGRESS NOTES
Apoorva Sanchez is a 89 y.o. female on day 4 of admission presenting with Cellulitis of right lower extremity.    Subjective   Patient seen and examined.  No complaints voiced.  Denies any pain.  Patient was taken to the OR yesterday for debridement and evacuation of hematoma with skin graft placed.       Objective     Physical Exam  Constitutional:       General: She is not in acute distress.     Appearance: She is not toxic-appearing.   HENT:      Ears:      Comments: [place]  Cardiovascular:      Rate and Rhythm: Normal rate and regular rhythm.      Pulses: Normal pulses.      Heart sounds: Normal heart sounds.   Pulmonary:      Effort: Pulmonary effort is normal. No respiratory distress.      Breath sounds: Normal breath sounds. No wheezing or rales.   Abdominal:      General: Bowel sounds are normal.      Palpations: Abdomen is soft.   Skin:     General: Skin is warm and dry.      Comments: Drsg to RLE dry and intact with wound vac   Neurological:      General: No focal deficit present.      Mental Status: She is alert and oriented to person, place, and time.   Psychiatric:         Mood and Affect: Mood normal.         Behavior: Behavior normal.         Last Recorded Vitals  Blood pressure 171/78, pulse 75, temperature 36.5 °C (97.7 °F), temperature source Oral, resp. rate 18, height 1.524 m (5'), weight 56.8 kg (125 lb 3.5 oz), SpO2 94%.  Intake/Output last 3 Shifts:  I/O last 3 completed shifts:  In: 2040 (35.9 mL/kg) [P.O.:440; I.V.:600 (10.6 mL/kg); IV Piggyback:1000]  Out: 1160 (20.4 mL/kg) [Urine:1150 (0.6 mL/kg/hr); Blood:10]  Weight: 56.8 kg     Relevant Results  Scheduled medications  atenolol, 50 mg, oral, Daily  brimonidine, 1 drop, ophthalmic (eye), BID  cloNIDine, 0.2 mg, oral, Nightly  furosemide, 40 mg, oral, Daily  glimepiride, 2 mg, oral, BID  heparin, 5,000 Units, subcutaneous, q12h AMY  insulin lispro, 0-5 Units, subcutaneous, TID  latanoprost, 1 drop, Both Eyes, q24h  levothyroxine, 100 mcg, oral,  Daily before breakfast  piperacillin-tazobactam, 3.375 g, intravenous, q6h  simvastatin, 40 mg, oral, Nightly  timolol, 1 drop, Both Eyes, BID  vancomycin, 1,000 mg, intravenous, q24h      Continuous medications     PRN medications  PRN medications: acetaminophen, dextrose, dextrose, glucagon, glucagon, oxyCODONE, vancomycin     following data reviewed    WBC- 12.2  Hgb-9.1  Hct-28.5  Platelets-257      Na-135  K+-3.7  Cl-98  Bicarb-29  BUN-12  creatinine-0.8                              Assessment/Plan   Principal Problem:    Cellulitis of right lower extremity  Active Problems:    Wound of right leg    Ulcer of right leg, with necrosis of muscle (Multi)    Right lower leg traumatic ulceration  -Wound VAC  -IV Vanc/Zosyn  -CT suggestive of hematoma with infection, mass not entirely excluded  -Wound cx growing Pseudomonas  -Blood cx no growth to date  -Vascular surgery following  -Podiatry following, debridement recommended  -ID following  -Cardiology cleared pt for OR     UTI  -Urine culture no growth     DM  -Hold Metformin post IV contrast  -Continue Glimepiride  -Insulin SS  -Monitor blood glucose  -A1C 7.1      Hypertension  -Continue home meds     Hypothyroidism  -Continue home meds     PVD/venous stasis  -Vascular        Plan  Above plan discussed with patient and she is in agreement.       ALICIA Sheldon-CNP

## 2024-07-28 VITALS
HEART RATE: 69 BPM | DIASTOLIC BLOOD PRESSURE: 75 MMHG | SYSTOLIC BLOOD PRESSURE: 170 MMHG | RESPIRATION RATE: 18 BRPM | WEIGHT: 125.22 LBS | HEIGHT: 60 IN | OXYGEN SATURATION: 95 % | TEMPERATURE: 98.1 F | BODY MASS INDEX: 24.58 KG/M2

## 2024-07-28 LAB
ANION GAP SERPL CALC-SCNC: 8 MMOL/L
BUN SERPL-MCNC: 14 MG/DL (ref 8–25)
CALCIUM SERPL-MCNC: 8.8 MG/DL (ref 8.5–10.4)
CHLORIDE SERPL-SCNC: 99 MMOL/L (ref 97–107)
CO2 SERPL-SCNC: 29 MMOL/L (ref 24–31)
CREAT SERPL-MCNC: 0.9 MG/DL (ref 0.4–1.6)
EGFRCR SERPLBLD CKD-EPI 2021: 59 ML/MIN/1.73M*2
ERYTHROCYTE [DISTWIDTH] IN BLOOD BY AUTOMATED COUNT: 14.1 % (ref 11.5–14.5)
GLUCOSE BLD MANUAL STRIP-MCNC: 283 MG/DL (ref 74–99)
GLUCOSE BLD MANUAL STRIP-MCNC: 301 MG/DL (ref 74–99)
GLUCOSE BLD MANUAL STRIP-MCNC: 310 MG/DL (ref 74–99)
GLUCOSE BLD MANUAL STRIP-MCNC: 78 MG/DL (ref 74–99)
GLUCOSE SERPL-MCNC: 101 MG/DL (ref 65–99)
HCT VFR BLD AUTO: 29.2 % (ref 36–46)
HGB BLD-MCNC: 9.2 G/DL (ref 12–16)
MCH RBC QN AUTO: 28 PG (ref 26–34)
MCHC RBC AUTO-ENTMCNC: 31.5 G/DL (ref 32–36)
MCV RBC AUTO: 89 FL (ref 80–100)
NRBC BLD-RTO: 0 /100 WBCS (ref 0–0)
PLATELET # BLD AUTO: 237 X10*3/UL (ref 150–450)
POTASSIUM SERPL-SCNC: 3.5 MMOL/L (ref 3.4–5.1)
RBC # BLD AUTO: 3.29 X10*6/UL (ref 4–5.2)
SODIUM SERPL-SCNC: 136 MMOL/L (ref 133–145)
WBC # BLD AUTO: 6.5 X10*3/UL (ref 4.4–11.3)

## 2024-07-28 PROCEDURE — 2500000001 HC RX 250 WO HCPCS SELF ADMINISTERED DRUGS (ALT 637 FOR MEDICARE OP): Performed by: STUDENT IN AN ORGANIZED HEALTH CARE EDUCATION/TRAINING PROGRAM

## 2024-07-28 PROCEDURE — 82374 ASSAY BLOOD CARBON DIOXIDE: CPT | Performed by: NURSE PRACTITIONER

## 2024-07-28 PROCEDURE — 85027 COMPLETE CBC AUTOMATED: CPT | Performed by: NURSE PRACTITIONER

## 2024-07-28 PROCEDURE — 2500000004 HC RX 250 GENERAL PHARMACY W/ HCPCS (ALT 636 FOR OP/ED): Performed by: STUDENT IN AN ORGANIZED HEALTH CARE EDUCATION/TRAINING PROGRAM

## 2024-07-28 PROCEDURE — 2500000002 HC RX 250 W HCPCS SELF ADMINISTERED DRUGS (ALT 637 FOR MEDICARE OP, ALT 636 FOR OP/ED): Performed by: STUDENT IN AN ORGANIZED HEALTH CARE EDUCATION/TRAINING PROGRAM

## 2024-07-28 PROCEDURE — 36415 COLL VENOUS BLD VENIPUNCTURE: CPT | Performed by: NURSE PRACTITIONER

## 2024-07-28 PROCEDURE — 82947 ASSAY GLUCOSE BLOOD QUANT: CPT

## 2024-07-28 PROCEDURE — 1100000001 HC PRIVATE ROOM DAILY

## 2024-07-28 RX ADMIN — BRIMONIDINE TARTRATE 1 DROP: 2 SOLUTION/ DROPS OPHTHALMIC at 09:00

## 2024-07-28 RX ADMIN — GLIMEPIRIDE 2 MG: 2 TABLET ORAL at 17:01

## 2024-07-28 RX ADMIN — PIPERACILLIN SODIUM AND TAZOBACTAM SODIUM 3.38 G: 3; .375 INJECTION, SOLUTION INTRAVENOUS at 06:01

## 2024-07-28 RX ADMIN — TIMOLOL MALEATE 1 DROP: 5 SOLUTION/ DROPS OPHTHALMIC at 09:00

## 2024-07-28 RX ADMIN — ATENOLOL 50 MG: 50 TABLET ORAL at 08:05

## 2024-07-28 RX ADMIN — PIPERACILLIN SODIUM AND TAZOBACTAM SODIUM 3.38 G: 3; .375 INJECTION, SOLUTION INTRAVENOUS at 17:24

## 2024-07-28 RX ADMIN — INSULIN LISPRO 4 UNITS: 100 INJECTION, SOLUTION INTRAVENOUS; SUBCUTANEOUS at 11:39

## 2024-07-28 RX ADMIN — LATANOPROST 1 DROP: 50 SOLUTION OPHTHALMIC at 21:31

## 2024-07-28 RX ADMIN — BRIMONIDINE TARTRATE 1 DROP: 2 SOLUTION/ DROPS OPHTHALMIC at 21:31

## 2024-07-28 RX ADMIN — HEPARIN SODIUM 5000 UNITS: 5000 INJECTION, SOLUTION INTRAVENOUS; SUBCUTANEOUS at 08:06

## 2024-07-28 RX ADMIN — HEPARIN SODIUM 5000 UNITS: 5000 INJECTION, SOLUTION INTRAVENOUS; SUBCUTANEOUS at 21:31

## 2024-07-28 RX ADMIN — OXYCODONE 5 MG: 5 TABLET ORAL at 12:06

## 2024-07-28 RX ADMIN — PIPERACILLIN SODIUM AND TAZOBACTAM SODIUM 3.38 G: 3; .375 INJECTION, SOLUTION INTRAVENOUS at 12:05

## 2024-07-28 RX ADMIN — GLIMEPIRIDE 2 MG: 2 TABLET ORAL at 08:05

## 2024-07-28 RX ADMIN — INSULIN LISPRO 3 UNITS: 100 INJECTION, SOLUTION INTRAVENOUS; SUBCUTANEOUS at 16:59

## 2024-07-28 RX ADMIN — FUROSEMIDE 40 MG: 40 TABLET ORAL at 08:05

## 2024-07-28 RX ADMIN — SIMVASTATIN 40 MG: 40 TABLET, FILM COATED ORAL at 21:31

## 2024-07-28 RX ADMIN — LEVOTHYROXINE SODIUM 100 MCG: 0.1 TABLET ORAL at 06:01

## 2024-07-28 RX ADMIN — CLONIDINE HYDROCHLORIDE 0.2 MG: 0.2 TABLET ORAL at 21:31

## 2024-07-28 RX ADMIN — TIMOLOL MALEATE 1 DROP: 5 SOLUTION/ DROPS OPHTHALMIC at 21:31

## 2024-07-28 ASSESSMENT — PAIN - FUNCTIONAL ASSESSMENT
PAIN_FUNCTIONAL_ASSESSMENT: 0-10
PAIN_FUNCTIONAL_ASSESSMENT: 0-10

## 2024-07-28 ASSESSMENT — COGNITIVE AND FUNCTIONAL STATUS - GENERAL
MOVING TO AND FROM BED TO CHAIR: A LITTLE
DRESSING REGULAR LOWER BODY CLOTHING: A LITTLE
MOBILITY SCORE: 18
STANDING UP FROM CHAIR USING ARMS: A LITTLE
HELP NEEDED FOR BATHING: A LITTLE
WALKING IN HOSPITAL ROOM: A LITTLE
DAILY ACTIVITIY SCORE: 19
PERSONAL GROOMING: A LITTLE
TURNING FROM BACK TO SIDE WHILE IN FLAT BAD: A LITTLE
CLIMB 3 TO 5 STEPS WITH RAILING: A LOT
TOILETING: A LITTLE
DRESSING REGULAR UPPER BODY CLOTHING: A LITTLE

## 2024-07-28 ASSESSMENT — PAIN DESCRIPTION - ORIENTATION: ORIENTATION: RIGHT

## 2024-07-28 ASSESSMENT — PAIN SCALES - GENERAL
PAINLEVEL_OUTOF10: 7
PAINLEVEL_OUTOF10: 0 - NO PAIN
PAINLEVEL_OUTOF10: 0 - NO PAIN

## 2024-07-28 NOTE — PROGRESS NOTES
Apoorva Sanchez is a 89 y.o. female on day 5 of admission presenting with Cellulitis of right lower extremity.    Subjective   Patient seen and examined.  No documented concerns/events overnight from nursing.  Afebrile overnight.  Denies chills.  No complaints voiced.       Objective     Physical Exam    Last Recorded Vitals  Blood pressure 174/72, pulse 66, temperature 36.3 °C (97.3 °F), temperature source Oral, resp. rate 18, height 1.524 m (5'), weight 56.8 kg (125 lb 3.5 oz), SpO2 99%.  Intake/Output last 3 Shifts:  I/O last 3 completed shifts:  In: 1150 (20.2 mL/kg) [IV Piggyback:1150]  Out: - (0 mL/kg)   Weight: 56.8 kg     Relevant Results  Scheduled medications  atenolol, 50 mg, oral, Daily  brimonidine, 1 drop, ophthalmic (eye), BID  cloNIDine, 0.2 mg, oral, Nightly  furosemide, 40 mg, oral, Daily  glimepiride, 2 mg, oral, BID  heparin, 5,000 Units, subcutaneous, q12h AMY  insulin lispro, 0-5 Units, subcutaneous, TID  latanoprost, 1 drop, Both Eyes, q24h  levothyroxine, 100 mcg, oral, Daily before breakfast  lidocaine, 5 mL, infiltration, Once  piperacillin-tazobactam, 3.375 g, intravenous, q6h  simvastatin, 40 mg, oral, Nightly  timolol, 1 drop, Both Eyes, BID      Continuous medications     PRN medications  PRN medications: acetaminophen, alteplase, dextrose, dextrose, glucagon, glucagon, oxyCODONE           following data reviewed    WBC-6.5  Hgb-9.2  Hct-29.2  Platelets-237      Na-136  K+-3.5  Cl-99  Bicarb-29  BUN-14  creatinine-0.9                  Assessment/Plan   Principal Problem:    Cellulitis of right lower extremity  Active Problems:    Wound of right leg    Ulcer of right leg, with necrosis of muscle (Multi)    Right lower leg traumatic ulceration  -Wound VAC  -IV Zosyn until August 9  -CT suggestive of hematoma with infection, mass not entirely excluded  -Wound cx growing Pseudomonas  -Blood cx no growth to date  -Vascular surgery following  -Podiatry following, debridement recommended  -ID  following  -Cardiology cleared pt for OR     UTI  -Urine culture no growth     DM  -Hold Metformin post IV contrast  -Continue Glimepiride  -Insulin SS  -Monitor blood glucose  -A1C 7.1      Hypertension  -Continue home meds     Hypothyroidism  -Continue home meds     PVD/venous stasis  -Vascular        Plan  Above plan discussed with patient and she is in agreement.       Denae Parada, ALICIA-CNP

## 2024-07-28 NOTE — PROGRESS NOTES
Apoorva Sanchez is a 89 y.o. female on day 5 of admission presenting with Cellulitis of right lower extremity.    Subjective   Seen at bedside.  Denies pain.  Denies nausea vomiting fever chills.       Physical Exam    Objective     REVIEW OF SYSTEMS  GENERAL:  Negative for malaise, significant weight loss, fever  HEENT:  No changes in hearing or vision, no nose bleeds or other nasal problems and Negative for frequent or significant headaches  NECK:  Negative for lumps, goiter, pain and significant neck swelling  RESPIRATORY:  Negative for cough, wheezing and shortness of breath  CARDIOVASCULAR:  Negative for chest pain, leg swelling and palpitations  GI:  Negative for abdominal discomfort, blood in stools or black stools and change in bowel habits  :  Negative for dysuria, frequency and incontinence  MUSCULOSKELETAL:  Negative for joint pain or swelling, back pain, and muscle pain.  SKIN: Positive for large wound right lower extremity  PSYCH:  Negative for sleep disturbance, mood disorder and recent psychosocial stressors  HEMATOLOGY/LYMPHOLOGY:  Negative for prolonged bleeding, bruising easily, and swollen nodes.  ENDOCRINE:  Negative for cold or heat intolerance, polyuria, polydipsia and goiter  NEURO: Negative, denies any burning, tingling or numbness     Objective: Wound VAC was intact and operational at the time of my visit.  Appropriate seal.  GranuFoam with 125 mmHg continuous.  Approximately 175 mL of sanguinous drainage within the canister of the wound VAC.    I did remove her bandage and the wound VAC.  Of note there are 2 small pressure sores now beginning to form at the lateral base of the fifth metatarsal, and medial aspect of the first metatarsal.  Likely from Ace bandage wrap.  No Kerlix had been utilized.    The wound itself appears healthy and well with skin graft substitute stapled onto the wound site.  The wound measures 10 x 6 cm.  There is skin graft substitute over top.  The erythema to the leg  has significantly decreased.  The edema has also significantly decreased and there is skin wrinkles throughout the entire lower leg now.  There is some maceration to the Jorde of the lower leg as well secondary to the wound VAC but no new wounds or lesions to the leg.    Last Recorded Vitals  Blood pressure 175/90, pulse 73, temperature 36.9 °C (98.4 °F), temperature source Oral, resp. rate 18, height 1.524 m (5'), weight 56.8 kg (125 lb 3.5 oz), SpO2 96%.    Intake/Output last 3 Shifts:  I/O last 3 completed shifts:  In: 1150 (20.2 mL/kg) [IV Piggyback:1150]  Out: - (0 mL/kg)   Weight: 56.8 kg     Relevant Results       Intraoperative tibia bone pathology: Pending  Wound culture 7/24 and 7/23: Pseudomonas    Assessment/Plan   Principal Problem:    Cellulitis of right lower extremity  Active Problems:    Wound of right leg    Ulcer of right leg, with necrosis of muscle (Multi)    1.  Right lower leg traumatic ulceration  2.  Right lower leg hematoma  3.  Right lower leg cellulitis  4.  Right leg venous stasis  5.  Right leg peripheral vascular disease     POD 2 s/p right leg hematoma and skin excision, tibia bone biopsy, skin substitute application, wound VAC application    -Wound VAC changed today.  Tolerated well.  There was maceration to surrounding skin-Tegaderm bottom layer placed this time to avoid maceration.  -IV Zosyn appears appropriate.  Appreciate ID.  -Patient may WBAT.    Wound VAC changed.  Well-appearing.  2 new pressure sores on the right foot.  Have ordered Prevalon heel offloading boots to be worn at all times and avoided Ace wrap or any compressive bandage to the foot now.  After discussion with daughter and hospital team will plan for discharge to skilled nursing facility once arranged.  Will require wound VAC 3 times weekly changes.       Joe Proctor DPM

## 2024-07-28 NOTE — PROGRESS NOTES
07/28/24 1032   Discharge Planning   Expected Discharge Disposition SNF   Does the patient need discharge transport arranged? Yes   RoundTrip coordination needed? Yes   Has discharge transport been arranged? No     Spoke with son, Glenn, discussed discharge planning.  [facility].  Sent referral for [facility] and will leave a SNF list in Room.  PICC to be placed tomorrow.  Antibiotic till 8/9.  No precert needed. RN-TCC following    1113- [facility] full at this time.  Spoke to Hanh, daughter, and she gave me 2nd choice.    1404-  Accepted by [facility] and [facility].  Daughter, Hanh, wants [facility].   Patient will be ready to go tomorrow after PICC placement.   Let facility know.

## 2024-07-29 ENCOUNTER — TELEPHONE (OUTPATIENT)
Dept: LDA SERVICES | Facility: HOSPITAL | Age: 89
End: 2024-07-29

## 2024-07-29 ENCOUNTER — PATIENT OUTREACH (OUTPATIENT)
Dept: PRIMARY CARE | Facility: CLINIC | Age: 89
End: 2024-07-29
Payer: MEDICARE

## 2024-07-29 VITALS
RESPIRATION RATE: 20 BRPM | DIASTOLIC BLOOD PRESSURE: 57 MMHG | HEART RATE: 64 BPM | TEMPERATURE: 98.2 F | HEIGHT: 67 IN | OXYGEN SATURATION: 98 % | BODY MASS INDEX: 19.65 KG/M2 | WEIGHT: 125.22 LBS | SYSTOLIC BLOOD PRESSURE: 129 MMHG

## 2024-07-29 DIAGNOSIS — I10 PRIMARY HYPERTENSION: ICD-10-CM

## 2024-07-29 DIAGNOSIS — E11.69 TYPE 2 DIABETES MELLITUS WITH OTHER SPECIFIED COMPLICATION, WITHOUT LONG-TERM CURRENT USE OF INSULIN (MULTI): ICD-10-CM

## 2024-07-29 LAB
ANION GAP SERPL CALC-SCNC: 8 MMOL/L
BUN SERPL-MCNC: 9 MG/DL (ref 8–25)
CALCIUM SERPL-MCNC: 8.6 MG/DL (ref 8.5–10.4)
CHLORIDE SERPL-SCNC: 100 MMOL/L (ref 97–107)
CO2 SERPL-SCNC: 28 MMOL/L (ref 24–31)
CREAT SERPL-MCNC: 0.7 MG/DL (ref 0.4–1.6)
EGFRCR SERPLBLD CKD-EPI 2021: 80 ML/MIN/1.73M*2
ERYTHROCYTE [DISTWIDTH] IN BLOOD BY AUTOMATED COUNT: 14.2 % (ref 11.5–14.5)
GLUCOSE BLD MANUAL STRIP-MCNC: 194 MG/DL (ref 74–99)
GLUCOSE BLD MANUAL STRIP-MCNC: 97 MG/DL (ref 74–99)
GLUCOSE SERPL-MCNC: 145 MG/DL (ref 65–99)
HCT VFR BLD AUTO: 30.1 % (ref 36–46)
HGB BLD-MCNC: 9.5 G/DL (ref 12–16)
LABORATORY COMMENT REPORT: NORMAL
MCH RBC QN AUTO: 28 PG (ref 26–34)
MCHC RBC AUTO-ENTMCNC: 31.6 G/DL (ref 32–36)
MCV RBC AUTO: 89 FL (ref 80–100)
NRBC BLD-RTO: 0 /100 WBCS (ref 0–0)
PATH REPORT.FINAL DX SPEC: NORMAL
PATH REPORT.GROSS SPEC: NORMAL
PATH REPORT.RELEVANT HX SPEC: NORMAL
PATH REPORT.TOTAL CANCER: NORMAL
PLATELET # BLD AUTO: 259 X10*3/UL (ref 150–450)
POTASSIUM SERPL-SCNC: 3.7 MMOL/L (ref 3.4–5.1)
RBC # BLD AUTO: 3.39 X10*6/UL (ref 4–5.2)
SODIUM SERPL-SCNC: 136 MMOL/L (ref 133–145)
WBC # BLD AUTO: 8.7 X10*3/UL (ref 4.4–11.3)

## 2024-07-29 PROCEDURE — 2500000001 HC RX 250 WO HCPCS SELF ADMINISTERED DRUGS (ALT 637 FOR MEDICARE OP): Performed by: STUDENT IN AN ORGANIZED HEALTH CARE EDUCATION/TRAINING PROGRAM

## 2024-07-29 PROCEDURE — 2500000004 HC RX 250 GENERAL PHARMACY W/ HCPCS (ALT 636 FOR OP/ED): Performed by: STUDENT IN AN ORGANIZED HEALTH CARE EDUCATION/TRAINING PROGRAM

## 2024-07-29 PROCEDURE — 99439 CHRNC CARE MGMT STAF EA ADDL: CPT | Performed by: STUDENT IN AN ORGANIZED HEALTH CARE EDUCATION/TRAINING PROGRAM

## 2024-07-29 PROCEDURE — 2500000002 HC RX 250 W HCPCS SELF ADMINISTERED DRUGS (ALT 637 FOR MEDICARE OP, ALT 636 FOR OP/ED): Performed by: STUDENT IN AN ORGANIZED HEALTH CARE EDUCATION/TRAINING PROGRAM

## 2024-07-29 PROCEDURE — 02HV33Z INSERTION OF INFUSION DEVICE INTO SUPERIOR VENA CAVA, PERCUTANEOUS APPROACH: ICD-10-PCS | Performed by: STUDENT IN AN ORGANIZED HEALTH CARE EDUCATION/TRAINING PROGRAM

## 2024-07-29 PROCEDURE — 97168 OT RE-EVAL EST PLAN CARE: CPT | Mod: GO

## 2024-07-29 PROCEDURE — 36415 COLL VENOUS BLD VENIPUNCTURE: CPT | Performed by: NURSE PRACTITIONER

## 2024-07-29 PROCEDURE — 97164 PT RE-EVAL EST PLAN CARE: CPT | Mod: GP

## 2024-07-29 PROCEDURE — 80048 BASIC METABOLIC PNL TOTAL CA: CPT | Performed by: NURSE PRACTITIONER

## 2024-07-29 PROCEDURE — 97535 SELF CARE MNGMENT TRAINING: CPT | Mod: GO

## 2024-07-29 PROCEDURE — 97110 THERAPEUTIC EXERCISES: CPT | Mod: GP

## 2024-07-29 PROCEDURE — 99490 CHRNC CARE MGMT STAFF 1ST 20: CPT | Performed by: STUDENT IN AN ORGANIZED HEALTH CARE EDUCATION/TRAINING PROGRAM

## 2024-07-29 PROCEDURE — 82947 ASSAY GLUCOSE BLOOD QUANT: CPT

## 2024-07-29 PROCEDURE — 85027 COMPLETE CBC AUTOMATED: CPT | Performed by: NURSE PRACTITIONER

## 2024-07-29 RX ORDER — OXYCODONE HYDROCHLORIDE 5 MG/1
5 TABLET ORAL EVERY 6 HOURS PRN
Qty: 12 TABLET | Refills: 0 | Status: SHIPPED | OUTPATIENT
Start: 2024-07-29 | End: 2024-11-08 | Stop reason: WASHOUT

## 2024-07-29 RX ORDER — ATENOLOL 50 MG/1
50 TABLET ORAL DAILY
Start: 2024-07-30 | End: 2024-11-08 | Stop reason: WASHOUT

## 2024-07-29 RX ORDER — ACETAMINOPHEN 325 MG/1
650 TABLET ORAL EVERY 6 HOURS PRN
Qty: 30 TABLET | Refills: 0 | Status: SHIPPED | OUTPATIENT
Start: 2024-07-29 | End: 2025-01-02 | Stop reason: HOSPADM

## 2024-07-29 RX ADMIN — PIPERACILLIN SODIUM AND TAZOBACTAM SODIUM 3.38 G: 3; .375 INJECTION, SOLUTION INTRAVENOUS at 12:02

## 2024-07-29 RX ADMIN — ATENOLOL 50 MG: 50 TABLET ORAL at 09:52

## 2024-07-29 RX ADMIN — HEPARIN SODIUM 5000 UNITS: 5000 INJECTION, SOLUTION INTRAVENOUS; SUBCUTANEOUS at 09:52

## 2024-07-29 RX ADMIN — FUROSEMIDE 40 MG: 40 TABLET ORAL at 09:52

## 2024-07-29 RX ADMIN — INSULIN LISPRO 1 UNITS: 100 INJECTION, SOLUTION INTRAVENOUS; SUBCUTANEOUS at 12:03

## 2024-07-29 RX ADMIN — PIPERACILLIN SODIUM AND TAZOBACTAM SODIUM 3.38 G: 3; .375 INJECTION, SOLUTION INTRAVENOUS at 06:14

## 2024-07-29 RX ADMIN — BRIMONIDINE TARTRATE 1 DROP: 2 SOLUTION/ DROPS OPHTHALMIC at 09:54

## 2024-07-29 RX ADMIN — TIMOLOL MALEATE 1 DROP: 5 SOLUTION/ DROPS OPHTHALMIC at 09:54

## 2024-07-29 RX ADMIN — PIPERACILLIN SODIUM AND TAZOBACTAM SODIUM 3.38 G: 3; .375 INJECTION, SOLUTION INTRAVENOUS at 00:39

## 2024-07-29 RX ADMIN — GLIMEPIRIDE 2 MG: 2 TABLET ORAL at 09:53

## 2024-07-29 RX ADMIN — LEVOTHYROXINE SODIUM 100 MCG: 0.1 TABLET ORAL at 06:14

## 2024-07-29 ASSESSMENT — PAIN SCALES - GENERAL
PAINLEVEL_OUTOF10: 4
PAINLEVEL_OUTOF10: 0 - NO PAIN
PAINLEVEL_OUTOF10: 0 - NO PAIN

## 2024-07-29 ASSESSMENT — COGNITIVE AND FUNCTIONAL STATUS - GENERAL
WALKING IN HOSPITAL ROOM: A LOT
DRESSING REGULAR LOWER BODY CLOTHING: A LOT
MOVING FROM LYING ON BACK TO SITTING ON SIDE OF FLAT BED WITH BEDRAILS: A LITTLE
MOBILITY SCORE: 13
TOILETING: A LOT
EATING MEALS: A LITTLE
MOVING TO AND FROM BED TO CHAIR: A LOT
PERSONAL GROOMING: A LITTLE
DRESSING REGULAR UPPER BODY CLOTHING: A LITTLE
CLIMB 3 TO 5 STEPS WITH RAILING: TOTAL
HELP NEEDED FOR BATHING: A LITTLE
TURNING FROM BACK TO SIDE WHILE IN FLAT BAD: A LITTLE
STANDING UP FROM CHAIR USING ARMS: A LOT
DAILY ACTIVITIY SCORE: 16

## 2024-07-29 ASSESSMENT — PAIN DESCRIPTION - DESCRIPTORS
DESCRIPTORS: BURNING
DESCRIPTORS: BURNING

## 2024-07-29 ASSESSMENT — PAIN - FUNCTIONAL ASSESSMENT
PAIN_FUNCTIONAL_ASSESSMENT: 0-10
PAIN_FUNCTIONAL_ASSESSMENT: 0-10

## 2024-07-29 ASSESSMENT — ACTIVITIES OF DAILY LIVING (ADL)
HOME_MANAGEMENT_TIME_ENTRY: 19
BATHING_ASSISTANCE: MODERATE
ADL_ASSISTANCE: INDEPENDENT

## 2024-07-29 ASSESSMENT — PAIN SCALES - WONG BAKER: WONGBAKER_NUMERICALRESPONSE: HURTS LITTLE MORE

## 2024-07-29 NOTE — PROGRESS NOTES
Apoorva Sanchez is a 89 y.o. female on day 6 of admission presenting with Cellulitis of right lower extremity.    Subjective   No acute issues. Pt is awaiting PICC line placement.      Objective     Physical Exam  Constitutional:       Comments: [place]   HENT:      Head: Normocephalic and atraumatic.      Nose: Nose normal.      Mouth/Throat:      Mouth: Mucous membranes are moist.      Pharynx: Oropharynx is clear.   Eyes:      Extraocular Movements: Extraocular movements intact.      Pupils: Pupils are equal, round, and reactive to light.   Cardiovascular:      Rate and Rhythm: Normal rate and regular rhythm.      Pulses: Normal pulses.   Pulmonary:      Effort: Pulmonary effort is normal.      Breath sounds: Normal breath sounds.   Abdominal:      General: Abdomen is flat. Bowel sounds are normal.      Palpations: Abdomen is soft.   Musculoskeletal:         General: Normal range of motion.      Comments: Generalized weakness   Skin:     General: Skin is warm and dry.      Capillary Refill: Capillary refill takes less than 2 seconds.      Comments: VAC dressing intact RLE   Neurological:      General: No focal deficit present.      Mental Status: She is oriented to person, place, and time.   Psychiatric:         Mood and Affect: Mood normal.         Last Recorded Vitals  Blood pressure 137/79, pulse 60, temperature 36.8 °C (98.2 °F), temperature source Oral, resp. rate 16, height 1.524 m (5'), weight 56.8 kg (125 lb 3.5 oz), SpO2 97%.  Intake/Output last 3 Shifts:  I/O last 3 completed shifts:  In: 200 (3.5 mL/kg) [IV Piggyback:200]  Out: - (0 mL/kg)   Weight: 56.8 kg     Relevant Results  Results for orders placed or performed during the hospital encounter of 07/23/24 (from the past 24 hour(s))   POCT GLUCOSE   Result Value Ref Range    POCT Glucose 301 (H) 74 - 99 mg/dL   POCT GLUCOSE   Result Value Ref Range    POCT Glucose 283 (H) 74 - 99 mg/dL   POCT GLUCOSE   Result Value Ref Range    POCT Glucose 310 (H) 74 - 99  mg/dL   CBC   Result Value Ref Range    WBC 8.7 4.4 - 11.3 x10*3/uL    nRBC 0.0 0.0 - 0.0 /100 WBCs    RBC 3.39 (L) 4.00 - 5.20 x10*6/uL    Hemoglobin 9.5 (L) 12.0 - 16.0 g/dL    Hematocrit 30.1 (L) 36.0 - 46.0 %    MCV 89 80 - 100 fL    MCH 28.0 26.0 - 34.0 pg    MCHC 31.6 (L) 32.0 - 36.0 g/dL    RDW 14.2 11.5 - 14.5 %    Platelets 259 150 - 450 x10*3/uL   Basic Metabolic Panel   Result Value Ref Range    Glucose 145 (H) 65 - 99 mg/dL    Sodium 136 133 - 145 mmol/L    Potassium 3.7 3.4 - 5.1 mmol/L    Chloride 100 97 - 107 mmol/L    Bicarbonate 28 24 - 31 mmol/L    Urea Nitrogen 9 8 - 25 mg/dL    Creatinine 0.70 0.40 - 1.60 mg/dL    eGFR 80 >60 mL/min/1.73m*2    Calcium 8.6 8.5 - 10.4 mg/dL    Anion Gap 8 <=19 mmol/L   POCT GLUCOSE   Result Value Ref Range    POCT Glucose 97 74 - 99 mg/dL     Assessment/Plan   Right lower leg traumatic ulceration  -Wound VAC per Podiatry  -IV Zosyn until August 9  -CT suggestive of hematoma with infection, mass not entirely excluded  -Wound cx growing Pseudomonas  -Blood cx no growth to date  -Vascular surgery saw  -Podiatry following, s/p hematoma and skin excision, bone biopsy skin graft and wound vac application, POD #3   -ID following     UTI  -Urine culture no growth     DM  -Holding Metformin   -Continue Glimepiride  -Insulin SS  -Monitor blood glucose  -A1C 7.1      Hypertension  -Continue home meds     Hypothyroidism  -Continue home meds     PVD/venous stasis  -Vascular saw, no further recs      Plan  To SNF with PICC/IV Abx. Awaiting PICC placement and dc arrangements per MIKE.      Marlyn Connelly, APRN-CNP

## 2024-07-29 NOTE — PROCEDURES
Vascular Access Team Procedure Note     Visit Date: 7/29/2024      Patient Name: Apoorva Sanchez         MRN: [id number]             Procedure Right basilic 4F single lumen PICC placed without difficulty                Lida Mireles RN  7/29/2024  1:39 PM

## 2024-07-29 NOTE — CARE PLAN
The patient's goals for the shift include      The clinical goals for the shift include safety      Problem: Diabetes  Goal: Maintain electrolyte levels within acceptable range throughout shift  Outcome: Progressing     Problem: Diabetes  Goal: Vital signs within normal range for age by end of shift  Outcome: Progressing     Problem: Skin  Goal: Prevent/manage excess moisture  Outcome: Progressing

## 2024-07-29 NOTE — PROGRESS NOTES
Physical Therapy    Physical Therapy Evaluation & Treatment    Patient Name: Apoorva Sanchez  MRN: [id number]  Today's Date: 7/29/2024   Time Calculation  Start Time: 1400  Stop Time: 1430  Time Calculation (min): 30 min    Assessment/Plan   PT Assessment  PT Assessment Results: Decreased strength, Decreased range of motion, Decreased endurance, Impaired balance, Decreased mobility, Decreased safety awareness, Impaired vision, Impaired hearing, Decreased skin integrity  Rehab Prognosis: Good  Barriers to Discharge: weakness, [place], s/p right lower leg skin graft, tibial bx and wound vac placement; lives alone  Evaluation/Treatment Tolerance: Patient limited by pain  Medical Staff Made Aware: Yes  Strengths: Support of extended family/friends, Living arrangement secure, Premorbid level of function  Barriers to Participation: Comorbidities  End of Session Communication: Bedside nurse  Assessment Comment: pt demonstrating decreased functional mobility on re-eval as compared to initial eval, s/p right lower anterior leg skin graft, tibial bx and wound vac placement 7/26/24; Will continue with same frequency 4x/wk and same goals as on initial eval. However now recommending mod int rehab post hospital d/c due to increased weakness, decreased endurance, balance and functional mobility ease/safety.  End of Session Patient Position: Up in chair, Alarm on (PT in room for ADL's.)   IP OR SWING BED PT PLAN  Inpatient or Swing Bed: Inpatient  PT Plan  Treatment/Interventions: Bed mobility, Transfer training, Gait training, Balance training, Strengthening, Endurance training, Therapeutic exercise, Therapeutic activity  PT Plan: Ongoing PT  PT Frequency: 4 times per week  PT Discharge Recommendations: Moderate intensity level of continued care  Equipment Recommended upon Discharge: Wheeled walker  PT Recommended Transfer Status: Assist x1, Assistive device (FWW)  PT - OK to Discharge: Yes      Subjective     General Visit  Information:  General  Reason for Referral: impaired mobility; s/p right anterior shin skin graft, tibial bone bx, wound vac placement 7/26/24  Referred By: Anita Harvey MD  Past Medical History Relevant to Rehab: DM, HTN, falls, pelvic fx, macular degeneration  Family/Caregiver Present: No  Co-Treatment: OT  Co-Treatment Reason: partial co-re-eval with OT to maximize pt safety  Prior to Session Communication: Bedside nurse  Patient Position Received: Bed, 4 rail up, Alarm off, not on at start of session  Preferred Learning Style: verbal, visual  General Comment: cleared by nurse for therapy; pt agreeable to therapy; + wound vac; pt seen for re-eval, s/p right lower anterior leg skin graft, tibial bone bx, wound vac placement; + decline in functional status since 7/26 sx  Home Living:  Home Living  Type of Home: House  Lives With: Alone  Home Adaptive Equipment: Cane, Walker rolling or standard  Home Layout: Two level  Alternate Level Stairs-Rails: Both  Alternate Level Stairs-Number of Steps: 13 steps up to bedroom/full bath  Home Access: Stairs to enter with rails  Entrance Stairs-Rails:  (2 posts pt holds onto)  Entrance Stairs-Number of Steps: 1  Bathroom Shower/Tub: Walk-in shower  Bathroom Toilet: Standard  Bathroom Equipment: Grab bars in shower  Bathroom Accessibility: full bath 2nd floor; 1/2 bath main floor  Prior Level of Function:  Prior Function Per Pt/Caregiver Report  Level of [place]: Independent with ADLs and functional transfers, Independent with homemaking with ambulation  Receives Help From: Family  ADL Assistance:  (adult son/.daughter live in [place] and provide transportation)  Homemaking Assistance: Independent  Ambulatory Assistance: Independent (FWW for extended distances; cane for household distances)  Vocational: Retired  Hand Dominance: Right  Prior Function Comments: pt manages her own meds; + recent fall  Precautions:  Precautions  Hearing/Visual Limitations: b/l hearing aides  donned. very [place]  LE Weight Bearing Status: Weight Bearing as Tolerated (right LE)  Medical Precautions: Fall precautions  Precautions Comment: + wound vac right anterior lower leg; off -loading to bilateral heeels when in bed ( pt refusing to wear off-loading boots per nurse Karla)  Vital Signs:  Vital Signs  Heart Rate: 66  SpO2: 100 %  Patient Position: 1 minute standing    Objective   Pain:  Pain Assessment  Pain Assessment: 0-10  Paula-Baker FACES Pain Rating: Hurts little more  Pain Type: Acute pain, Surgical pain  Pain Location: Leg  Pain Orientation: Right, Lower, Anterior  Pain Descriptors: Burning  Pain Interventions:  (WBAT right LE, FWW use, pain meds per nurse, positioning)  Cognition:  Cognition  Overall Cognitive Status: Within Functional Limits  Orientation Level: Appropriate for developmental age, Oriented X4  Cognition Comments: limitedby hearing loss  Memory: Exceptions to WFL  Safety/Judgement:  (decreased safety insight during functional mobility)  Insight: Mild    General Assessments:  General Observation  General Observation: pleasant, cooperative, NAD; + right lower leg dressing/ace wrap due to wound; + wound vac; [place]               Activity Tolerance  Endurance: Decreased tolerance for upright activites  Activity Tolerance Comments: fair    Sensation  Light Touch: No apparent deficits    Strength  Strength Comments: ricardo hips 3+/5, knees 4-/5, ankle DF 2/5, PF 2+/5  Coordination  Movements are Fluid and Coordinated: Yes  Heel to [place]: Intact (age appropriate)    Postural Control  Posture Comment: mild to mod forward head with right sidebending, protracted shldrs, mild T-spine kyphoscoliosos    Static Sitting Balance  Static Sitting-Balance Support: Feet supported  Static Sitting-Level of Assistance: Close supervision  Static Sitting-Comment/Number of Minutes: 5 to 6 min with good balance  Dynamic Sitting Balance  Dynamic Sitting-Balance Support: Feet supported  Dynamic Sitting-Comments: close  supervision with good - balance    Static Standing Balance  Static Standing-Balance Support: Bilateral upper extremity supported  Static Standing-Level of Assistance: Minimum assistance  Static Standing-Comment/Number of Minutes: 1-2 min, FWW, min assist of 1  Dynamic Standing Balance  Dynamic Standing-Balance Support: Bilateral upper extremity supported  Dynamic Standing-Comments: FWW, mod assist of 1, contact guard of 1, fair balance  Functional Assessments:  Bed Mobility  Bed Mobility: Yes  Bed Mobility 1  Bed Mobility 1: Supine to sitting  Level of Assistance 1: Minimum assistance, Minimal verbal cues  Bed Mobility Comments 1: head of bed elevated; min assist of 1 for trunk up, pelvis and right LE off of bed, verbal cues for active use of ricardo UE's    Transfers  Transfer: Yes  Transfer 1  Transfer From 1: Sit to  Transfer to 1: Stand  Technique 1: Sit to stand  Transfer Device 1: Walker  Transfer Level of Assistance 1: Moderate assistance, Moderate verbal cues  Trials/Comments 1: mod assist of 1 for trunk up from bed,m verbal cues for proper ricardo hand and foot  placement  Transfers 2  Transfer From 2: Stand to  Transfer to 2: Sit  Technique 2: Stand to sit  Transfer Device 2: Walker  Transfer Level of Assistance 2: Minimum assistance, Moderate verbal cues  Trials/Comments 2: min assist of 1 for trunk down, verbal cues for reaching back for arms of chair with both hands    Ambulation/Gait Training  Ambulation/Gait Training Performed: Yes  Ambulation/Gait Training 1  Surface 1: Level tile  Device 1: Rolling walker  Assistance 1: Moderate assistance, Contact guard, Moderate verbal cues  Quality of Gait 1: Diminished heel strike, Decreased step length, Narrow base of support, Forward flexed posture  Comments/Distance (ft) 1: pt amb 10 ft x 2 with FWW, + turns, mod assist of 1, contact guard of 2nd person, verbal cues for erect posture, increased B of S, safe step through sequencing.    Stairs  Stairs:  No  Extremity/Trunk Assessments:  Cervical Spine   Cervical Spine:  (mild to mod forward head)  RLE   RLE :  (see above strength comments)  LLE   LLE :  (see above strength comments)  Treatments:  Therapeutic Exercise  Therapeutic Exercise Performed: Yes  Therapeutic Exercise Activity 1: seated ricardo AP/heel raises x 20 reps  Therapeutic Exercise Activity 2: seated ricardo LAQ's x 15 reps  Therapeutic Exercise Activity 3: seated ricardo marching x 10 reps  Therapeutic Exercise Activity 4: seated ricardo hip abd/add x 10 reps  Therapeutic Exercise Activity 5: seated deep breathing x 2 sets of 3-5 reps  Outcome Measures:  [facility] Basic Mobility  Turning from your back to your side while in a flat bed without using bedrails: A little  Moving from lying on your back to sitting on the side of a flat bed without using bedrails: A little  Moving to and from bed to chair (including a wheelchair): A lot  Standing up from a chair using your arms (e.g. wheelchair or bedside chair): A lot  To walk in hospital room: A lot  Climbing 3-5 steps with railing: Total  Basic Mobility - Total Score: 13    Encounter Problems       Encounter Problems (Active)       Balance       STG - Maintains dynamic standing balance with upper extremity support (Progressing)       Start:  07/24/24    Expected End:  08/21/24       INTERVENTIONS:  1. Practice standing with minimal support.  2. Educate patient about standing tolerance.  3. Educate patient about independence with gait, transfers, and ADL's.  4. Educate patient about use of assistive device.  5. Educate patient about self-directed care.            Mobility       STG - Patient will ambulate 300 ft, + turns, LRAD, distant supervision of 1 (Progressing)       Start:  07/24/24    Expected End:  08/21/24            pt ascends/descends 13 steps, bilateral rails, reciprocally with distant supervision of 1 (Progressing)       Start:  07/24/24    Expected End:  08/21/24               PT Transfers       STG -  Patient to transfer to and from sit to supine mod Ind (Progressing)       Start:  07/24/24    Expected End:  08/21/24            STG - Patient will transfer sit to and from stand mod Ind (Progressing)       Start:  07/24/24    Expected End:  08/21/24                   Education Documentation  Mobility Training, taught by Shanika Chaney, PT at 7/29/2024  3:02 PM.  Learner: Patient  Readiness: Acceptance  Method: Explanation, Demonstration  Response: Needs Reinforcement, Verbalizes Understanding      Precautions, taught by Shanika Chaney, PT at 7/29/2024  3:02 PM.  Learner: Patient  Readiness: Acceptance  Method: Explanation, Demonstration  Response: Needs Reinforcement, Verbalizes Understanding    Education Comments  No comments found.

## 2024-07-29 NOTE — PROGRESS NOTES
Multiple contacts with patient due to ongoing concerns regarding lower leg injury from previous fall. Was unable to have wound accessed at  [facility] 7/23. Waited for an hour & family decided to take patient to  Urgent Care on [address] in [place].  [facility] & was admitted with a diagnosis of cellulitis. Message sent to PCP & CNP advising of update. Spoke to patient 7/28, states is doing well, no pain. Had surgical debridement 7/26 with wound vac applied. Apoorva will be on IV antibiotics till 8/8 & is scheduled for PICC line insertion today. Current discharge plans to SNF when ready. Apoorva has good family support from son & daughter who live close & goal per Apoorva, is to return to her home & continue to live independently. Will monitor progress & reach out to patient who has my contact information for additional questions/concerns.

## 2024-07-29 NOTE — CARE PLAN
The patient's goals for the shift include  rest    The clinical goals for the shift include safety, I's/O's    Problem: Diabetes  Goal: Achieve decreasing blood glucose levels by end of shift  Outcome: Progressing  Goal: Increase stability of blood glucose readings by end of shift  Outcome: Progressing  Goal: Decrease in ketones present in urine by end of shift  Outcome: Progressing  Goal: Maintain electrolyte levels within acceptable range throughout shift  Outcome: Progressing  Goal: Learn about and adhere to nutrition recommendations by end of shift  Outcome: Progressing  Goal: Vital signs within normal range for age by end of shift  Outcome: Progressing  Goal: Increase self care and/or family involovement by end of shift  Outcome: Progressing  Goal: Receive DSME education by end of shift  Outcome: Progressing     Problem: Skin  Goal: Decreased wound size/increased tissue granulation at next dressing change  Outcome: Progressing  Goal: Prevent/manage excess moisture  Outcome: Progressing  Goal: Prevent/minimize sheer/friction injuries  Outcome: Progressing  Goal: Promote/optimize nutrition  Outcome: Progressing  Goal: Promote skin healing  Outcome: Progressing     Problem: Fall/Injury  Goal: Verbalize understanding of risk factor reduction measures to prevent injury from fall in the home  Outcome: Progressing  Goal: Pace activities to prevent fatigue by end of the shift  Outcome: Progressing

## 2024-07-29 NOTE — DISCHARGE SUMMARY
Discharge Diagnosis  Cellulitis of right lower extremity    Issues Requiring Follow-Up  Leg wound    Discharge Meds     Your medication list        START taking these medications        Instructions Last Dose Given Next Dose Due   acetaminophen 325 mg tablet  Commonly known as: Tylenol      Take 2 tablets (650 mg) by mouth every 6 hours if needed for mild pain (1 - 3), headaches or fever (temp greater than 38.0 C).       dextrose 5% piggyback 50 mL with piperacillin-tazobactam 3.375 gram recon soln 3.375 g      Infuse 3.375 g into a venous catheter every 6 hours for 13 days.       oxyCODONE 5 mg immediate release tablet  Commonly known as: Roxicodone      Take 1 tablet (5 mg) by mouth every 6 hours if needed for moderate pain (4 - 6).              CHANGE how you take these medications        Instructions Last Dose Given Next Dose Due   atenolol 50 mg tablet  Commonly known as: Tenormin  Start taking on: July 30, 2024  What changed:   medication strength  how much to take      Take 1 tablet (50 mg) by mouth once daily.       metFORMIN  mg 24 hr tablet  Commonly known as: Glucophage-XR  What changed: when to take this      Take 1 tablet (500 mg) by mouth once daily in the evening. Take with meals. Do not crush, chew, or split.              CONTINUE taking these medications        Instructions Last Dose Given Next Dose Due   brimonidine 0.2 % ophthalmic solution  Commonly known as: AlphaGAN           cloNIDine 0.2 mg tablet  Commonly known as: Catapres      Take 2 tablets (0.4 mg) by mouth once daily at bedtime.       furosemide 40 mg tablet  Commonly known as: Lasix      Take 1 tablet (40 mg) by mouth once daily.       glimepiride 2 mg tablet  Commonly known as: Amaryl      Take 1 tablet (2 mg) by mouth 2 times a day with meals.       latanoprost 0.005 % ophthalmic solution  Commonly known as: Xalatan           levothyroxine 100 mcg tablet  Commonly known as: Synthroid, Levoxyl      Take 1 tablet (100 mcg) by  mouth once daily in the morning. Take before meals.       prednisoLONE acetate 1 % ophthalmic suspension  Commonly known as: Pred-Forte           simvastatin 40 mg tablet  Commonly known as: Zocor      Take 1 tablet (40 mg) by mouth once daily at bedtime.       timolol 0.5 % ophthalmic solution  Commonly known as: Timoptic                     Where to Get Your Medications        These medications were sent to [facility] - [place], [place] - [address]  [address], [facility] [address]      Phone: [phone]   acetaminophen 325 mg tablet       You can get these medications from any pharmacy    Bring a paper prescription for each of these medications  oxyCODONE 5 mg immediate release tablet       Information about where to get these medications is not yet available    Ask your nurse or doctor about these medications  atenolol 50 mg tablet  dextrose 5% piggyback 50 mL with piperacillin-tazobactam 3.375 gram recon soln 3.375 g         Test Results Pending At Discharge  Pending Labs       Order Current Status    Extra Urine Gray Tube Collected (07/23/24 1942)    Surgical Pathology Exam In process    Urinalysis with Reflex Culture and Microscopic In process            Hospital Course   Admitted for right leg wound. S/p hematoma and skin excision, bone biopsy skin graft and wound vac application per Podiatry. Wound VAC applied. Was evaluated by ID and to complete IV Zosyn until 8/9/2024. To follow-up outpatient with Podiatry and ID. Medically stable for discharge.      Pertinent Physical Exam At Time of Discharge  Physical Exam  Constitutional:       Comments: FER KINSEYT:      Head: Normocephalic and atraumatic.      Nose: Nose normal.      Mouth/Throat:      Mouth: Mucous membranes are moist.      Pharynx: Oropharynx is clear.   Eyes:      Extraocular Movements: Extraocular movements intact.      Pupils: Pupils are equal, round, and reactive to light.   Cardiovascular:      Rate and  Rhythm: Normal rate and regular rhythm.      Pulses: Normal pulses.   Pulmonary:      Effort: Pulmonary effort is normal.      Breath sounds: Normal breath sounds.   Abdominal:      General: Abdomen is flat. Bowel sounds are normal.      Palpations: Abdomen is soft.   Musculoskeletal:         General: Normal range of motion.   Skin:     General: Skin is warm and dry.      Capillary Refill: Capillary refill takes less than 2 seconds.      Comments: Wound vac dressing RLE   Neurological:      General: No focal deficit present.      Mental Status: She is oriented to person, place, and time.   Psychiatric:         Mood and Affect: Mood normal.         Outpatient Follow-Up  Future Appointments   Date Time Provider Department Center   10/1/2024  1:15 PM Man Mccracken MD [id number]         ALICIA Camargo-CNP

## 2024-07-29 NOTE — PROGRESS NOTES
Occupational Therapy    Re-Evaluation/ Treatment    Patient Name: Apoorva Sanchez  MRN: [id number]  Today's Date: 7/29/2024  Time Calculation  Start Time: 1408  Stop Time: 1449  Time Calculation (min): 41 min    Assessment  IP OT Assessment  OT Assessment: Pt seen fro re-eval, presents w/ decreased ADL skills/ functional mobility and decreased activity tolerance. REcommend OT services to address aboive deficits.  Prognosis: Good  Barriers to Discharge:  (lives alone, fall risk , bed/ bath upstairs)  Evaluation/Treatment Tolerance: Patient tolerated treatment well  Medical Staff Made Aware: Yes  End of Session Communication: Bedside nurse  End of Session Patient Position: Up in chair, Alarm on  Plan:  Treatment Interventions: ADL retraining, Functional transfer training, UE strengthening/ROM, Endurance training, Patient/family training, Equipment evaluation/education, Compensatory technique education  OT Frequency: 3 times per week  OT Discharge Recommendations: Moderate intensity level of continued care  Equipment Recommended upon Discharge: Wheeled walker  OT Recommended Transfer Status: Assist of 1  OT - OK to Discharge: Yes    Subjective   Current Problem:  1. Cellulitis of right lower extremity  Case Request Operating Room: Debridement Lower Extremity, Application Skin Graft Lower Extremity, Wound Vac Application / Change    Case Request Operating Room: Debridement Lower Extremity, Application Skin Graft Lower Extremity, Wound Vac Application / Change    Surgical Pathology Exam    Surgical Pathology Exam      2. Wound of right lower extremity, initial encounter  Vascular US ankle brachial index (MEETA) without exercise    Vascular US ankle brachial index (MEETA) without exercise    Case Request Operating Room: Debridement Lower Extremity, Application Skin Graft Lower Extremity, Wound Vac Application / Change    Case Request Operating Room: Debridement Lower Extremity, Application Skin Graft Lower Extremity, Wound Vac  Application / Change    Surgical Pathology Exam    Surgical Pathology Exam    dextrose 5% piggyback 50 mL with piperacillin-tazobactam 3.375 gram recon soln 3.375 g    CBC and Auto Differential    Comprehensive metabolic panel    atenolol (Tenormin) 50 mg tablet    acetaminophen (Tylenol) 325 mg tablet    oxyCODONE (Roxicodone) 5 mg immediate release tablet    CANCELED: Vascular US PVR without exercise    CANCELED: Vascular US PVR without exercise      3. Peripheral vascular disease (CMS-HCC)  Vascular US ankle brachial index (MEETA) without exercise    Vascular US ankle brachial index (MEETA) without exercise    CANCELED: Vascular US PVR without exercise    CANCELED: Vascular US PVR without exercise      4. Vascular insufficiency  Vascular US ankle brachial index (MEETA) without exercise    Vascular US ankle brachial index (MEETA) without exercise    CANCELED: Vascular US PVR without exercise    CANCELED: Vascular US PVR without exercise      5. Type 2 diabetes mellitus with foot ulcer (CODE) (Multi)  Vascular US ankle brachial index (MEETA) without exercise    Vascular US ankle brachial index (MEETA) without exercise    CANCELED: Vascular US PVR without exercise      6. Ulcer of right leg, with necrosis of muscle (Multi)  Case Request Operating Room: Debridement Lower Extremity, Application Skin Graft Lower Extremity, Wound Vac Application / Change    Case Request Operating Room: Debridement Lower Extremity, Application Skin Graft Lower Extremity, Wound Vac Application / Change    Surgical Pathology Exam    Surgical Pathology Exam        General:  General  Reason for Referral: decreased ADL's s/p Rt anterior shin skin graft, tibial bone bx, wound vac placement 07/26/24  Referred By: Anita Harvey MD  Past Medical History Relevant to Rehab: DM, HTN, falls, pelvic fx, macular degeneration  Family/Caregiver Present: No  Co-Treatment: PT  Co-Treatment Reason: Partial co-eval w/ PT for saafety in mobility  Prior to Session  Communication: Bedside nurse  Patient Position Received: Bed, 4 rail up, Alarm off, not on at start of session  Preferred Learning Style: verbal, visual  General Comment: Cleared to see for re-eval, pt agreeable to therapy  Precautions:  Hearing/Visual Limitations: Andrés. hearing aides donned  LE Weight Bearing Status: Weight Bearing as Tolerated  Medical Precautions: Fall precautions  Precautions Comment: + wound vacRt LE, off loading to andrés heels when in bed (pt refusing off loading boots per nurse Karla )  Vital Signs:     Pain:  Pain Assessment  Pain Assessment: 0-10  0-10 (Numeric) Pain Score: 4  Pain Type: Acute pain, Surgical pain  Pain Location: Leg  Pain Orientation: Right, Lower, Anterior  Pain Descriptors: Burning    Objective   Cognition:  Overall Cognitive Status: Within Functional Limits  Orientation Level: Oriented X4  Cognition Comments: affectec by hearing loss  Safety/Judgement:  (decreased safety awareness during mobility)  Insight: Mild           Home Living:  Type of Home: House  Lives With: Alone  Home Adaptive Equipment: Cane, Walker rolling or standard  Home Layout: Two level  Alternate Level Stairs-Rails: Both  Alternate Level Stairs-Number of Steps: 13 steps upr to bedroom/ bathroom  Home Access: Stairs to enter with rails  Entrance Stairs-Rails:  (Pt holds onto 2 posts)  Entrance Stairs-Number of Steps: 1  Bathroom Shower/Tub: Walk-in shower  Bathroom Toilet: Standard  Bathroom Equipment: Grab bars in shower  Bathroom Accessibility: full bath second floor, 1/2 bath main floor   Prior Function:  Level of Coffee: Independent with ADLs and functional transfers, Independent with homemaking with ambulation  Receives Help From: Family (adult son and dtr live nearby and provide transportation)  ADL Assistance: Independent  Homemaking Assistance: Independent  Ambulatory Assistance: Independent (FWW for extended disatnces, cane for household distances)  Vocational: Retired  IADL History:      ADL:  Eating Assistance: Stand by  Eating Deficit: Setup (per clinical judgement)  Grooming Assistance: Stand by  Grooming Deficit: Setup, Wash/dry face, Teeth care, Brushing hair  Bathing Assistance: Moderate  Bathing Deficit: Buttocks, Perineal area  UE Dressing Assistance: Minimal  UE Dressing Deficit: Thread LUE, Thread RUE, Pull around back, Fasteners (to don/ doff gown)  LE Dressing Assistance: Maximal  LE Dressing Deficit: Thread RLE into underwear, Thread LLE into underwear (to don/ doff depends)  Toileting Assistance with Device: Not performed  Functional Assistance: Not performed  ADL Comments: Pt unsteady in stance to don/ doff depends  Activity Tolerance:  Endurance: Decreased tolerance for upright activites  Activity Tolerance Comments: fair  Bed Mobility/Transfers: Bed Mobility  Bed Mobility: Yes  Bed Mobility 1  Bed Mobility 1: Supine to sitting  Level of Assistance 1: Minimum assistance  Bed Mobility Comments 1: HOB elev, assist for trunk up, legs off bed    Transfers  Transfer: Yes  Transfer 1  Transfer From 1: Sit to  Transfer to 1: Stand  Technique 1: Sit to stand  Transfer Device 1: Walker  Transfer Level of Assistance 1: Moderate assistance, Moderate verbal cues  Trials/Comments 1: Assist for trunk up, verbal cues for hand placement  Transfers 2  Transfer From 2: Stand to  Transfer to 2: Sit, Chair with arms  Technique 2: Stand to sit, Sit to stand  Transfer Device 2: Walker  Transfer Level of Assistance 2: Minimum assistance, Moderate verbal cues  Trials/Comments 2: Assist for controlled descent into chair, verbal cues for hand placement      Functional Mobility:  Functional Mobility  Functional Mobility Performed: Yes  Functional Mobility 1  Surface 1: Level tile  Device 1: Rolling walker  Assistance 1: Minimal verbal cues  Comments 1: Pt ambulated 4-6 steps forward and back around to chair w/ min. assist w/ FWW, wound vac  Modalities:     IADL's:      Vision: Vision - Basic  Assessment  Current Vision: No visual deficits  Sensation:  Light Touch: No apparent deficits  Strength:  Strength Comments: BUE's =/> 35 per functional observ.  Perception:     Coordination:  Movements are Fluid and Coordinated: Yes   Hand Function:  Hand Function  Gross Grasp: Functional  Coordination: Functional  Extremities: RUE   RUE : Within Functional Limits and LUE   LUE: Within Functional Limits    Treatment-See ADL/ transfer section    Outcome Measures: [facility] Daily Activity  Putting on and taking off regular lower body clothing: A lot  Bathing (including washing, rinsing, drying): A little  Putting on and taking off regular upper body clothing: A little  Toileting, which includes using toilet, bedpan or urinal: A lot  Taking care of personal grooming such as brushing teeth: A little  Eating Meals: A little  Daily Activity - Total Score: 16      Education Documentation  Home Exercise Program, taught by Jackie Hooker OT at 7/29/2024  4:23 PM.  Learner: Patient  Readiness: Acceptance  Method: Explanation  Response: Demonstrated Understanding, Needs Reinforcement    ADL Training, taught by Jackie Hooker OT at 7/29/2024  4:23 PM.  Learner: Patient  Readiness: Acceptance  Method: Explanation  Response: Demonstrated Understanding, Needs Reinforcement    Education Comments  No comments found.      Goals:   Encounter Problems       Encounter Problems (Active)       OT Goals       ADLs (Progressing)       Start:  07/24/24    Expected End:  07/31/24       Patient will perform ADLs with MOD I using AE/compensatory techniques as needed.          Functional Transfers (Progressing)       Start:  07/24/24    Expected End:  07/31/24       Patient will perform functional transfers including but not limited to: bed, chair, toilet with MOD I using LRD.          UE Strengthening (Progressing)       Start:  07/24/24    Expected End:  07/31/24       Patient will perform UE therapeutic exercises with supervision to improve  overall strength/activity tolerance in preparation for ADLS.

## 2024-07-29 NOTE — PROGRESS NOTES
07/29/24 1000   Discharge Planning   Expected Discharge Disposition SNF   Does the patient need discharge transport arranged? Yes   RoundTrip coordination needed? Yes   Has discharge transport been arranged? No     Plan is for [facility] upon discharge. Plan is for IV antibiotics and wound vac. Care Transitions reach out to , requesting wound vac orders to provide to the facility. Care Transitions will follow.

## 2024-07-29 NOTE — PROGRESS NOTES
07/29/24 1614   Discharge Planning   Living Arrangements Alone   Support Systems Friends/neighbors;Children   Type of Residence Private residence   Home or Post Acute Services Post acute facilities (Rehab/SNF/etc)   Type of Post Acute Facility Services Skilled nursing;Rehab   Expected Discharge Disposition SNF   Does the patient need discharge transport arranged? Yes   RoundTrip coordination needed? Yes   Has discharge transport been arranged? Yes   What day is the transport expected? 07/29/24   What time is the transport expected? 1645     Patient is being discharged to [facility] and  time for ambulance   /stretcher is 1645. Son and daughter were notified of discharge and time.

## 2024-07-29 NOTE — PROGRESS NOTES
"Nutrition Follow up Note    Nutrition Assessment      Patient had debridement and wound VAC placement to right lower extremity wound on 7/26. She is concerned about elevated blood sugar levels and states she was given insulin, which she has never had to take before. Pt requested diet education. Provided information on no concentrated sweets. Pt also informed that elevated BGs may be related to wounds.    Nutrition History:  Food and Nutrient History: Patient reports good appetite and intake. No changes from when at home. Baseline is 3 meals/day. Patient was agreeable to receive nutritional supplement last visit, will order mighty shake BID. Will continue to monitor.  Energy Intake: Good > 75 %  Food Allergies/Intolerances:  None  GI Symptoms: None  Oral Problems: None    Anthropometrics:  Ht: 170.2 cm (5' 7\"), Wt: 56.8 kg (125 lb 3.5 oz), BMI: 24.46    Weight Change:  Daily Weight  07/23/24 : 56.8 kg (125 lb 3.5 oz)  07/11/24 : 54.4 kg (120 lb)  07/01/24 : 54.4 kg (120 lb)  03/21/24 : 55.3 kg (122 lb)  09/18/23 : 57.6 kg (127 lb)  03/13/23 : 57.2 kg (126 lb)  01/18/23 : 57.6 kg (127 lb)  01/05/23 : 59.9 kg (132 lb)  09/27/22 : 58.1 kg (128 lb)  09/20/22 : 57.6 kg (127 lb)     Weight History / % Weight Change: Patient reports a UBW of 140#. States she has lost about 20# in the past 6 months d/t decreased ambulation. Per chart hx pt has maintained wt from 120-125# for the past 4 months, question accuracy of pt's report.    Nutrition Focused Physical Exam Findings: assessed 7/24/24  Subcutaneous Fat Loss  Orbital Fat Pads: Mild-Moderate (slight dark circles and slight hollowing)  Buccal Fat Pads: Mild-Moderate (flat cheeks, minimal bounce)    Muscle Wasting  Temporalis: Mild-Moderate (slight depression)    Edema  Edema: +1 trace  Edema Location: BLE, generalized    Physical Findings (Nutrition Deficiency/Toxicity)  Skin: Positive (RLE wound)    Nutrition Significant Labs:  Lab Results   Component Value Date    WBC 8.7 " 07/29/2024    HGB 9.5 (L) 07/29/2024    HCT 30.1 (L) 07/29/2024     07/29/2024    CHOL 94 (L) 03/06/2023    TRIG 72 03/06/2023    HDL 48 (L) 03/06/2023    ALT 10 07/23/2024    AST 16 07/23/2024     07/29/2024    K 3.7 07/29/2024     07/29/2024    CREATININE 0.70 07/29/2024    BUN 9 07/29/2024    CO2 28 07/29/2024    TSH 1.70 09/07/2023    INR 1.0 04/01/2020    HGBA1C 7.1 (H) 07/24/2024     Nutrition Specific Medications:  atenolol, 50 mg, oral, Daily  brimonidine, 1 drop, ophthalmic (eye), BID  cloNIDine, 0.2 mg, oral, Nightly  furosemide, 40 mg, oral, Daily  glimepiride, 2 mg, oral, BID  heparin, 5,000 Units, subcutaneous, q12h AMY  insulin lispro, 0-5 Units, subcutaneous, TID  latanoprost, 1 drop, Both Eyes, q24h  levothyroxine, 100 mcg, oral, Daily before breakfast  lidocaine, 5 mL, infiltration, Once  piperacillin-tazobactam, 3.375 g, intravenous, q6h  simvastatin, 40 mg, oral, Nightly  timolol, 1 drop, Both Eyes, BID      Dietary Orders (From admission, onward)       Start     Ordered    07/29/24 1412  Oral nutritional supplements  Until discontinued        Question Answer Comment   Deliver with All meals    Select supplement: Sugar Free Mighty Shake        07/29/24 1412    07/26/24 1107  Adult diet Regular  Diet effective now        Question:  Diet type  Answer:  Regular    07/26/24 1106                   Estimated Needs:   Estimated Energy Needs  Total Energy Estimated Needs (kCal):  (8539-6421)  Total Estimated Energy Need per Day (kCal/kg):  (30-35)  Method for Estimating Needs: actual wt    Estimated Protein Needs  Total Protein Estimated Needs (g):  (68-85)  Total Protein Estimated Needs (g/kg):  (1.2-1.5)  Method for Estimating Needs: actual wt    Estimated Fluid Needs  Total Fluid Estimated Needs (mL):  (8123-9937)  Total Fluid Estimated Needs (mL/kg):  (30-35)  Method for Estimating Needs: 1 mL/kcal        Nutrition Diagnosis   Nutrition Diagnosis:  Malnutrition Diagnosis  Patient  has Malnutrition Diagnosis: Yes  Diagnosis Status: Ongoing  Malnutrition Diagnosis: Severe malnutrition related to chronic disease or condition  As Evidenced by: mild/moderate subcutaneous fat loss and muscle wasting    Nutrition Diagnosis  Patient has Nutrition Diagnosis: Yes  Diagnosis Status (1): New  Nutrition Diagnosis 1: Food and nutrition related knowledge deficit  Related to (1): needs review of diet education  As Evidenced by (1): Requested review of diabetic diet education       Nutrition Interventions/Recommendations   Nutrition Interventions and Recommendations:    Nutrition Prescription:  Individualized Nutrition Prescription Provided for : 3556-9942 calories/day and 68-85g protein/day to be provided via diet & supplements.    Nutrition Interventions:   Food and/or Nutrient Delivery Interventions  Interventions: Meals and snacks  Meals and Snacks: Carbohydrate-modified diet  Goal: CCD 75g carb/meal per pt request  Medical Food Supplement: Commercial beverage  Goal: mighty shake TID to provide 200 kcals and 7g protein each  Additional Interventions: no concentrated sweets diet information provided to pt at this visit    Education Documentation  No documentation found.           Nutrition Monitoring and Evaluation   Monitoring/Evaluation:   Food/Nutrient Related History Monitoring  Monitoring and Evaluation Plan: Energy intake  Energy Intake: Estimated energy intake  Criteria: pt to consume >/= 75% estimated needs  Additional Plans: pt will plan meals within prescribed guidelines    Body Composition/Growth/Weight History  Monitoring and Evaluation Plan: Weight  Weight: Measured weight  Criteria: pt will maintain/gain wt       Time Spent/Follow-up:   Follow Up  Time Spent (min): 30 minutes  Last Date of Nutrition Visit: 07/29/24  Nutrition Follow-Up Needed?: 3-5 days  Follow up Comment: 8/1/24

## 2024-07-30 ENCOUNTER — PATIENT OUTREACH (OUTPATIENT)
Dept: PRIMARY CARE | Facility: CLINIC | Age: 89
End: 2024-07-30
Payer: MEDICARE

## 2024-07-30 NOTE — PROGRESS NOTES
Apoorva was admitted 7/23-29 cellulitis of rt lower extremity and discharged to [place] [facility] [id number] on piper/prudence via PICC and wound vac.  I called [place] [facility] [id number] to confirm she was there.

## 2024-08-03 ENCOUNTER — NURSING HOME VISIT (OUTPATIENT)
Dept: POST ACUTE CARE | Facility: EXTERNAL LOCATION | Age: 89
End: 2024-08-03
Payer: MEDICARE

## 2024-08-03 DIAGNOSIS — R53.1 WEAKNESS: ICD-10-CM

## 2024-08-03 DIAGNOSIS — I99.8 VASCULAR INSUFFICIENCY: ICD-10-CM

## 2024-08-03 DIAGNOSIS — Z79.4 TYPE 2 DIABETES MELLITUS WITHOUT COMPLICATION, WITH LONG-TERM CURRENT USE OF INSULIN (MULTI): ICD-10-CM

## 2024-08-03 DIAGNOSIS — L03.115 CELLULITIS OF RIGHT LOWER EXTREMITY: Primary | ICD-10-CM

## 2024-08-03 DIAGNOSIS — E11.9 TYPE 2 DIABETES MELLITUS WITHOUT COMPLICATION, WITH LONG-TERM CURRENT USE OF INSULIN (MULTI): ICD-10-CM

## 2024-08-03 DIAGNOSIS — Z91.81 AT RISK FOR FALLING: ICD-10-CM

## 2024-08-03 DIAGNOSIS — I10 HYPERTENSION, UNSPECIFIED TYPE: ICD-10-CM

## 2024-08-03 DIAGNOSIS — Z87.440 RECENT URINARY TRACT INFECTION: ICD-10-CM

## 2024-08-03 DIAGNOSIS — E03.9 HYPOTHYROIDISM, UNSPECIFIED TYPE: ICD-10-CM

## 2024-08-03 PROCEDURE — 99309 SBSQ NF CARE MODERATE MDM 30: CPT | Performed by: INTERNAL MEDICINE

## 2024-08-03 NOTE — LETTER
Patient: Apoorva Sanchez  : 1929    Encounter Date: 2024    PLACE OF SERVICE:  [facility]    This is a subsequent visit.    Subjective  Patient ID: Apoorva Sanchez is a 89 y.o. female who presents for Follow-up.    Ms. Apoorva Sanchez is a 89-year-old female with history of diabetes with recent UTI and wound to her right lower extremity.  She continues to undergo wound care and has been antibiotics.  She is unable to care for herself and requires supportive care.    Review of Systems   Constitutional:  Negative for chills and fever.   Cardiovascular:  Negative for chest pain.   All other systems reviewed and are negative.    Objective  /82   Pulse 76   Temp 36.6 °C (97.8 °F)   Resp 16     Physical Exam  Vitals reviewed.   Constitutional:       General: She is not in acute distress.     Comments: This is a well-developed, well-nourished female, sitting in a chair,   HENT:      Right Ear: Tympanic membrane, ear canal and external ear normal.      Left Ear: Tympanic membrane, ear canal and external ear normal.   Eyes:      General: No scleral icterus.     Pupils: Pupils are equal, round, and reactive to light.   Neck:      Vascular: No carotid bruit.   Cardiovascular:      Heart sounds: Normal heart sounds, S1 normal and S2 normal. No murmur heard.     No friction rub.   Pulmonary:      Effort: Pulmonary effort is normal.      Breath sounds: Normal breath sounds and air entry.   Abdominal:      Palpations: There is no hepatomegaly, splenomegaly or mass.   Musculoskeletal:         General: No swelling or deformity. Normal range of motion.      Cervical back: Neck supple.      Right lower leg: No edema.      Left lower leg: No edema.      Comments: There is dressing over the patient's right lower extremity.   Lymphadenopathy:      Cervical: No cervical adenopathy.      Upper Body:      Right upper body: No axillary adenopathy.      Left upper body: No axillary adenopathy.      Lower Body:  No right inguinal adenopathy. No left inguinal adenopathy.   Neurological:      Mental Status: She is oriented to person, place, and time.      Cranial Nerves: Cranial nerves 2-12 are intact. No cranial nerve deficit.      Sensory: No sensory deficit.      Motor: Motor function is intact. No weakness.      Gait: Gait is intact.      Deep Tendon Reflexes: Reflexes normal.   Psychiatric:         Mood and Affect: Mood normal. Mood is not anxious or depressed. Affect is not angry.         Behavior: Behavior is not agitated.         Thought Content: Thought content normal.         Judgment: Judgment normal.     LAB WORK:  Laboratory studies reviewed.    Assessment/Plan  Problem List Items Addressed This Visit             ICD-10-CM       Cardiac and Vasculature    Vascular insufficiency I99.8    HTN (hypertension) I10       Endocrine/Metabolic    Hypothyroidism E03.9    Type 2 diabetes mellitus (Multi) E11.9       Infectious Diseases    Cellulitis of right lower extremity - Primary L03.115     Other Visit Diagnoses         Codes    Recent urinary tract infection     Z87.440    Weakness     R53.1    At risk for falling     Z91.81        1. Right lower extremity wound.  Continue wound care, on antibiotic.  2. Recent UTI, finished antibiotic.  3. Diabetes, on insulin.  4. Hypertension, med controlled.  5. Hypothyroidism, on levothyroxine.  6. Vascular insufficiency, with compression wraps.  7. Weakness, on PT/OT.  8. Fall risk, on fall precaution.    Scribe Attestation  By signing my name below, IRuth Ann Scribe attest that this documentation has been prepared under the direction and in the presence of Yasmany Vera MD.     All medical record entries made by the scribe were personally dictated by me I have reviewed the chart and agree the record accurately reflects my personal performance of his history physical examination and management      Electronically Signed By: Yasmany Vera MD   8/10/24 12:15 AM

## 2024-08-07 VITALS
DIASTOLIC BLOOD PRESSURE: 82 MMHG | RESPIRATION RATE: 16 BRPM | TEMPERATURE: 97.8 F | HEART RATE: 76 BPM | SYSTOLIC BLOOD PRESSURE: 124 MMHG

## 2024-08-07 ASSESSMENT — ENCOUNTER SYMPTOMS
CHILLS: 0
FEVER: 0

## 2024-08-07 NOTE — PROGRESS NOTES
PLACE OF SERVICE:  [facility]    This is a subsequent visit.    Subjective   Patient ID: Apoorva Sanchez is a 89 y.o. female who presents for Follow-up.    Ms. Apoorva Sanchez is a 89-year-old female with history of diabetes with recent UTI and wound to her right lower extremity.  She continues to undergo wound care and has been antibiotics.  She is unable to care for herself and requires supportive care.    Review of Systems   Constitutional:  Negative for chills and fever.   Cardiovascular:  Negative for chest pain.   All other systems reviewed and are negative.    Objective   /82   Pulse 76   Temp 36.6 °C (97.8 °F)   Resp 16     Physical Exam  Vitals reviewed.   Constitutional:       General: She is not in acute distress.     Comments: This is a well-developed, well-nourished female, sitting in a chair,   HENT:      Right Ear: Tympanic membrane, ear canal and external ear normal.      Left Ear: Tympanic membrane, ear canal and external ear normal.   Eyes:      General: No scleral icterus.     Pupils: Pupils are equal, round, and reactive to light.   Neck:      Vascular: No carotid bruit.   Cardiovascular:      Heart sounds: Normal heart sounds, S1 normal and S2 normal. No murmur heard.     No friction rub.   Pulmonary:      Effort: Pulmonary effort is normal.      Breath sounds: Normal breath sounds and air entry.   Abdominal:      Palpations: There is no hepatomegaly, splenomegaly or mass.   Musculoskeletal:         General: No swelling or deformity. Normal range of motion.      Cervical back: Neck supple.      Right lower leg: No edema.      Left lower leg: No edema.      Comments: There is dressing over the patient's right lower extremity.   Lymphadenopathy:      Cervical: No cervical adenopathy.      Upper Body:      Right upper body: No axillary adenopathy.      Left upper body: No axillary adenopathy.      Lower Body: No right inguinal adenopathy. No left inguinal adenopathy.    Neurological:      Mental Status: She is oriented to person, place, and time.      Cranial Nerves: Cranial nerves 2-12 are intact. No cranial nerve deficit.      Sensory: No sensory deficit.      Motor: Motor function is intact. No weakness.      Gait: Gait is intact.      Deep Tendon Reflexes: Reflexes normal.   Psychiatric:         Mood and Affect: Mood normal. Mood is not anxious or depressed. Affect is not angry.         Behavior: Behavior is not agitated.         Thought Content: Thought content normal.         Judgment: Judgment normal.     LAB WORK:  Laboratory studies reviewed.    Assessment/Plan   Problem List Items Addressed This Visit             ICD-10-CM       Cardiac and Vasculature    Vascular insufficiency I99.8    HTN (hypertension) I10       Endocrine/Metabolic    Hypothyroidism E03.9    Type 2 diabetes mellitus (Multi) E11.9       Infectious Diseases    Cellulitis of right lower extremity - Primary L03.115     Other Visit Diagnoses         Codes    Recent urinary tract infection     Z87.440    Weakness     R53.1    At risk for falling     Z91.81        1. Right lower extremity wound.  Continue wound care, on antibiotic.  2. Recent UTI, finished antibiotic.  3. Diabetes, on insulin.  4. Hypertension, med controlled.  5. Hypothyroidism, on levothyroxine.  6. Vascular insufficiency, with compression wraps.  7. Weakness, on PT/OT.  8. Fall risk, on fall precaution.    Scribe Attestation  By signing my name below, I, Diana Coulter attest that this documentation has been prepared under the direction and in the presence of Yasmany Vera MD.     All medical record entries made by the scribe were personally dictated by me I have reviewed the chart and agree the record accurately reflects my personal performance of his history physical examination and management

## 2024-08-10 ENCOUNTER — NURSING HOME VISIT (OUTPATIENT)
Dept: POST ACUTE CARE | Facility: EXTERNAL LOCATION | Age: 89
End: 2024-08-10
Payer: MEDICARE

## 2024-08-10 DIAGNOSIS — E11.9 TYPE 2 DIABETES MELLITUS WITHOUT COMPLICATION, WITH LONG-TERM CURRENT USE OF INSULIN (MULTI): Primary | ICD-10-CM

## 2024-08-10 DIAGNOSIS — I99.8 VASCULAR INSUFFICIENCY: ICD-10-CM

## 2024-08-10 DIAGNOSIS — Z91.81 AT RISK FOR FALLING: ICD-10-CM

## 2024-08-10 DIAGNOSIS — Z79.4 TYPE 2 DIABETES MELLITUS WITHOUT COMPLICATION, WITH LONG-TERM CURRENT USE OF INSULIN (MULTI): Primary | ICD-10-CM

## 2024-08-10 DIAGNOSIS — L97.909 ULCER OF LOWER EXTREMITY, UNSPECIFIED LATERALITY, UNSPECIFIED ULCER STAGE (MULTI): ICD-10-CM

## 2024-08-10 DIAGNOSIS — L03.115 CELLULITIS OF RIGHT LOWER EXTREMITY: ICD-10-CM

## 2024-08-10 DIAGNOSIS — I73.9 PVD (PERIPHERAL VASCULAR DISEASE) (CMS-HCC): ICD-10-CM

## 2024-08-10 DIAGNOSIS — E03.9 HYPOTHYROIDISM, UNSPECIFIED TYPE: ICD-10-CM

## 2024-08-10 DIAGNOSIS — R53.1 WEAKNESS: ICD-10-CM

## 2024-08-10 PROCEDURE — 99308 SBSQ NF CARE LOW MDM 20: CPT | Performed by: INTERNAL MEDICINE

## 2024-08-10 NOTE — LETTER
Patient: Apoorva Sanchez  : 1929    Encounter Date: 08/10/2024    PLACE OF SERVICE:  [facility]    This is a subsequent visit.    Subjective  Patient ID: Apoorva Sanchez is a 89 y.o. female who presents for Follow-up.    Ms. Apoorva Sanchez is a 89-year-old female with history of diabetes and PVD.  She has been treated for cellulitis as well as lower extremity ulcers.  She continues to undergo wound care and requires supportive care.    Review of Systems   All other systems reviewed and are negative.    Objective  /82   Pulse 82   Temp 36.8 °C (98.3 °F)   Resp 16     Physical Exam  Vitals reviewed.   Constitutional:       General: She is not in acute distress.     Comments: This is a well-developed, well-nourished female, sitting in a chair   HENT:      Right Ear: Tympanic membrane, ear canal and external ear normal.      Left Ear: Tympanic membrane, ear canal and external ear normal.   Eyes:      General: No scleral icterus.     Pupils: Pupils are equal, round, and reactive to light.   Neck:      Vascular: No carotid bruit.   Cardiovascular:      Heart sounds: Normal heart sounds, S1 normal and S2 normal. No murmur heard.     No friction rub.   Pulmonary:      Effort: Pulmonary effort is normal.      Breath sounds: Normal breath sounds and air entry.   Abdominal:      Palpations: There is no hepatomegaly, splenomegaly or mass.   Musculoskeletal:         General: No swelling or deformity. Normal range of motion.      Cervical back: Neck supple.      Right lower leg: No edema.      Left lower leg: No edema.      Comments: Both lower extremities are currently wrapped.   Lymphadenopathy:      Cervical: No cervical adenopathy.      Upper Body:      Right upper body: No axillary adenopathy.      Left upper body: No axillary adenopathy.      Lower Body: No right inguinal adenopathy. No left inguinal adenopathy.   Neurological:      Mental Status: She is oriented to person, place, and time.       Cranial Nerves: Cranial nerves 2-12 are intact. No cranial nerve deficit.      Sensory: No sensory deficit.      Motor: Motor function is intact. No weakness.      Gait: Gait is intact.      Deep Tendon Reflexes: Reflexes normal.   Psychiatric:         Mood and Affect: Mood normal. Mood is not anxious or depressed. Affect is not angry.         Behavior: Behavior is not agitated.         Thought Content: Thought content normal.         Judgment: Judgment normal.     LAB WORK:  Laboratory studies reviewed.    Assessment/Plan  Problem List Items Addressed This Visit             ICD-10-CM       Cardiac and Vasculature    Vascular insufficiency I99.8       Endocrine/Metabolic    Hypothyroidism E03.9    Type 2 diabetes mellitus (Multi) - Primary E11.9       Infectious Diseases    Cellulitis of right lower extremity L03.115     Other Visit Diagnoses         Codes    PVD (peripheral vascular disease) ([facility])     I73.9    Ulcer of lower extremity, unspecified laterality, unspecified ulcer stage (Multi)     L97.909    Weakness     R53.1    At risk for falling     Z91.81        1. Diabetes, on insulin.  2. PVD, on medication.  3. Cellulitis, continue antibiotic.  4. Leg ulcers, continue wound care.  5. Vascular insufficiency, with compression wrap.  6. Hypothyroidism, on levothyroxine.  7. Weakness, on PT/OT.  8. Fall risk, fall precautions.    Scribe Attestation  By signing my name below, I, Diana Coulter attest that this documentation has been prepared under the direction and in the presence of Yasmany Vera MD.     All medical record entries made by the scribe were personally dictated by me I have reviewed the chart and agree the record accurately reflects my personal performance of his history physical examination and management      Patient was identified as a fall risk. Risk prevention instructions provided.       Electronically Signed By: Yasmany Vera MD   9/8/24 11:28 PM

## 2024-08-18 ENCOUNTER — NURSING HOME VISIT (OUTPATIENT)
Dept: POST ACUTE CARE | Facility: EXTERNAL LOCATION | Age: 89
End: 2024-08-18
Payer: MEDICARE

## 2024-08-18 DIAGNOSIS — Z91.81 AT RISK FOR FALLING: ICD-10-CM

## 2024-08-18 DIAGNOSIS — E11.9 TYPE 2 DIABETES MELLITUS WITHOUT COMPLICATION, WITH LONG-TERM CURRENT USE OF INSULIN (MULTI): Primary | ICD-10-CM

## 2024-08-18 DIAGNOSIS — Z87.440 RECENT URINARY TRACT INFECTION: ICD-10-CM

## 2024-08-18 DIAGNOSIS — I87.2 VENOUS INSUFFICIENCY: ICD-10-CM

## 2024-08-18 DIAGNOSIS — R53.1 WEAKNESS: ICD-10-CM

## 2024-08-18 DIAGNOSIS — I10 HYPERTENSION, UNSPECIFIED TYPE: ICD-10-CM

## 2024-08-18 DIAGNOSIS — L03.115 CELLULITIS OF RIGHT LOWER EXTREMITY: ICD-10-CM

## 2024-08-18 DIAGNOSIS — Z79.4 TYPE 2 DIABETES MELLITUS WITHOUT COMPLICATION, WITH LONG-TERM CURRENT USE OF INSULIN (MULTI): Primary | ICD-10-CM

## 2024-08-18 DIAGNOSIS — E03.9 HYPOTHYROIDISM, UNSPECIFIED TYPE: ICD-10-CM

## 2024-08-18 PROCEDURE — 99309 SBSQ NF CARE MODERATE MDM 30: CPT | Performed by: INTERNAL MEDICINE

## 2024-08-18 NOTE — LETTER
Patient: Apoorva Sanchez  : 1929    Encounter Date: 2024    PLACE OF SERVICE:  [facility].    This is a subsequent visit.    Subjective  Patient ID: Apoorva Sanchez is a 89 y.o. female who presents for Follow-up.    Ms. Apoorva Sanchez is a 89-year-old female with history of diabetes with cellulitis with wound to her right lower extremity.  She continues to undergo wound care and requires supportive care.    Review of Systems   Constitutional:  Negative for chills and fever.   Cardiovascular:  Negative for chest pain.   All other systems reviewed and are negative.    Objective  /80   Pulse 78   Temp 36.6 °C (97.8 °F)   Resp 16     Physical Exam  Vitals reviewed.   Constitutional:       General: She is not in acute distress.     Comments: This is a well-developed, well-nourished female, sitting in a chair.   HENT:      Right Ear: Tympanic membrane, ear canal and external ear normal.      Left Ear: Tympanic membrane, ear canal and external ear normal.   Eyes:      General: No scleral icterus.     Pupils: Pupils are equal, round, and reactive to light.   Neck:      Vascular: No carotid bruit.   Cardiovascular:      Heart sounds: Normal heart sounds, S1 normal and S2 normal. No murmur heard.     No friction rub.   Pulmonary:      Effort: Pulmonary effort is normal.      Breath sounds: Normal breath sounds and air entry.   Abdominal:      Palpations: There is no hepatomegaly, splenomegaly or mass.   Musculoskeletal:         General: No swelling or deformity. Normal range of motion.      Cervical back: Neck supple.      Right lower leg: No edema.      Left lower leg: No edema.   Lymphadenopathy:      Cervical: No cervical adenopathy.      Upper Body:      Right upper body: No axillary adenopathy.      Left upper body: No axillary adenopathy.      Lower Body: No right inguinal adenopathy. No left inguinal adenopathy.   Skin:     Comments: The patient's right lower extremity is currently  dressed.   Neurological:      Mental Status: She is oriented to person, place, and time.      Cranial Nerves: Cranial nerves 2-12 are intact. No cranial nerve deficit.      Sensory: No sensory deficit.      Motor: Motor function is intact. No weakness.      Gait: Gait is intact.      Deep Tendon Reflexes: Reflexes normal.   Psychiatric:         Mood and Affect: Mood normal. Mood is not anxious or depressed. Affect is not angry.         Behavior: Behavior is not agitated.         Thought Content: Thought content normal.         Judgment: Judgment normal.     LAB WORK:  Laboratory studies reviewed.    Assessment/Plan  Problem List Items Addressed This Visit             ICD-10-CM       Cardiac and Vasculature    HTN (hypertension) I10       Endocrine/Metabolic    Hypothyroidism E03.9    Type 2 diabetes mellitus (Multi) - Primary E11.9       Infectious Diseases    Cellulitis of right lower extremity L03.115     Other Visit Diagnoses         Codes    Venous insufficiency     I87.2    Weakness     R53.1    At risk for falling     Z91.81    Recent urinary tract infection     Z87.440        1. Diabetes, on insulin.  2. Right lower extremity wound with cellulitis, continue wound care.  3. Hypertension, med controlled.  4. Venous insufficiency with compression wrap.  5. Hypothyroidism, on levothyroxine.  6. Weakness, on PT/OT.  7. Fall risk, on fall precaution.  8. History of UTI, continue to monitor.    Scribe Attestation  By signing my name below, I, Diana Celestin attest that this documentation has been prepared under the direction and in the presence of Yasmany Vera MD.     All medical record entries made by the scribe were personally dictated by me I have reviewed the chart and agree the record accurately reflects my personal performance of his history physical examination and management      Electronically Signed By: Yasmany Vera MD   8/24/24 12:08 AM

## 2024-08-20 VITALS
DIASTOLIC BLOOD PRESSURE: 80 MMHG | SYSTOLIC BLOOD PRESSURE: 126 MMHG | RESPIRATION RATE: 16 BRPM | HEART RATE: 78 BPM | TEMPERATURE: 97.8 F

## 2024-08-20 ASSESSMENT — ENCOUNTER SYMPTOMS
CHILLS: 0
FEVER: 0

## 2024-08-20 NOTE — PROGRESS NOTES
PLACE OF SERVICE:  [facility].    This is a subsequent visit.    Subjective   Patient ID: Apoorva Sanchez is a 89 y.o. female who presents for Follow-up.    Ms. Apoorva Sanchez is a 89-year-old female with history of diabetes with cellulitis with wound to her right lower extremity.  She continues to undergo wound care and requires supportive care.    Review of Systems   Constitutional:  Negative for chills and fever.   Cardiovascular:  Negative for chest pain.   All other systems reviewed and are negative.    Objective   /80   Pulse 78   Temp 36.6 °C (97.8 °F)   Resp 16     Physical Exam  Vitals reviewed.   Constitutional:       General: She is not in acute distress.     Comments: This is a well-developed, well-nourished female, sitting in a chair.   HENT:      Right Ear: Tympanic membrane, ear canal and external ear normal.      Left Ear: Tympanic membrane, ear canal and external ear normal.   Eyes:      General: No scleral icterus.     Pupils: Pupils are equal, round, and reactive to light.   Neck:      Vascular: No carotid bruit.   Cardiovascular:      Heart sounds: Normal heart sounds, S1 normal and S2 normal. No murmur heard.     No friction rub.   Pulmonary:      Effort: Pulmonary effort is normal.      Breath sounds: Normal breath sounds and air entry.   Abdominal:      Palpations: There is no hepatomegaly, splenomegaly or mass.   Musculoskeletal:         General: No swelling or deformity. Normal range of motion.      Cervical back: Neck supple.      Right lower leg: No edema.      Left lower leg: No edema.   Lymphadenopathy:      Cervical: No cervical adenopathy.      Upper Body:      Right upper body: No axillary adenopathy.      Left upper body: No axillary adenopathy.      Lower Body: No right inguinal adenopathy. No left inguinal adenopathy.   Skin:     Comments: The patient's right lower extremity is currently dressed.   Neurological:      Mental Status: She is oriented to  person, place, and time.      Cranial Nerves: Cranial nerves 2-12 are intact. No cranial nerve deficit.      Sensory: No sensory deficit.      Motor: Motor function is intact. No weakness.      Gait: Gait is intact.      Deep Tendon Reflexes: Reflexes normal.   Psychiatric:         Mood and Affect: Mood normal. Mood is not anxious or depressed. Affect is not angry.         Behavior: Behavior is not agitated.         Thought Content: Thought content normal.         Judgment: Judgment normal.     LAB WORK:  Laboratory studies reviewed.    Assessment/Plan   Problem List Items Addressed This Visit             ICD-10-CM       Cardiac and Vasculature    HTN (hypertension) I10       Endocrine/Metabolic    Hypothyroidism E03.9    Type 2 diabetes mellitus (Multi) - Primary E11.9       Infectious Diseases    Cellulitis of right lower extremity L03.115     Other Visit Diagnoses         Codes    Venous insufficiency     I87.2    Weakness     R53.1    At risk for falling     Z91.81    Recent urinary tract infection     Z87.440        1. Diabetes, on insulin.  2. Right lower extremity wound with cellulitis, continue wound care.  3. Hypertension, med controlled.  4. Venous insufficiency with compression wrap.  5. Hypothyroidism, on levothyroxine.  6. Weakness, on PT/OT.  7. Fall risk, on fall precaution.  8. History of UTI, continue to monitor.    Scribe Attestation  By signing my name below, I, Diana Celestin attest that this documentation has been prepared under the direction and in the presence of Yasmany Vera MD.     All medical record entries made by the scribe were personally dictated by me I have reviewed the chart and agree the record accurately reflects my personal performance of his history physical examination and management

## 2024-08-24 ENCOUNTER — NURSING HOME VISIT (OUTPATIENT)
Dept: POST ACUTE CARE | Facility: EXTERNAL LOCATION | Age: 89
End: 2024-08-24
Payer: MEDICARE

## 2024-08-24 DIAGNOSIS — E46 PROTEIN-CALORIE MALNUTRITION, UNSPECIFIED SEVERITY (MULTI): ICD-10-CM

## 2024-08-24 DIAGNOSIS — I10 HYPERTENSION, UNSPECIFIED TYPE: ICD-10-CM

## 2024-08-24 DIAGNOSIS — R53.1 WEAKNESS: ICD-10-CM

## 2024-08-24 DIAGNOSIS — S81.801A WOUND OF RIGHT LEG, INITIAL ENCOUNTER: ICD-10-CM

## 2024-08-24 DIAGNOSIS — I87.2 VENOUS INSUFFICIENCY: ICD-10-CM

## 2024-08-24 DIAGNOSIS — E03.9 HYPOTHYROIDISM, UNSPECIFIED TYPE: ICD-10-CM

## 2024-08-24 DIAGNOSIS — Z79.4 TYPE 2 DIABETES MELLITUS WITHOUT COMPLICATION, WITH LONG-TERM CURRENT USE OF INSULIN (MULTI): ICD-10-CM

## 2024-08-24 DIAGNOSIS — S62.90XD CLOSED FRACTURE OF HAND WITH ROUTINE HEALING, UNSPECIFIED LATERALITY, SUBSEQUENT ENCOUNTER: Primary | ICD-10-CM

## 2024-08-24 DIAGNOSIS — E11.9 TYPE 2 DIABETES MELLITUS WITHOUT COMPLICATION, WITH LONG-TERM CURRENT USE OF INSULIN (MULTI): ICD-10-CM

## 2024-08-24 DIAGNOSIS — Z91.81 AT RISK FOR FALLING: ICD-10-CM

## 2024-08-24 PROCEDURE — 99309 SBSQ NF CARE MODERATE MDM 30: CPT | Performed by: INTERNAL MEDICINE

## 2024-08-24 NOTE — LETTER
Patient: Apoorva Sanchez  : 1929    Encounter Date: 2024    PLACE OF SERVICE:  [facility]    This is a subsequent visit.    Subjective  Patient ID: Apoorva Sanchez is a 89 y.o. female who presents for Follow-up.    Ms. Apoorva Sanchez is a 89-year-old female with history of diabetes with wound to her right lower extremity.  She does undergo wound care and is treated for cellulitis.  She also has a hand fracture.  It is currently splinted.    Review of Systems   Constitutional:  Negative for chills and fever.   Cardiovascular:  Negative for chest pain.   All other systems reviewed and are negative.    Objective  /78   Pulse 74   Temp 36.5 °C (97.7 °F)   Resp 16     Physical Exam  Vitals reviewed.   Constitutional:       General: She is not in acute distress.     Comments: This is a well-developed, elderly female, sitting in a chair.   HENT:      Right Ear: Tympanic membrane, ear canal and external ear normal.      Left Ear: Tympanic membrane, ear canal and external ear normal.   Eyes:      General: No scleral icterus.     Pupils: Pupils are equal, round, and reactive to light.   Neck:      Vascular: No carotid bruit.   Cardiovascular:      Heart sounds: Normal heart sounds, S1 normal and S2 normal. No murmur heard.     No friction rub.   Pulmonary:      Effort: Pulmonary effort is normal.      Breath sounds: Normal breath sounds and air entry.   Abdominal:      Palpations: There is no hepatomegaly, splenomegaly or mass.   Musculoskeletal:         General: No swelling or deformity. Normal range of motion.      Cervical back: Neck supple.      Right lower leg: No edema.      Left lower leg: No edema.      Comments: The patient's hand is casted.  Right lower extremity shows wound care employed.  There is some surrounding erythema.   Lymphadenopathy:      Cervical: No cervical adenopathy.      Upper Body:      Right upper body: No axillary adenopathy.      Left upper body: No axillary  adenopathy.      Lower Body: No right inguinal adenopathy. No left inguinal adenopathy.   Neurological:      Mental Status: She is oriented to person, place, and time.      Cranial Nerves: Cranial nerves 2-12 are intact. No cranial nerve deficit.      Sensory: No sensory deficit.      Motor: Motor function is intact. No weakness.      Gait: Gait is intact.      Deep Tendon Reflexes: Reflexes normal.   Psychiatric:         Mood and Affect: Mood normal. Mood is not anxious or depressed. Affect is not angry.         Behavior: Behavior is not agitated.         Thought Content: Thought content normal.         Judgment: Judgment normal.     LAB WORK: Laboratory studies reviewed.    Assessment/Plan  Problem List Items Addressed This Visit             ICD-10-CM       Cardiac and Vasculature    HTN (hypertension) I10       Endocrine/Metabolic    Hypothyroidism E03.9    Type 2 diabetes mellitus (Multi) E11.9       Infectious Diseases    Cellulitis of right lower extremity L03.115     Other Visit Diagnoses         Codes    Closed fracture of hand with routine healing, unspecified laterality, subsequent encounter    -  Primary S62.90XD    Venous insufficiency     I87.2    Weakness     R53.1    At risk for falling     Z91.81        1. Hand fracture, splinted, on pain control.  2. Diabetes, on insulin.  3. Right lower extremity wound, continue wound care.  4. Venous insufficiency, with compression wrap.  5. Hypothyroidism, on levothyroxine.  6. Hypertension, med controlled.  7. Weakness, on PT/OT.  8. Fall risk, on fall precaution.    Scribe Attestation  By signing my name below, IRuth Ann Scribe attest that this documentation has been prepared under the direction and in the presence of Yasmany Vera MD.     All medical record entries made by the scribe were personally dictated by me I have reviewed the chart and agree the record accurately reflects my personal performance of his history physical examination and  management      Electronically Signed By: Yasmany Vera MD   8/26/24 11:47 PM

## 2024-08-26 VITALS
HEART RATE: 74 BPM | SYSTOLIC BLOOD PRESSURE: 126 MMHG | TEMPERATURE: 97.7 F | RESPIRATION RATE: 16 BRPM | DIASTOLIC BLOOD PRESSURE: 78 MMHG

## 2024-08-26 PROBLEM — E46 PROTEIN-CALORIE MALNUTRITION, UNSPECIFIED SEVERITY (MULTI): Status: ACTIVE | Noted: 2024-08-26

## 2024-08-26 ASSESSMENT — ENCOUNTER SYMPTOMS
CHILLS: 0
FEVER: 0

## 2024-08-26 NOTE — PROGRESS NOTES
PLACE OF SERVICE:  [facility]    This is a subsequent visit.    Subjective   Patient ID: Apoorva Sanchez is a 89 y.o. female who presents for Follow-up.    Ms. Apoorva Sanchez is a 89-year-old female with history of diabetes with wound to her right lower extremity.  She does undergo wound care and is treated for cellulitis.  She also has a hand fracture.  It is currently splinted.    Review of Systems   Constitutional:  Negative for chills and fever.   Cardiovascular:  Negative for chest pain.   All other systems reviewed and are negative.    Objective   /78   Pulse 74   Temp 36.5 °C (97.7 °F)   Resp 16     Physical Exam  Vitals reviewed.   Constitutional:       General: She is not in acute distress.     Comments: This is a well-developed, elderly female, sitting in a chair.   HENT:      Right Ear: Tympanic membrane, ear canal and external ear normal.      Left Ear: Tympanic membrane, ear canal and external ear normal.   Eyes:      General: No scleral icterus.     Pupils: Pupils are equal, round, and reactive to light.   Neck:      Vascular: No carotid bruit.   Cardiovascular:      Heart sounds: Normal heart sounds, S1 normal and S2 normal. No murmur heard.     No friction rub.   Pulmonary:      Effort: Pulmonary effort is normal.      Breath sounds: Normal breath sounds and air entry.   Abdominal:      Palpations: There is no hepatomegaly, splenomegaly or mass.   Musculoskeletal:         General: No swelling or deformity. Normal range of motion.      Cervical back: Neck supple.      Right lower leg: No edema.      Left lower leg: No edema.      Comments: The patient's hand is casted.  Right lower extremity shows wound care employed.  There is some surrounding erythema.   Lymphadenopathy:      Cervical: No cervical adenopathy.      Upper Body:      Right upper body: No axillary adenopathy.      Left upper body: No axillary adenopathy.      Lower Body: No right inguinal adenopathy. No left  inguinal adenopathy.   Neurological:      Mental Status: She is oriented to person, place, and time.      Cranial Nerves: Cranial nerves 2-12 are intact. No cranial nerve deficit.      Sensory: No sensory deficit.      Motor: Motor function is intact. No weakness.      Gait: Gait is intact.      Deep Tendon Reflexes: Reflexes normal.   Psychiatric:         Mood and Affect: Mood normal. Mood is not anxious or depressed. Affect is not angry.         Behavior: Behavior is not agitated.         Thought Content: Thought content normal.         Judgment: Judgment normal.     LAB WORK: Laboratory studies reviewed.    Assessment/Plan   Problem List Items Addressed This Visit             ICD-10-CM       Cardiac and Vasculature    HTN (hypertension) I10       Endocrine/Metabolic    Hypothyroidism E03.9    Type 2 diabetes mellitus (Multi) E11.9       Infectious Diseases    Cellulitis of right lower extremity L03.115     Other Visit Diagnoses         Codes    Closed fracture of hand with routine healing, unspecified laterality, subsequent encounter    -  Primary S62.90XD    Venous insufficiency     I87.2    Weakness     R53.1    At risk for falling     Z91.81        1. Hand fracture, splinted, on pain control.  2. Diabetes, on insulin.  3. Right lower extremity wound, continue wound care.  4. Venous insufficiency, with compression wrap.  5. Hypothyroidism, on levothyroxine.  6. Hypertension, med controlled.  7. Weakness, on PT/OT.  8. Fall risk, on fall precaution.    Scribe Attestation  By signing my name below, IRuth Ann Scribe attest that this documentation has been prepared under the direction and in the presence of Yasmany Vera MD.     All medical record entries made by the scribe were personally dictated by me I have reviewed the chart and agree the record accurately reflects my personal performance of his history physical examination and management

## 2024-08-27 ENCOUNTER — DOCUMENTATION (OUTPATIENT)
Dept: PRIMARY CARE | Facility: CLINIC | Age: 89
End: 2024-08-27
Payer: MEDICARE

## 2024-08-27 NOTE — PROGRESS NOTES
Per review of [facility] notes & medical records, patient remains in [facility] following inpatient admit 7/23 - 7/29 for right lower leg wound with cellulitis. Unable to complete monthly outreach & will continue to monitor patient's progress.

## 2024-08-30 ENCOUNTER — PATIENT OUTREACH (OUTPATIENT)
Dept: PRIMARY CARE | Facility: CLINIC | Age: 89
End: 2024-08-30
Payer: MEDICARE

## 2024-09-01 ENCOUNTER — NURSING HOME VISIT (OUTPATIENT)
Dept: POST ACUTE CARE | Facility: EXTERNAL LOCATION | Age: 89
End: 2024-09-01
Payer: MEDICARE

## 2024-09-01 DIAGNOSIS — Z79.4 TYPE 2 DIABETES MELLITUS WITHOUT COMPLICATION, WITH LONG-TERM CURRENT USE OF INSULIN (MULTI): ICD-10-CM

## 2024-09-01 DIAGNOSIS — S81.801A WOUND OF RIGHT LEG, INITIAL ENCOUNTER: ICD-10-CM

## 2024-09-01 DIAGNOSIS — I87.2 VENOUS INSUFFICIENCY: ICD-10-CM

## 2024-09-01 DIAGNOSIS — R53.1 WEAKNESS: ICD-10-CM

## 2024-09-01 DIAGNOSIS — E11.9 TYPE 2 DIABETES MELLITUS WITHOUT COMPLICATION, WITH LONG-TERM CURRENT USE OF INSULIN (MULTI): ICD-10-CM

## 2024-09-01 DIAGNOSIS — E03.9 HYPOTHYROIDISM, UNSPECIFIED TYPE: ICD-10-CM

## 2024-09-01 DIAGNOSIS — L03.115 CELLULITIS OF RIGHT LOWER EXTREMITY: ICD-10-CM

## 2024-09-01 DIAGNOSIS — S62.90XD CLOSED FRACTURE OF HAND WITH ROUTINE HEALING, UNSPECIFIED LATERALITY, SUBSEQUENT ENCOUNTER: Primary | ICD-10-CM

## 2024-09-01 DIAGNOSIS — Z91.81 AT RISK FOR FALLING: ICD-10-CM

## 2024-09-01 DIAGNOSIS — I10 HYPERTENSION, UNSPECIFIED TYPE: ICD-10-CM

## 2024-09-01 PROCEDURE — 99308 SBSQ NF CARE LOW MDM 20: CPT | Performed by: INTERNAL MEDICINE

## 2024-09-01 NOTE — LETTER
Patient: Apoorva Sanchez  : 1929    Encounter Date: 2024    PLACE OF SERVICE:  [facility]    This is a subsequent visit.    Subjective  Patient ID: Apoorva Sanchez is a 89 y.o. female who presents for Follow-up.    Ms. Apoorva Sanchez is a 89-year-old female with history of diabetes and wounds to her lower extremity.  She has had a fall and developed hand fracture.  She is currently splinted, on pain control, requires supportive care.    Review of Systems   Constitutional:  Negative for chills and fever.   Cardiovascular:  Negative for chest pain.   All other systems reviewed and are negative.    Objective  /78   Pulse 76   Temp 36.7 °C (98 °F)   Resp 16     Physical Exam  Vitals reviewed.   Constitutional:       General: She is not in acute distress.     Comments: This is a well-developed, well-nourished female, sitting in a chair,   HENT:      Right Ear: Tympanic membrane, ear canal and external ear normal.      Left Ear: Tympanic membrane, ear canal and external ear normal.   Eyes:      General: No scleral icterus.     Pupils: Pupils are equal, round, and reactive to light.   Neck:      Vascular: No carotid bruit.   Cardiovascular:      Heart sounds: Normal heart sounds, S1 normal and S2 normal. No murmur heard.     No friction rub.   Pulmonary:      Effort: Pulmonary effort is normal.      Breath sounds: Normal breath sounds and air entry.   Abdominal:      Palpations: There is no hepatomegaly, splenomegaly or mass.   Musculoskeletal:         General: No swelling or deformity. Normal range of motion.      Cervical back: Neck supple.      Right lower leg: No edema.      Left lower leg: No edema.      Comments: The patient is wearing a splint to her hand.  Legs are both currently dressed.   Lymphadenopathy:      Cervical: No cervical adenopathy.      Upper Body:      Right upper body: No axillary adenopathy.      Left upper body: No axillary adenopathy.      Lower Body: No right  inguinal adenopathy. No left inguinal adenopathy.   Neurological:      Mental Status: She is oriented to person, place, and time.      Cranial Nerves: Cranial nerves 2-12 are intact. No cranial nerve deficit.      Sensory: No sensory deficit.      Motor: Motor function is intact. No weakness.      Gait: Gait is intact.      Deep Tendon Reflexes: Reflexes normal.   Psychiatric:         Mood and Affect: Mood normal. Mood is not anxious or depressed. Affect is not angry.         Behavior: Behavior is not agitated.         Thought Content: Thought content normal.         Judgment: Judgment normal.     LAB WORK:  Laboratory studies reviewed.    Assessment/Plan  Problem List Items Addressed This Visit             ICD-10-CM       Cardiac and Vasculature    HTN (hypertension) I10       Endocrine/Metabolic    Hypothyroidism E03.9    Type 2 diabetes mellitus (Multi) E11.9       Infectious Diseases    Cellulitis of right lower extremity L03.115     Other Visit Diagnoses         Codes    Closed fracture of hand with routine healing, unspecified laterality, subsequent encounter    -  Primary S62.90XD    Wound of right leg, initial encounter     S81.801A    Venous insufficiency     I87.2    Weakness     R53.1    At risk for falling     Z91.81        1. Hand fracture, splinted, on pain control.  2. Leg wound, continue wound care.  3. Venous insufficiency, with compression wrap.  4. Diabetes, on insulin.  5. Hypertension, med controlled.  6. Hypothyroidism, on levothyroxine.  7. Recent cellulitis, continue to monitor.  8. Weakness, on PT/OT.  9. Fall risk, on fall precaution.    Scribe Attestation  By signing my name below, IRuth Ann Scribe attest that this documentation has been prepared under the direction and in the presence of Yasmany Vera MD     All medical record entries made by the scribe were personally dictated by me I have reviewed the chart and agree the record accurately reflects my personal performance of his  history physical examination and management      Electronically Signed By: Yasmany Vera MD   9/8/24 11:25 PM

## 2024-09-06 VITALS
HEART RATE: 76 BPM | DIASTOLIC BLOOD PRESSURE: 78 MMHG | TEMPERATURE: 98 F | SYSTOLIC BLOOD PRESSURE: 126 MMHG | RESPIRATION RATE: 16 BRPM

## 2024-09-06 VITALS
RESPIRATION RATE: 16 BRPM | TEMPERATURE: 98.3 F | DIASTOLIC BLOOD PRESSURE: 82 MMHG | SYSTOLIC BLOOD PRESSURE: 128 MMHG | HEART RATE: 82 BPM

## 2024-09-06 ASSESSMENT — ENCOUNTER SYMPTOMS
FEVER: 0
CHILLS: 0

## 2024-09-07 NOTE — PROGRESS NOTES
PLACE OF SERVICE:  [facility]    This is a subsequent visit.    Subjective   Patient ID: Apoorva Sanchez is a 89 y.o. female who presents for Follow-up.    Ms. Apoorva Sanchez is a 89-year-old female with history of diabetes and wounds to her lower extremity.  She has had a fall and developed hand fracture.  She is currently splinted, on pain control, requires supportive care.    Review of Systems   Constitutional:  Negative for chills and fever.   Cardiovascular:  Negative for chest pain.   All other systems reviewed and are negative.    Objective   /78   Pulse 76   Temp 36.7 °C (98 °F)   Resp 16     Physical Exam  Vitals reviewed.   Constitutional:       General: She is not in acute distress.     Comments: This is a well-developed, well-nourished female, sitting in a chair,   HENT:      Right Ear: Tympanic membrane, ear canal and external ear normal.      Left Ear: Tympanic membrane, ear canal and external ear normal.   Eyes:      General: No scleral icterus.     Pupils: Pupils are equal, round, and reactive to light.   Neck:      Vascular: No carotid bruit.   Cardiovascular:      Heart sounds: Normal heart sounds, S1 normal and S2 normal. No murmur heard.     No friction rub.   Pulmonary:      Effort: Pulmonary effort is normal.      Breath sounds: Normal breath sounds and air entry.   Abdominal:      Palpations: There is no hepatomegaly, splenomegaly or mass.   Musculoskeletal:         General: No swelling or deformity. Normal range of motion.      Cervical back: Neck supple.      Right lower leg: No edema.      Left lower leg: No edema.      Comments: The patient is wearing a splint to her hand.  Legs are both currently dressed.   Lymphadenopathy:      Cervical: No cervical adenopathy.      Upper Body:      Right upper body: No axillary adenopathy.      Left upper body: No axillary adenopathy.      Lower Body: No right inguinal adenopathy. No left inguinal adenopathy.   Neurological:       Mental Status: She is oriented to person, place, and time.      Cranial Nerves: Cranial nerves 2-12 are intact. No cranial nerve deficit.      Sensory: No sensory deficit.      Motor: Motor function is intact. No weakness.      Gait: Gait is intact.      Deep Tendon Reflexes: Reflexes normal.   Psychiatric:         Mood and Affect: Mood normal. Mood is not anxious or depressed. Affect is not angry.         Behavior: Behavior is not agitated.         Thought Content: Thought content normal.         Judgment: Judgment normal.     LAB WORK:  Laboratory studies reviewed.    Assessment/Plan   Problem List Items Addressed This Visit             ICD-10-CM       Cardiac and Vasculature    HTN (hypertension) I10       Endocrine/Metabolic    Hypothyroidism E03.9    Type 2 diabetes mellitus (Multi) E11.9       Infectious Diseases    Cellulitis of right lower extremity L03.115     Other Visit Diagnoses         Codes    Closed fracture of hand with routine healing, unspecified laterality, subsequent encounter    -  Primary S62.90XD    Wound of right leg, initial encounter     S81.801A    Venous insufficiency     I87.2    Weakness     R53.1    At risk for falling     Z91.81        1. Hand fracture, splinted, on pain control.  2. Leg wound, continue wound care.  3. Venous insufficiency, with compression wrap.  4. Diabetes, on insulin.  5. Hypertension, med controlled.  6. Hypothyroidism, on levothyroxine.  7. Recent cellulitis, continue to monitor.  8. Weakness, on PT/OT.  9. Fall risk, on fall precaution.    Scribe Attestation  By signing my name below, Ruth Ann COOPER Scribe attest that this documentation has been prepared under the direction and in the presence of Yasmany Vera MD     All medical record entries made by the scribe were personally dictated by me I have reviewed the chart and agree the record accurately reflects my personal performance of his history physical examination and management

## 2024-09-07 NOTE — PROGRESS NOTES
PLACE OF SERVICE:  [facility]    This is a subsequent visit.    Subjective   Patient ID: Apoorva Sanchez is a 89 y.o. female who presents for Follow-up.    Ms. Apoorva Sanchez is a 89-year-old female with history of diabetes and PVD.  She has been treated for cellulitis as well as lower extremity ulcers.  She continues to undergo wound care and requires supportive care.    Review of Systems   All other systems reviewed and are negative.    Objective   /82   Pulse 82   Temp 36.8 °C (98.3 °F)   Resp 16     Physical Exam  Vitals reviewed.   Constitutional:       General: She is not in acute distress.     Comments: This is a well-developed, well-nourished female, sitting in a chair   HENT:      Right Ear: Tympanic membrane, ear canal and external ear normal.      Left Ear: Tympanic membrane, ear canal and external ear normal.   Eyes:      General: No scleral icterus.     Pupils: Pupils are equal, round, and reactive to light.   Neck:      Vascular: No carotid bruit.   Cardiovascular:      Heart sounds: Normal heart sounds, S1 normal and S2 normal. No murmur heard.     No friction rub.   Pulmonary:      Effort: Pulmonary effort is normal.      Breath sounds: Normal breath sounds and air entry.   Abdominal:      Palpations: There is no hepatomegaly, splenomegaly or mass.   Musculoskeletal:         General: No swelling or deformity. Normal range of motion.      Cervical back: Neck supple.      Right lower leg: No edema.      Left lower leg: No edema.      Comments: Both lower extremities are currently wrapped.   Lymphadenopathy:      Cervical: No cervical adenopathy.      Upper Body:      Right upper body: No axillary adenopathy.      Left upper body: No axillary adenopathy.      Lower Body: No right inguinal adenopathy. No left inguinal adenopathy.   Neurological:      Mental Status: She is oriented to person, place, and time.      Cranial Nerves: Cranial nerves 2-12 are intact. No cranial nerve  deficit.      Sensory: No sensory deficit.      Motor: Motor function is intact. No weakness.      Gait: Gait is intact.      Deep Tendon Reflexes: Reflexes normal.   Psychiatric:         Mood and Affect: Mood normal. Mood is not anxious or depressed. Affect is not angry.         Behavior: Behavior is not agitated.         Thought Content: Thought content normal.         Judgment: Judgment normal.     LAB WORK:  Laboratory studies reviewed.    Assessment/Plan   Problem List Items Addressed This Visit             ICD-10-CM       Cardiac and Vasculature    Vascular insufficiency I99.8       Endocrine/Metabolic    Hypothyroidism E03.9    Type 2 diabetes mellitus (Multi) - Primary E11.9       Infectious Diseases    Cellulitis of right lower extremity L03.115     Other Visit Diagnoses         Codes    PVD (peripheral vascular disease) ([facility])     I73.9    Ulcer of lower extremity, unspecified laterality, unspecified ulcer stage (Multi)     L97.909    Weakness     R53.1    At risk for falling     Z91.81        1. Diabetes, on insulin.  2. PVD, on medication.  3. Cellulitis, continue antibiotic.  4. Leg ulcers, continue wound care.  5. Vascular insufficiency, with compression wrap.  6. Hypothyroidism, on levothyroxine.  7. Weakness, on PT/OT.  8. Fall risk, fall precautions.    Scribe Attestation  By signing my name below, I, Diana Coulter attest that this documentation has been prepared under the direction and in the presence of Yasmany Vera MD.     All medical record entries made by the scribe were personally dictated by me I have reviewed the chart and agree the record accurately reflects my personal performance of his history physical examination and management

## 2024-09-09 ENCOUNTER — PATIENT OUTREACH (OUTPATIENT)
Dept: PRIMARY CARE | Facility: CLINIC | Age: 89
End: 2024-09-09
Payer: MEDICARE

## 2024-09-09 ENCOUNTER — DOCUMENTATION (OUTPATIENT)
Dept: PRIMARY CARE | Facility: CLINIC | Age: 89
End: 2024-09-09
Payer: MEDICARE

## 2024-09-09 NOTE — PATIENT INSTRUCTIONS

## 2024-09-09 NOTE — PROGRESS NOTES
Pt had hospital admission from 7/23-7/29 for leg wound- was discharged to [place] [facility] [address] and discharged to home on 9/5/24. Will reach out for TCM.

## 2024-09-09 NOTE — PROGRESS NOTES
Discharge facility: [facility] [address]  Discharge diagnosis: Rt leg wound, cellulitis, fall with fx thumb  Admission date: 7/29  Discharge date: 9/5    PCP Appointment Date: to be scheduled  Specialist Appointment Date: Podaitrist 9/5 and again 9/12 weekly  Hospital Encounter and Summary: Linked   See Discharge assessment below for further details    Engagement  Call Start Time: 1419 (9/9/2024  2:20 PM)    Medications  Medications reviewed with patient/caregiver?: Yes (9/9/2024  2:20 PM)  Is the patient having any side effects they believe may be caused by any medication additions or changes?: No (9/9/2024  2:20 PM)  Does the patient have all medications ordered at discharge?: Yes (9/9/2024  2:20 PM)  Care Management Interventions: No intervention needed; Provided patient education (9/9/2024  2:20 PM)  Prescription Comments: No changes in meds had been made- has all meds, states compliance (9/9/2024  2:20 PM)  Is the patient taking all medications as directed (includes completed medication regime)?: Yes (9/9/2024  2:20 PM)  Care Management Interventions: Provided patient education (9/9/2024  2:20 PM)  Follow Up Tasks: -- (NA) (9/9/2024  2:20 PM)    Appointments  Does the patient have a primary care provider?: Yes (9/9/2024  2:20 PM)  Care Management Interventions: Educated patient on importance of making appointment (9/9/2024  2:20 PM)  Has the patient kept scheduled appointments due by today?: Yes (9/9/2024  2:20 PM)  Care Management Interventions: Educated on importance of keeping appointment (9/9/2024  2:20 PM)  Follow Up Tasks: Appointments (9/9/2024  2:20 PM)    Self Management  What is the home health agency?: [facility] (9/9/2024  2:20 PM)  Has home health visited the patient within 72 hours of discharge?: Yes (9/9/2024  2:20 PM)  Home Health Interventions: -- (NA) (9/9/2024  2:20 PM)  What Durable Medical Equipment (DME) was ordered?: Patient states she has all equipment she nbeeds at this time. (9/9/2024  2:20  PM)  Has all Durable Medical Equipment (DME) been delivered?: -- (NA) (9/9/2024  2:20 PM)  Follow Up Tasks: -- (NA) (9/9/2024  2:20 PM)    Patient Teaching  Does the patient have access to their discharge instructions?: Yes (9/9/2024  2:20 PM)  Care Management Interventions: Reviewed instructions with patient (9/9/2024  2:20 PM)  What is the patient's perception of their health status since discharge?: Improving (9/9/2024  2:20 PM)  Is the patient/caregiver able to teach back the hierarchy of who to call/visit for symptoms/problems? PCP, Specialist, Home Health nurse, Urgent Care, ED, 911: Yes (9/9/2024  2:20 PM)  If the patient is a current smoker, are they able to teach back resources for cessation?: -- (NA) (9/9/2024  2:20 PM)    Wrap Up  Is the patient/caregiver familiar with Advance Care Planning?: Yes (9/9/2024  2:20 PM)  Would the patient like more information on Advance Care Planning?: No (9/9/2024  2:20 PM)  Call End Time: 1439 (9/9/2024  2:20 PM)      Patient states she is independent with her would care to Rt lower leg, does follow up with podiatrist who is managing wound weekly.

## 2024-09-18 DIAGNOSIS — I10 PRIMARY HYPERTENSION: ICD-10-CM

## 2024-09-19 RX ORDER — CLONIDINE HYDROCHLORIDE 0.2 MG/1
0.2 TABLET ORAL 2 TIMES DAILY
Qty: 180 TABLET | Refills: 3 | Status: SHIPPED | OUTPATIENT
Start: 2024-09-19

## 2024-09-19 RX ORDER — ATENOLOL 100 MG/1
100 TABLET ORAL DAILY
Qty: 90 TABLET | Refills: 3 | Status: SHIPPED | OUTPATIENT
Start: 2024-09-19

## 2024-09-23 ENCOUNTER — PATIENT OUTREACH (OUTPATIENT)
Dept: PRIMARY CARE | Facility: CLINIC | Age: 89
End: 2024-09-23
Payer: MEDICARE

## 2024-09-23 DIAGNOSIS — E11.69 TYPE 2 DIABETES MELLITUS WITH OTHER SPECIFIED COMPLICATION, WITHOUT LONG-TERM CURRENT USE OF INSULIN: ICD-10-CM

## 2024-09-23 DIAGNOSIS — I10 PRIMARY HYPERTENSION: ICD-10-CM

## 2024-09-23 NOTE — PROGRESS NOTES
Contacted patient for monthly outreach & follow up after inpatient admit in July for cellulitis with discharge to SNF. Was discharged home with C provided by [facility] on 9/5 & continues with services 2x/week. Apoorva reports is doing fairly well & is seen by Podiatry 1x/week for ongoing wound care. States now dressing changes are every other day which is more tolerable. Notes some pain in leg after debridement but is able to bear weight & ambulate with assistance of walker. Daughter does shopping for patient & son provides transportation to appointments. States remains independent in ADL's & is now able to shower. Confirmed next appointment with PCP on 10/1 @ 1:15 pm. No falls & reports she is very careful with all daily activities. Apoorva is taking all medications as prescribed & knows to contact physician for changes in current health status. Agreeable to monthly outreach & has contact information for this  for additional questions/concerns.

## 2024-10-01 ENCOUNTER — APPOINTMENT (OUTPATIENT)
Dept: PRIMARY CARE | Facility: CLINIC | Age: 89
End: 2024-10-01
Payer: MEDICARE

## 2024-10-22 ENCOUNTER — OFFICE VISIT (OUTPATIENT)
Dept: WOUND CARE | Facility: HOSPITAL | Age: 89
End: 2024-10-22
Payer: MEDICARE

## 2024-10-22 PROCEDURE — 11045 DBRDMT SUBQ TISS EACH ADDL: CPT | Mod: RT

## 2024-10-22 PROCEDURE — 11042 DBRDMT SUBQ TIS 1ST 20SQCM/<: CPT | Mod: RT

## 2024-10-24 ENCOUNTER — APPOINTMENT (OUTPATIENT)
Dept: RADIOLOGY | Facility: HOSPITAL | Age: 89
End: 2024-10-24
Payer: MEDICARE

## 2024-10-24 ENCOUNTER — HOSPITAL ENCOUNTER (EMERGENCY)
Facility: HOSPITAL | Age: 89
Discharge: HOME | End: 2024-10-25
Attending: STUDENT IN AN ORGANIZED HEALTH CARE EDUCATION/TRAINING PROGRAM
Payer: MEDICARE

## 2024-10-24 VITALS
HEIGHT: 64 IN | HEART RATE: 58 BPM | BODY MASS INDEX: 22.06 KG/M2 | TEMPERATURE: 97.8 F | RESPIRATION RATE: 14 BRPM | SYSTOLIC BLOOD PRESSURE: 131 MMHG | OXYGEN SATURATION: 98 % | DIASTOLIC BLOOD PRESSURE: 53 MMHG | WEIGHT: 129.19 LBS
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
## 2024-10-24 DIAGNOSIS — R10.31 RIGHT GROIN PAIN: Primary | ICD-10-CM

## 2024-10-24 PROCEDURE — 99283 EMERGENCY DEPT VISIT LOW MDM: CPT | Performed by: STUDENT IN AN ORGANIZED HEALTH CARE EDUCATION/TRAINING PROGRAM

## 2024-10-24 PROCEDURE — 73502 X-RAY EXAM HIP UNI 2-3 VIEWS: CPT | Mod: RT

## 2024-10-24 PROCEDURE — 73502 X-RAY EXAM HIP UNI 2-3 VIEWS: CPT | Mod: RIGHT SIDE | Performed by: RADIOLOGY

## 2024-10-24 ASSESSMENT — COLUMBIA-SUICIDE SEVERITY RATING SCALE - C-SSRS
6. HAVE YOU EVER DONE ANYTHING, STARTED TO DO ANYTHING, OR PREPARED TO DO ANYTHING TO END YOUR LIFE?: NO
2. HAVE YOU ACTUALLY HAD ANY THOUGHTS OF KILLING YOURSELF?: NO
1. IN THE PAST MONTH, HAVE YOU WISHED YOU WERE DEAD OR WISHED YOU COULD GO TO SLEEP AND NOT WAKE UP?: NO

## 2024-10-24 ASSESSMENT — PAIN - FUNCTIONAL ASSESSMENT: PAIN_FUNCTIONAL_ASSESSMENT: 0-10

## 2024-10-24 ASSESSMENT — PAIN SCALES - GENERAL: PAINLEVEL_OUTOF10: 0 - NO PAIN

## 2024-10-25 ENCOUNTER — PATIENT OUTREACH (OUTPATIENT)
Dept: PRIMARY CARE | Facility: CLINIC | Age: 89
End: 2024-10-25
Payer: COMMERCIAL

## 2024-10-25 DIAGNOSIS — E11.69 TYPE 2 DIABETES MELLITUS WITH OTHER SPECIFIED COMPLICATION, WITHOUT LONG-TERM CURRENT USE OF INSULIN: ICD-10-CM

## 2024-10-25 DIAGNOSIS — I10 PRIMARY HYPERTENSION: ICD-10-CM

## 2024-10-25 RX ORDER — ACETAMINOPHEN 325 MG/1
650 TABLET ORAL ONCE
Status: DISCONTINUED | OUTPATIENT
Start: 2024-10-25 | End: 2024-10-25 | Stop reason: HOSPADM

## 2024-10-25 NOTE — ED NOTES
"To ED via [facility] from home, pt fell today and fell yesterday as well. Pt is c/o right groin pain when she tries to bare weight on the right leg. She has no pelvis pain, no pelvis instability, there is no shortening of either leg and no external rotation. Pt does have bilateral 2+ pitting edema BLE, she does have a wound on the right lower extremity that is currently wrapped, she states this was dressed yesterday.     Pt denies hitting her head, but she does have an abrasion on the left posterior scalp, no hematoma, no tenderness over the area. She has no C, T, L spine tenderness.     Pt is very hard of hearing. She is A&Ox3-4. She states \"hopefully I can just get some medicine for this groin pain and then go home.     She denies HA, dizziness, vision changes, CP/SOB, abd pain, N/V/D, changes in urine/bowel and denies f/c's.      Moshe Mcqueen RN  10/24/24 2300    "

## 2024-10-25 NOTE — PROGRESS NOTES
Contacted patient for monthly outreach & to follow up from recent ER visit to [facility] after a fall. Spoke to patient's daughter Hanh who reported mom is currently sleeping after getting back early this morning from ER. Per daughter, her mom has fallen twice in 2 days. Usually is independent with ADL's, however since inpatient admit in July with discharge to SNF has needed additional assistance due to ongoing wound care needs. Apoorva has HHC 3x/week & goes to the Wound Clinic at [facility] weekly. Patient has good family support from daughter & son & goal is to return to an independent life style when stable. Taking all medications as prescribed & patient encouraged to use rollator when ambulating. Per Hanh, mom's leg wound improving but may take several more months of care till completely healed. Agreeable to monthly outreach & daughter has my contact information for additional questions/concerns.

## 2024-10-25 NOTE — DISCHARGE INSTRUCTIONS
Your x-rays do not reveal any broken bones.  You may use Tylenol as needed for pain.  You should follow-up with your primary care physician.    It is important to remember that your care does not end here and you must continue to monitor your condition closely. Please return to the emergency department for any worsening or concerning signs or symptoms as directed by our conversations and the discharge instructions. If you do not have a doctor please contact the referral number on your discharge instructions. Please contact any physician specialists provided in your discharge notes as it is very important to follow up with them regarding your condition. If you are unable to reach the physicians provided, please come back to the Emergency Department at any time.    Return to emergency room without delay for ANY new or worsening pains or for any other symptoms or concerns.  Return with worsening pains, nausea, vomiting, trouble breathing, palpitations, shortness of breath, inability to pass stool or urine, loss of control of stool or urine, any numbness or tingling (that is not normal for you), uncontrolled fevers, the passing of blood or other material in stool or urine, rashes, pains or for any other symptoms or concerns you may have.  You are always welcome to return to the ER at any time for any reason or for any other concerns you may have.

## 2024-10-25 NOTE — ED PROVIDER NOTES
HPI   Chief Complaint   Patient presents with    Fall     Patient presents for multiple falls and right groin pain. Pain started today and is only present when she moves. Patient was trying to get into bed and was unable to do to the pain. EMS called. On their arrival patients son was on seen. He states patient had fall yesterday also. Patient denies pain at this time        Patient presents with several falls.  She reports that after her last fall, she had pain in her right groin.  It does not hurt to stand on it and does not hurt while at rest, but does hurt when she is trying to move around.  This caused her to call 911.  She denies any other injuries.  She does not take any blood thinning medications.              Patient History   Past Medical History:   Diagnosis Date    Accidental fall 03/19/2024    At risk for falling     Cellulitis     Diabetes mellitus (Multi)     Disease of thyroid gland     Fracture of pelvis (Multi) 03/19/2024    Hand fracture     Hypertension     Hypothyroidism     Injury of head 03/19/2024    Open wound of right lower extremity     Oth fracture of left pubis, init encntr for closed fracture (Multi) 07/03/2022    PVD (peripheral vascular disease) (CMS-[facility])     Syncope 03/19/2024    Urinary tract infection     UTI (urinary tract infection)     Venous insufficiency     Weakness      No past surgical history on file.  Family History   Problem Relation Name Age of Onset    Diabetes Other      Hypertension Other       Social History     Tobacco Use    Smoking status: Never     Passive exposure: Never    Smokeless tobacco: Never   Vaping Use    Vaping status: Never Used   Substance Use Topics    Alcohol use: Never    Drug use: Never       Physical Exam   ED Triage Vitals [10/24/24 2257]   Temperature Heart Rate Respirations BP   36.6 °C (97.8 °F) 58 14 131/53      Pulse Ox Temp Source Heart Rate Source Patient Position   98 % Temporal Monitor Lying      BP Location FiO2 (%)     Left arm --        Physical Exam  Eyes:      General: No scleral icterus.  Cardiovascular:      Rate and Rhythm: Normal rate.   Pulmonary:      Effort: Pulmonary effort is normal.   Musculoskeletal:      Comments: No pain with movement or palpation of right hip.  Full range of motion.   Neurological:      Mental Status: She is alert.      Comments: Patient awake and alert, answers questions appropriately.  Very hard of hearing.           ED Course & MDM   Diagnoses as of 10/25/24 0144   Right groin pain   Fall, initial encounter                 No data recorded     [place] Coma Scale Score: 15 (10/24/24 2304 : Moshe Mcqueen RN)                           Medical Decision Making  X-rays do not reveal any fractures.  Patient was able to ambulate in the emergency department without difficulty.  Patient given Tylenol.  Patient advised to follow-up with primary care physician.  Return precautions given for any worsening symptoms.  Parts of this chart were completed with dictation software, please excuse any errors in transcription.        Procedure  Procedures     Jamey Plascencia MD  10/25/24 0144

## 2024-10-25 NOTE — ED NOTES
Pt was able to ambulate to and from the bathroom with use of walker with minimal assistance. She had a steady gait. She only c/o discomfort when getting in and out of the bed, but was okay on her feet.      Moshe Mcqueen RN  10/25/24 0145

## 2024-10-25 NOTE — ED NOTES
[facility] onsite. Report given. Son called back and he will meet the [facility] wheelchair service at the house to help her in.      Moshe Mcqueen RN  10/25/24 9029

## 2024-10-25 NOTE — ED NOTES
Son, Glenn Daniel [phone], called and updated on discharge as well as ETA back to the house via [facility].      Moshe Mcqueen RN  10/25/24 0157

## 2024-10-28 ENCOUNTER — TELEPHONE (OUTPATIENT)
Dept: PRIMARY CARE | Facility: CLINIC | Age: 89
End: 2024-10-28
Payer: MEDICARE

## 2024-10-28 NOTE — TELEPHONE ENCOUNTER
Per [facility] calling to report, pt fell in living room on 10/27. Pt c/o groin pain but [facility] is unclear when that began as pt has fallen 3 times in the past week.   Family is looking into Assisted Living for pt.  No further action requested by home care.

## 2024-10-29 ENCOUNTER — OFFICE VISIT (OUTPATIENT)
Dept: WOUND CARE | Facility: HOSPITAL | Age: 89
End: 2024-10-29
Payer: MEDICARE

## 2024-10-29 ENCOUNTER — DOCUMENTATION (OUTPATIENT)
Dept: PRIMARY CARE | Facility: CLINIC | Age: 89
End: 2024-10-29
Payer: MEDICARE

## 2024-10-29 DIAGNOSIS — L03.115 CELLULITIS OF RIGHT LEG: Primary | ICD-10-CM

## 2024-10-29 DIAGNOSIS — R30.0 DYSURIA: Primary | ICD-10-CM

## 2024-10-29 PROCEDURE — 11045 DBRDMT SUBQ TISS EACH ADDL: CPT

## 2024-10-29 PROCEDURE — 11042 DBRDMT SUBQ TIS 1ST 20SQCM/<: CPT

## 2024-10-29 RX ORDER — DOXYCYCLINE 100 MG/1
100 CAPSULE ORAL 2 TIMES DAILY
Qty: 20 CAPSULE | Refills: 0 | Status: SHIPPED | OUTPATIENT
Start: 2024-10-29 | End: 2024-11-08

## 2024-10-31 ENCOUNTER — TELEPHONE (OUTPATIENT)
Dept: PRIMARY CARE | Facility: CLINIC | Age: 89
End: 2024-10-31
Payer: MEDICARE

## 2024-11-05 ENCOUNTER — TELEPHONE (OUTPATIENT)
Dept: PRIMARY CARE | Facility: CLINIC | Age: 89
End: 2024-11-05
Payer: MEDICARE

## 2024-11-05 ENCOUNTER — OFFICE VISIT (OUTPATIENT)
Dept: WOUND CARE | Facility: HOSPITAL | Age: 89
End: 2024-11-05
Payer: MEDICARE

## 2024-11-05 DIAGNOSIS — I73.9 PERIPHERAL VASCULAR DISEASE (CMS-HCC): Primary | ICD-10-CM

## 2024-11-05 DIAGNOSIS — L03.115 CELLULITIS OF RIGHT LEG: ICD-10-CM

## 2024-11-05 PROCEDURE — 11042 DBRDMT SUBQ TIS 1ST 20SQCM/<: CPT | Mod: LT,XS

## 2024-11-05 PROCEDURE — 87077 CULTURE AEROBIC IDENTIFY: CPT | Mod: GEALAB | Performed by: STUDENT IN AN ORGANIZED HEALTH CARE EDUCATION/TRAINING PROGRAM

## 2024-11-05 PROCEDURE — 11045 DBRDMT SUBQ TISS EACH ADDL: CPT | Mod: RT,XS

## 2024-11-05 RX ORDER — DOXYCYCLINE 100 MG/1
100 CAPSULE ORAL 2 TIMES DAILY
Qty: 20 CAPSULE | Refills: 0 | Status: SHIPPED | OUTPATIENT
Start: 2024-11-05 | End: 2024-11-15

## 2024-11-05 RX ORDER — CIPROFLOXACIN 500 MG/1
500 TABLET ORAL 2 TIMES DAILY
Qty: 20 TABLET | Refills: 0 | Status: SHIPPED | OUTPATIENT
Start: 2024-11-05 | End: 2024-11-15

## 2024-11-05 NOTE — TELEPHONE ENCOUNTER
Patient new to facility, expected move in 11/6/24.  Patient of Dr. REMBERTO Mccracken, last appointment w/ Dr. Mccracken 3/21/24. Has history of falls, most recent 10/24/24, current with [facility] wound clinic. Med list current per information sent from facility to [facility] office. Please see patient at next visit to facility.

## 2024-11-06 ENCOUNTER — NURSING HOME VISIT (OUTPATIENT)
Dept: POST ACUTE CARE | Facility: EXTERNAL LOCATION | Age: 89
End: 2024-11-06
Payer: MEDICARE

## 2024-11-06 DIAGNOSIS — E78.2 MIXED HYPERLIPIDEMIA: Primary | ICD-10-CM

## 2024-11-06 DIAGNOSIS — Z78.9 LIVING IN ASSISTED LIVING: ICD-10-CM

## 2024-11-06 DIAGNOSIS — L97.913 ULCER OF RIGHT LEG, WITH NECROSIS OF MUSCLE (MULTI): ICD-10-CM

## 2024-11-06 DIAGNOSIS — Z71.89 ADVANCE DIRECTIVE DISCUSSED WITH PATIENT: ICD-10-CM

## 2024-11-06 DIAGNOSIS — R53.81 PHYSICAL DEBILITY: ICD-10-CM

## 2024-11-06 DIAGNOSIS — I99.8 VASCULAR INSUFFICIENCY: ICD-10-CM

## 2024-11-06 DIAGNOSIS — I10 PRIMARY HYPERTENSION: ICD-10-CM

## 2024-11-06 DIAGNOSIS — E03.8 OTHER SPECIFIED HYPOTHYROIDISM: ICD-10-CM

## 2024-11-06 NOTE — LETTER
Patient: Apoorva Sanchez  : 1929    Encounter Date: 2024    Subjective  Patient ID: Apoorva Sanchez is a 89 y.o. female who is assisted living/ home patient being seen at [facility], and evaluated to Establish Care.     HPI   Pt visited in apartment of assisted living. Daughter Renata present for visit. Pt up in chair, oriented x3, comfortable and denies pain. Pt recently living in own home, daughter was staying with patient the last two weeks until pt was having frequent falls. Daughter states pt had 3 falls in 7 days, and squad had to be called. Daughter states pt sustained a right groin injury during one of the falls. Daughter states pt was independent and cooking meals prior to move. 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 Pt takes eye drops for glaucoma and macular degeneration.  Pt is very [place], and wears hearing aids in bilateral ears. Pt with own dentition, denies difficulty with chewing and swallowing. States appetite is good. Pt denies headaches, dizziness, and congestion. Pt admits to chronic runny nose.     Pt denies chest pain, discomfort, and sob at rest/exertion. Pt denies voiding symptoms and constipation. Pt with hx of UTI's per daughter. Pt denies sleep disturbances, sleeps in chair with ottoman. Pt has difficulty getting up in bed with current groin injury, discussed hospital bed. Pt not interested at this time. Daughter states she wants to buy a lift chair for patient, as pt enjoys sleeping in chair vs bed. Pt with significant vascular wound to right leg, dressing intact. Pt follows [facility] weekly for wound care. [facility] skilled nursing to visit pt during week for additional dressing changes. Pt was referred to vascular surgery for PVD. Pt able to answer questions appropriately. Daughter provided most of HPI due to patient's [place]. Advanced directives discussed with patient and daughter. Code statuses discussed in  detail and what interventions would look like in the event of cardiac or respiratory arrest. Pt elected for DNR-CCA.    Current Outpatient Medications on File Prior to Visit   Medication Sig Dispense Refill   • atenolol (Tenormin) 100 mg tablet Take 1 tablet (100 mg) by mouth once daily. 90 tablet 3   • brimonidine (AlphaGAN) 0.2 % ophthalmic solution Administer 1 drop into both eyes 2 times a day.     • ciprofloxacin (Cipro) 500 mg tablet Take 1 tablet (500 mg) by mouth 2 times a day for 10 days. 20 tablet 0   • cloNIDine (Catapres) 0.2 mg tablet Take 1 tablet (0.2 mg) by mouth 2 times a day. (Patient taking differently: Take 2 tablets (0.4 mg) by mouth once daily at bedtime.) 180 tablet 3   • doxycycline (Vibramycin) 100 mg capsule Take 1 capsule (100 mg) by mouth 2 times a day for 10 days. Take with at least 8 ounces (large glass) of water, do not lie down for 30 minutes after 20 capsule 0   • furosemide (Lasix) 40 mg tablet Take 1 tablet (40 mg) by mouth once daily. 90 tablet 3   • glimepiride (Amaryl) 2 mg tablet Take 1 tablet (2 mg) by mouth 2 times a day with meals. 180 tablet 3   • latanoprost (Xalatan) 0.005 % ophthalmic solution Administer 1 drop into both eyes once daily at bedtime. bedtime     • levothyroxine (Synthroid, Levoxyl) 100 mcg tablet Take 1 tablet (100 mcg) by mouth once daily in the morning. Take before meals. 90 tablet 3   • metFORMIN XR (Glucophage-XR) 500 mg 24 hr tablet Take 1 tablet (500 mg) by mouth once daily in the evening. Take with meals. Do not crush, chew, or split. 90 tablet 3   • simvastatin (Zocor) 40 mg tablet Take 1 tablet (40 mg) by mouth once daily at bedtime. 90 tablet 3   • timolol (Timoptic) 0.5 % ophthalmic solution Administer 1 drop into both eyes 2 times a day.     • acetaminophen (Tylenol) 325 mg tablet Take 2 tablets (650 mg) by mouth every 6 hours if needed for mild pain (1 - 3), headaches or fever (temp greater than 38.0 C). (Patient not taking: Reported on  11/8/2024) 30 tablet 0   • prednisoLONE acetate (Pred-Forte) 1 % ophthalmic suspension Not yet to take (Patient not taking: Reported on 11/8/2024)     • [DISCONTINUED] atenolol (Tenormin) 50 mg tablet Take 1 tablet (50 mg) by mouth once daily.     • [DISCONTINUED] doxycycline (Vibramycin) 100 mg capsule Take 1 capsule (100 mg) by mouth 2 times a day for 10 days. Take with at least 8 ounces (large glass) of water, do not lie down for 30 minutes after 20 capsule 0   • [DISCONTINUED] oxyCODONE (Roxicodone) 5 mg immediate release tablet Take 1 tablet (5 mg) by mouth every 6 hours if needed for moderate pain (4 - 6). 12 tablet 0     No current facility-administered medications on file prior to visit.      Review of Systems   Constitutional:  Negative for activity change, appetite change, chills, fatigue and fever.   HENT:  Negative for congestion and rhinorrhea.    Eyes:  Positive for visual disturbance.        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    Cardiovascular:  Positive for leg swelling.   Gastrointestinal:  Negative for constipation, diarrhea, nausea and vomiting.   Genitourinary:  Negative for frequency.   Musculoskeletal:  Positive for arthralgias and gait problem.        Ambulates with walker   Neurological:  Negative for dizziness and headaches.   Psychiatric/Behavioral:  Negative for sleep disturbance. The patient is not nervous/anxious.        Objective  /60 (BP Location: Right arm, Patient Position: Sitting, BP Cuff Size: Adult) Comment: Done 11/6  Pulse 55   Resp 16   SpO2 99%     Lab Results   Component Value Date    GLUCOSE 145 (H) 07/29/2024    CALCIUM 8.6 07/29/2024     07/29/2024    K 3.7 07/29/2024    CO2 28 07/29/2024     07/29/2024    BUN 9 07/29/2024    CREATININE 0.70 07/29/2024      Lab Results   Component Value Date    WBC 8.7 07/29/2024    HGB 9.5 (L) 07/29/2024    HCT 30.1 (L) 07/29/2024    MCV 89  07/29/2024     07/29/2024      Physical Exam  Constitutional:       General: She is awake. She is not in acute distress.     Appearance: She is not ill-appearing.   HENT:      Head: Normocephalic.      Right Ear: Decreased hearing noted. There is impacted cerumen.      Left Ear: Decreased hearing noted. There is impacted cerumen.      Nose: No congestion or rhinorrhea.   Eyes:      Conjunctiva/sclera: Conjunctivae normal.      Pupils: Pupils are equal, round, and reactive to light.   Cardiovascular:      Rate and Rhythm: Normal rate and regular rhythm.      Pulses: Normal pulses.      Heart sounds: Normal heart sounds.   Pulmonary:      Effort: Pulmonary effort is normal. No respiratory distress.      Breath sounds: Normal breath sounds. No wheezing or rhonchi.   Abdominal:      General: Bowel sounds are normal.      Palpations: Abdomen is soft.   Musculoskeletal:         General: Normal range of motion.      Cervical back: Normal range of motion.      Right lower leg: Edema present.      Left lower leg: Edema present.      Comments: Right leg wrapped with dressing.    Skin:     General: Skin is warm.   Neurological:      Mental Status: She is alert and oriented to person, place, and time. Mental status is at baseline.   Psychiatric:         Speech: Speech normal.         Behavior: Behavior is cooperative.         Cognition and Memory: Cognition is not impaired. Memory is not impaired.         Assessment/Plan  Diagnoses and all orders for this visit:  Mixed hyperlipidemia  Comments:  Continue simvastatin.  Primary hypertension  Comments:  Blood pressure controlled. Follow up with cardiology on 11/13.  Other specified hypothyroidism  Comments:  Continue levothyroxine.  Vascular insufficiency  Comments:  Follow up with vascular surgery as previously referred.  Ulcer of right leg, with necrosis of muscle (Multi)  Comments:  Continue to follow wound care center. SN to follow resident in assisted living for  dressing changes.  Living in assisted living  Comments:  Physician orders reviewed. Medical and facility chart reviewed, medication reconcilation and collaboration with nursing.  Advance directive discussed with patient  Comments:  Sixteen mins spent discussing code statues with patient. DNR form completed, order written at assisted living, and placed in chart.  Orders:  -     DNR  Physical debility  Comments:  Continue to follow PT/OT.          Electronically Signed By: ALICIA Sanz-CNP   11/8/24  8:27 PM

## 2024-11-08 ENCOUNTER — TELEPHONE (OUTPATIENT)
Dept: PRIMARY CARE | Facility: CLINIC | Age: 89
End: 2024-11-08
Payer: MEDICARE

## 2024-11-08 VITALS
RESPIRATION RATE: 16 BRPM | HEART RATE: 55 BPM | OXYGEN SATURATION: 99 % | DIASTOLIC BLOOD PRESSURE: 60 MMHG | SYSTOLIC BLOOD PRESSURE: 140 MMHG

## 2024-11-08 LAB
BACTERIA SPEC CULT: ABNORMAL
GRAM STN SPEC: ABNORMAL
GRAM STN SPEC: ABNORMAL

## 2024-11-08 ASSESSMENT — ENCOUNTER SYMPTOMS
CHILLS: 0
ACTIVITY CHANGE: 0
SLEEP DISTURBANCE: 0
RHINORRHEA: 0
FEVER: 0
FREQUENCY: 0
COUGH: 0
FATIGUE: 0
NAUSEA: 0
SHORTNESS OF BREATH: 0
NERVOUS/ANXIOUS: 0
CONSTIPATION: 0
HEADACHES: 0
DIZZINESS: 0
ARTHRALGIAS: 1
APPETITE CHANGE: 0
DIARRHEA: 0
VOMITING: 0

## 2024-11-08 ASSESSMENT — PAIN SCALES - GENERAL: PAINLEVEL_OUTOF10: 0-NO PAIN

## 2024-11-08 NOTE — TELEPHONE ENCOUNTER
"Disregard my previous reply. Spoke to Minna GAMBOA. Pt was referred to vascular surgery three days ago, and is being seen for cellulitis wounds to legs next week on 11/12. Pt has an appt with cardiologist on 11/13. Pt is asymptomatic, no sob, chest pain, numbness/tingling, and Minna stated pt's lungs \"sounded clear\". Minna stated pt is eating \"lots of canned soups\", and she spoke to her about diet. Pt sleeping in a chair with ottoman and not in bed. Nursing to monitor for worsening symptoms and notify provider.   "

## 2024-11-08 NOTE — TELEPHONE ENCOUNTER
Minna calls today to report patient weight up +10 lbs since first weight in on move day 11/6/24.  Patient noted to have +2-3 pitting edema on right side from foot/ankle up to hip, has +3 edema of left ankle.  Patient denies increased shortness of breath but Minna the OT notes patient does grow tired quicker than when services started a couple days ago.  Please Advise.

## 2024-11-09 NOTE — PROGRESS NOTES
Subjective   Patient ID: Apoorva Sanchez is a 89 y.o. female who is assisted living/ home patient being seen at [facility], and evaluated to Establish Care.     HPI   Pt visited in apartment of assisted living. Daughter Renata present for visit. Pt up in chair, oriented x3, comfortable and denies pain. Pt recently living in own home, daughter was staying with patient the last two weeks until pt was having frequent falls. Daughter states pt had 3 falls in 7 days, and squad had to be called. Daughter states pt sustained a right groin injury during one of the falls. Daughter states pt was independent and cooking meals prior to move. 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 Pt takes eye drops for glaucoma and macular degeneration.  Pt is very [place], and wears hearing aids in bilateral ears. Pt with own dentition, denies difficulty with chewing and swallowing. States appetite is good. Pt denies headaches, dizziness, and congestion. Pt admits to chronic runny nose.     Pt denies chest pain, discomfort, and sob at rest/exertion. Pt denies voiding symptoms and constipation. Pt with hx of UTI's per daughter. Pt denies sleep disturbances, sleeps in chair with ottoman. Pt has difficulty getting up in bed with current groin injury, discussed hospital bed. Pt not interested at this time. Daughter states she wants to buy a lift chair for patient, as pt enjoys sleeping in chair vs bed. Pt with significant vascular wound to right leg, dressing intact. Pt follows [facility] weekly for wound care. [facility]'s skilled nursing to visit pt during week for additional dressing changes. Pt was referred to vascular surgery for PVD. Pt able to answer questions appropriately. Daughter provided most of HPI due to patient's [place]. Advanced directives discussed with patient and daughter. Code statuses discussed in detail and what interventions would look like in the event of cardiac  or respiratory arrest. Pt elected for DNR-CCA.    Current Outpatient Medications on File Prior to Visit   Medication Sig Dispense Refill    atenolol (Tenormin) 100 mg tablet Take 1 tablet (100 mg) by mouth once daily. 90 tablet 3    brimonidine (AlphaGAN) 0.2 % ophthalmic solution Administer 1 drop into both eyes 2 times a day.      ciprofloxacin (Cipro) 500 mg tablet Take 1 tablet (500 mg) by mouth 2 times a day for 10 days. 20 tablet 0    cloNIDine (Catapres) 0.2 mg tablet Take 1 tablet (0.2 mg) by mouth 2 times a day. (Patient taking differently: Take 2 tablets (0.4 mg) by mouth once daily at bedtime.) 180 tablet 3    doxycycline (Vibramycin) 100 mg capsule Take 1 capsule (100 mg) by mouth 2 times a day for 10 days. Take with at least 8 ounces (large glass) of water, do not lie down for 30 minutes after 20 capsule 0    furosemide (Lasix) 40 mg tablet Take 1 tablet (40 mg) by mouth once daily. 90 tablet 3    glimepiride (Amaryl) 2 mg tablet Take 1 tablet (2 mg) by mouth 2 times a day with meals. 180 tablet 3    latanoprost (Xalatan) 0.005 % ophthalmic solution Administer 1 drop into both eyes once daily at bedtime. bedtime      levothyroxine (Synthroid, Levoxyl) 100 mcg tablet Take 1 tablet (100 mcg) by mouth once daily in the morning. Take before meals. 90 tablet 3    metFORMIN XR (Glucophage-XR) 500 mg 24 hr tablet Take 1 tablet (500 mg) by mouth once daily in the evening. Take with meals. Do not crush, chew, or split. 90 tablet 3    simvastatin (Zocor) 40 mg tablet Take 1 tablet (40 mg) by mouth once daily at bedtime. 90 tablet 3    timolol (Timoptic) 0.5 % ophthalmic solution Administer 1 drop into both eyes 2 times a day.      acetaminophen (Tylenol) 325 mg tablet Take 2 tablets (650 mg) by mouth every 6 hours if needed for mild pain (1 - 3), headaches or fever (temp greater than 38.0 C). (Patient not taking: Reported on 11/8/2024) 30 tablet 0    prednisoLONE acetate (Pred-Forte) 1 % ophthalmic suspension Not  yet to take (Patient not taking: Reported on 11/8/2024)      [DISCONTINUED] atenolol (Tenormin) 50 mg tablet Take 1 tablet (50 mg) by mouth once daily.      [DISCONTINUED] doxycycline (Vibramycin) 100 mg capsule Take 1 capsule (100 mg) by mouth 2 times a day for 10 days. Take with at least 8 ounces (large glass) of water, do not lie down for 30 minutes after 20 capsule 0    [DISCONTINUED] oxyCODONE (Roxicodone) 5 mg immediate release tablet Take 1 tablet (5 mg) by mouth every 6 hours if needed for moderate pain (4 - 6). 12 tablet 0     No current facility-administered medications on file prior to visit.      Review of Systems   Constitutional:  Negative for activity change, appetite change, chills, fatigue and fever.   HENT:  Negative for congestion and rhinorrhea.    Eyes:  Positive for visual disturbance.        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    Cardiovascular:  Positive for leg swelling.   Gastrointestinal:  Negative for constipation, diarrhea, nausea and vomiting.   Genitourinary:  Negative for frequency.   Musculoskeletal:  Positive for arthralgias and gait problem.        Ambulates with walker   Neurological:  Negative for dizziness and headaches.   Psychiatric/Behavioral:  Negative for sleep disturbance. The patient is not nervous/anxious.        Objective   /60 (BP Location: Right arm, Patient Position: Sitting, BP Cuff Size: Adult) Comment: Done 11/6  Pulse 55   Resp 16   SpO2 99%     Lab Results   Component Value Date    GLUCOSE 145 (H) 07/29/2024    CALCIUM 8.6 07/29/2024     07/29/2024    K 3.7 07/29/2024    CO2 28 07/29/2024     07/29/2024    BUN 9 07/29/2024    CREATININE 0.70 07/29/2024      Lab Results   Component Value Date    WBC 8.7 07/29/2024    HGB 9.5 (L) 07/29/2024    HCT 30.1 (L) 07/29/2024    MCV 89 07/29/2024     07/29/2024      Physical Exam  Constitutional:       General: She is  awake. She is not in acute distress.     Appearance: She is not ill-appearing.   HENT:      Head: Normocephalic.      Right Ear: Decreased hearing noted. There is impacted cerumen.      Left Ear: Decreased hearing noted. There is impacted cerumen.      Ears:      Comments: Ears visualized with otoscope, bilateral canals wnl after debridement of cerumen.      Nose: No congestion or rhinorrhea.   Eyes:      Conjunctiva/sclera: Conjunctivae normal.      Pupils: Pupils are equal, round, and reactive to light.   Cardiovascular:      Rate and Rhythm: Normal rate and regular rhythm.      Pulses: Normal pulses.      Heart sounds: Normal heart sounds.   Pulmonary:      Effort: Pulmonary effort is normal. No respiratory distress.      Breath sounds: Normal breath sounds. No wheezing or rhonchi.   Abdominal:      General: Bowel sounds are normal.      Palpations: Abdomen is soft.   Musculoskeletal:         General: Normal range of motion.      Cervical back: Normal range of motion.      Right lower leg: Edema present.      Left lower leg: Edema present.      Comments: Right leg wrapped with dressing.    Skin:     General: Skin is warm.   Neurological:      Mental Status: She is alert and oriented to person, place, and time. Mental status is at baseline.   Psychiatric:         Speech: Speech normal.         Behavior: Behavior is cooperative.         Cognition and Memory: Cognition is not impaired. Memory is not impaired.         Assessment/Plan   Diagnoses and all orders for this visit:  Mixed hyperlipidemia  Comments:  Continue simvastatin.  Primary hypertension  Comments:  Blood pressure controlled. Follow up with cardiology on 11/13.  Other specified hypothyroidism  Comments:  Continue levothyroxine.  Vascular insufficiency  Comments:  Follow up with vascular surgery as previously referred.  Ulcer of right leg, with necrosis of muscle (Multi)  Comments:  Continue to follow wound care center. SN to follow resident in assisted  living for dressing changes.  Living in assisted living  Comments:  Physician orders reviewed. Medical and facility chart reviewed, medication reconcilation and collaboration with nursing.  Advance directive discussed with patient  Comments:  Sixteen mins spent discussing code statues with patient. DNR form completed, order written at assisted living, and placed in chart.  Orders:  -     DNR  Physical debility  Comments:  Continue to follow PT/OT.  Bilateral impacted cerumen  Comments:  Bilateral ears debrided of large amount of cerumen with lighted curette. Pt tolerated well.

## 2024-11-11 ENCOUNTER — TELEPHONE (OUTPATIENT)
Dept: PRIMARY CARE | Facility: CLINIC | Age: 89
End: 2024-11-11
Payer: MEDICARE

## 2024-11-11 NOTE — TELEPHONE ENCOUNTER
Left message for Terri, pt may do therapy while on Cipro. I had recommended nothing strenuous due to increased risk for tendon rupture while taking a fluoroquinolone.

## 2024-11-11 NOTE — TELEPHONE ENCOUNTER
Daughter advised Terri that she was advised by someone that she shouldn't be doing therapy while on Cipro. Terri asking to speak to provider.

## 2024-11-12 ENCOUNTER — OFFICE VISIT (OUTPATIENT)
Dept: WOUND CARE | Facility: HOSPITAL | Age: 89
End: 2024-11-12
Payer: MEDICARE

## 2024-11-12 ENCOUNTER — APPOINTMENT (OUTPATIENT)
Dept: CARDIOLOGY | Facility: CLINIC | Age: 89
End: 2024-11-12
Payer: MEDICARE

## 2024-11-12 DIAGNOSIS — L03.115 CELLULITIS OF RIGHT LEG: ICD-10-CM

## 2024-11-12 PROCEDURE — 11042 DBRDMT SUBQ TIS 1ST 20SQCM/<: CPT | Mod: XS

## 2024-11-12 PROCEDURE — 15271 SKIN SUB GRAFT TRNK/ARM/LEG: CPT

## 2024-11-12 RX ORDER — DOXYCYCLINE 100 MG/1
100 CAPSULE ORAL 2 TIMES DAILY
Qty: 20 CAPSULE | Refills: 0 | Status: ON HOLD | OUTPATIENT
Start: 2024-11-12 | End: 2024-11-22

## 2024-11-12 RX ORDER — CIPROFLOXACIN 500 MG/1
500 TABLET ORAL 2 TIMES DAILY
Qty: 20 TABLET | Refills: 0 | Status: ON HOLD | OUTPATIENT
Start: 2024-11-12 | End: 2024-11-22

## 2024-11-13 ENCOUNTER — TELEPHONE (OUTPATIENT)
Dept: PRIMARY CARE | Facility: CLINIC | Age: 89
End: 2024-11-13
Payer: MEDICARE

## 2024-11-13 ENCOUNTER — APPOINTMENT (OUTPATIENT)
Dept: CARDIOLOGY | Facility: HOSPITAL | Age: 89
End: 2024-11-13
Payer: MEDICARE

## 2024-11-13 ENCOUNTER — OFFICE VISIT (OUTPATIENT)
Dept: CARDIOLOGY | Facility: HOSPITAL | Age: 89
End: 2024-11-13
Payer: MEDICARE

## 2024-11-13 VITALS
HEART RATE: 61 BPM | BODY MASS INDEX: 22.18 KG/M2 | SYSTOLIC BLOOD PRESSURE: 146 MMHG | DIASTOLIC BLOOD PRESSURE: 77 MMHG | WEIGHT: 129.19 LBS | OXYGEN SATURATION: 98 %

## 2024-11-13 DIAGNOSIS — S81.809A NON-HEALING WOUND OF LOWER EXTREMITY, UNSPECIFIED LATERALITY, INITIAL ENCOUNTER: Primary | ICD-10-CM

## 2024-11-13 DIAGNOSIS — I73.9 PERIPHERAL VASCULAR DISEASE (CMS-HCC): ICD-10-CM

## 2024-11-13 DIAGNOSIS — R60.0 LOWER EXTREMITY EDEMA: ICD-10-CM

## 2024-11-13 PROCEDURE — 1159F MED LIST DOCD IN RCRD: CPT | Performed by: PHYSICIAN ASSISTANT

## 2024-11-13 PROCEDURE — 3077F SYST BP >= 140 MM HG: CPT | Performed by: PHYSICIAN ASSISTANT

## 2024-11-13 PROCEDURE — 3078F DIAST BP <80 MM HG: CPT | Performed by: PHYSICIAN ASSISTANT

## 2024-11-13 PROCEDURE — 99214 OFFICE O/P EST MOD 30 MIN: CPT | Performed by: PHYSICIAN ASSISTANT

## 2024-11-13 PROCEDURE — 1036F TOBACCO NON-USER: CPT | Performed by: PHYSICIAN ASSISTANT

## 2024-11-13 NOTE — PROGRESS NOTES
Referred by Dr. Proctor for No chief complaint on file.     History Of Present Illness:    Apoorva Sanchez is a 89 y.o. female presenting to [facility] care. Patient was referred by the wound center for RLE wound. Pt fell July 1st of this year with injury to her RLE. Wound worsened with discharge so patient presented to the ED late July. Podiatry consulted during that hospital stay, recommended debridement of necrotic tissue. Was treated with IV antibiotics. Vascular medicine consulted during that stay, did not recommend intervention. Noted pedal pulses difficulty to palpate likely 2/2 edema. Pt underwent debridement, wound vac placement. Discharged to SNF 07/29/2024. Has been managed through podiatry, wound care since. R leg has improved.  L leg wound (fell 2-3 weeks ago) hitting her shin with hematoma, seeping/swelling.   Presents today for poor progression in wound healing. She presents today with her daughter. She is fatigued, altered falling asleep during the conversation. Reports that this began after starting Cipro.   Patient is very hard of hearing the majority of the history is though her daughter.   She denies any shortness of breath.       Past Medical History:  She has a past medical history of Accidental fall (03/19/2024), At risk for falling, Cellulitis, Diabetes mellitus (Multi), Disease of thyroid gland, Fracture of pelvis (Multi) (03/19/2024), Hand fracture, Hypertension, Hypothyroidism, Injury of head (03/19/2024), Open wound of right lower extremity, Oth fracture of left pubis, init encntr for closed fracture (07/03/2022), PVD (peripheral vascular disease) (CMS-[facility]), Syncope (03/19/2024), Urinary tract infection, UTI (urinary tract infection), Venous insufficiency, and Weakness.    Past Surgical History:  She has no past surgical history on file.      Social History:  She reports that she has never smoked. She has never been exposed to tobacco smoke. She has never used smokeless tobacco. She reports  that she does not drink alcohol and does not use drugs.    Family History:  Family History   Problem Relation Name Age of Onset    Diabetes Other      Hypertension Other          Allergies:  Sulfa (sulfonamide antibiotics)    Outpatient Medications:  Current Outpatient Medications   Medication Instructions    acetaminophen (TYLENOL) 650 mg, oral, Every 6 hours PRN    atenolol (TENORMIN) 100 mg, oral, Daily    brimonidine (AlphaGAN) 0.2 % ophthalmic solution 1 drop, 2 times daily    ciprofloxacin (CIPRO) 500 mg, oral, 2 times daily    cloNIDine (CATAPRES) 0.2 mg, oral, 2 times daily    doxycycline (VIBRAMYCIN) 100 mg, oral, 2 times daily, Take with at least 8 ounces (large glass) of water, do not lie down for 30 minutes after    furosemide (LASIX) 40 mg, oral, Daily    glimepiride (AMARYL) 2 mg, oral, 2 times daily (morning and late afternoon)    latanoprost (Xalatan) 0.005 % ophthalmic solution 1 drop, Nightly    levothyroxine (SYNTHROID, LEVOXYL) 100 mcg, oral, Daily before breakfast    metFORMIN XR (GLUCOPHAGE-XR) 500 mg, oral, Daily with evening meal, Do not crush, chew, or split.    prednisoLONE acetate (Pred-Forte) 1 % ophthalmic suspension Not yet to take    simvastatin (ZOCOR) 40 mg, oral, Nightly    timolol (Timoptic) 0.5 % ophthalmic solution Administer 1 drop into both eyes 2 times a day.        Last Recorded Vitals:  There were no vitals filed for this visit.    Physical Exam:  Physical Exam  Constitutional:       Comments: Fatigued, somnolent.      HENT:      Head: Normocephalic.      Nose: Nose normal.      Mouth/Throat:      Mouth: Mucous membranes are moist.   Eyes:      Conjunctiva/sclera: Conjunctivae normal.   Cardiovascular:      Rate and Rhythm: Normal rate and regular rhythm.      Pulses:           Dorsalis pedis pulses are detected w/ Doppler on the right side and detected w/ Doppler on the left side.        Posterior tibial pulses are detected w/ Doppler on the right side and detected w/  Doppler on the left side.      Comments: Dressing to L shin with exudate.   Pulmonary:      Effort: Pulmonary effort is normal.   Abdominal:      Palpations: Abdomen is soft.   Musculoskeletal:         General: Swelling and tenderness present.      Right lower leg: Edema present.      Left lower leg: Edema present.   Skin:     General: Skin is warm.   Neurological:      General: No focal deficit present.      Mental Status: Mental status is at baseline.   Psychiatric:         Mood and Affect: Mood normal.         Behavior: Behavior normal.              Last Labs:  CBC -  Lab Results   Component Value Date    WBC 8.7 07/29/2024    HGB 9.5 (L) 07/29/2024    HCT 30.1 (L) 07/29/2024    MCV 89 07/29/2024     07/29/2024       CMP -  Lab Results   Component Value Date    CALCIUM 8.6 07/29/2024    PROT 7.1 07/23/2024    ALBUMIN 3.9 07/23/2024    AST 16 07/23/2024    ALT 10 07/23/2024    ALKPHOS 71 07/23/2024    BILITOT 0.3 07/23/2024       LIPID PANEL -   Lab Results   Component Value Date    CHOL 94 (L) 03/06/2023    TRIG 72 03/06/2023    HDL 48 (L) 03/06/2023    CHHDL 2.0 03/06/2023       RENAL FUNCTION PANEL -   Lab Results   Component Value Date    GLUCOSE 145 (H) 07/29/2024     07/29/2024    K 3.7 07/29/2024     07/29/2024    CO2 28 07/29/2024    ANIONGAP 8 07/29/2024    BUN 9 07/29/2024    CREATININE 0.70 07/29/2024    CALCIUM 8.6 07/29/2024    ALBUMIN 3.9 07/23/2024        Lab Results   Component Value Date    HGBA1C 7.1 (H) 07/24/2024       Last Cardiology Tests:  Imaging:  Vascular US MEETA (07/24/2024):     CONCLUSIONS:  Right Lower PVR: Right pressures of >220 mmHg suggest no compressibility of vessels and may make absolute Segmental Limb Pressures (SLP) unreliable. Monophasic flow is noted in the right dorsalis pedis artery. Multiphasic flow is noted in the right posterior tibial artery. Unable to obtain pressures due to large draining wound and erythema of the calf and ankle. Disease called by  waveforms. The posterior tibial waveform appears preserved, but the dorsalis pedis waveform is dampened consistent with moderate disease.  Left Lower PVR: No evidence of arterial occlusive disease in the left lower extremity at rest. Left pressures of >220 mmHg suggest no compressibility of vessels and may make absolute Segmental Limb Pressures (SLP) unreliable. Multiphasic flow is noted in the left posterior tibial artery and left dorsalis pedis artery. Full PVR not performed due to patient intolerance to cuff pressure.     Imaging & Doppler Findings:     RIGHT Lower PVR                Pressures Ratios  Right Posterior Tibial (Ankle) 255 mmHg  1.41  Right Dorsalis Pedis (Ankle)   255 mmHg  1.41           LEFT Lower PVR                Pressures Ratios  Left Posterior Tibial (Ankle) 255 mmHg  1.41  Left Dorsalis Pedis (Ankle)   255 mmHg  1.41                              Right     Left  Brachial Pressure 176 mmHg 181 mmHg    Lower Extremity Venous Duplex (07/23/2024):  IMPRESSION:  Suboptimal visualization of the calf veins. No sonographic evidence  of acute DVT in the visualized vessels of the right lower extremity.      Lab review: I have Chemistry BMP   Lab Results   Component Value Date    GLUCOSE 145 (H) 07/29/2024    CALCIUM 8.6 07/29/2024    CO2 28 07/29/2024    CREATININE 0.70 07/29/2024   , CBC:  Lab Results   Component Value Date    WBC 8.7 07/29/2024    RBC 3.39 (L) 07/29/2024    HGB 9.5 (L) 07/29/2024    HCT 30.1 (L) 07/29/2024    MCV 89 07/29/2024    MCH 28.0 07/29/2024    MCHC 31.6 (L) 07/29/2024    RDW 14.2 07/29/2024    MPV 12.6 09/07/2023    NRBC 0.0 07/29/2024   , Coags:   Lab Results   Component Value Date    APTT 26.0 04/01/2020    INR 1.0 04/01/2020   , and Lipids:   Lab Results   Component Value Date    CHOL 94 (L) 03/06/2023    HDL 48 (L) 03/06/2023    LDLCALC 32 (L) 03/06/2023    TRIG 72 03/06/2023     Imaging review: I have  personally reviewed the result(s) US MEETA, Venous Duplex  (07/23/2024).    Assessment/Plan   Problem List Items Addressed This Visit             ICD-10-CM    Lower extremity edema R60.0    Peripheral vascular disease (CMS-HCC) I73.9    Non-healing wound of lower extremity - Primary S81.809A       Apoorva Sanchez is a 89 yo female with a  PMH of HTN, HLD, Hypothyroidism, who presented with nonhealing lower extremity wounds, shin wounds b/l.     Nonhealing lower extremity wounds b/l R (since July) L (3-4 weeks)   Peripheral vascular disease (noncompressible calcified vessels on MEETA from July)  Lower extremity edema    Recommend elevation, compression.   Continue wound care as scheduled.   Recommend repeat MEETA/TBI to further define disease.   Case discussed with Dr. Mejía  Will call to scheduled follow up.       Leslie Herndon PA-C

## 2024-11-13 NOTE — TELEPHONE ENCOUNTER
[facility] received referral for patient Skilled Nursing care for wound management.  Will provider sign orders? Please Advise.

## 2024-11-13 NOTE — TELEPHONE ENCOUNTER
Call placed to [facility], information relayed Aimee will sign orders for HHC, Skilled Nursing.

## 2024-11-14 ENCOUNTER — HOSPITAL ENCOUNTER (INPATIENT)
Facility: HOSPITAL | Age: 89
DRG: 641 | End: 2024-11-14
Attending: INTERNAL MEDICINE | Admitting: INTERNAL MEDICINE
Payer: MEDICARE

## 2024-11-14 ENCOUNTER — APPOINTMENT (OUTPATIENT)
Dept: RADIOLOGY | Facility: HOSPITAL | Age: 89
DRG: 641 | End: 2024-11-14
Payer: MEDICARE

## 2024-11-14 DIAGNOSIS — I10 PRIMARY HYPERTENSION: ICD-10-CM

## 2024-11-14 DIAGNOSIS — L03.115 CELLULITIS OF RIGHT LOWER EXTREMITY: Primary | ICD-10-CM

## 2024-11-14 DIAGNOSIS — E11.69 TYPE 2 DIABETES MELLITUS WITH OTHER SPECIFIED COMPLICATION, WITHOUT LONG-TERM CURRENT USE OF INSULIN: ICD-10-CM

## 2024-11-14 DIAGNOSIS — E87.1 HYPONATREMIA: ICD-10-CM

## 2024-11-14 DIAGNOSIS — H35.30 MACULAR DEGENERATION, UNSPECIFIED LATERALITY, UNSPECIFIED TYPE: ICD-10-CM

## 2024-11-14 LAB
GLUCOSE BLD MANUAL STRIP-MCNC: 141 MG/DL (ref 74–99)
GLUCOSE BLD MANUAL STRIP-MCNC: 206 MG/DL (ref 74–99)

## 2024-11-14 PROCEDURE — G0378 HOSPITAL OBSERVATION PER HR: HCPCS

## 2024-11-14 PROCEDURE — 51701 INSERT BLADDER CATHETER: CPT

## 2024-11-14 PROCEDURE — 2500000005 HC RX 250 GENERAL PHARMACY W/O HCPCS: Performed by: INTERNAL MEDICINE

## 2024-11-14 PROCEDURE — 74018 RADEX ABDOMEN 1 VIEW: CPT

## 2024-11-14 PROCEDURE — 2500000002 HC RX 250 W HCPCS SELF ADMINISTERED DRUGS (ALT 637 FOR MEDICARE OP, ALT 636 FOR OP/ED): Performed by: INTERNAL MEDICINE

## 2024-11-14 PROCEDURE — 2500000004 HC RX 250 GENERAL PHARMACY W/ HCPCS (ALT 636 FOR OP/ED): Performed by: INTERNAL MEDICINE

## 2024-11-14 PROCEDURE — 2500000001 HC RX 250 WO HCPCS SELF ADMINISTERED DRUGS (ALT 637 FOR MEDICARE OP): Performed by: INTERNAL MEDICINE

## 2024-11-14 PROCEDURE — 82947 ASSAY GLUCOSE BLOOD QUANT: CPT

## 2024-11-14 PROCEDURE — 99223 1ST HOSP IP/OBS HIGH 75: CPT | Performed by: INTERNAL MEDICINE

## 2024-11-14 PROCEDURE — 74018 RADEX ABDOMEN 1 VIEW: CPT | Performed by: RADIOLOGY

## 2024-11-14 RX ORDER — PREDNISOLONE ACETATE 10 MG/ML
1 SUSPENSION/ DROPS OPHTHALMIC 2 TIMES DAILY
Status: DISCONTINUED | OUTPATIENT
Start: 2024-11-14 | End: 2024-11-18 | Stop reason: HOSPADM

## 2024-11-14 RX ORDER — LATANOPROST 50 UG/ML
1 SOLUTION/ DROPS OPHTHALMIC NIGHTLY
Status: DISCONTINUED | OUTPATIENT
Start: 2024-11-14 | End: 2024-11-18 | Stop reason: HOSPADM

## 2024-11-14 RX ORDER — ATENOLOL 50 MG/1
100 TABLET ORAL DAILY
Status: DISCONTINUED | OUTPATIENT
Start: 2024-11-14 | End: 2024-11-18 | Stop reason: HOSPADM

## 2024-11-14 RX ORDER — CLONIDINE HYDROCHLORIDE 0.2 MG/1
0.2 TABLET ORAL 2 TIMES DAILY
Status: DISCONTINUED | OUTPATIENT
Start: 2024-11-14 | End: 2024-11-18 | Stop reason: HOSPADM

## 2024-11-14 RX ORDER — ACETAMINOPHEN 650 MG/1
650 SUPPOSITORY RECTAL EVERY 4 HOURS PRN
Status: DISCONTINUED | OUTPATIENT
Start: 2024-11-14 | End: 2024-11-18 | Stop reason: HOSPADM

## 2024-11-14 RX ORDER — METFORMIN HYDROCHLORIDE 500 MG/1
500 TABLET, EXTENDED RELEASE ORAL
Status: DISCONTINUED | OUTPATIENT
Start: 2024-11-14 | End: 2024-11-18 | Stop reason: HOSPADM

## 2024-11-14 RX ORDER — ONDANSETRON 4 MG/1
4 TABLET, ORALLY DISINTEGRATING ORAL EVERY 8 HOURS PRN
Status: DISCONTINUED | OUTPATIENT
Start: 2024-11-14 | End: 2024-11-18 | Stop reason: HOSPADM

## 2024-11-14 RX ORDER — DEXTROSE 50 % IN WATER (D50W) INTRAVENOUS SYRINGE
25
Status: DISCONTINUED | OUTPATIENT
Start: 2024-11-14 | End: 2024-11-18 | Stop reason: HOSPADM

## 2024-11-14 RX ORDER — SODIUM CHLORIDE 9 MG/ML
100 INJECTION, SOLUTION INTRAVENOUS CONTINUOUS
Status: ACTIVE | OUTPATIENT
Start: 2024-11-14 | End: 2024-11-15

## 2024-11-14 RX ORDER — POLYETHYLENE GLYCOL 3350 17 G/17G
17 POWDER, FOR SOLUTION ORAL DAILY
Status: DISCONTINUED | OUTPATIENT
Start: 2024-11-14 | End: 2024-11-18 | Stop reason: HOSPADM

## 2024-11-14 RX ORDER — ENOXAPARIN SODIUM 100 MG/ML
40 INJECTION SUBCUTANEOUS DAILY
Status: DISCONTINUED | OUTPATIENT
Start: 2024-11-14 | End: 2024-11-18 | Stop reason: HOSPADM

## 2024-11-14 RX ORDER — GLIMEPIRIDE 2 MG/1
2 TABLET ORAL
Status: DISCONTINUED | OUTPATIENT
Start: 2024-11-14 | End: 2024-11-18 | Stop reason: HOSPADM

## 2024-11-14 RX ORDER — BRIMONIDINE TARTRATE 2 MG/ML
1 SOLUTION/ DROPS OPHTHALMIC 2 TIMES DAILY
Status: DISCONTINUED | OUTPATIENT
Start: 2024-11-14 | End: 2024-11-18 | Stop reason: HOSPADM

## 2024-11-14 RX ORDER — ACETAMINOPHEN 160 MG/5ML
650 SOLUTION ORAL EVERY 4 HOURS PRN
Status: DISCONTINUED | OUTPATIENT
Start: 2024-11-14 | End: 2024-11-18 | Stop reason: HOSPADM

## 2024-11-14 RX ORDER — SIMVASTATIN 40 MG/1
40 TABLET, FILM COATED ORAL NIGHTLY
Status: DISCONTINUED | OUTPATIENT
Start: 2024-11-14 | End: 2024-11-18 | Stop reason: HOSPADM

## 2024-11-14 RX ORDER — DEXTROSE 50 % IN WATER (D50W) INTRAVENOUS SYRINGE
12.5
Status: DISCONTINUED | OUTPATIENT
Start: 2024-11-14 | End: 2024-11-18 | Stop reason: HOSPADM

## 2024-11-14 RX ORDER — ONDANSETRON HYDROCHLORIDE 2 MG/ML
4 INJECTION, SOLUTION INTRAVENOUS EVERY 8 HOURS PRN
Status: DISCONTINUED | OUTPATIENT
Start: 2024-11-14 | End: 2024-11-18 | Stop reason: HOSPADM

## 2024-11-14 RX ORDER — INSULIN LISPRO 100 [IU]/ML
0-5 INJECTION, SOLUTION INTRAVENOUS; SUBCUTANEOUS
Status: DISCONTINUED | OUTPATIENT
Start: 2024-11-15 | End: 2024-11-18 | Stop reason: HOSPADM

## 2024-11-14 RX ORDER — TIMOLOL MALEATE 5 MG/ML
1 SOLUTION/ DROPS OPHTHALMIC 2 TIMES DAILY
Status: DISCONTINUED | OUTPATIENT
Start: 2024-11-14 | End: 2024-11-18 | Stop reason: HOSPADM

## 2024-11-14 RX ORDER — LEVOTHYROXINE SODIUM 100 UG/1
100 TABLET ORAL
Status: DISCONTINUED | OUTPATIENT
Start: 2024-11-15 | End: 2024-11-18 | Stop reason: HOSPADM

## 2024-11-14 RX ORDER — ACETAMINOPHEN 325 MG/1
650 TABLET ORAL EVERY 4 HOURS PRN
Status: DISCONTINUED | OUTPATIENT
Start: 2024-11-14 | End: 2024-11-18 | Stop reason: HOSPADM

## 2024-11-14 SDOH — SOCIAL STABILITY: SOCIAL INSECURITY: DO YOU FEEL UNSAFE GOING BACK TO THE PLACE WHERE YOU ARE LIVING?: NO

## 2024-11-14 SDOH — SOCIAL STABILITY: SOCIAL INSECURITY: WITHIN THE LAST YEAR, HAVE YOU BEEN HUMILIATED OR EMOTIONALLY ABUSED IN OTHER WAYS BY YOUR PARTNER OR EX-PARTNER?: NO

## 2024-11-14 SDOH — ECONOMIC STABILITY: INCOME INSECURITY: IN THE PAST 12 MONTHS HAS THE ELECTRIC, GAS, OIL, OR WATER COMPANY THREATENED TO SHUT OFF SERVICES IN YOUR HOME?: NO

## 2024-11-14 SDOH — SOCIAL STABILITY: SOCIAL INSECURITY: ARE THERE ANY APPARENT SIGNS OF INJURIES/BEHAVIORS THAT COULD BE RELATED TO ABUSE/NEGLECT?: NO

## 2024-11-14 SDOH — SOCIAL STABILITY: SOCIAL INSECURITY: HAVE YOU HAD THOUGHTS OF HARMING ANYONE ELSE?: NO

## 2024-11-14 SDOH — SOCIAL STABILITY: SOCIAL INSECURITY: WITHIN THE LAST YEAR, HAVE YOU BEEN AFRAID OF YOUR PARTNER OR EX-PARTNER?: NO

## 2024-11-14 SDOH — SOCIAL STABILITY: SOCIAL INSECURITY: DO YOU FEEL ANYONE HAS EXPLOITED OR TAKEN ADVANTAGE OF YOU FINANCIALLY OR OF YOUR PERSONAL PROPERTY?: NO

## 2024-11-14 SDOH — SOCIAL STABILITY: SOCIAL INSECURITY: DOES ANYONE TRY TO KEEP YOU FROM HAVING/CONTACTING OTHER FRIENDS OR DOING THINGS OUTSIDE YOUR HOME?: NO

## 2024-11-14 SDOH — ECONOMIC STABILITY: FOOD INSECURITY: WITHIN THE PAST 12 MONTHS, THE FOOD YOU BOUGHT JUST DIDN'T LAST AND YOU DIDN'T HAVE MONEY TO GET MORE.: NEVER TRUE

## 2024-11-14 SDOH — SOCIAL STABILITY: SOCIAL INSECURITY: WERE YOU ABLE TO COMPLETE ALL THE BEHAVIORAL HEALTH SCREENINGS?: NO

## 2024-11-14 SDOH — SOCIAL STABILITY: SOCIAL INSECURITY: ABUSE: ADULT

## 2024-11-14 SDOH — ECONOMIC STABILITY: FOOD INSECURITY: WITHIN THE PAST 12 MONTHS, YOU WORRIED THAT YOUR FOOD WOULD RUN OUT BEFORE YOU GOT THE MONEY TO BUY MORE.: NEVER TRUE

## 2024-11-14 SDOH — SOCIAL STABILITY: SOCIAL INSECURITY: ARE YOU OR HAVE YOU BEEN THREATENED OR ABUSED PHYSICALLY, EMOTIONALLY, OR SEXUALLY BY ANYONE?: NO

## 2024-11-14 SDOH — SOCIAL STABILITY: SOCIAL INSECURITY: HAS ANYONE EVER THREATENED TO HURT YOUR FAMILY OR YOUR PETS?: NO

## 2024-11-14 ASSESSMENT — COGNITIVE AND FUNCTIONAL STATUS - GENERAL
DAILY ACTIVITIY SCORE: 9
EATING MEALS: A LITTLE
WALKING IN HOSPITAL ROOM: A LOT
STANDING UP FROM CHAIR USING ARMS: A LOT
MOBILITY SCORE: 14
MOBILITY SCORE: 14
TURNING FROM BACK TO SIDE WHILE IN FLAT BAD: A LITTLE
PERSONAL GROOMING: A LOT
CLIMB 3 TO 5 STEPS WITH RAILING: TOTAL
EATING MEALS: A LITTLE
DAILY ACTIVITIY SCORE: 9
MOVING FROM LYING ON BACK TO SITTING ON SIDE OF FLAT BED WITH BEDRAILS: A LITTLE
PATIENT BASELINE BEDBOUND: NO
TOILETING: TOTAL
WALKING IN HOSPITAL ROOM: A LOT
DRESSING REGULAR UPPER BODY CLOTHING: TOTAL
DRESSING REGULAR LOWER BODY CLOTHING: TOTAL
MOVING TO AND FROM BED TO CHAIR: A LITTLE
HELP NEEDED FOR BATHING: TOTAL
PERSONAL GROOMING: A LOT
CLIMB 3 TO 5 STEPS WITH RAILING: TOTAL
TURNING FROM BACK TO SIDE WHILE IN FLAT BAD: A LITTLE
TOILETING: TOTAL
HELP NEEDED FOR BATHING: TOTAL
STANDING UP FROM CHAIR USING ARMS: A LOT
DRESSING REGULAR UPPER BODY CLOTHING: TOTAL
MOVING FROM LYING ON BACK TO SITTING ON SIDE OF FLAT BED WITH BEDRAILS: A LITTLE
DRESSING REGULAR LOWER BODY CLOTHING: TOTAL
MOVING TO AND FROM BED TO CHAIR: A LITTLE

## 2024-11-14 ASSESSMENT — ACTIVITIES OF DAILY LIVING (ADL)
PATIENT'S MEMORY ADEQUATE TO SAFELY COMPLETE DAILY ACTIVITIES?: NO
FEEDING YOURSELF: NEEDS ASSISTANCE
JUDGMENT_ADEQUATE_SAFELY_COMPLETE_DAILY_ACTIVITIES: NO
HEARING - LEFT EAR: HEARING AID
LACK_OF_TRANSPORTATION: NO
TOILETING: NEEDS ASSISTANCE
BATHING: NEEDS ASSISTANCE
DRESSING YOURSELF: NEEDS ASSISTANCE
LACK_OF_TRANSPORTATION: NO
GROOMING: NEEDS ASSISTANCE
HEARING - RIGHT EAR: HEARING AID
WALKS IN HOME: NEEDS ASSISTANCE
ADEQUATE_TO_COMPLETE_ADL: NO

## 2024-11-14 ASSESSMENT — PAIN SCALES - GENERAL
PAINLEVEL_OUTOF10: 0 - NO PAIN
PAINLEVEL_OUTOF10: 0 - NO PAIN

## 2024-11-14 ASSESSMENT — LIFESTYLE VARIABLES
SKIP TO QUESTIONS 9-10: 1
AUDIT-C TOTAL SCORE: 0
AUDIT-C TOTAL SCORE: 0
HOW MANY STANDARD DRINKS CONTAINING ALCOHOL DO YOU HAVE ON A TYPICAL DAY: PATIENT DOES NOT DRINK
HOW OFTEN DO YOU HAVE 6 OR MORE DRINKS ON ONE OCCASION: NEVER
HOW OFTEN DO YOU HAVE A DRINK CONTAINING ALCOHOL: NEVER

## 2024-11-14 ASSESSMENT — COLUMBIA-SUICIDE SEVERITY RATING SCALE - C-SSRS
2. HAVE YOU ACTUALLY HAD ANY THOUGHTS OF KILLING YOURSELF?: NO
1. IN THE PAST MONTH, HAVE YOU WISHED YOU WERE DEAD OR WISHED YOU COULD GO TO SLEEP AND NOT WAKE UP?: NO
6. HAVE YOU EVER DONE ANYTHING, STARTED TO DO ANYTHING, OR PREPARED TO DO ANYTHING TO END YOUR LIFE?: NO

## 2024-11-14 ASSESSMENT — PATIENT HEALTH QUESTIONNAIRE - PHQ9
2. FEELING DOWN, DEPRESSED OR HOPELESS: NOT AT ALL
SUM OF ALL RESPONSES TO PHQ9 QUESTIONS 1 & 2: 0
1. LITTLE INTEREST OR PLEASURE IN DOING THINGS: NOT AT ALL

## 2024-11-14 ASSESSMENT — PAIN - FUNCTIONAL ASSESSMENT
PAIN_FUNCTIONAL_ASSESSMENT: 0-10
PAIN_FUNCTIONAL_ASSESSMENT: 0-10

## 2024-11-14 NOTE — H&P
History Of Present Illness  Apoorva Sanchez is a 89 y.o. female presenting with fall.  Pmhx PAD with nonhealing bilateral lower extremity wounds, type 2 diabetes, hypertension, and hypothyroidism. [facility] patient presented there after mechanical fall.  Patient denies dizziness chest pain shortness of breath.  No nausea no vomiting.  Lives at home alone.  Patient was found to have a sodium of 126 therefore patient was transferred to [facility].     Past Medical History  She has a past medical history of Accidental fall (03/19/2024), At risk for falling, Cellulitis, Diabetes mellitus (Multi), Disease of thyroid gland, Fracture of pelvis (Multi) (03/19/2024), Hand fracture, Hypertension, Hypothyroidism, Injury of head (03/19/2024), Open wound of right lower extremity, Oth fracture of left pubis, init encntr for closed fracture (07/03/2022), PVD (peripheral vascular disease) (CMS-[facility]), Syncope (03/19/2024), Urinary tract infection, UTI (urinary tract infection), Venous insufficiency, and Weakness.    Surgical History  She has no past surgical history on file.     Social History  She reports that she has never smoked. She has never been exposed to tobacco smoke. She has never used smokeless tobacco. She reports that she does not drink alcohol and does not use drugs.    Family History  Family History   Problem Relation Name Age of Onset    Diabetes Other      Hypertension Other          Allergies  Sulfa (sulfonamide antibiotics)    Review of Systems   All other systems reviewed and are negative.       Physical Exam  Vitals reviewed.   Constitutional:       Appearance: Normal appearance.      Comments: Very hard of hearing   HENT:      Head: Normocephalic.      Right Ear: Tympanic membrane normal.      Nose: Nose normal.   Cardiovascular:      Rate and Rhythm: Normal rate and regular rhythm.   Pulmonary:      Effort: Pulmonary effort is normal.   Abdominal:      General: Abdomen is flat. Bowel sounds  are normal.      Palpations: Abdomen is soft.   Musculoskeletal:      Comments: Bilateral lower extremity wounds and dressings   Skin:     Capillary Refill: Capillary refill takes less than 2 seconds.   Neurological:      General: No focal deficit present.      Mental Status: She is alert.          Last Recorded Vitals  /73   Pulse 75   Temp 36.8 °C (98.2 °F) (Oral)   Resp 18   Wt 58.8 kg (129 lb 10.1 oz)   SpO2 98%     Relevant Results  atenolol, 100 mg, oral, Daily  brimonidine, 1 drop, Both Eyes, BID  cloNIDine, 0.2 mg, oral, BID  enoxaparin, 40 mg, subcutaneous, Daily  glimepiride, 2 mg, oral, BID  [START ON 11/15/2024] insulin lispro, 0-5 Units, subcutaneous, TID AC  latanoprost, 1 drop, Both Eyes, Nightly  [START ON 11/15/2024] levothyroxine, 100 mcg, oral, Daily before breakfast  metFORMIN XR, 500 mg, oral, Daily with evening meal  polyethylene glycol, 17 g, oral, Daily  prednisoLONE acetate, 1 drop, Both Eyes, BID  simvastatin, 40 mg, oral, Nightly  timolol, 1 drop, Both Eyes, BID      No results found for this or any previous visit (from the past 24 hours).      Imaging:  Ct head/cervical spine:  CT head   No acute intracranial abnormality.   Incidental findings described in the result section.     Cervical spine   No acute fracture or dislocation.   Degenerative change.   2.2 cm left lobe thyroid nodule.  Elective sonographic characterization   is recommended.          Assessment/Plan   Assessment & Plan  Hyponatremia    1.  Mechanical fall  -needs pt/ot    2. Bilateral LE wounds, nonhealing, with peripheral vascular disease  -Saw cardiology/endovascular  -They just saw her outpatient yesterday patient will have MEETA TBI to further define the disease they will follow-up with the patient again for now consulted wound care    3.  Hyponatremia  -hold lasix for now gentle iv hydration, seems dry on exam    4. DMII  -continue home meds plus SSI    5. Episode of urinary retention  -straight cath x  1      DNR/DNI       Meghan Rosa MD

## 2024-11-15 ENCOUNTER — APPOINTMENT (OUTPATIENT)
Dept: CARDIOLOGY | Facility: HOSPITAL | Age: 89
DRG: 641 | End: 2024-11-15
Payer: MEDICARE

## 2024-11-15 LAB
ANION GAP SERPL CALCULATED.3IONS-SCNC: 10 MMOL/L (ref 10–20)
BUN SERPL-MCNC: 18 MG/DL (ref 6–23)
CALCIUM SERPL-MCNC: 8 MG/DL (ref 8.6–10.3)
CHLORIDE SERPL-SCNC: 97 MMOL/L (ref 98–107)
CO2 SERPL-SCNC: 24 MMOL/L (ref 21–32)
CREAT SERPL-MCNC: 0.48 MG/DL (ref 0.5–1.05)
EGFRCR SERPLBLD CKD-EPI 2021: 87 ML/MIN/1.73M*2
ERYTHROCYTE [DISTWIDTH] IN BLOOD BY AUTOMATED COUNT: 16.3 % (ref 11.5–14.5)
GLUCOSE BLD MANUAL STRIP-MCNC: 135 MG/DL (ref 74–99)
GLUCOSE BLD MANUAL STRIP-MCNC: 168 MG/DL (ref 74–99)
GLUCOSE BLD MANUAL STRIP-MCNC: 197 MG/DL (ref 74–99)
GLUCOSE BLD MANUAL STRIP-MCNC: 37 MG/DL (ref 74–99)
GLUCOSE BLD MANUAL STRIP-MCNC: 44 MG/DL (ref 74–99)
GLUCOSE BLD MANUAL STRIP-MCNC: 50 MG/DL (ref 74–99)
GLUCOSE BLD MANUAL STRIP-MCNC: 63 MG/DL (ref 74–99)
GLUCOSE BLD MANUAL STRIP-MCNC: 66 MG/DL (ref 74–99)
GLUCOSE BLD MANUAL STRIP-MCNC: 72 MG/DL (ref 74–99)
GLUCOSE SERPL-MCNC: 33 MG/DL (ref 74–99)
HCT VFR BLD AUTO: 28.6 % (ref 36–46)
HGB BLD-MCNC: 9.1 G/DL (ref 12–16)
MCH RBC QN AUTO: 26.5 PG (ref 26–34)
MCHC RBC AUTO-ENTMCNC: 31.8 G/DL (ref 32–36)
MCV RBC AUTO: 83 FL (ref 80–100)
NRBC BLD-RTO: 0 /100 WBCS (ref 0–0)
PLATELET # BLD AUTO: 222 X10*3/UL (ref 150–450)
POTASSIUM SERPL-SCNC: 3.7 MMOL/L (ref 3.5–5.3)
RBC # BLD AUTO: 3.43 X10*6/UL (ref 4–5.2)
SODIUM SERPL-SCNC: 127 MMOL/L (ref 136–145)
WBC # BLD AUTO: 9.1 X10*3/UL (ref 4.4–11.3)

## 2024-11-15 PROCEDURE — 85027 COMPLETE CBC AUTOMATED: CPT | Performed by: INTERNAL MEDICINE

## 2024-11-15 PROCEDURE — 96372 THER/PROPH/DIAG INJ SC/IM: CPT | Performed by: INTERNAL MEDICINE

## 2024-11-15 PROCEDURE — 82947 ASSAY GLUCOSE BLOOD QUANT: CPT

## 2024-11-15 PROCEDURE — 97535 SELF CARE MNGMENT TRAINING: CPT | Mod: GO

## 2024-11-15 PROCEDURE — 2500000004 HC RX 250 GENERAL PHARMACY W/ HCPCS (ALT 636 FOR OP/ED): Mod: JZ | Performed by: INTERNAL MEDICINE

## 2024-11-15 PROCEDURE — 2500000005 HC RX 250 GENERAL PHARMACY W/O HCPCS: Performed by: INTERNAL MEDICINE

## 2024-11-15 PROCEDURE — 2500000002 HC RX 250 W HCPCS SELF ADMINISTERED DRUGS (ALT 637 FOR MEDICARE OP, ALT 636 FOR OP/ED): Performed by: INTERNAL MEDICINE

## 2024-11-15 PROCEDURE — 99233 SBSQ HOSP IP/OBS HIGH 50: CPT | Performed by: INTERNAL MEDICINE

## 2024-11-15 PROCEDURE — 83036 HEMOGLOBIN GLYCOSYLATED A1C: CPT | Mod: TRILAB | Performed by: INTERNAL MEDICINE

## 2024-11-15 PROCEDURE — 97165 OT EVAL LOW COMPLEX 30 MIN: CPT | Mod: GO

## 2024-11-15 PROCEDURE — 2500000004 HC RX 250 GENERAL PHARMACY W/ HCPCS (ALT 636 FOR OP/ED): Performed by: INTERNAL MEDICINE

## 2024-11-15 PROCEDURE — 93005 ELECTROCARDIOGRAM TRACING: CPT

## 2024-11-15 PROCEDURE — 80048 BASIC METABOLIC PNL TOTAL CA: CPT | Performed by: INTERNAL MEDICINE

## 2024-11-15 PROCEDURE — 1200000002 HC GENERAL ROOM WITH TELEMETRY DAILY

## 2024-11-15 PROCEDURE — 97161 PT EVAL LOW COMPLEX 20 MIN: CPT | Mod: GP

## 2024-11-15 PROCEDURE — 2500000001 HC RX 250 WO HCPCS SELF ADMINISTERED DRUGS (ALT 637 FOR MEDICARE OP): Performed by: INTERNAL MEDICINE

## 2024-11-15 PROCEDURE — 36415 COLL VENOUS BLD VENIPUNCTURE: CPT | Performed by: INTERNAL MEDICINE

## 2024-11-15 RX ORDER — VANCOMYCIN HYDROCHLORIDE 1 G/20ML
INJECTION, POWDER, LYOPHILIZED, FOR SOLUTION INTRAVENOUS DAILY PRN
Status: DISCONTINUED | OUTPATIENT
Start: 2024-11-15 | End: 2024-11-18 | Stop reason: HOSPADM

## 2024-11-15 RX ORDER — SODIUM CHLORIDE 9 MG/ML
100 INJECTION, SOLUTION INTRAVENOUS CONTINUOUS
Status: ACTIVE | OUTPATIENT
Start: 2024-11-15 | End: 2024-11-16

## 2024-11-15 RX ORDER — VANCOMYCIN 1.5 G/300ML
1500 INJECTION, SOLUTION INTRAVENOUS EVERY 24 HOURS
Status: DISCONTINUED | OUTPATIENT
Start: 2024-11-15 | End: 2024-11-18 | Stop reason: HOSPADM

## 2024-11-15 SDOH — SOCIAL STABILITY: SOCIAL NETWORK: HOW OFTEN DO YOU ATTEND MEETINGS OF THE CLUBS OR ORGANIZATIONS YOU BELONG TO?: NEVER

## 2024-11-15 SDOH — SOCIAL STABILITY: SOCIAL INSECURITY: ARE YOU MARRIED, WIDOWED, DIVORCED, SEPARATED, NEVER MARRIED, OR LIVING WITH A PARTNER?: WIDOWED

## 2024-11-15 SDOH — ECONOMIC STABILITY: FOOD INSECURITY: WITHIN THE PAST 12 MONTHS, YOU WORRIED THAT YOUR FOOD WOULD RUN OUT BEFORE YOU GOT THE MONEY TO BUY MORE.: NEVER TRUE

## 2024-11-15 SDOH — ECONOMIC STABILITY: INCOME INSECURITY: IN THE PAST 12 MONTHS HAS THE ELECTRIC, GAS, OIL, OR WATER COMPANY THREATENED TO SHUT OFF SERVICES IN YOUR HOME?: NO

## 2024-11-15 SDOH — ECONOMIC STABILITY: FOOD INSECURITY: HOW HARD IS IT FOR YOU TO PAY FOR THE VERY BASICS LIKE FOOD, HOUSING, MEDICAL CARE, AND HEATING?: NOT VERY HARD

## 2024-11-15 SDOH — HEALTH STABILITY: MENTAL HEALTH: HOW OFTEN DO YOU HAVE A DRINK CONTAINING ALCOHOL?: NEVER

## 2024-11-15 SDOH — HEALTH STABILITY: MENTAL HEALTH: HOW OFTEN DO YOU HAVE SIX OR MORE DRINKS ON ONE OCCASION?: NEVER

## 2024-11-15 SDOH — SOCIAL STABILITY: SOCIAL INSECURITY: WITHIN THE LAST YEAR, HAVE YOU BEEN HUMILIATED OR EMOTIONALLY ABUSED IN OTHER WAYS BY YOUR PARTNER OR EX-PARTNER?: NO

## 2024-11-15 SDOH — ECONOMIC STABILITY: HOUSING INSECURITY: AT ANY TIME IN THE PAST 12 MONTHS, WERE YOU HOMELESS OR LIVING IN A SHELTER (INCLUDING NOW)?: NO

## 2024-11-15 SDOH — HEALTH STABILITY: MENTAL HEALTH: HOW MANY DRINKS CONTAINING ALCOHOL DO YOU HAVE ON A TYPICAL DAY WHEN YOU ARE DRINKING?: PATIENT DOES NOT DRINK

## 2024-11-15 SDOH — SOCIAL STABILITY: SOCIAL INSECURITY: WITHIN THE LAST YEAR, HAVE YOU BEEN AFRAID OF YOUR PARTNER OR EX-PARTNER?: NO

## 2024-11-15 SDOH — HEALTH STABILITY: PHYSICAL HEALTH: ON AVERAGE, HOW MANY DAYS PER WEEK DO YOU ENGAGE IN MODERATE TO STRENUOUS EXERCISE (LIKE A BRISK WALK)?: 0 DAYS

## 2024-11-15 SDOH — ECONOMIC STABILITY: FOOD INSECURITY: WITHIN THE PAST 12 MONTHS, THE FOOD YOU BOUGHT JUST DIDN'T LAST AND YOU DIDN'T HAVE MONEY TO GET MORE.: NEVER TRUE

## 2024-11-15 SDOH — HEALTH STABILITY: PHYSICAL HEALTH: ON AVERAGE, HOW MANY MINUTES DO YOU ENGAGE IN EXERCISE AT THIS LEVEL?: 0 MIN

## 2024-11-15 SDOH — ECONOMIC STABILITY: HOUSING INSECURITY: IN THE LAST 12 MONTHS, WAS THERE A TIME WHEN YOU WERE NOT ABLE TO PAY THE MORTGAGE OR RENT ON TIME?: NO

## 2024-11-15 SDOH — ECONOMIC STABILITY: TRANSPORTATION INSECURITY: IN THE PAST 12 MONTHS, HAS LACK OF TRANSPORTATION KEPT YOU FROM MEDICAL APPOINTMENTS OR FROM GETTING MEDICATIONS?: NO

## 2024-11-15 SDOH — SOCIAL STABILITY: SOCIAL NETWORK: HOW OFTEN DO YOU ATTEND CHURCH OR RELIGIOUS SERVICES?: NEVER

## 2024-11-15 SDOH — SOCIAL STABILITY: SOCIAL NETWORK: HOW OFTEN DO YOU GET TOGETHER WITH FRIENDS OR RELATIVES?: MORE THAN THREE TIMES A WEEK

## 2024-11-15 SDOH — ECONOMIC STABILITY: HOUSING INSECURITY: IN THE PAST 12 MONTHS, HOW MANY TIMES HAVE YOU MOVED WHERE YOU WERE LIVING?: 1

## 2024-11-15 ASSESSMENT — COGNITIVE AND FUNCTIONAL STATUS - GENERAL
DRESSING REGULAR UPPER BODY CLOTHING: A LITTLE
MOVING FROM LYING ON BACK TO SITTING ON SIDE OF FLAT BED WITH BEDRAILS: A LOT
EATING MEALS: A LITTLE
TURNING FROM BACK TO SIDE WHILE IN FLAT BAD: A LITTLE
MOBILITY SCORE: 14
HELP NEEDED FOR BATHING: TOTAL
STANDING UP FROM CHAIR USING ARMS: A LOT
DRESSING REGULAR UPPER BODY CLOTHING: TOTAL
MOVING TO AND FROM BED TO CHAIR: A LITTLE
WALKING IN HOSPITAL ROOM: A LOT
HELP NEEDED FOR BATHING: TOTAL
PERSONAL GROOMING: A LOT
DAILY ACTIVITIY SCORE: 9
DAILY ACTIVITIY SCORE: 13
TURNING FROM BACK TO SIDE WHILE IN FLAT BAD: A LOT
DRESSING REGULAR LOWER BODY CLOTHING: TOTAL
DRESSING REGULAR UPPER BODY CLOTHING: TOTAL
DRESSING REGULAR LOWER BODY CLOTHING: TOTAL
EATING MEALS: A LITTLE
PERSONAL GROOMING: A LOT
STANDING UP FROM CHAIR USING ARMS: A LOT
WALKING IN HOSPITAL ROOM: A LOT
EATING MEALS: A LITTLE
MOBILITY SCORE: 11
MOVING TO AND FROM BED TO CHAIR: A LOT
WALKING IN HOSPITAL ROOM: A LOT
TOILETING: TOTAL
STANDING UP FROM CHAIR USING ARMS: A LOT
DRESSING REGULAR LOWER BODY CLOTHING: TOTAL
CLIMB 3 TO 5 STEPS WITH RAILING: TOTAL
PERSONAL GROOMING: A LITTLE
MOVING FROM LYING ON BACK TO SITTING ON SIDE OF FLAT BED WITH BEDRAILS: A LITTLE
CLIMB 3 TO 5 STEPS WITH RAILING: TOTAL
HELP NEEDED FOR BATHING: A LOT
MOVING TO AND FROM BED TO CHAIR: A LITTLE
TOILETING: TOTAL
CLIMB 3 TO 5 STEPS WITH RAILING: TOTAL
TURNING FROM BACK TO SIDE WHILE IN FLAT BAD: A LITTLE
TOILETING: TOTAL
MOVING FROM LYING ON BACK TO SITTING ON SIDE OF FLAT BED WITH BEDRAILS: A LITTLE

## 2024-11-15 ASSESSMENT — LIFESTYLE VARIABLES
SKIP TO QUESTIONS 9-10: 1
AUDIT-C TOTAL SCORE: 0

## 2024-11-15 ASSESSMENT — ENCOUNTER SYMPTOMS
COUGH: 0
WOUND: 1
SHORTNESS OF BREATH: 0
FEVER: 1

## 2024-11-15 ASSESSMENT — PAIN SCALES - GENERAL
PAINLEVEL_OUTOF10: 0 - NO PAIN

## 2024-11-15 ASSESSMENT — ACTIVITIES OF DAILY LIVING (ADL)
LACK_OF_TRANSPORTATION: NO
LACK_OF_TRANSPORTATION: NO
BATHING_ASSISTANCE: MODERATE
HOME_MANAGEMENT_TIME_ENTRY: 25

## 2024-11-15 ASSESSMENT — PAIN - FUNCTIONAL ASSESSMENT
PAIN_FUNCTIONAL_ASSESSMENT: 0-10

## 2024-11-15 NOTE — CONSULTS
Inpatient consult to Infectious Diseases  Consult performed by: Kristian Montelongo MD  Consult ordered by: Joe Proctor DPM            Primary MD: Man Mccracken MD    Reason For Consult  Infected right leg ulcer    History Of Present Illness  Apoorva Sanchez is a 89 y.o. female presenting with nonhealing bilateral leg ulcers.  She has a background history of type 2 diabetes, hypertension.  She recently had a mechanical fall.  She presented to an outside hospital emergency room and was found to be hyponatremic.  She was transferred to [facility] for further care.  She had a recent positive wound culture for Pseudomonas and MRSA.  She has mild bilateral leg.  She denies any fever or chills  She recently had a right leg Apligraf placement.    Past Medical History  She has a past medical history of Accidental fall (03/19/2024), At risk for falling, Cellulitis, Diabetes mellitus (Multi), Disease of thyroid gland, Fracture of pelvis (Multi) (03/19/2024), Hand fracture, Hypertension, Hypothyroidism, Injury of head (03/19/2024), Open wound of right lower extremity, Oth fracture of left pubis, init encntr for closed fracture (07/03/2022), PVD (peripheral vascular disease) ([facility]), Syncope (03/19/2024), Urinary tract infection, UTI (urinary tract infection), Venous insufficiency, and Weakness.    Surgical History  She has no past surgical history on file.     Social History     Occupational History    Not on file   Tobacco Use    Smoking status: Never     Passive exposure: Never    Smokeless tobacco: Never   Vaping Use    Vaping status: Never Used   Substance and Sexual Activity    Alcohol use: Never    Drug use: Never    Sexual activity: Not on file     Travel History   Travel since 10/15/24    No documented travel since 10/15/24           Family History  Family History   Problem Relation Name Age of Onset    Diabetes Other      Hypertension Other       Allergies  Sulfa (sulfonamide antibiotics)     Immunization History    Administered Date(s) Administered    Flu vaccine, quadrivalent, high-dose, preservative free, age 65y+ (FLUZONE) 10/22/2020, 10/20/2021, 11/07/2022, 09/18/2023    Flu vaccine, trivalent, preservative free, HIGH-DOSE, age 65y+ (Fluzone) 10/31/2018, 10/03/2019    Influenza, injectable, quadrivalent 11/22/2016, 11/28/2017    Influenza, seasonal, injectable 01/07/2011, 09/21/2012, 12/03/2013, 10/07/2014, 10/01/2015    Moderna SARS-CoV-2 Vaccination 03/25/2021, 04/22/2021    Pfizer Purple Cap SARS-CoV-2 12/20/2021    Pneumococcal conjugate vaccine, 13-valent (PREVNAR 13) 11/28/2017    Pneumococcal polysaccharide vaccine, 23-valent, age 2 years and older (PNEUMOVAX 23) 10/31/2018    Td (adult), unspecified 08/23/2002    Tdap vaccine, age 7 year and older (BOOSTRIX, ADACEL) 07/01/2024     Medications  Home medications:  Medications Prior to Admission   Medication Sig Dispense Refill Last Dose/Taking    acetaminophen (Tylenol) 325 mg tablet Take 2 tablets (650 mg) by mouth every 6 hours if needed for mild pain (1 - 3), headaches or fever (temp greater than 38.0 C). 30 tablet 0     atenolol (Tenormin) 100 mg tablet Take 1 tablet (100 mg) by mouth once daily. 90 tablet 3     brimonidine (AlphaGAN) 0.2 % ophthalmic solution Administer 1 drop into both eyes 2 times a day.       ciprofloxacin (Cipro) 500 mg tablet Take 1 tablet (500 mg) by mouth 2 times a day for 10 days. 20 tablet 0     cloNIDine (Catapres) 0.2 mg tablet Take 1 tablet (0.2 mg) by mouth 2 times a day. (Patient taking differently: Take 2 tablets (0.4 mg) by mouth once daily at bedtime.) 180 tablet 3     doxycycline (Vibramycin) 100 mg capsule Take 1 capsule (100 mg) by mouth 2 times a day for 10 days. Take with at least 8 ounces (large glass) of water, do not lie down for 30 minutes after 20 capsule 0     furosemide (Lasix) 40 mg tablet Take 1 tablet (40 mg) by mouth once daily. 90 tablet 3     glimepiride (Amaryl) 2 mg tablet Take 1 tablet (2 mg) by mouth 2  times a day with meals. 180 tablet 3     latanoprost (Xalatan) 0.005 % ophthalmic solution Administer 1 drop into both eyes once daily at bedtime. bedtime       levothyroxine (Synthroid, Levoxyl) 100 mcg tablet Take 1 tablet (100 mcg) by mouth once daily in the morning. Take before meals. 90 tablet 3     metFORMIN XR (Glucophage-XR) 500 mg 24 hr tablet Take 1 tablet (500 mg) by mouth once daily in the evening. Take with meals. Do not crush, chew, or split. 90 tablet 3     prednisoLONE acetate (Pred-Forte) 1 % ophthalmic suspension Not yet to take       simvastatin (Zocor) 40 mg tablet Take 1 tablet (40 mg) by mouth once daily at bedtime. 90 tablet 3     timolol (Timoptic) 0.5 % ophthalmic solution Administer 1 drop into both eyes 2 times a day.        Current medications:  Scheduled medications  atenolol, 100 mg, oral, Daily  brimonidine, 1 drop, Both Eyes, BID  cloNIDine, 0.2 mg, oral, BID  enoxaparin, 40 mg, subcutaneous, Daily  [Held by provider] glimepiride, 2 mg, oral, BID  insulin lispro, 0-5 Units, subcutaneous, TID AC  latanoprost, 1 drop, Both Eyes, Nightly  levothyroxine, 100 mcg, oral, Daily before breakfast  [Held by provider] metFORMIN XR, 500 mg, oral, Daily with evening meal  polyethylene glycol, 17 g, oral, Daily  prednisoLONE acetate, 1 drop, Both Eyes, BID  simvastatin, 40 mg, oral, Nightly  timolol, 1 drop, Both Eyes, BID      Continuous medications  sodium chloride 0.9%, 100 mL/hr, Last Rate: 100 mL/hr (11/15/24 1539)  sodium chloride 0.9%, 100 mL/hr      PRN medications  PRN medications: acetaminophen **OR** acetaminophen **OR** acetaminophen, dextrose, dextrose, glucagon, glucagon, ondansetron ODT **OR** ondansetron    Review of Systems   Constitutional:  Positive for fever.   Respiratory:  Negative for cough and shortness of breath.    Cardiovascular:  Positive for leg swelling.   Skin:  Positive for rash and wound.   All other systems reviewed and are negative.       Objective  Range of  "Vitals (last 24 hours)  Heart Rate:  [61-81]   Temp:  [36.4 °C (97.5 °F)-36.8 °C (98.2 °F)]   Resp:  [17-18]   BP: (124-179)/(59-73)   Height:  [167.6 cm (5' 6\")]   Weight:  [58.8 kg (129 lb 10.1 oz)]   SpO2:  [94 %-98 %]   Daily Weight  11/14/24 : 58.8 kg (129 lb 10.1 oz)    Body mass index is 20.92 kg/m².     Physical Exam  Constitutional:       Appearance: Normal appearance.   HENT:      Head: Normocephalic and atraumatic.   Cardiovascular:      Rate and Rhythm: Normal rate and regular rhythm.      Heart sounds: Normal heart sounds.   Pulmonary:      Effort: Pulmonary effort is normal.      Breath sounds: Normal breath sounds.   Abdominal:      General: Bowel sounds are normal.      Palpations: Abdomen is soft.      Tenderness: There is no abdominal tenderness.   Musculoskeletal:      Cervical back: Normal range of motion and neck supple.      Right lower leg: No edema.      Left lower leg: No edema.   Skin:     General: Skin is warm and dry.      Comments: Bilateral leg dressing   Neurological:      Mental Status: She is alert and oriented to person, place, and time.   Psychiatric:         Behavior: Behavior normal.          Relevant Results  Outside Hospital Results    Labs  Results from last 72 hours   Lab Units 11/15/24  0440   WBC AUTO x10*3/uL 9.1   HEMOGLOBIN g/dL 9.1*   HEMATOCRIT % 28.6*   PLATELETS AUTO x10*3/uL 222     Results from last 72 hours   Lab Units 11/15/24  0440   SODIUM mmol/L 127*   POTASSIUM mmol/L 3.7   CHLORIDE mmol/L 97*   CO2 mmol/L 24   BUN mg/dL 18   CREATININE mg/dL 0.48*   GLUCOSE mg/dL 33*   CALCIUM mg/dL 8.0*   ANION GAP mmol/L 10   EGFR mL/min/1.73m*2 87         Estimated Creatinine Clearance: 65.1 mL/min (A) (by C-G formula based on SCr of 0.48 mg/dL (L)).  No results found for: \"CRP\", \"SEDRATE\"  No results found for: \"HIV1X2\", \"HIVCONF\", \"BFZZTF9GD\"  No results found for: \"HEPCABINIT\", \"HEPCAB\", \"HCVPCRQUANT\"  Microbiology  Susceptibility data from last 90 days.  Collected " Specimen Info Organism Aztreonam Cefepime Ceftazidime Ciprofloxacin Clindamycin Erythromycin Levofloxacin Oxacillin Piperacillin/Tazobactam Tetracycline Tobramycin Trimethoprim/Sulfamethoxazole Vancomycin   11/05/24 Tissue/Biopsy from Wound/Tissue Pseudomonas aeruginosa  S  S  S  S    I   S   S       Methicillin Resistant Staphylococcus aureus (MRSA)      R  R   R   R   R  S     Mixed Gram-Negative Bacteria                     Imaging  XR abdomen 1 view    Result Date: 11/15/2024  Interpreted By:  Sarah Sanchez, STUDY: XR ABDOMEN 1 VIEW;  11/14/2024 6:45 pm   INDICATION: Signs/Symptoms:r/o ileus.   COMPARISON: None.   ACCESSION NUMBER(S): [id number]   ORDERING CLINICIAN: JC TAN   TECHNIQUE: 2 AP images of the abdomen were obtained.   FINDINGS: There is no significant bowel distention. There is a moderate amount of fecal material.   The patient is scoliotic. There degenerative changes. There is a fracture of the right superior pubic ramus. There also appears be a fracture of the inferior right pubic ramus. The age   COMPARISON OF FINDINGS:       Nonspecific bowel gas pattern. Moderate amount of fecal material.     MACRO: none   Signed by: Sarah Sanchez 11/15/2024 8:23 AM Dictation workstation:   [id number]    CT cervical spine wo IV contrast    Result Date: 11/14/2024  * * *Final Report* * * DATE OF EXAM: Nov 14 2024 10:15AM   MY[facility]   0505  -  CT CERVICAL SPINE WO IVCON  / ACCESSION #  [id number] PROCEDURE REASON: Spine fracture, cervical, traumatic      * * * * Physician Interpretation * * * *  EXAMINATION: CT BRAIN WO IVCON, CT CERVICAL SPINE WO IVCON CLINICAL HISTORY: Injury TECHNIQUE:  Serial axial images without IV contrast were obtained from the vertex to the foramen magnum. MQ:  CTBWO_3 CT Radiation dose: Integrated Dose-Length Product (DLP) for this visit =   1005  mGy*cm CT Dose Reduction Employed: Automated exposure control(AEC) and iterative recon COMPARISON: None. RESULT: CT head Post-operative  change: None. Acute change: No evidence of an acute infarct or other acute parenchymal process. Hemorrhage: No evidence of acute intracranial hemorrhage. ECASS hemorrhagic transformation score: Not Applicable Mass Lesion / Mass Effect: There is no evidence of an intracranial mass or extraaxial fluid collection.  No significant mass effect. Chronic change: Scattered patchy foci of low attenuation are present within supratentorial white matter which is a nonspecific finding but likely represents mild microvascular ischemia. Parenchyma: There is no significant volume loss.  The brain parenchyma is otherwise within normal limits for age. Ventricles: The ventricles are within normal limits of size and configuration for age. Paranasal sinuses and skull base: No calvarial fracture.  Calcification in the posterior wall of the left globe.  Unremarkable sinuses.  1 cm soft tissue scalp nodule on the right side near the skull vertex.  1.2 cm osteoma/meningioma arising from the inner cortex of the left temporal bone (6:13). Localizer images: Unremarkable. CT cervical spine No acute fracture or dislocation.  No focal skeletal abnormality.   Multilevel facet arthropathy, spurring and disc space narrowing consistent with degenerative change.  Bony productive change adjacent to the atlantooccipital joint.  No acute soft tissue changes. 2.2 cm left lobe thyroid nodule with microcalcifications. Acuity: Actionable Findings: Endocrine (thyroid) Routing Code:  EMI_1 Recommendation: US THYROID/PARATHYROID TimeFrame: at the discretion of the clinical team. --END OF FINDING--    IMPRESSION: CT head No acute intracranial abnormality. Incidental findings described in the result section. Cervical spine No acute fracture or dislocation. Degenerative change. 2.2 cm left lobe thyroid nodule.  Elective sonographic characterization is recommended. Acuity: Actionable Findings: Endocrine (thyroid) Routing Code:  EMI_1 Recommendation: US  THYROID/PARATHYROID TimeFrame: at the discretion of the clinical team. --END OF FINDING-- : JOSS   Transcribe Date/Time: Nov 14 2024 11:04A Dictated by : CLARENCE MARTINEZ MD This examination was interpreted and the report reviewed and electronically signed by: CLARENCE MARTINEZ MD on Nov 14 2024 11:31AM  EST    CT head wo IV contrast    Result Date: 11/14/2024  * * *Final Report* * * DATE OF EXAM: Nov 14 2024 10:15AM   MYC   0504  -  CT BRAIN WO IVCON   / ACCESSION #  [id number] PROCEDURE REASON: Head trauma, moderate-severe      * * * * Physician Interpretation * * * *  EXAMINATION: CT BRAIN WO IVCON, CT CERVICAL SPINE WO IVCON CLINICAL HISTORY: Injury TECHNIQUE:  Serial axial images without IV contrast were obtained from the vertex to the foramen magnum. MQ:  CTBWO_3 CT Radiation dose: Integrated Dose-Length Product (DLP) for this visit =   1005  mGy*cm CT Dose Reduction Employed: Automated exposure control(AEC) and iterative recon COMPARISON: None. RESULT: CT head Post-operative change: None. Acute change: No evidence of an acute infarct or other acute parenchymal process. Hemorrhage: No evidence of acute intracranial hemorrhage. ECASS hemorrhagic transformation score: Not Applicable Mass Lesion / Mass Effect: There is no evidence of an intracranial mass or extraaxial fluid collection.  No significant mass effect. Chronic change: Scattered patchy foci of low attenuation are present within supratentorial white matter which is a nonspecific finding but likely represents mild microvascular ischemia. Parenchyma: There is no significant volume loss.  The brain parenchyma is otherwise within normal limits for age. Ventricles: The ventricles are within normal limits of size and configuration for age. Paranasal sinuses and skull base: No calvarial fracture.  Calcification in the posterior wall of the left globe.  Unremarkable sinuses.  1 cm soft tissue scalp nodule on the right side near the skull vertex.  1.2  cm osteoma/meningioma arising from the inner cortex of the left temporal bone (6:13). Localizer images: Unremarkable. CT cervical spine No acute fracture or dislocation.  No focal skeletal abnormality.   Multilevel facet arthropathy, spurring and disc space narrowing consistent with degenerative change.  Bony productive change adjacent to the atlantooccipital joint.  No acute soft tissue changes. 2.2 cm left lobe thyroid nodule with microcalcifications. Acuity: Actionable Findings: Endocrine (thyroid) Routing Code:  EMI_1 Recommendation: US THYROID/PARATHYROID TimeFrame: at the discretion of the clinical team. --END OF FINDING--    IMPRESSION: CT head No acute intracranial abnormality. Incidental findings described in the result section. Cervical spine No acute fracture or dislocation. Degenerative change. 2.2 cm left lobe thyroid nodule.  Elective sonographic characterization is recommended. Acuity: Actionable Findings: Endocrine (thyroid) Routing Code:  EMI_1 Recommendation: US THYROID/PARATHYROID TimeFrame: at the discretion of the clinical team. --END OF FINDING-- : JOSS   Transcribe Date/Time: Nov 14 2024 11:04A Dictated by : CLARENCE MARTINEZ MD This examination was interpreted and the report reviewed and electronically signed by: CLARENCE MARTINEZ MD on Nov 14 2024 11:31AM  EST    XR hip right with pelvis when performed 2 or 3 views    Result Date: 10/25/2024  Interpreted By:  Marty Morris, STUDY: XR HIP RIGHT WITH PELVIS WHEN PERFORMED 2 OR 3 VIEWS; ;  10/24/2024 11:31 pm   INDICATION: Signs/Symptoms:fall, pain.   COMPARISON: 06/24/2022   ACCESSION NUMBER(S): [id number]   ORDERING CLINICIAN: NAHID CA   FINDINGS: AP radiograph of the pelvis and two views of the right hip: There is severe osteopenia which limits evaluation for subtle fractures. There are chronic healed bilateral inferior pubic ramus fractures with similar alignment compared to 06/20/2022. Deformity of the bilateral  superior pubic rami also appears similar, likely sequelae of remote fractures. No acute right proximal femur fracture or dislocation. There is moderate bilateral hip joint space narrowing and marginal osteophyte formation. Periarticular calcification about the bilateral hips. Diffuse vascular calcifications. Moderate to severe lower lumbar degenerative change.       1. No acute osseous abnormality of the right hip. 2. Chronic bilateral obturator ring fractures. 3. Moderate bilateral hip osteoarthritis. 4. Severe osteopenia limits evaluation for subtle fractures. If there is strong clinical suspicion fracture, consider CT or MRI.   Signed by: Marty Morris 10/25/2024 12:21 AM Dictation workstation:   [id number]     Assessment/Plan   Infected right leg ulcer  Positive culture for MRSA  Therapy diabetes with peripheral angiopathy without gangrene  Right leg diabetic ulcer    IV vancomycin  IV Zosyn  Local care  Supportive care  Monitor temperature and WBC      Kristian Montelongo MD

## 2024-11-15 NOTE — PROGRESS NOTES
"Physical Therapy    Physical Therapy Evaluation    Patient Name: Apoorva Sanchez  MRN: [id number]  Department: [address]  Room: [facility]/308-A  Today's Date: 11/15/2024   Time Calculation  Start Time: 0821  Stop Time: 0841  Time Calculation (min): 20 min    Assessment/Plan   PT Assessment  PT Assessment Results: Decreased strength, Decreased endurance, Impaired balance, Decreased mobility, Decreased coordination, Impaired judgement, Decreased safety awareness, Impaired hearing  Rehab Prognosis: Good  Barriers to Discharge: very [place], assist x 1, falls risk  Evaluation/Treatment Tolerance: Patient limited by fatigue  Medical Staff Made Aware: Yes  End of Session Communication: Bedside nurse  End of Session Patient Position: Up in chair, Alarm on (OT at bedside)      Assessment Comment: 89 y/o female who presented to ED post fall at [facility]. Pt is [place] and a questionable historian as a result,however reportedly amb primarily with wheeled walker and staff \"walks me to the dining room.\" Is currently an increased falls risk due to impaired balance, decreased strength and decreased activity tolerance. Pt will likely return to assisted living, pending assist able to be provided. Pt is currently an assist x 1 with wheeled walker for all mobility. Would benefit from PT involvement upon dc, and will cont to follow while in-house to maximize safe mobility.        IP OR SWING BED PT PLAN  Inpatient or Swing Bed: Inpatient  PT Plan  Treatment/Interventions: Bed mobility, Transfer training, Gait training, Balance training, Neuromuscular re-education, Strengthening, Endurance training, Therapeutic exercise, Therapeutic activity  PT Plan: Ongoing PT  PT Frequency: 4 times per week  PT Discharge Recommendations: 24 hr supervision due to cognition. Moderate Intensity therapy.   Equipment Recommended upon Discharge:  (has FWW at facility)  PT Recommended Transfer Status: Assist x1, Assistive device  PT - OK to Discharge: Yes    Subjective   General Visit " "Information:  General  Reason for Referral: impaired mobility; fall  Referred By: Dr Rosa  Past Medical History Relevant to Rehab: DM, HTN, hypothyroid, nonhealing wounds bilateral LEs, PVD  Family/Caregiver Present: No  Co-Treatment: OT  Co-Treatment Reason: safety with OOB mobility  Prior to Session Communication: Bedside nurse  Patient Position Received: Bed, 3 rail up, Alarm on  Preferred Learning Style: verbal, visual  General Comment: 89 y/o female who presented to ED post fall. CTs negative for acute abnormalities. Pt supine in bed upon arrival, OT at bedside. Pt [place]; agreeable to participate.  Home Living:  Home Living  Type of Home: Assisted living  Lives With: Other (Comment) (Care Staff)  Home Adaptive Equipment: Walker rolling or standard  Home Layout: One level  Home Access: Level entry  Bathroom Shower/Tub: Walk-in shower  Bathroom Toilet: Handicapped height  Bathroom Equipment: Grab bars in shower  Home Living Comments: Reports living in assisted living facility  Prior Level of Function:  Prior Function Per Pt/Caregiver Report  Level of [place]: Needs assistance with ADLs, Needs assistance with homemaking (assume assist with ADLs)  Receives Help From: Other (Comment) (Care staff \"will help with whatever I need\")  Homemaking Assistance:  (completed by facility)  Ambulatory Assistance:  (reports ambulating with a 2 wheeled walker)  Hand Dominance: Right  Prior Function Comments: Pt stated, \"I've sat in a wc before,\" however primarily ambulates with FWW. \"They walk me to the dining room.\"  Precautions:  Precautions  Hearing/Visual Limitations: [place] - has bilateral aides but currently no   Medical Precautions: Fall precautions      Objective   Pain:  Pain Assessment  Pain Assessment: 0-10  0-10 (Numeric) Pain Score: 0 - No pain  Cognition:  Cognition  Following Commands:  (able to follow simple commands with repetition and visual cues due to [place])  Cognition Comments: pleasant and cooperative. " requires MAX cueing for comipletion of tasks due to very [place].    General Assessments:  General Observation  General Observation: [place]     Activity Tolerance  Endurance: Decreased tolerance for upright activites    Sensation  Light Touch: No apparent deficits    Strength  Strength Comments: B LEs > 3/5 observed       Postural Control  Posture Comment: rounded shoulders    Static Sitting Balance  Static Sitting-Level of Assistance: Contact guard  Static Sitting-Comment/Number of Minutes: sitting on EOB and on BSC  Dynamic Sitting Balance  Dynamic Sitting-Comments: assistance required for ADLs    Static Standing Balance  Static Standing-Level of Assistance: Minimum assistance  Static Standing-Comment/Number of Minutes: B UE support of walker. increased posterior tendency and wt shift. flexed posture.  Dynamic Standing Balance  Dynamic Standing-Comments: unsteady  Functional Assessments:  Bed Mobility  Bed Mobility:  (Performed sup to sit transfer with mod assist of OT, PT not present in room at the time.)    Transfer 1  Technique 1: Sit to stand  Transfer Device 1: Walker  Transfer Level of Assistance 1: Moderate assistance  Trials/Comments 1: x3 trials. Elevated EOB, BSC and chair with arms  Transfers 2  Transfer to 2: Commode-standard  Technique 2: Sit to stand, Stand to sit  Transfer Device 2: Walker  Transfer Level of Assistance 2: Moderate assistance  Trials/Comments 2: shuffle-llike gait, minimal foot clearance. walker manipulation provided by therapist, flexed posture.  Transfers 3  Technique 3: Stand to sit  Transfer Device 3: Walker  Transfer Level of Assistance 3: Moderate assistance  Trials/Comments 3: cueing to control descent    Ambulation/Gait Training  Ambulation/Gait Training Performed: Yes  Ambulation/Gait Training 1  Surface 1: Level tile  Device 1: Rolling walker  Assistance 1: Moderate assistance  Quality of Gait 1: Soft knee(s), Shuffling gait, Decreased step length  Comments/Distance (ft) 1: about  3 ft from BSC to chair  Extremity/Trunk Assessments:  RLE   RLE : Exceptions to WFL  Strength RLE  RLE Overall Strength: Greater than or equal to 3/5 as evidenced by functional mobility  LLE   LLE : Exceptions to WFL  Strength LLE  LLE Overall Strength: Greater than or equal to 3/5 as evidenced by functional mobility  Outcome Measures:  [facility] Basic Mobility  Turning from your back to your side while in a flat bed without using bedrails: A lot  Moving from lying on your back to sitting on the side of a flat bed without using bedrails: A lot  Moving to and from bed to chair (including a wheelchair): A lot  Standing up from a chair using your arms (e.g. wheelchair or bedside chair): A lot  To walk in hospital room: A lot  Climbing 3-5 steps with railing: Total  Basic Mobility - Total Score: 11    Encounter Problems       Encounter Problems (Active)       Balance       sitting (Progressing)       Start:  11/15/24    Expected End:  11/29/24       Pt will sit on EOB with supervision and perform dynamic activities without LOB         standing (Progressing)       Start:  11/15/24    Expected End:  11/29/24       Pt will stand with UE support of walker and CGA            Mobility       bed mobility (Progressing)       Start:  11/15/24    Expected End:  11/29/24       Pt will perform sup to/from sit transfer with CGA         ambulation (Progressing)       Start:  11/15/24    Expected End:  11/29/24       Pt will amb > 60  ft with wheeled walker and CGA            PT Transfers       sit to stand (Progressing)       Start:  11/15/24    Expected End:  11/29/24       Pt will perform sit to stand transfer with walker and supervision         bed to chair (Progressing)       Start:  11/15/24    Expected End:  11/29/24       Pt will perform bed to chair transfer with walker and CGA            Pain - Adult          Safety       LTG - Patient will utilize safety techniques (Progressing)       Start:  11/15/24    Expected End:  11/29/24                    Education Documentation  Mobility Training, taught by Lyubov Lopez PT at 11/15/2024  9:34 AM.  Learner: Patient  Readiness: Acceptance  Method: Explanation  Response: Needs Reinforcement    Education Comments  No comments found.

## 2024-11-15 NOTE — CARE PLAN
The patient's goals for the shift include  monitor skin    The clinical goals for the shift include maintain safety      Problem: Safety - Adult  Goal: Free from fall injury  Outcome: Progressing

## 2024-11-15 NOTE — NURSING NOTE
Lab informed this nurse patient glucose 33, assessed accu check 37, patient alert and verbal, asymptomatic, patient consumed 2 cartons of milk, 25g iv glucose administered via right forearm IV, MD informed, this nurse spoke with senior nurse on floor, no rapid response required as patient is alert and asymptomatic, this nurse remained at bedside until recheck.

## 2024-11-15 NOTE — PROGRESS NOTES
Vancomycin Dosing by Pharmacy- INITIAL    Apoorva Sanchez is a 89 y.o. year old female who Pharmacy has been consulted for vancomycin dosing for cellulitis, skin and soft tissue. Based on the patient's indication and renal status this patient will be dosed based on a goal AUC of 400-600.     Renal function is currently improving.    Visit Vitals  /59 (BP Location: Right arm, Patient Position: Lying)   Pulse 74   Temp 36.6 °C (97.8 °F) (Oral)   Resp 18        Lab Results   Component Value Date    CREATININE 0.48 (L) 11/15/2024    CREATININE 0.70 2024    CREATININE 0.90 2024    CREATININE 0.80 2024        Patient weight is as follows:   Vitals:    24 1720   Weight: 58.8 kg (129 lb 10.1 oz)       Cultures:  No results found for the encounter in last 14 days.        I/O last 3 completed shifts:  In: 1170 (19.9 mL/kg) [I.V.:1170 (19.9 mL/kg)]  Out: 650 (11.1 mL/kg) [Urine:650 (0.3 mL/kg/hr)]  Weight: 58.8 kg   I/O during current shift:  I/O this shift:  In: 240 [P.O.:240]  Out: -     Temp (24hrs), Av.5 °C (97.7 °F), Min:36.4 °C (97.5 °F), Max:36.6 °C (97.9 °F)         Assessment/Plan     Patient will not be given a loading dose.  Will initiate vancomycin maintenance,  1500 mg every 24 hours.    This dosing regimen is predicted by [employer] to result in the following pharmacokinetic parameters:  Loading dose: N/A  Regimen: 1500 mg IV every 24 hours.  Start time: 17:33 on 11/15/2024  Exposure target: AUC24 (range)400-600 mg/L.hr   [id number]: 420 mg/L.hr  AUC24,ss: 517 mg/L.hr  Probability of AUC24 > 400: 79 %  Ctrough,ss: 13.9 mg/L  Probability of Ctrough,ss > 20: 19 %    Follow-up level will be ordered on  at 0500 unless clinically indicated sooner.  Will continue to monitor renal function daily while on vancomycin and order serum creatinine at least every 48 hours if not already ordered.  Follow for continued vancomycin needs, clinical response, and signs/symptoms of toxicity.       CARMEN  DEE MONCADA, PharmD

## 2024-11-15 NOTE — PROGRESS NOTES
Evaluation    Patient Name: Apoorva Sanchez  MRN: [id number]  Department: [facility] 3 S  Room: [address] [place] [address]  Today's Date: 11/15/2024  Time Calculation  Start Time: 0810  Stop Time: 0850  Time Calculation (min): 40 min    Assessment  IP OT Assessment  OT Assessment: 89 yo female with history of multiple falls admits with hyponatremia and generalized weakness following another fall.  On eval, patient presents with significant funcitonal deficits and requires OT services to provide ADL retraining utilizing  compensatory techniques and progressive strengthening in order to increase functional capacity and safety with mobility.  Prognosis: Good  Barriers to Discharge: Other (Comment) (2 person assist with transfers, assist with ADLs, high falls risk)  Evaluation/Treatment Tolerance: Patient limited by fatigue  Medical Staff Made Aware: Yes  End of Session Communication: Bedside nurse  End of Session Patient Position: Up in room, Alarm on    Plan:  Treatment Interventions: ADL retraining, Functional transfer training, UE strengthening/ROM, Endurance training, Patient/family training, Equipment evaluation/education, Cognitive reorientation, Compensatory technique education  OT Frequency: 3 times per week  OT Discharge Recommendations: Other (Comment) (OT services at [facility] if able to provide level of care required at time of discharge)  Equipment Recommended upon Discharge: Wheeled walker  OT Recommended Transfer Status: Assist of 2  OT - OK to Discharge: Yes        Subjective     General:  General  Reason for Referral: Impaired ADLs s/p fall  Referred By: Dr Rosa  Past Medical History Relevant to Rehab: DM, HTN, hypothyroid, nonhealing wounds bilateral LEs, PVD  Family/Caregiver Present: No  Prior to Session Communication: Bedside nurse  Patient Position Received: Bed, 3 rail up, Alarm on  Preferred Learning Style: verbal, visual  General Comment: 89 yo female admitted with generalized weakness and hyponatremia s/p fall was cleared by  "nursing for therapy and agreeable to same.    Precautions:  Hearing/Visual Limitations: VERY [place] - has bilateral aides but currently no   Medical Precautions: Fall precautions    Pain:  Pain Assessment  Pain Assessment: 0-10  0-10 (Numeric) Pain Score: 0 - No pain        Objective   Cognition:  Orientation Level: Disoriented to time  Following Commands: Other (Comment) (Follows one step commands with visual cues)  Cognition Comments: Difficult to assess d/t hearing defiict - will monitor  Safety/Judgement: Exceptions to WFL (decreased safety awareness with transfers/mobility)    Home Living:  Type of Home: Assisted living  Lives With: Other (Comment) (Care Staff)  Home Adaptive Equipment: Walker rolling or standard  Home Layout: One level  Home Access: Level entry  Bathroom Shower/Tub: Walk-in shower  Bathroom Toilet: Handicapped height  Bathroom Equipment: Grab bars in shower  Home Living Comments: Reports living in an senior living.  Assume bathroom is handicap accessable.     Prior Function:  Level of [place]: Needs assistance with ADLs, Needs assistance with homemaking (assume assist with ADLs)  Receives Help From: Other (Comment) (Care staff \"will help with whatever I need\")  Homemaking Assistance:  (completed by facility)  Ambulatory Assistance:  (reports ambulating with a 2 wheeled walker)  Hand Dominance: Right    ADL:  Eating Assistance: Stand by  Eating Deficit: Setup (per clinical judgement)  Grooming Assistance: Stand by  Grooming Deficit: Setup (per clinical judgement)  Bathing Assistance: Moderate  Bathing Deficit: Buttocks, Perineal area (plus assist below the knees per clinical judgement)  UE Dressing Assistance: Minimal  UE Dressing Deficit: Pull around back (per clinical judgement)  LE Dressing Assistance: Total  LE Dressing Deficit:  (donning/doffing protective briefs; remainder = per clinical judgement)  Toileting Assistance with Device: Total  Toileting Deficit:  (for hygiene following a " BM)    Activity Tolerance:  Endurance: Decreased tolerance for upright activites    Bed Mobility/Transfers: Bed Mobility  Bed Mobility: Yes  Bed Mobility 1  Bed Mobility 1: Supine to sitting  Level of Assistance 1: Moderate assistance  Bed Mobility Comments 1: assist with trunk toward the left side of bed with head elevated ~40 degrees    Transfers  Transfer: Yes  Transfer 1  Transfer From 1: Bed to  Transfer to 1: Commode-standard  Technique 1: Stand pivot, To right  Transfer Device 1: Walker  Transfer Level of Assistance 1: Moderate assistance, +2  Trials/Comments 1: cues for hand placement and to align self with seated surface  Transfers 2  Transfer From 2: Commode-standard to  Transfer to 2: Chair with arms  Technique 2: Stand pivot, To left  Transfer Device 2: Walker  Transfer Level of Assistance 2: Moderate assistance, +2  Trials/Comments 2: cues for hand placement    Sensation:  Light Touch: No apparent deficits    Strength:  Strength Comments: BUEs = ~4-/5 grossly    Hand Function:  Hand Function  Gross Grasp: Functional  Coordination: Functional    Extremities: RUE   RUE : Within Functional Limits and LUE   LUE: Within Functional Limits    Outcome Measures: [facility] Daily Activity  Putting on and taking off regular lower body clothing: Total  Bathing (including washing, rinsing, drying): A lot  Putting on and taking off regular upper body clothing: A little  Toileting, which includes using toilet, bedpan or urinal: Total  Taking care of personal grooming such as brushing teeth: A little  Eating Meals: A little  Daily Activity - Total Score: 13    Education Documentation  ADL Training, taught by Nai Torres, OT at 11/15/2024  9:12 AM.  Learner: Patient  Readiness: Acceptance  Method: Explanation  Response: Demonstrated Understanding, Needs Reinforcement  Comment: ADL and functional transfer retraining initiated    Goals:   Encounter Problems       Encounter Problems (Active)       OT Goals       ADLs  (Progressing)       Start:  11/15/24    Expected End:  11/29/24       Patient will complete ADL tasks, with set-up using AE need in order to increase patient's safety and independence with self-care tasks.           Functional transfers (Progressing)       Start:  11/15/24    Expected End:  11/29/24       Patient will complete functional transfers with supervision using least restrictive device, in order to increase patient's safety and independence with daily tasks.           Activity tolerance (Progressing)       Start:  11/15/24    Expected End:  11/29/24       Patient will demonstrate the ability to participate in functional activity at least >/= 25 minutes in order to increase patient's safety and independence with daily tasks.

## 2024-11-15 NOTE — PROGRESS NOTES
"   11/15/24 1425   Discharge Planning   Living Arrangements Children   Support Systems Children;Family members   Assistance Needed Needs assistance with ADLs, Needs assistance with homemaking (assume assist with ADLs)  Receives Help From: Other (Comment) (Care staff \"will help with whatever I need\")  Homemaking Assistance:  (completed by facility)  Ambulatory Assistance:  (reports ambulating with a 2 wheeled walker)  Hand Dominance: Right  Prior Function Comments: Pt stated, \"I've sat in a wc before,\" however primarily ambulates with FWW. \"They walk me to the dining room.\"   Type of Residence Assisted living  ([facility]   Do you have animals or pets at home? No   Care Facility Name [facility]   Who is requesting discharge planning? Provider   Home or Post Acute Services Post acute facilities (Rehab/SNF/etc)   Type of Post Acute Facility Services Skilled nursing;Rehab   Expected Discharge Disposition SNF   Does the patient need discharge transport arranged? Yes   RoundTrip coordination needed? Yes   Has discharge transport been arranged? No   Financial Resource Strain   How hard is it for you to pay for the very basics like food, housing, medical care, and heating? Not very   Housing Stability   In the last 12 months, was there a time when you were not able to pay the mortgage or rent on time? N   In the past 12 months, how many times have you moved where you were living? 1   At any time in the past 12 months, were you homeless or living in a shelter (including now)? N   Transportation Needs   In the past 12 months, has lack of transportation kept you from medical appointments or from getting medications? no   In the past 12 months, has lack of transportation kept you from meetings, work, or from getting things needed for daily living? No   Patient Choice   Provider Choice list and CMS website (https://medicare.gov/care-compare#search) for post-acute Quality and Resource Measure Data were " "provided and reviewed with: Family     Apoorva Sanchez is a 89 y.o. female presenting with fall.  Pmhx PAD with nonhealing bilateral lower extremity wounds, type 2 diabetes, hypertension, and hypothyroidism. patient presented for [facility], patient presented there after mechanical fall. Patient was found to have a sodium of 126 therefore patient was transferred to [facility].     Patient currently resides at [facility] (was just admitted to AL last Wednesday). Prior to assisted living daughter was living with patient and assisting with care, [facility]. Daughter is requesting skilled rehab, choose [facility] and referral placed. Per PT notes: Pt is currently an assist x 1 with wheeled walker for all mobility. Would benefit from PT involvement upon dc, and will cont to follow while in-house to maximize safe mobility.   Needs assistance with ADLs, Needs assistance with homemaking.  Receives Help From: Care staff \"will help with whatever I need\"  Homemaking Assistance:  (completed by facility)  Ambulatory Assistance:  (reports ambulating with a 2 wheeled walker)  Prior Function Comments: Pt primarily ambulates with FWW.  Hearing/Visual Limitations: [place] - has bilateral aides.  Patient has Bilateral LE wounds, nonhealing, with peripheral vascular disease.  Patient had Apligraf to Right medial lower leg placed by FLASH Proctor DPM on 11/12/24     PLAN: Discharge to Skilled Rehab, referral placed to [facility]. Patient has medicare, no precert, inpatient on 11/15.     "

## 2024-11-15 NOTE — PROGRESS NOTES
11/15/24 1447   Physical Activity   On average, how many days per week do you engage in moderate to strenuous exercise (like a brisk walk)? 0 days   On average, how many minutes do you engage in exercise at this level? 0 min   Financial Resource Strain   How hard is it for you to pay for the very basics like food, housing, medical care, and heating? Not very   Housing Stability   In the last 12 months, was there a time when you were not able to pay the mortgage or rent on time? N   In the past 12 months, how many times have you moved where you were living? 1   At any time in the past 12 months, were you homeless or living in a shelter (including now)? N   Transportation Needs   In the past 12 months, has lack of transportation kept you from medical appointments or from getting medications? no   In the past 12 months, has lack of transportation kept you from meetings, work, or from getting things needed for daily living? No   Food Insecurity   Within the past 12 months, you worried that your food would run out before you got the money to buy more. Never true   Within the past 12 months, the food you bought just didn't last and you didn't have money to get more. Never true   Stress   Do you feel stress - tense, restless, nervous, or anxious, or unable to sleep at night because your mind is troubled all the time - these days? Not at all   Social Connections   In a typical week, how many times do you talk on the phone with family, friends, or neighbors? More than 3   How often do you get together with friends or relatives? More than 3   How often do you attend Mormon or Shinto services? Never   Do you belong to any clubs or organizations such as Mormon groups, unions, fraternal or athletic groups, or school groups? No   How often do you attend meetings of the clubs or organizations you belong to? Never   Are you , , , , never , or living with a partner?    Intimate  Partner Violence   Within the last year, have you been afraid of your partner or ex-partner? No   Within the last year, have you been humiliated or emotionally abused in other ways by your partner or ex-partner? No   Within the last year, have you been kicked, hit, slapped, or otherwise physically hurt by your partner or ex-partner? No   Within the last year, have you been raped or forced to have any kind of sexual activity by your partner or ex-partner? No   Alcohol Use   Q1: How often do you have a drink containing alcohol? Never   Q2: How many drinks containing alcohol do you have on a typical day when you are drinking? None   Q3: How often do you have six or more drinks on one occasion? Never   Utilities   In the past 12 months has the electric, gas, oil, or water company threatened to shut off services in your home? No

## 2024-11-15 NOTE — PROGRESS NOTES
11/15/24 1447   [facility] Disability Status   Are you deaf or do you have serious difficulty hearing? Y   Are you blind or do you have serious difficulty seeing, even when wearing glasses? N   Because of a physical, mental, or emotional condition, do you have serious difficulty concentrating, remembering, or making decisions? (5 years old or older) Y   Do you have serious difficulty walking or climbing stairs? Y   Do you have serious difficulty dressing or bathing? Y   Because of a physical, mental, or emotional condition, do you have serious difficulty doing errands alone such as visiting the doctor? Y

## 2024-11-15 NOTE — CONSULTS
Inpatient consult to Podiatry  Consult performed by: Joe Proctor DPM  Consult ordered by: Meghan Rosa MD          Reason For Consult  Bilateral leg wounds    History Of Present Illness  Apoorva Sanchez is a 89 y.o. female presenting with bilateral leg wounds.  Known to me from my private practice and wound care center.  I last saw her this past Tuesday at the [facility] wound care center.  I placed an apligraft on her right lower leg wound.  She has been on Doxycycline and Levofloxacin for MRSA and psuedomonal infection to the right leg wounds.     Past Medical History  She has a past medical history of Accidental fall (03/19/2024), At risk for falling, Cellulitis, Diabetes mellitus (Multi), Disease of thyroid gland, Fracture of pelvis (Multi) (03/19/2024), Hand fracture, Hypertension, Hypothyroidism, Injury of head (03/19/2024), Open wound of right lower extremity, Oth fracture of left pubis, init encntr for closed fracture (07/03/2022), PVD (peripheral vascular disease) (CMS-[facility]), Syncope (03/19/2024), Urinary tract infection, UTI (urinary tract infection), Venous insufficiency, and Weakness.    Surgical History  She has no past surgical history on file.     Social History  She reports that she has never smoked. She has never been exposed to tobacco smoke. She has never used smokeless tobacco. She reports that she does not drink alcohol and does not use drugs.    Family History  Family History   Problem Relation Name Age of Onset    Diabetes Other      Hypertension Other          Allergies  Sulfa (sulfonamide antibiotics)    Review of Systems    REVIEW OF SYSTEMS  GENERAL:  Feeling week and with frequent falls  HEENT:  No changes in hearing or vision, no nose bleeds or other nasal problems and Negative for frequent or significant headaches  NECK:  Negative for lumps, goiter, pain and significant neck swelling  RESPIRATORY:  Negative for cough, wheezing and shortness of breath  CARDIOVASCULAR:  Negative for chest  "pain, leg swelling and palpitations  GI:  Negative for abdominal discomfort, blood in stools or black stools and change in bowel habits  :  Negative for dysuria, frequency and incontinence  MUSCULOSKELETAL:  Negative for joint pain or swelling, back pain, and muscle pain.  SKIN:  Positive for wounds bilateral legs  PSYCH:  Negative for sleep disturbance, mood disorder and recent psychosocial stressors  HEMATOLOGY/LYMPHOLOGY:  Negative for prolonged bleeding, bruising easily, and swollen nodes.  ENDOCRINE:  Negative for cold or heat intolerance, polyuria, polydipsia and goiter  NEURO: Negative, denies any burning, tingling or numbness     Objective:   Vasc: DP and PT pulses are weak bilateral.  CFT is less than 3 seconds bilateral.  Skin temperature is warm to cool proximal to distal bilateral.  On the left leg there is the early stages of hematoma, which I have lanced in the office to allow for drainage.    Neuro:  Light touch is intact to the foot bilateral.  Protective sensation is intact to the foot when tested with the 5.07 SWM bilateral.      Derm: There are multiple wounds to bilateral lower extremities as previously documented in my office notes.  There is a large right lower leg wound with apligraft in place.  There are multiple smaller venous stasis ulcerations to the lateral right lower leg.  The right lower leg has some purplish discoloration and edema secondary to injury but no clear definable hematoma formation.    Ortho: Pain to wounds bilateral legs.       Physical Exam     Last Recorded Vitals  Blood pressure 179/71, pulse 62, temperature 36.4 °C (97.5 °F), temperature source Oral, resp. rate 17, height 1.676 m (5' 6\"), weight 58.8 kg (129 lb 10.1 oz), SpO2 94%.    Relevant Results  Glucose 33  Sodium 127  No Luekocytosis     Assessment/Plan     Right lower leg chronic ulcer  Right lower leg venous stasis ulcer  Left lower leg ecchymosis     - Bandages changed today.  - Recommend daily bandage " changes. Orders placed.  Adaptic to remain intact to protect the apligraft skin graft substitute to the right leg.  - Heel offloading boots while in bed.  - Infectious disease consultation for antibiotic management while in house.  Previous cultures growing psuedomonas and MRSA.  - I will follow closely while in house.    I spent 50 minutes in the professional and overall care of this patient.    Joe Proctor DPM

## 2024-11-15 NOTE — PROGRESS NOTES
"Apoorva Sanchez is a 89 y.o. female on day 0 of admission presenting with Hyponatremia.    Subjective   Sodium slight improvement to 127, was hypoglycemic this am glucose 33, holding all orals, patient overall feeling ok       Objective     Physical Exam  Vitals reviewed.   Constitutional:       Comments: Very hard of hearing   HENT:      Head: Normocephalic.      Nose: Nose normal.   Abdominal:      General: Abdomen is flat. Bowel sounds are normal.   Skin:     Capillary Refill: Capillary refill takes less than 2 seconds.   Neurological:      General: No focal deficit present.      Mental Status: She is alert.   Psychiatric:         Mood and Affect: Mood normal.         Last Recorded Vitals  Blood pressure 179/71, pulse 62, temperature 36.4 °C (97.5 °F), temperature source Oral, resp. rate 17, height 1.676 m (5' 6\"), weight 58.8 kg (129 lb 10.1 oz), SpO2 94%.  Intake/Output last 3 Shifts:  I/O last 3 completed shifts:  In: 1170 (19.9 mL/kg) [I.V.:1170 (19.9 mL/kg)]  Out: 650 (11.1 mL/kg) [Urine:650 (0.3 mL/kg/hr)]  Weight: 58.8 kg     Relevant Results  Results for orders placed or performed during the hospital encounter of 11/14/24 (from the past 24 hours)   POCT GLUCOSE   Result Value Ref Range    POCT Glucose 206 (H) 74 - 99 mg/dL   POCT GLUCOSE   Result Value Ref Range    POCT Glucose 141 (H) 74 - 99 mg/dL   CBC   Result Value Ref Range    WBC 9.1 4.4 - 11.3 x10*3/uL    nRBC 0.0 0.0 - 0.0 /100 WBCs    RBC 3.43 (L) 4.00 - 5.20 x10*6/uL    Hemoglobin 9.1 (L) 12.0 - 16.0 g/dL    Hematocrit 28.6 (L) 36.0 - 46.0 %    MCV 83 80 - 100 fL    MCH 26.5 26.0 - 34.0 pg    MCHC 31.8 (L) 32.0 - 36.0 g/dL    RDW 16.3 (H) 11.5 - 14.5 %    Platelets 222 150 - 450 x10*3/uL   Basic metabolic panel   Result Value Ref Range    Glucose 33 (LL) 74 - 99 mg/dL    Sodium 127 (L) 136 - 145 mmol/L    Potassium 3.7 3.5 - 5.3 mmol/L    Chloride 97 (L) 98 - 107 mmol/L    Bicarbonate 24 21 - 32 mmol/L    Anion Gap 10 10 - 20 mmol/L    Urea Nitrogen " 18 6 - 23 mg/dL    Creatinine 0.48 (L) 0.50 - 1.05 mg/dL    eGFR 87 >60 mL/min/1.73m*2    Calcium 8.0 (L) 8.6 - 10.3 mg/dL   POCT GLUCOSE   Result Value Ref Range    POCT Glucose 37 (L) 74 - 99 mg/dL   POCT GLUCOSE   Result Value Ref Range    POCT Glucose 197 (H) 74 - 99 mg/dL   POCT GLUCOSE   Result Value Ref Range    POCT Glucose 168 (H) 74 - 99 mg/dL       Imaging Results  Abd x ray:  IMPRESSION:  Nonspecific bowel gas pattern. Moderate amount of fecal material.      Ct head/spine:  CT head   No acute intracranial abnormality.   Incidental findings described in the result section.     Cervical spine   No acute fracture or dislocation.   Degenerative change.   2.2 cm left lobe thyroid nodule.  Elective sonographic characterization   is recommended.     Medications:  atenolol, 100 mg, oral, Daily  brimonidine, 1 drop, Both Eyes, BID  cloNIDine, 0.2 mg, oral, BID  enoxaparin, 40 mg, subcutaneous, Daily  [Held by provider] glimepiride, 2 mg, oral, BID  insulin lispro, 0-5 Units, subcutaneous, TID AC  latanoprost, 1 drop, Both Eyes, Nightly  levothyroxine, 100 mcg, oral, Daily before breakfast  [Held by provider] metFORMIN XR, 500 mg, oral, Daily with evening meal  polyethylene glycol, 17 g, oral, Daily  prednisoLONE acetate, 1 drop, Both Eyes, BID  simvastatin, 40 mg, oral, Nightly  timolol, 1 drop, Both Eyes, BID       PRN medications: acetaminophen **OR** acetaminophen **OR** acetaminophen, dextrose, dextrose, glucagon, glucagon, ondansetron ODT **OR** ondansetron     Assessment/Plan     1.  Mechanical fall  -pt recs in 24 hours supervision patient is at [facility]     2. Bilateral LE wounds, nonhealing, with peripheral vascular disease  -Saw cardiology/endovascular  - just saw her outpatient 11/13 patient will have MEETA TBI to further define the disease they will follow-up with the patient   -podiatry for wound care     3.  Hyponatremia  -hold lasix for now gentle iv hydration, seems dry on exam  -increased to 127     4.  DMII  -with hypoglycemia  -holding all oral hypoglycemics     5. Episode of urinary retention  -straight cath x 1, resolved        DVT Prophylaxis:  Lovenox subq          Meghan Rosa MD  [facility] Medicine

## 2024-11-15 NOTE — PROGRESS NOTES
"Apoorva Sanchez is a 89 y.o. female on day 0 of admission presenting with Hyponatremia.    Subjective   Enter in error       Objective     Physical Exam    Last Recorded Vitals  Blood pressure 179/71, pulse 62, temperature 36.4 °C (97.5 °F), temperature source Oral, resp. rate 17, height 1.676 m (5' 6\"), weight 58.8 kg (129 lb 10.1 oz), SpO2 94%.  Intake/Output last 3 Shifts:  I/O last 3 completed shifts:  In: 1170 (19.9 mL/kg) [I.V.:1170 (19.9 mL/kg)]  Out: 650 (11.1 mL/kg) [Urine:650 (0.3 mL/kg/hr)]  Weight: 58.8 kg     Relevant Results  Results for orders placed or performed during the hospital encounter of 11/14/24 (from the past 24 hours)   POCT GLUCOSE   Result Value Ref Range    POCT Glucose 206 (H) 74 - 99 mg/dL   POCT GLUCOSE   Result Value Ref Range    POCT Glucose 141 (H) 74 - 99 mg/dL   CBC   Result Value Ref Range    WBC 9.1 4.4 - 11.3 x10*3/uL    nRBC 0.0 0.0 - 0.0 /100 WBCs    RBC 3.43 (L) 4.00 - 5.20 x10*6/uL    Hemoglobin 9.1 (L) 12.0 - 16.0 g/dL    Hematocrit 28.6 (L) 36.0 - 46.0 %    MCV 83 80 - 100 fL    MCH 26.5 26.0 - 34.0 pg    MCHC 31.8 (L) 32.0 - 36.0 g/dL    RDW 16.3 (H) 11.5 - 14.5 %    Platelets 222 150 - 450 x10*3/uL   Basic metabolic panel   Result Value Ref Range    Glucose 33 (LL) 74 - 99 mg/dL    Sodium 127 (L) 136 - 145 mmol/L    Potassium 3.7 3.5 - 5.3 mmol/L    Chloride 97 (L) 98 - 107 mmol/L    Bicarbonate 24 21 - 32 mmol/L    Anion Gap 10 10 - 20 mmol/L    Urea Nitrogen 18 6 - 23 mg/dL    Creatinine 0.48 (L) 0.50 - 1.05 mg/dL    eGFR 87 >60 mL/min/1.73m*2    Calcium 8.0 (L) 8.6 - 10.3 mg/dL   POCT GLUCOSE   Result Value Ref Range    POCT Glucose 37 (L) 74 - 99 mg/dL   POCT GLUCOSE   Result Value Ref Range    POCT Glucose 197 (H) 74 - 99 mg/dL   POCT GLUCOSE   Result Value Ref Range    POCT Glucose 168 (H) 74 - 99 mg/dL         Meghan Rosa MD  [facility] Medicine      "

## 2024-11-15 NOTE — CONSULTS
Wound Care Consult     Visit Date: 11/15/2024      Patient Name: Apoorva Sanchez         MRN: [id number]           YOB: 1929    Consulted for wound care. A 89 y.o. female patient admitted for   Hyponatremia [E87.1]   Past Medical History:   Diagnosis Date    Accidental fall 03/19/2024    At risk for falling     Cellulitis     Diabetes mellitus (Multi)     Disease of thyroid gland     Fracture of pelvis (Multi) 03/19/2024    Hand fracture     Hypertension     Hypothyroidism     Injury of head 03/19/2024    Open wound of right lower extremity     Oth fracture of left pubis, init encntr for closed fracture 07/03/2022    PVD (peripheral vascular disease) (CMS-[facility])     Syncope 03/19/2024    Urinary tract infection     UTI (urinary tract infection)     Venous insufficiency     Weakness       No past surgical history on file.   Scheduled medications  atenolol, 100 mg, oral, Daily  brimonidine, 1 drop, Both Eyes, BID  cloNIDine, 0.2 mg, oral, BID  enoxaparin, 40 mg, subcutaneous, Daily  [Held by provider] glimepiride, 2 mg, oral, BID  insulin lispro, 0-5 Units, subcutaneous, TID AC  latanoprost, 1 drop, Both Eyes, Nightly  levothyroxine, 100 mcg, oral, Daily before breakfast  [Held by provider] metFORMIN XR, 500 mg, oral, Daily with evening meal  polyethylene glycol, 17 g, oral, Daily  prednisoLONE acetate, 1 drop, Both Eyes, BID  simvastatin, 40 mg, oral, Nightly  timolol, 1 drop, Both Eyes, BID      Continuous medications  sodium chloride 0.9%, 100 mL/hr, Last Rate: 100 mL/hr (11/15/24 0608)      PRN medications  PRN medications: acetaminophen **OR** acetaminophen **OR** acetaminophen, dextrose, dextrose, glucagon, glucagon, ondansetron ODT **OR** ondansetron     Allergies   Allergen Reactions    Sulfa (Sulfonamide Antibiotics) Hives        Susceptibility data from last 90 days.  Collected Specimen Info Organism Aztreonam Cefepime Ceftazidime Ciprofloxacin Clindamycin Erythromycin Levofloxacin Oxacillin  Piperacillin/Tazobactam Tetracycline Tobramycin Trimethoprim/Sulfamethoxazole Vancomycin   11/05/24 Tissue/Biopsy from Wound/Tissue Pseudomonas aeruginosa  S  S  S  S    I   S   S       Methicillin Resistant Staphylococcus aureus (MRSA)      R  R   R   R   R  S     Mixed Gram-Negative Bacteria                        Pertinent Labs:   Albumin   Date Value Ref Range Status   07/23/2024 3.9 3.5 - 5.0 g/dL Final       Wound Assessment:  Wound 07/23/24 Traumatic Pretibial Right (Active)   Wound Image    11/14/24 1658       Wound 11/14/24 Pretibial Left (Active)   Wound Image    11/14/24 1701       Wound 11/14/24 Skin Tear Elbow Dorsal;Left (Active)   Wound Image   11/14/24 1711   Site Assessment Purple 11/15/24 1100   Wound Length (cm) 3 cm 11/15/24 1100   Wound Width (cm) 0.1 cm 11/15/24 1100   Wound Surface Area (cm^2) 0.3 cm^2 11/15/24 1100   Wound Depth (cm) 0.1 cm 11/15/24 1100   Wound Volume (cm^3) 0.03 cm^3 11/15/24 1100   Drainage Description None 11/15/24 1100   Dressing Gauze;Kerlix/rolled gauze 11/15/24 1100   Dressing Status Clean;Dry 11/15/24 1100       Reason for Consult: consulted for wound care recommendations.        Wound History: Patient had Apligraf to Right medial lower leg placed by FLASH Proctor DPM on 11/12/24, discussed case with provider and Dr. Proctor will be consulted to manage lower extremity wounds as he placed graft.  Nursing removed outer dressings and took photo of wounds on admission per protocol.    Wound Team Assessment: Accompanied by bedside nurse Mitali Aguilera RN, patient is sitting in bedside chair, very hard of hearing. White board is being used for better communication per nursing. Left arm dressing unwrapped and evaluated. Wound edges of skin tear is well approximated, secured with two steri strips, no drainage and periwound is purple, multiple bruises noted (see photo). Evaluation noted above.     No pressure injury concerns by nursing and myself at visit. Podiatry will  manage lower extremity wounds.      Recommendations:  Left arm skin tear recommend keeping area clean and dry, protective dressing may be applied to protect wound and changed as needed.        Patient is provided a Versa Care bed frame with Accumax Mattress with waffle mattress in place. Mitali Aguilera RN bedside nurse updated, continue pressure injury prevention interventions and nursing to continue to follow provider orders. Re consult wound RN PRN.    Liz SAMS RN [facility]  11/15/2024  11:23 AM

## 2024-11-16 LAB
ANION GAP SERPL CALCULATED.3IONS-SCNC: 10 MMOL/L (ref 10–20)
BUN SERPL-MCNC: 10 MG/DL (ref 6–23)
CALCIUM SERPL-MCNC: 8 MG/DL (ref 8.6–10.3)
CHLORIDE SERPL-SCNC: 101 MMOL/L (ref 98–107)
CO2 SERPL-SCNC: 24 MMOL/L (ref 21–32)
CREAT SERPL-MCNC: 0.52 MG/DL (ref 0.5–1.05)
EGFRCR SERPLBLD CKD-EPI 2021: 86 ML/MIN/1.73M*2
ERYTHROCYTE [DISTWIDTH] IN BLOOD BY AUTOMATED COUNT: 16.6 % (ref 11.5–14.5)
EST. AVERAGE GLUCOSE BLD GHB EST-MCNC: 126 MG/DL
GLUCOSE BLD MANUAL STRIP-MCNC: 100 MG/DL (ref 74–99)
GLUCOSE BLD MANUAL STRIP-MCNC: 110 MG/DL (ref 74–99)
GLUCOSE BLD MANUAL STRIP-MCNC: 152 MG/DL (ref 74–99)
GLUCOSE BLD MANUAL STRIP-MCNC: 154 MG/DL (ref 74–99)
GLUCOSE BLD MANUAL STRIP-MCNC: 171 MG/DL (ref 74–99)
GLUCOSE SERPL-MCNC: 56 MG/DL (ref 74–99)
HBA1C MFR BLD: 6 %
HCT VFR BLD AUTO: 28.9 % (ref 36–46)
HGB BLD-MCNC: 9 G/DL (ref 12–16)
MCH RBC QN AUTO: 26.3 PG (ref 26–34)
MCHC RBC AUTO-ENTMCNC: 31.1 G/DL (ref 32–36)
MCV RBC AUTO: 85 FL (ref 80–100)
NRBC BLD-RTO: 0 /100 WBCS (ref 0–0)
PLATELET # BLD AUTO: 214 X10*3/UL (ref 150–450)
POTASSIUM SERPL-SCNC: 3.9 MMOL/L (ref 3.5–5.3)
RBC # BLD AUTO: 3.42 X10*6/UL (ref 4–5.2)
SODIUM SERPL-SCNC: 131 MMOL/L (ref 136–145)
VANCOMYCIN SERPL-MCNC: 12 UG/ML (ref 5–20)
WBC # BLD AUTO: 7.1 X10*3/UL (ref 4.4–11.3)

## 2024-11-16 PROCEDURE — 2500000002 HC RX 250 W HCPCS SELF ADMINISTERED DRUGS (ALT 637 FOR MEDICARE OP, ALT 636 FOR OP/ED): Performed by: INTERNAL MEDICINE

## 2024-11-16 PROCEDURE — 1200000002 HC GENERAL ROOM WITH TELEMETRY DAILY

## 2024-11-16 PROCEDURE — 2500000001 HC RX 250 WO HCPCS SELF ADMINISTERED DRUGS (ALT 637 FOR MEDICARE OP): Performed by: INTERNAL MEDICINE

## 2024-11-16 PROCEDURE — 2500000004 HC RX 250 GENERAL PHARMACY W/ HCPCS (ALT 636 FOR OP/ED): Performed by: INTERNAL MEDICINE

## 2024-11-16 PROCEDURE — 99233 SBSQ HOSP IP/OBS HIGH 50: CPT | Performed by: INTERNAL MEDICINE

## 2024-11-16 PROCEDURE — 85027 COMPLETE CBC AUTOMATED: CPT | Performed by: INTERNAL MEDICINE

## 2024-11-16 PROCEDURE — 80048 BASIC METABOLIC PNL TOTAL CA: CPT | Performed by: INTERNAL MEDICINE

## 2024-11-16 PROCEDURE — 82947 ASSAY GLUCOSE BLOOD QUANT: CPT

## 2024-11-16 PROCEDURE — 36415 COLL VENOUS BLD VENIPUNCTURE: CPT | Performed by: INTERNAL MEDICINE

## 2024-11-16 PROCEDURE — 80202 ASSAY OF VANCOMYCIN: CPT | Performed by: INTERNAL MEDICINE

## 2024-11-16 ASSESSMENT — COGNITIVE AND FUNCTIONAL STATUS - GENERAL
DRESSING REGULAR UPPER BODY CLOTHING: TOTAL
MOBILITY SCORE: 14
HELP NEEDED FOR BATHING: TOTAL
MOVING FROM LYING ON BACK TO SITTING ON SIDE OF FLAT BED WITH BEDRAILS: A LITTLE
TURNING FROM BACK TO SIDE WHILE IN FLAT BAD: A LITTLE
CLIMB 3 TO 5 STEPS WITH RAILING: TOTAL
WALKING IN HOSPITAL ROOM: A LOT
TOILETING: TOTAL
STANDING UP FROM CHAIR USING ARMS: A LOT
DAILY ACTIVITIY SCORE: 9
PERSONAL GROOMING: A LOT
EATING MEALS: A LITTLE
MOVING TO AND FROM BED TO CHAIR: A LITTLE
DRESSING REGULAR LOWER BODY CLOTHING: TOTAL

## 2024-11-16 ASSESSMENT — PAIN SCALES - GENERAL
PAINLEVEL_OUTOF10: 0 - NO PAIN

## 2024-11-16 NOTE — PROGRESS NOTES
Apoorva Sanchez is a 89 y.o. female on day 1 of admission presenting with Hyponatremia.    Subjective   Interval History:   Patient seen and examined  Afebrile, no chills  No chest pain or shortness of breath  Resting comfortable        Review of Systems   All other systems reviewed and are negative.      Objective   Range of Vitals (last 24 hours)  Heart Rate:  [56-74]   Temp:  [36.6 °C (97.8 °F)-36.8 °C (98.2 °F)]   Resp:  [16-18]   BP: (121-176)/(56-89)   SpO2:  [93 %-97 %]   Daily Weight  11/14/24 : 58.8 kg (129 lb 10.1 oz)    Body mass index is 20.92 kg/m².    Physical Exam  Constitutional:       Appearance: Normal appearance.   HENT:      Head: Normocephalic and atraumatic.   Cardiovascular:      Rate and Rhythm: Normal rate and regular rhythm.      Heart sounds: Normal heart sounds.   Pulmonary:      Effort: Pulmonary effort is normal.      Breath sounds: Normal breath sounds.   Abdominal:      General: Bowel sounds are normal.      Palpations: Abdomen is soft.      Tenderness: There is no abdominal tenderness.   Musculoskeletal:      Cervical back: Normal range of motion and neck supple.      Right lower leg: No edema.      Left lower leg: No edema.   Skin:     General: Skin is warm and dry.      Comments: Bilateral leg dressing   Neurological:      Mental Status: She is alert and oriented to person, place, and time.   Psychiatric:         Behavior: Behavior normal.        Antibiotics  ciprofloxacin - 500 mg  doxycycline - 100 mg  piperacillin-tazobactam - 3.375 gram/50 mL  vancomycin - 1.5 gram/300 mL    Relevant Results  Labs  Results from last 72 hours   Lab Units 11/16/24  0432 11/15/24  0440   WBC AUTO x10*3/uL 7.1 9.1   HEMOGLOBIN g/dL 9.0* 9.1*   HEMATOCRIT % 28.9* 28.6*   PLATELETS AUTO x10*3/uL 214 222     Results from last 72 hours   Lab Units 11/16/24  0432 11/15/24  0440   SODIUM mmol/L 131* 127*   POTASSIUM mmol/L 3.9 3.7   CHLORIDE mmol/L 101 97*   CO2 mmol/L 24 24   BUN mg/dL 10 18   CREATININE  "mg/dL 0.52 0.48*   GLUCOSE mg/dL 56* 33*   CALCIUM mg/dL 8.0* 8.0*   ANION GAP mmol/L 10 10   EGFR mL/min/1.73m*2 86 87         Estimated Creatinine Clearance: 60.1 mL/min (by C-G formula based on SCr of 0.52 mg/dL).  No results found for: \"CRP\"  Microbiology  Susceptibility data from last 14 days.  Collected Specimen Info Organism Aztreonam Cefepime Ceftazidime Ciprofloxacin Clindamycin Erythromycin Levofloxacin Oxacillin Piperacillin/Tazobactam Tetracycline Tobramycin Trimethoprim/Sulfamethoxazole Vancomycin   11/05/24 Tissue/Biopsy from Wound/Tissue Pseudomonas aeruginosa  S  S  S  S    I   S   S       Methicillin Resistant Staphylococcus aureus (MRSA)      R  R   R   R   R  S     Mixed Gram-Negative Bacteria                   Imaging  XR abdomen 1 view    Result Date: 11/15/2024  Interpreted By:  Sarah Sanchez, STUDY: XR ABDOMEN 1 VIEW;  11/14/2024 6:45 pm   INDICATION: Signs/Symptoms:r/o ileus.   COMPARISON: None.   ACCESSION NUMBER(S): [id number]   ORDERING CLINICIAN: JC TAN   TECHNIQUE: 2 AP images of the abdomen were obtained.   FINDINGS: There is no significant bowel distention. There is a moderate amount of fecal material.   The patient is scoliotic. There degenerative changes. There is a fracture of the right superior pubic ramus. There also appears be a fracture of the inferior right pubic ramus. The age   COMPARISON OF FINDINGS:       Nonspecific bowel gas pattern. Moderate amount of fecal material.     MACRO: none   Signed by: Sarah Sanchez 11/15/2024 8:23 AM Dictation workstation:   [id number]    CT cervical spine wo IV contrast    Result Date: 11/14/2024  * * *Final Report* * * DATE OF EXAM: Nov 14 2024 10:15AM   MY[facility]   0505  -  CT CERVICAL SPINE WO IVCON  / ACCESSION #  [id number] PROCEDURE REASON: Spine fracture, cervical, traumatic      * * * * Physician Interpretation * * * *  EXAMINATION: CT BRAIN WO IVCON, CT CERVICAL SPINE WO IVCON CLINICAL HISTORY: Injury TECHNIQUE:  Serial axial " images without IV contrast were obtained from the vertex to the foramen magnum. MQ:  CTBWO_3 CT Radiation dose: Integrated Dose-Length Product (DLP) for this visit =   1005  mGy*cm CT Dose Reduction Employed: Automated exposure control(AEC) and iterative recon COMPARISON: None. RESULT: CT head Post-operative change: None. Acute change: No evidence of an acute infarct or other acute parenchymal process. Hemorrhage: No evidence of acute intracranial hemorrhage. ECASS hemorrhagic transformation score: Not Applicable Mass Lesion / Mass Effect: There is no evidence of an intracranial mass or extraaxial fluid collection.  No significant mass effect. Chronic change: Scattered patchy foci of low attenuation are present within supratentorial white matter which is a nonspecific finding but likely represents mild microvascular ischemia. Parenchyma: There is no significant volume loss.  The brain parenchyma is otherwise within normal limits for age. Ventricles: The ventricles are within normal limits of size and configuration for age. Paranasal sinuses and skull base: No calvarial fracture.  Calcification in the posterior wall of the left globe.  Unremarkable sinuses.  1 cm soft tissue scalp nodule on the right side near the skull vertex.  1.2 cm osteoma/meningioma arising from the inner cortex of the left temporal bone (6:13). Localizer images: Unremarkable. CT cervical spine No acute fracture or dislocation.  No focal skeletal abnormality.   Multilevel facet arthropathy, spurring and disc space narrowing consistent with degenerative change.  Bony productive change adjacent to the atlantooccipital joint.  No acute soft tissue changes. 2.2 cm left lobe thyroid nodule with microcalcifications. Acuity: Actionable Findings: Endocrine (thyroid) Routing Code:  EMI_1 Recommendation: US THYROID/PARATHYROID TimeFrame: at the discretion of the clinical team. --END OF FINDING--    IMPRESSION: CT head No acute intracranial abnormality.  Incidental findings described in the result section. Cervical spine No acute fracture or dislocation. Degenerative change. 2.2 cm left lobe thyroid nodule.  Elective sonographic characterization is recommended. Acuity: Actionable Findings: Endocrine (thyroid) Routing Code:  EMI_1 Recommendation: US THYROID/PARATHYROID TimeFrame: at the discretion of the clinical team. --END OF FINDING-- : JOSS   Transcribe Date/Time: Nov 14 2024 11:04A Dictated by : CLARENCE MARTINEZ MD This examination was interpreted and the report reviewed and electronically signed by: CLARENCE MARTINEZ MD on Nov 14 2024 11:31AM  EST    CT head wo IV contrast    Result Date: 11/14/2024  * * *Final Report* * * DATE OF EXAM: Nov 14 2024 10:15AM   MYC   0504  -  CT BRAIN WO IVCON   / ACCESSION #  [id number] PROCEDURE REASON: Head trauma, moderate-severe      * * * * Physician Interpretation * * * *  EXAMINATION: CT BRAIN WO IVCON, CT CERVICAL SPINE WO IVCON CLINICAL HISTORY: Injury TECHNIQUE:  Serial axial images without IV contrast were obtained from the vertex to the foramen magnum. MQ:  CTBWO_3 CT Radiation dose: Integrated Dose-Length Product (DLP) for this visit =   1005  mGy*cm CT Dose Reduction Employed: Automated exposure control(AEC) and iterative recon COMPARISON: None. RESULT: CT head Post-operative change: None. Acute change: No evidence of an acute infarct or other acute parenchymal process. Hemorrhage: No evidence of acute intracranial hemorrhage. ECASS hemorrhagic transformation score: Not Applicable Mass Lesion / Mass Effect: There is no evidence of an intracranial mass or extraaxial fluid collection.  No significant mass effect. Chronic change: Scattered patchy foci of low attenuation are present within supratentorial white matter which is a nonspecific finding but likely represents mild microvascular ischemia. Parenchyma: There is no significant volume loss.  The brain parenchyma is otherwise within normal limits for age.  Ventricles: The ventricles are within normal limits of size and configuration for age. Paranasal sinuses and skull base: No calvarial fracture.  Calcification in the posterior wall of the left globe.  Unremarkable sinuses.  1 cm soft tissue scalp nodule on the right side near the skull vertex.  1.2 cm osteoma/meningioma arising from the inner cortex of the left temporal bone (6:13). Localizer images: Unremarkable. CT cervical spine No acute fracture or dislocation.  No focal skeletal abnormality.   Multilevel facet arthropathy, spurring and disc space narrowing consistent with degenerative change.  Bony productive change adjacent to the atlantooccipital joint.  No acute soft tissue changes. 2.2 cm left lobe thyroid nodule with microcalcifications. Acuity: Actionable Findings: Endocrine (thyroid) Routing Code:  EMI_1 Recommendation: US THYROID/PARATHYROID TimeFrame: at the discretion of the clinical team. --END OF FINDING--    IMPRESSION: CT head No acute intracranial abnormality. Incidental findings described in the result section. Cervical spine No acute fracture or dislocation. Degenerative change. 2.2 cm left lobe thyroid nodule.  Elective sonographic characterization is recommended. Acuity: Actionable Findings: Endocrine (thyroid) Routing Code:  EMI_1 Recommendation: US THYROID/PARATHYROID TimeFrame: at the discretion of the clinical team. --END OF FINDING-- : PSCB   Transcribe Date/Time: Nov 14 2024 11:04A Dictated by : CLARENCE MARTINEZ MD This examination was interpreted and the report reviewed and electronically signed by: CLARENCE MARTINEZ MD on Nov 14 2024 11:31AM  EST    XR hip right with pelvis when performed 2 or 3 views    Result Date: 10/25/2024  Interpreted By:  Marty Morris, STUDY: XR HIP RIGHT WITH PELVIS WHEN PERFORMED 2 OR 3 VIEWS; ;  10/24/2024 11:31 pm   INDICATION: Signs/Symptoms:fall, pain.   COMPARISON: 06/24/2022   ACCESSION NUMBER(S): [id number]   ORDERING CLINICIAN:  NAHID CA   FINDINGS: AP radiograph of the pelvis and two views of the right hip: There is severe osteopenia which limits evaluation for subtle fractures. There are chronic healed bilateral inferior pubic ramus fractures with similar alignment compared to 06/20/2022. Deformity of the bilateral superior pubic rami also appears similar, likely sequelae of remote fractures. No acute right proximal femur fracture or dislocation. There is moderate bilateral hip joint space narrowing and marginal osteophyte formation. Periarticular calcification about the bilateral hips. Diffuse vascular calcifications. Moderate to severe lower lumbar degenerative change.       1. No acute osseous abnormality of the right hip. 2. Chronic bilateral obturator ring fractures. 3. Moderate bilateral hip osteoarthritis. 4. Severe osteopenia limits evaluation for subtle fractures. If there is strong clinical suspicion fracture, consider CT or MRI.   Signed by: Marty Morris 10/25/2024 12:21 AM Dictation workstation:   [id number]    Assessment/Plan     Infected right leg ulcer  Positive culture for MRSA  Therapy diabetes with peripheral angiopathy without gangrene  Right leg diabetic ulcer     IV vancomycin  IV Zosyn  Blood cultures times 2  Local care  Supportive care  Monitor temperature and WBC  PICC line placement  Long-term plan is 14 days of IV antibiotic therapy      Kristian Montelongo MD

## 2024-11-16 NOTE — PROGRESS NOTES
Apoorva Sanchez is a 89 y.o. female on day 1 of admission presenting with Hyponatremia.      Subjective   Patient was seen and examined at bedside. Patient was awake, eating lunch.  Pleasant, elderly female with no distress  She Is hard of hearing     Objective     Last Recorded Vitals  /69 (BP Location: Left arm, Patient Position: Lying)   Pulse 58   Temp 36.8 °C (98.2 °F) (Oral)   Resp 17   Wt 58.8 kg (129 lb 10.1 oz)   SpO2 94%   Intake/Output last 3 Shifts:    Intake/Output Summary (Last 24 hours) at 11/16/2024 1141  Last data filed at 11/16/2024 1009  Gross per 24 hour   Intake 2843.34 ml   Output --   Net 2843.34 ml       Admission Weight  Weight: 58.8 kg (129 lb 10.1 oz) (11/14/24 1720)    Daily Weight  11/14/24 : 58.8 kg (129 lb 10.1 oz)    Image Results  XR abdomen 1 view  Narrative: Interpreted By:  Sarah Sanchez,   STUDY:  XR ABDOMEN 1 VIEW;  11/14/2024 6:45 pm      INDICATION:  Signs/Symptoms:r/o ileus.      COMPARISON:  None.      ACCESSION NUMBER(S):  [id number]      ORDERING CLINICIAN:  JC TAN      TECHNIQUE:  2 AP images of the abdomen were obtained.      FINDINGS:  There is no significant bowel distention. There is a moderate amount  of fecal material.      The patient is scoliotic. There degenerative changes. There is a  fracture of the right superior pubic ramus. There also appears be a  fracture of the inferior right pubic ramus. The age      COMPARISON OF FINDINGS:      Impression: Nonspecific bowel gas pattern. Moderate amount of fecal material.          MACRO:  none      Signed by: Sarah Sanchez 11/15/2024 8:23 AM  Dictation workstation:   [id number]      Physical Exam  Constitutional: sitting in bed with no distress  HEENT: moist oral mucosa  Neck: No JVD  Lungs: Clear to auscultation bilaterally, no wheezing, no rhonchi   Cardiac: regular rate and rhythm, S1 and S2 present, no rubs, no murmurs, no gallops   Abdomen: bowel sounds present, non tender, non distended  Ext: lower  extremities and left elbow wrapped       Relevant Results  Results for orders placed or performed during the hospital encounter of 11/14/24 (from the past 24 hours)   POCT GLUCOSE   Result Value Ref Range    POCT Glucose 63 (L) 74 - 99 mg/dL   POCT GLUCOSE   Result Value Ref Range    POCT Glucose 44 (L) 74 - 99 mg/dL   POCT GLUCOSE   Result Value Ref Range    POCT Glucose 50 (L) 74 - 99 mg/dL   POCT GLUCOSE   Result Value Ref Range    POCT Glucose 72 (L) 74 - 99 mg/dL   POCT GLUCOSE   Result Value Ref Range    POCT Glucose 135 (H) 74 - 99 mg/dL   POCT GLUCOSE   Result Value Ref Range    POCT Glucose 66 (L) 74 - 99 mg/dL   CBC   Result Value Ref Range    WBC 7.1 4.4 - 11.3 x10*3/uL    nRBC 0.0 0.0 - 0.0 /100 WBCs    RBC 3.42 (L) 4.00 - 5.20 x10*6/uL    Hemoglobin 9.0 (L) 12.0 - 16.0 g/dL    Hematocrit 28.9 (L) 36.0 - 46.0 %    MCV 85 80 - 100 fL    MCH 26.3 26.0 - 34.0 pg    MCHC 31.1 (L) 32.0 - 36.0 g/dL    RDW 16.6 (H) 11.5 - 14.5 %    Platelets 214 150 - 450 x10*3/uL   Basic metabolic panel   Result Value Ref Range    Glucose 56 (L) 74 - 99 mg/dL    Sodium 131 (L) 136 - 145 mmol/L    Potassium 3.9 3.5 - 5.3 mmol/L    Chloride 101 98 - 107 mmol/L    Bicarbonate 24 21 - 32 mmol/L    Anion Gap 10 10 - 20 mmol/L    Urea Nitrogen 10 6 - 23 mg/dL    Creatinine 0.52 0.50 - 1.05 mg/dL    eGFR 86 >60 mL/min/1.73m*2    Calcium 8.0 (L) 8.6 - 10.3 mg/dL   Vancomycin   Result Value Ref Range    Vancomycin 12.0 5.0 - 20.0 ug/mL   POCT GLUCOSE   Result Value Ref Range    POCT Glucose 110 (H) 74 - 99 mg/dL   POCT GLUCOSE   Result Value Ref Range    POCT Glucose 100 (H) 74 - 99 mg/dL      Scheduled medications  atenolol, 100 mg, oral, Daily  brimonidine, 1 drop, Both Eyes, BID  cloNIDine, 0.2 mg, oral, BID  enoxaparin, 40 mg, subcutaneous, Daily  [Held by provider] glimepiride, 2 mg, oral, BID  insulin lispro, 0-5 Units, subcutaneous, TID AC  latanoprost, 1 drop, Both Eyes, Nightly  levothyroxine, 100 mcg, oral, Daily before  breakfast  [Held by provider] metFORMIN XR, 500 mg, oral, Daily with evening meal  piperacillin-tazobactam, 3.375 g, intravenous, q6h  polyethylene glycol, 17 g, oral, Daily  prednisoLONE acetate, 1 drop, Both Eyes, BID  simvastatin, 40 mg, oral, Nightly  timolol, 1 drop, Both Eyes, BID  vancomycin, 1,500 mg, intravenous, q24h      Continuous medications  sodium chloride 0.9%, 100 mL/hr, Last Rate: 100 mL/hr (11/15/24 2336)      PRN medications  PRN medications: acetaminophen **OR** acetaminophen **OR** acetaminophen, dextrose, dextrose, glucagon, glucagon, ondansetron ODT **OR** ondansetron, vancomycin     Assessment:   89 year old female with hx of chronic stasis dermatitis with leg ulcers, DM Type 2, HTN,  Thyroid, Hx of recurrent falls  transfer from [facility] ED to [facility] after     PLAN:   Mechanical Fall         1. Patient resides at assisted living          2. PT recs SNF    2. Hypovolemic hyponatremia      1. Resolved with Fluids     3. Hypoglycemia       1. Hg A1C on 11/15/24 was 6      2. Oral hypoglycemics are on hold right now      3. Hypoglycemic protocol    4. Chronic stasis dermatitis with non healing right ulcer with hx of PVD       1. Podiatry following        2.  Patient had a wound swab on 11/5/24 positive for MRSA and Pseudomonas       3. Patient is on vanc + Zosyn and ID is following        4. Patient has an MEETA order to be done in the outpatient by the outpatient provider team In cards     5. HTN      1. Patient is in her home meds atenolol and clonidine    6. Prophylaxis       1. Lovenox 40 mg subcutaneous    Disposition: Patient transfer from [facility] after a fall and found to have hypoglycemia, hyponatremia and dehydration and chronic non healing ulcer.   Hyponatremia resolved.  Oral hypoglycemic have been dc at this time. Pending ID final recs. She will follow up outpatient with cards for her hx of PVD    55 minutes spent coordinating care of the patient      Amina Cross,  DO

## 2024-11-16 NOTE — CARE PLAN
The patient's goals for the shift include  up to chair, monitor labs    The clinical goals for the shift include safety, monitor labs        Problem: Skin  Goal: Prevent/manage excess moisture  Outcome: Not Progressing  Flowsheets (Taken 11/16/2024 1002)  Prevent/manage excess moisture:   Monitor for/manage infection if present   Cleanse incontinence/protect with barrier cream   Follow provider orders for dressing changes   Use wicking fabric (obtain order)   Moisturize dry skin     Problem: Pain - Adult  Goal: Verbalizes/displays adequate comfort level or baseline comfort level  Outcome: Progressing     Problem: Safety - Adult  Goal: Free from fall injury  Outcome: Progressing     Problem: Discharge Planning  Goal: Discharge to home or other facility with appropriate resources  Outcome: Progressing     Problem: Chronic Conditions and Co-morbidities  Goal: Patient's chronic conditions and co-morbidity symptoms are monitored and maintained or improved  Outcome: Progressing     Problem: Fall/Injury  Goal: Not fall by end of shift  Outcome: Progressing  Goal: Be free from injury by end of the shift  Outcome: Progressing  Goal: Verbalize understanding of personal risk factors for fall in the hospital  Outcome: Progressing  Goal: Verbalize understanding of risk factor reduction measures to prevent injury from fall in the home  Outcome: Progressing  Goal: Use assistive devices by end of the shift  Outcome: Progressing  Goal: Pace activities to prevent fatigue by end of the shift  Outcome: Progressing

## 2024-11-16 NOTE — PROGRESS NOTES
Vancomycin Dosing by Pharmacy- FOLLOW UP    Apoorva Sanchez is a 89 y.o. year old female who Pharmacy has been consulted for vancomycin dosing for cellulitis, skin and soft tissue. Based on the patient's indication and renal status this patient is being dosed based on a goal AUC of 400-600.     Renal function is currently stable.    Current vancomycin dose: 1500 mg given every 24 hours    Estimated vancomycin AUC on current dose: 540 mg/L.hr     Visit Vitals  /56 (BP Location: Right arm, Patient Position: Lying)   Pulse 56   Temp 36.8 °C (98.2 °F) (Oral)   Resp 16        Lab Results   Component Value Date    CREATININE 0.52 2024    CREATININE 0.48 (L) 11/15/2024    CREATININE 0.70 2024    CREATININE 0.90 2024        Patient weight is as follows:   Vitals:    24 1720   Weight: 58.8 kg (129 lb 10.1 oz)       Cultures:  No results found for the encounter in last 14 days.       I/O last 3 completed shifts:  In: 3198.3 (54.4 mL/kg) [P.O.:240; I.V.:2958.3 (50.3 mL/kg)]  Out: - (0 mL/kg)   Weight: 58.8 kg   I/O during current shift:  No intake/output data recorded.    Temp (24hrs), Av.7 °C (98 °F), Min:36.6 °C (97.8 °F), Max:36.8 °C (98.2 °F)      Assessment/Plan    Within goal AUC range. Continue current vancomycin regimen.    This dosing regimen is predicted by [employer] to result in the following pharmacokinetic parameters:  Loading dose: N/A  Regimen: 1500 mg IV every 24 hours.  Start time: 20:43 on 2024  Exposure target: AUC24 (range)400-600 mg/L.hr   [id number]: 492 mg/L.hr  AUC24,ss: 540 mg/L.hr  Probability of AUC24 > 400: 93 %  Ctrough,ss: 15.7 mg/L  Probability of Ctrough,ss > 20: 22 %      The next level will be obtained on 24 at 0500 hr. May be obtained sooner if clinically indicated.   Will continue to monitor renal function daily while on vancomycin and order serum creatinine at least every 48 hours if not already ordered.  Follow for continued vancomycin needs, clinical  response, and signs/symptoms of toxicity.       Yohannes Simmons, PharmD

## 2024-11-17 ENCOUNTER — APPOINTMENT (OUTPATIENT)
Dept: CARDIOLOGY | Facility: HOSPITAL | Age: 89
DRG: 641 | End: 2024-11-17
Payer: MEDICARE

## 2024-11-17 VITALS
DIASTOLIC BLOOD PRESSURE: 67 MMHG | SYSTOLIC BLOOD PRESSURE: 145 MMHG | TEMPERATURE: 98.1 F | WEIGHT: 129.63 LBS | RESPIRATION RATE: 18 BRPM | BODY MASS INDEX: 20.83 KG/M2 | HEIGHT: 66 IN | OXYGEN SATURATION: 98 % | HEART RATE: 59 BPM

## 2024-11-17 LAB
ALBUMIN SERPL BCP-MCNC: 3 G/DL (ref 3.4–5)
ANION GAP SERPL CALCULATED.3IONS-SCNC: 10 MMOL/L (ref 10–20)
BACTERIA BLD CULT: NORMAL
BACTERIA BLD CULT: NORMAL
BASOPHILS # BLD AUTO: 0.07 X10*3/UL (ref 0–0.1)
BASOPHILS NFR BLD AUTO: 1.1 %
BUN SERPL-MCNC: 8 MG/DL (ref 6–23)
CALCIUM SERPL-MCNC: 7.9 MG/DL (ref 8.6–10.3)
CHLORIDE SERPL-SCNC: 99 MMOL/L (ref 98–107)
CO2 SERPL-SCNC: 25 MMOL/L (ref 21–32)
CREAT SERPL-MCNC: 0.54 MG/DL (ref 0.5–1.05)
EGFRCR SERPLBLD CKD-EPI 2021: 85 ML/MIN/1.73M*2
EOSINOPHIL # BLD AUTO: 0.25 X10*3/UL (ref 0–0.4)
EOSINOPHIL NFR BLD AUTO: 3.8 %
ERYTHROCYTE [DISTWIDTH] IN BLOOD BY AUTOMATED COUNT: 16.5 % (ref 11.5–14.5)
GLUCOSE BLD MANUAL STRIP-MCNC: 154 MG/DL (ref 74–99)
GLUCOSE BLD MANUAL STRIP-MCNC: 245 MG/DL (ref 74–99)
GLUCOSE BLD MANUAL STRIP-MCNC: 257 MG/DL (ref 74–99)
GLUCOSE BLD MANUAL STRIP-MCNC: 281 MG/DL (ref 74–99)
GLUCOSE SERPL-MCNC: 148 MG/DL (ref 74–99)
HCT VFR BLD AUTO: 29.2 % (ref 36–46)
HEMOCCULT SP1 STL QL: NEGATIVE
HGB BLD-MCNC: 9.1 G/DL (ref 12–16)
IMM GRANULOCYTES # BLD AUTO: 0.03 X10*3/UL (ref 0–0.5)
IMM GRANULOCYTES NFR BLD AUTO: 0.5 % (ref 0–0.9)
LYMPHOCYTES # BLD AUTO: 0.68 X10*3/UL (ref 0.8–3)
LYMPHOCYTES NFR BLD AUTO: 10.4 %
MAGNESIUM SERPL-MCNC: 1.68 MG/DL (ref 1.6–2.4)
MCH RBC QN AUTO: 26.4 PG (ref 26–34)
MCHC RBC AUTO-ENTMCNC: 31.2 G/DL (ref 32–36)
MCV RBC AUTO: 85 FL (ref 80–100)
MONOCYTES # BLD AUTO: 0.58 X10*3/UL (ref 0.05–0.8)
MONOCYTES NFR BLD AUTO: 8.9 %
NEUTROPHILS # BLD AUTO: 4.94 X10*3/UL (ref 1.6–5.5)
NEUTROPHILS NFR BLD AUTO: 75.3 %
NRBC BLD-RTO: 0 /100 WBCS (ref 0–0)
PHOSPHATE SERPL-MCNC: 1.8 MG/DL (ref 2.5–4.9)
PLATELET # BLD AUTO: 208 X10*3/UL (ref 150–450)
POTASSIUM SERPL-SCNC: 3.8 MMOL/L (ref 3.5–5.3)
RBC # BLD AUTO: 3.45 X10*6/UL (ref 4–5.2)
SODIUM SERPL-SCNC: 130 MMOL/L (ref 136–145)
WBC # BLD AUTO: 6.6 X10*3/UL (ref 4.4–11.3)

## 2024-11-17 PROCEDURE — 2500000002 HC RX 250 W HCPCS SELF ADMINISTERED DRUGS (ALT 637 FOR MEDICARE OP, ALT 636 FOR OP/ED): Performed by: INTERNAL MEDICINE

## 2024-11-17 PROCEDURE — 99232 SBSQ HOSP IP/OBS MODERATE 35: CPT | Performed by: INTERNAL MEDICINE

## 2024-11-17 PROCEDURE — 2780000003 HC OR 278 NO HCPCS

## 2024-11-17 PROCEDURE — 2500000004 HC RX 250 GENERAL PHARMACY W/ HCPCS (ALT 636 FOR OP/ED): Mod: JZ | Performed by: INTERNAL MEDICINE

## 2024-11-17 PROCEDURE — 87040 BLOOD CULTURE FOR BACTERIA: CPT | Mod: TRILAB | Performed by: INTERNAL MEDICINE

## 2024-11-17 PROCEDURE — 2500000001 HC RX 250 WO HCPCS SELF ADMINISTERED DRUGS (ALT 637 FOR MEDICARE OP): Performed by: INTERNAL MEDICINE

## 2024-11-17 PROCEDURE — 83735 ASSAY OF MAGNESIUM: CPT | Performed by: INTERNAL MEDICINE

## 2024-11-17 PROCEDURE — 2500000004 HC RX 250 GENERAL PHARMACY W/ HCPCS (ALT 636 FOR OP/ED): Performed by: INTERNAL MEDICINE

## 2024-11-17 PROCEDURE — 82270 OCCULT BLOOD FECES: CPT | Performed by: INTERNAL MEDICINE

## 2024-11-17 PROCEDURE — 36415 COLL VENOUS BLD VENIPUNCTURE: CPT | Performed by: INTERNAL MEDICINE

## 2024-11-17 PROCEDURE — 1200000002 HC GENERAL ROOM WITH TELEMETRY DAILY

## 2024-11-17 PROCEDURE — 80069 RENAL FUNCTION PANEL: CPT | Performed by: INTERNAL MEDICINE

## 2024-11-17 PROCEDURE — 36573 INSJ PICC RS&I 5 YR+: CPT

## 2024-11-17 PROCEDURE — C1751 CATH, INF, PER/CENT/MIDLINE: HCPCS

## 2024-11-17 PROCEDURE — 82947 ASSAY GLUCOSE BLOOD QUANT: CPT

## 2024-11-17 PROCEDURE — 85025 COMPLETE CBC W/AUTO DIFF WBC: CPT | Performed by: INTERNAL MEDICINE

## 2024-11-17 RX ORDER — HYDRALAZINE HYDROCHLORIDE 20 MG/ML
5 INJECTION INTRAMUSCULAR; INTRAVENOUS ONCE
Status: COMPLETED | OUTPATIENT
Start: 2024-11-17 | End: 2024-11-17

## 2024-11-17 RX ORDER — VANCOMYCIN HYDROCHLORIDE
1.5 DAILY
Qty: 250 ML | Refills: 10 | Status: SHIPPED | OUTPATIENT
Start: 2024-11-17 | End: 2024-11-29

## 2024-11-17 RX ORDER — LIDOCAINE HYDROCHLORIDE 10 MG/ML
5 INJECTION, SOLUTION INFILTRATION; PERINEURAL ONCE
Status: DISCONTINUED | OUTPATIENT
Start: 2024-11-17 | End: 2024-11-18 | Stop reason: HOSPADM

## 2024-11-17 ASSESSMENT — COGNITIVE AND FUNCTIONAL STATUS - GENERAL
CLIMB 3 TO 5 STEPS WITH RAILING: TOTAL
TURNING FROM BACK TO SIDE WHILE IN FLAT BAD: A LITTLE
MOBILITY SCORE: 13
DRESSING REGULAR LOWER BODY CLOTHING: TOTAL
STANDING UP FROM CHAIR USING ARMS: A LOT
STANDING UP FROM CHAIR USING ARMS: A LOT
MOVING FROM LYING ON BACK TO SITTING ON SIDE OF FLAT BED WITH BEDRAILS: A LITTLE
HELP NEEDED FOR BATHING: A LOT
HELP NEEDED FOR BATHING: TOTAL
WALKING IN HOSPITAL ROOM: TOTAL
TOILETING: TOTAL
DRESSING REGULAR LOWER BODY CLOTHING: TOTAL
MOBILITY SCORE: 13
DAILY ACTIVITIY SCORE: 10
MOVING TO AND FROM BED TO CHAIR: A LITTLE
CLIMB 3 TO 5 STEPS WITH RAILING: TOTAL
DRESSING REGULAR UPPER BODY CLOTHING: A LOT
DRESSING REGULAR UPPER BODY CLOTHING: TOTAL
TOILETING: TOTAL
DAILY ACTIVITIY SCORE: 8
WALKING IN HOSPITAL ROOM: TOTAL
EATING MEALS: A LOT
PERSONAL GROOMING: A LOT
PERSONAL GROOMING: A LOT
TURNING FROM BACK TO SIDE WHILE IN FLAT BAD: A LITTLE
MOVING FROM LYING ON BACK TO SITTING ON SIDE OF FLAT BED WITH BEDRAILS: A LITTLE
EATING MEALS: A LOT
MOVING TO AND FROM BED TO CHAIR: A LITTLE

## 2024-11-17 ASSESSMENT — PAIN SCALES - GENERAL
PAINLEVEL_OUTOF10: 0 - NO PAIN
PAINLEVEL_OUTOF10: 0 - NO PAIN

## 2024-11-17 NOTE — PROGRESS NOTES
Apoorva Sanchez is a 89 y.o. female on day 2 of admission presenting with Hyponatremia.      Subjective   Patient was seen and examined at bedside. Patient is at her baseline  LPN reported blood pressure of 190 before given patient her BP meds.   She has no concerns at this time     Objective     Last Recorded Vitals  BP (!) 183/74   Pulse 70   Temp 36.1 °C (97 °F) (Oral)   Resp 18   Wt 58.8 kg (129 lb 10.1 oz)   SpO2 94%   Intake/Output last 3 Shifts:    Intake/Output Summary (Last 24 hours) at 11/17/2024 1438  Last data filed at 11/17/2024 1300  Gross per 24 hour   Intake 403.33 ml   Output 2100 ml   Net -1696.67 ml       Admission Weight  Weight: 58.8 kg (129 lb 10.1 oz) (11/14/24 1720)    Daily Weight  11/14/24 : 58.8 kg (129 lb 10.1 oz)    Image Results  XR abdomen 1 view  Narrative: Interpreted By:  Sarah Sanchez,   STUDY:  XR ABDOMEN 1 VIEW;  11/14/2024 6:45 pm      INDICATION:  Signs/Symptoms:r/o ileus.      COMPARISON:  None.      ACCESSION NUMBER(S):  [id number]      ORDERING CLINICIAN:  JC TAN      TECHNIQUE:  2 AP images of the abdomen were obtained.      FINDINGS:  There is no significant bowel distention. There is a moderate amount  of fecal material.      The patient is scoliotic. There degenerative changes. There is a  fracture of the right superior pubic ramus. There also appears be a  fracture of the inferior right pubic ramus. The age      COMPARISON OF FINDINGS:      Impression: Nonspecific bowel gas pattern. Moderate amount of fecal material.          MACRO:  none      Signed by: Sarah Sanchez 11/15/2024 8:23 AM  Dictation workstation:   [id number]      Physical Exam  Constitutional: sitting in bed with no distress  HEENT: moist oral mucosa  Neck: No JVD  Lungs: Clear to auscultation bilaterally, no wheezing, no rhonchi   Cardiac: regular rate and rhythm, S1 and S2 present, no rubs, no murmurs, no gallops   Abdomen: bowel sounds present, non tender, non distended  Ext: lower extremities  and left elbow wrapped     Relevant Results  Results for orders placed or performed during the hospital encounter of 11/14/24 (from the past 24 hours)   POCT GLUCOSE   Result Value Ref Range    POCT Glucose 152 (H) 74 - 99 mg/dL   POCT GLUCOSE   Result Value Ref Range    POCT Glucose 171 (H) 74 - 99 mg/dL   Renal Function Panel   Result Value Ref Range    Glucose 148 (H) 74 - 99 mg/dL    Sodium 130 (L) 136 - 145 mmol/L    Potassium 3.8 3.5 - 5.3 mmol/L    Chloride 99 98 - 107 mmol/L    Bicarbonate 25 21 - 32 mmol/L    Anion Gap 10 10 - 20 mmol/L    Urea Nitrogen 8 6 - 23 mg/dL    Creatinine 0.54 0.50 - 1.05 mg/dL    eGFR 85 >60 mL/min/1.73m*2    Calcium 7.9 (L) 8.6 - 10.3 mg/dL    Phosphorus 1.8 (L) 2.5 - 4.9 mg/dL    Albumin 3.0 (L) 3.4 - 5.0 g/dL   Magnesium   Result Value Ref Range    Magnesium 1.68 1.60 - 2.40 mg/dL   CBC and Auto Differential   Result Value Ref Range    WBC 6.6 4.4 - 11.3 x10*3/uL    nRBC 0.0 0.0 - 0.0 /100 WBCs    RBC 3.45 (L) 4.00 - 5.20 x10*6/uL    Hemoglobin 9.1 (L) 12.0 - 16.0 g/dL    Hematocrit 29.2 (L) 36.0 - 46.0 %    MCV 85 80 - 100 fL    MCH 26.4 26.0 - 34.0 pg    MCHC 31.2 (L) 32.0 - 36.0 g/dL    RDW 16.5 (H) 11.5 - 14.5 %    Platelets 208 150 - 450 x10*3/uL    Neutrophils % 75.3 40.0 - 80.0 %    Immature Granulocytes %, Automated 0.5 0.0 - 0.9 %    Lymphocytes % 10.4 13.0 - 44.0 %    Monocytes % 8.9 2.0 - 10.0 %    Eosinophils % 3.8 0.0 - 6.0 %    Basophils % 1.1 0.0 - 2.0 %    Neutrophils Absolute 4.94 1.60 - 5.50 x10*3/uL    Immature Granulocytes Absolute, Automated 0.03 0.00 - 0.50 x10*3/uL    Lymphocytes Absolute 0.68 (L) 0.80 - 3.00 x10*3/uL    Monocytes Absolute 0.58 0.05 - 0.80 x10*3/uL    Eosinophils Absolute 0.25 0.00 - 0.40 x10*3/uL    Basophils Absolute 0.07 0.00 - 0.10 x10*3/uL   Occult Blood, Stool    Specimen: Stool   Result Value Ref Range    Occult Blood, Stool X1 Negative Negative   POCT GLUCOSE   Result Value Ref Range    POCT Glucose 154 (H) 74 - 99 mg/dL   POCT  GLUCOSE   Result Value Ref Range    POCT Glucose 245 (H) 74 - 99 mg/dL      Scheduled medications  atenolol, 100 mg, oral, Daily  brimonidine, 1 drop, Both Eyes, BID  cloNIDine, 0.2 mg, oral, BID  enoxaparin, 40 mg, subcutaneous, Daily  [Held by provider] glimepiride, 2 mg, oral, BID  insulin lispro, 0-5 Units, subcutaneous, TID AC  latanoprost, 1 drop, Both Eyes, Nightly  levothyroxine, 100 mcg, oral, Daily before breakfast  [Held by provider] metFORMIN XR, 500 mg, oral, Daily with evening meal  piperacillin-tazobactam, 3.375 g, intravenous, q6h  polyethylene glycol, 17 g, oral, Daily  prednisoLONE acetate, 1 drop, Both Eyes, BID  simvastatin, 40 mg, oral, Nightly  timolol, 1 drop, Both Eyes, BID  vancomycin, 1,500 mg, intravenous, q24h      Continuous medications     PRN medications  PRN medications: acetaminophen **OR** acetaminophen **OR** acetaminophen, dextrose, dextrose, glucagon, glucagon, ondansetron ODT **OR** ondansetron, vancomycin     Assessment:   89 year old female with hx of chronic stasis dermatitis with leg ulcers, DM Type 2, HTN,  Thyroid, Hx of recurrent falls  transfer from [facility] to [facility] after      PLAN:   Mechanical Fall         1. Patient resides at assisted living          2. PT recs SNF and patient will go to SNF now per       2. Hypovolemic hyponatremia      1. Improved      3. Hypoglycemia       1. Hg A1C on 11/15/24 was 6      2. Oral hypoglycemics are on hold right now      3. Hypoglycemic protocol      4. Continue sliding scale      4. Chronic venous insufficiency with non healing right ulcer with hx of PVD       1. Podiatry following        2.  Patient had a wound swab on 11/5/24 positive for MRSA and Pseudomonas       3. Patient is on vanc + Zosyn and ID is following        4. Patient has an MEETA order to be done in the outpatient by the outpatient provider team In cards      5. HTN      1. Patient is in her home meds atenolol and clonidine     6. Prophylaxis        1. Lovenox 40 mg subcutaneous     Disposition: Patient transfer from [facility]  mentor after a fall and found to have hypoglycemia, hyponatremia and dehydration and chronic non healing ulcer.   Hyponatremia resolved.  Oral hypoglycemic have been dc at this time. Pending ID final recs then she will go to SNF.     She will follow up outpatient with cards for her hx of PVD     35 minutes spent coordinating care of the patient          Amina Cross DO

## 2024-11-17 NOTE — PROGRESS NOTES
Apoorva Sanchez is a 89 y.o. female on day 2 of admission presenting with Hyponatremia.    Subjective   Interval History:   Afebrile, no chills  No right leg pain  No chest pain or shortness of breath  No nausea vomiting or diarrhea        Review of Systems   All other systems reviewed and are negative.      Objective   Range of Vitals (last 24 hours)  Heart Rate:  [62-80]   Temp:  [36.1 °C (97 °F)-36.8 °C (98.2 °F)]   Resp:  [16-18]   BP: (143-197)/(61-90)   SpO2:  [93 %-100 %]   Daily Weight  11/14/24 : 58.8 kg (129 lb 10.1 oz)    Body mass index is 20.92 kg/m².    Physical Exam  Constitutional:       Appearance: Normal appearance.   HENT:      Head: Normocephalic and atraumatic.   Cardiovascular:      Rate and Rhythm: Normal rate and regular rhythm.      Heart sounds: Normal heart sounds.   Pulmonary:      Effort: Pulmonary effort is normal.      Breath sounds: Normal breath sounds.   Abdominal:      General: Bowel sounds are normal.      Palpations: Abdomen is soft.      Tenderness: There is no abdominal tenderness.   Musculoskeletal:      Cervical back: Normal range of motion and neck supple.      Right lower leg: No edema.      Left lower leg: No edema.   Skin:     General: Skin is warm and dry.      Comments: Bilateral leg dressing   Neurological:      Mental Status: She is alert and oriented to person, place, and time.   Psychiatric:         Behavior: Behavior normal.     Antibiotics  ciprofloxacin - 500 mg  doxycycline - 100 mg  piperacillin-tazobactam - 3.375 gram/50 mL  vancomycin - 1.5 gram/300 mL    Relevant Results  Labs  Results from last 72 hours   Lab Units 11/17/24  0418 11/16/24  0432 11/15/24  0440   WBC AUTO x10*3/uL 6.6 7.1 9.1   HEMOGLOBIN g/dL 9.1* 9.0* 9.1*   HEMATOCRIT % 29.2* 28.9* 28.6*   PLATELETS AUTO x10*3/uL 208 214 222   NEUTROS PCT AUTO % 75.3  --   --    LYMPHS PCT AUTO % 10.4  --   --    MONOS PCT AUTO % 8.9  --   --    EOS PCT AUTO % 3.8  --   --      Results from last 72 hours   Lab  "Units 11/17/24  0417 11/16/24  0432 11/15/24  0440   SODIUM mmol/L 130* 131* 127*   POTASSIUM mmol/L 3.8 3.9 3.7   CHLORIDE mmol/L 99 101 97*   CO2 mmol/L 25 24 24   BUN mg/dL 8 10 18   CREATININE mg/dL 0.54 0.52 0.48*   GLUCOSE mg/dL 148* 56* 33*   CALCIUM mg/dL 7.9* 8.0* 8.0*   ANION GAP mmol/L 10 10 10   EGFR mL/min/1.73m*2 85 86 87   PHOSPHORUS mg/dL 1.8*  --   --      Results from last 72 hours   Lab Units 11/17/24 0417   ALBUMIN g/dL 3.0*     Estimated Creatinine Clearance: 57.8 mL/min (by C-G formula based on SCr of 0.54 mg/dL).  No results found for: \"CRP\"  Microbiology  Susceptibility data from last 14 days.  Collected Specimen Info Organism Aztreonam Cefepime Ceftazidime Ciprofloxacin Clindamycin Erythromycin Levofloxacin Oxacillin Piperacillin/Tazobactam Tetracycline Tobramycin Trimethoprim/Sulfamethoxazole Vancomycin   11/05/24 Tissue/Biopsy from Wound/Tissue Pseudomonas aeruginosa  S  S  S  S    I   S   S       Methicillin Resistant Staphylococcus aureus (MRSA)      R  R   R   R   R  S     Mixed Gram-Negative Bacteria                   Imaging  XR abdomen 1 view    Result Date: 11/15/2024  Interpreted By:  Sarah Sanchez, STUDY: XR ABDOMEN 1 VIEW;  11/14/2024 6:45 pm   INDICATION: Signs/Symptoms:r/o ileus.   COMPARISON: None.   ACCESSION NUMBER(S): [id number]   ORDERING CLINICIAN: JC TAN   TECHNIQUE: 2 AP images of the abdomen were obtained.   FINDINGS: There is no significant bowel distention. There is a moderate amount of fecal material.   The patient is scoliotic. There degenerative changes. There is a fracture of the right superior pubic ramus. There also appears be a fracture of the inferior right pubic ramus. The age   COMPARISON OF FINDINGS:       Nonspecific bowel gas pattern. Moderate amount of fecal material.     MACRO: none   Signed by: Sarah Sanchez 11/15/2024 8:23 AM Dictation workstation:   [id number]    CT cervical spine wo IV contrast    Result Date: 11/14/2024  * * *Final " Report* * * DATE OF EXAM: Nov 14 2024 10:15AM   [facility]   0505  -  CT CERVICAL SPINE WO IVCON  / ACCESSION #  [id number] PROCEDURE REASON: Spine fracture, cervical, traumatic      * * * * Physician Interpretation * * * *  EXAMINATION: CT BRAIN WO IVCON, CT CERVICAL SPINE WO IVCON CLINICAL HISTORY: Injury TECHNIQUE:  Serial axial images without IV contrast were obtained from the vertex to the foramen magnum. MQ:  CTBWO_3 CT Radiation dose: Integrated Dose-Length Product (DLP) for this visit =   1005  mGy*cm CT Dose Reduction Employed: Automated exposure control(AEC) and iterative recon COMPARISON: None. RESULT: CT head Post-operative change: None. Acute change: No evidence of an acute infarct or other acute parenchymal process. Hemorrhage: No evidence of acute intracranial hemorrhage. ECASS hemorrhagic transformation score: Not Applicable Mass Lesion / Mass Effect: There is no evidence of an intracranial mass or extraaxial fluid collection.  No significant mass effect. Chronic change: Scattered patchy foci of low attenuation are present within supratentorial white matter which is a nonspecific finding but likely represents mild microvascular ischemia. Parenchyma: There is no significant volume loss.  The brain parenchyma is otherwise within normal limits for age. Ventricles: The ventricles are within normal limits of size and configuration for age. Paranasal sinuses and skull base: No calvarial fracture.  Calcification in the posterior wall of the left globe.  Unremarkable sinuses.  1 cm soft tissue scalp nodule on the right side near the skull vertex.  1.2 cm osteoma/meningioma arising from the inner cortex of the left temporal bone (6:13). Localizer images: Unremarkable. CT cervical spine No acute fracture or dislocation.  No focal skeletal abnormality.   Multilevel facet arthropathy, spurring and disc space narrowing consistent with degenerative change.  Bony productive change adjacent to the atlantooccipital joint.   No acute soft tissue changes. 2.2 cm left lobe thyroid nodule with microcalcifications. Acuity: Actionable Findings: Endocrine (thyroid) Routing Code:  EMI_1 Recommendation: US THYROID/PARATHYROID TimeFrame: at the discretion of the clinical team. --END OF FINDING--    IMPRESSION: CT head No acute intracranial abnormality. Incidental findings described in the result section. Cervical spine No acute fracture or dislocation. Degenerative change. 2.2 cm left lobe thyroid nodule.  Elective sonographic characterization is recommended. Acuity: Actionable Findings: Endocrine (thyroid) Routing Code:  EMI_1 Recommendation: US THYROID/PARATHYROID TimeFrame: at the discretion of the clinical team. --END OF FINDING-- : PSCB   Transcribe Date/Time: Nov 14 2024 11:04A Dictated by : CLARENCE MARTINEZ MD This examination was interpreted and the report reviewed and electronically signed by: CLARENCE MARTINEZ MD on Nov 14 2024 11:31AM  EST    CT head wo IV contrast    Result Date: 11/14/2024  * * *Final Report* * * DATE OF EXAM: Nov 14 2024 10:15AM   [facility]   0504  -  CT BRAIN WO IVCON   / ACCESSION #  [id number] PROCEDURE REASON: Head trauma, moderate-severe      * * * * Physician Interpretation * * * *  EXAMINATION: CT BRAIN WO IVCON, CT CERVICAL SPINE WO IVCON CLINICAL HISTORY: Injury TECHNIQUE:  Serial axial images without IV contrast were obtained from the vertex to the foramen magnum. MQ:  CTBWO_3 CT Radiation dose: Integrated Dose-Length Product (DLP) for this visit =   1005  mGy*cm CT Dose Reduction Employed: Automated exposure control(AEC) and iterative recon COMPARISON: None. RESULT: CT head Post-operative change: None. Acute change: No evidence of an acute infarct or other acute parenchymal process. Hemorrhage: No evidence of acute intracranial hemorrhage. ECASS hemorrhagic transformation score: Not Applicable Mass Lesion / Mass Effect: There is no evidence of an intracranial mass or extraaxial fluid collection.  No  significant mass effect. Chronic change: Scattered patchy foci of low attenuation are present within supratentorial white matter which is a nonspecific finding but likely represents mild microvascular ischemia. Parenchyma: There is no significant volume loss.  The brain parenchyma is otherwise within normal limits for age. Ventricles: The ventricles are within normal limits of size and configuration for age. Paranasal sinuses and skull base: No calvarial fracture.  Calcification in the posterior wall of the left globe.  Unremarkable sinuses.  1 cm soft tissue scalp nodule on the right side near the skull vertex.  1.2 cm osteoma/meningioma arising from the inner cortex of the left temporal bone (6:13). Localizer images: Unremarkable. CT cervical spine No acute fracture or dislocation.  No focal skeletal abnormality.   Multilevel facet arthropathy, spurring and disc space narrowing consistent with degenerative change.  Bony productive change adjacent to the atlantooccipital joint.  No acute soft tissue changes. 2.2 cm left lobe thyroid nodule with microcalcifications. Acuity: Actionable Findings: Endocrine (thyroid) Routing Code:  EMI_1 Recommendation: US THYROID/PARATHYROID TimeFrame: at the discretion of the clinical team. --END OF FINDING--    IMPRESSION: CT head No acute intracranial abnormality. Incidental findings described in the result section. Cervical spine No acute fracture or dislocation. Degenerative change. 2.2 cm left lobe thyroid nodule.  Elective sonographic characterization is recommended. Acuity: Actionable Findings: Endocrine (thyroid) Routing Code:  EMI_1 Recommendation: US THYROID/PARATHYROID TimeFrame: at the discretion of the clinical team. --END OF FINDING-- : JOSS   Transcribe Date/Time: Nov 14 2024 11:04A Dictated by : CLARENCE MARTINEZ MD This examination was interpreted and the report reviewed and electronically signed by: CLARENCE MARTINEZ MD on Nov 14 2024 11:31AM  EST    XR  hip right with pelvis when performed 2 or 3 views    Result Date: 10/25/2024  Interpreted By:  Marty Morris, STUDY: XR HIP RIGHT WITH PELVIS WHEN PERFORMED 2 OR 3 VIEWS; ;  10/24/2024 11:31 pm   INDICATION: Signs/Symptoms:fall, pain.   COMPARISON: 06/24/2022   ACCESSION NUMBER(S): [id number]   ORDERING CLINICIAN: NAHID CA   FINDINGS: AP radiograph of the pelvis and two views of the right hip: There is severe osteopenia which limits evaluation for subtle fractures. There are chronic healed bilateral inferior pubic ramus fractures with similar alignment compared to 06/20/2022. Deformity of the bilateral superior pubic rami also appears similar, likely sequelae of remote fractures. No acute right proximal femur fracture or dislocation. There is moderate bilateral hip joint space narrowing and marginal osteophyte formation. Periarticular calcification about the bilateral hips. Diffuse vascular calcifications. Moderate to severe lower lumbar degenerative change.       1. No acute osseous abnormality of the right hip. 2. Chronic bilateral obturator ring fractures. 3. Moderate bilateral hip osteoarthritis. 4. Severe osteopenia limits evaluation for subtle fractures. If there is strong clinical suspicion fracture, consider CT or MRI.   Signed by: Marty Morris 10/25/2024 12:21 AM Dictation workstation:   [id number]     Assessment/Plan   Infected right leg ulcer  Positive culture for MRSA  Therapy diabetes with peripheral angiopathy without gangrene  Right leg diabetic ulcer     IV vancomycin  IV Zosyn  Follow-up blood cultures  Local care  Supportive care  Monitor temperature and WBC  PICC line placement  Long-term plan is 14 days of IV antibiotic therapy-11/29/2024         Kristian Montelongo MD

## 2024-11-17 NOTE — CARE PLAN
The patient's goals for the shift include  comfort    The clinical goals for the shift include maintain safety    Over the shift, the patient did make progress toward the following goals.       Problem: Safety - Adult  Goal: Free from fall injury  Outcome: Progressing     Problem: Chronic Conditions and Co-morbidities  Goal: Patient's chronic conditions and co-morbidity symptoms are monitored and maintained or improved  Outcome: Progressing     Problem: Fall/Injury  Goal: Not fall by end of shift  Outcome: Progressing  Goal: Be free from injury by end of the shift  Outcome: Progressing     Problem: Skin  Goal: Prevent/manage excess moisture  Outcome: Progressing  Flowsheets (Taken 11/17/2024 0106)  Prevent/manage excess moisture:   Cleanse incontinence/protect with barrier cream   Follow provider orders for dressing changes   Moisturize dry skin   Monitor for/manage infection if present  Goal: Promote/optimize nutrition  Outcome: Progressing  Goal: Promote skin healing  Outcome: Progressing

## 2024-11-18 ENCOUNTER — DOCUMENTATION (OUTPATIENT)
Dept: PRIMARY CARE | Facility: CLINIC | Age: 89
End: 2024-11-18
Payer: MEDICARE

## 2024-11-18 ENCOUNTER — TELEPHONE (OUTPATIENT)
Dept: ADMISSION | Facility: HOSPITAL | Age: 89
End: 2024-11-18

## 2024-11-18 VITALS
RESPIRATION RATE: 20 BRPM | HEART RATE: 87 BPM | WEIGHT: 129.63 LBS | DIASTOLIC BLOOD PRESSURE: 65 MMHG | SYSTOLIC BLOOD PRESSURE: 186 MMHG | TEMPERATURE: 98.4 F | OXYGEN SATURATION: 96 % | BODY MASS INDEX: 20.83 KG/M2 | HEIGHT: 66 IN

## 2024-11-18 LAB
ALBUMIN SERPL BCP-MCNC: 2.9 G/DL (ref 3.4–5)
ANION GAP SERPL CALCULATED.3IONS-SCNC: 12 MMOL/L (ref 10–20)
BUN SERPL-MCNC: 9 MG/DL (ref 6–23)
CALCIUM SERPL-MCNC: 8 MG/DL (ref 8.6–10.3)
CHLORIDE SERPL-SCNC: 98 MMOL/L (ref 98–107)
CO2 SERPL-SCNC: 24 MMOL/L (ref 21–32)
CREAT SERPL-MCNC: 0.56 MG/DL (ref 0.5–1.05)
EGFRCR SERPLBLD CKD-EPI 2021: 84 ML/MIN/1.73M*2
ERYTHROCYTE [DISTWIDTH] IN BLOOD BY AUTOMATED COUNT: 16.5 % (ref 11.5–14.5)
GLUCOSE BLD MANUAL STRIP-MCNC: 234 MG/DL (ref 74–99)
GLUCOSE BLD MANUAL STRIP-MCNC: 277 MG/DL (ref 74–99)
GLUCOSE BLD MANUAL STRIP-MCNC: 318 MG/DL (ref 74–99)
GLUCOSE SERPL-MCNC: 208 MG/DL (ref 74–99)
HCT VFR BLD AUTO: 31.2 % (ref 36–46)
HGB BLD-MCNC: 9.7 G/DL (ref 12–16)
MAGNESIUM SERPL-MCNC: 1.71 MG/DL (ref 1.6–2.4)
MCH RBC QN AUTO: 26.4 PG (ref 26–34)
MCHC RBC AUTO-ENTMCNC: 31.1 G/DL (ref 32–36)
MCV RBC AUTO: 85 FL (ref 80–100)
NRBC BLD-RTO: 0 /100 WBCS (ref 0–0)
PHOSPHATE SERPL-MCNC: 2 MG/DL (ref 2.5–4.9)
PLATELET # BLD AUTO: 222 X10*3/UL (ref 150–450)
POTASSIUM SERPL-SCNC: 3.7 MMOL/L (ref 3.5–5.3)
RBC # BLD AUTO: 3.67 X10*6/UL (ref 4–5.2)
SODIUM SERPL-SCNC: 130 MMOL/L (ref 136–145)
WBC # BLD AUTO: 7.3 X10*3/UL (ref 4.4–11.3)

## 2024-11-18 PROCEDURE — 2500000002 HC RX 250 W HCPCS SELF ADMINISTERED DRUGS (ALT 637 FOR MEDICARE OP, ALT 636 FOR OP/ED): Performed by: INTERNAL MEDICINE

## 2024-11-18 PROCEDURE — 2500000001 HC RX 250 WO HCPCS SELF ADMINISTERED DRUGS (ALT 637 FOR MEDICARE OP): Performed by: INTERNAL MEDICINE

## 2024-11-18 PROCEDURE — 80069 RENAL FUNCTION PANEL: CPT | Performed by: INTERNAL MEDICINE

## 2024-11-18 PROCEDURE — 87081 CULTURE SCREEN ONLY: CPT | Mod: TRILAB | Performed by: INTERNAL MEDICINE

## 2024-11-18 PROCEDURE — 99238 HOSP IP/OBS DSCHRG MGMT 30/<: CPT | Performed by: INTERNAL MEDICINE

## 2024-11-18 PROCEDURE — 99232 SBSQ HOSP IP/OBS MODERATE 35: CPT | Performed by: INTERNAL MEDICINE

## 2024-11-18 PROCEDURE — 83735 ASSAY OF MAGNESIUM: CPT | Performed by: INTERNAL MEDICINE

## 2024-11-18 PROCEDURE — 02HV33Z INSERTION OF INFUSION DEVICE INTO SUPERIOR VENA CAVA, PERCUTANEOUS APPROACH: ICD-10-PCS | Performed by: INTERNAL MEDICINE

## 2024-11-18 PROCEDURE — 82947 ASSAY GLUCOSE BLOOD QUANT: CPT

## 2024-11-18 PROCEDURE — 36415 COLL VENOUS BLD VENIPUNCTURE: CPT | Performed by: INTERNAL MEDICINE

## 2024-11-18 PROCEDURE — 2500000004 HC RX 250 GENERAL PHARMACY W/ HCPCS (ALT 636 FOR OP/ED): Performed by: INTERNAL MEDICINE

## 2024-11-18 PROCEDURE — 97110 THERAPEUTIC EXERCISES: CPT | Mod: GP,CQ

## 2024-11-18 PROCEDURE — 85027 COMPLETE CBC AUTOMATED: CPT | Performed by: INTERNAL MEDICINE

## 2024-11-18 RX ORDER — LATANOPROST 50 UG/ML
1 SOLUTION/ DROPS OPHTHALMIC NIGHTLY
Start: 2024-11-18

## 2024-11-18 RX ORDER — CLONIDINE HYDROCHLORIDE 0.2 MG/1
0.4 TABLET ORAL NIGHTLY
Start: 2024-11-18

## 2024-11-18 RX ORDER — SODIUM,POTASSIUM PHOSPHATES 280-250MG
1 POWDER IN PACKET (EA) ORAL 4 TIMES DAILY
Status: COMPLETED | OUTPATIENT
Start: 2024-11-18 | End: 2024-11-18

## 2024-11-18 RX ORDER — INSULIN LISPRO 100 [IU]/ML
0-5 INJECTION, SOLUTION INTRAVENOUS; SUBCUTANEOUS
Start: 2024-11-18

## 2024-11-18 ASSESSMENT — COGNITIVE AND FUNCTIONAL STATUS - GENERAL
TURNING FROM BACK TO SIDE WHILE IN FLAT BAD: A LITTLE
CLIMB 3 TO 5 STEPS WITH RAILING: A LOT
WALKING IN HOSPITAL ROOM: A LOT
MOVING FROM LYING ON BACK TO SITTING ON SIDE OF FLAT BED WITH BEDRAILS: A LOT
PERSONAL GROOMING: A LITTLE
STANDING UP FROM CHAIR USING ARMS: A LOT
MOBILITY SCORE: 14
MOVING TO AND FROM BED TO CHAIR: A LOT
DAILY ACTIVITIY SCORE: 15
WALKING IN HOSPITAL ROOM: A LOT
TURNING FROM BACK TO SIDE WHILE IN FLAT BAD: A LOT
MOBILITY SCORE: 11
CLIMB 3 TO 5 STEPS WITH RAILING: TOTAL
TOILETING: A LOT
HELP NEEDED FOR BATHING: A LOT
DRESSING REGULAR UPPER BODY CLOTHING: A LOT
MOVING TO AND FROM BED TO CHAIR: A LOT
MOVING FROM LYING ON BACK TO SITTING ON SIDE OF FLAT BED WITH BEDRAILS: A LITTLE
STANDING UP FROM CHAIR USING ARMS: A LOT
DRESSING REGULAR LOWER BODY CLOTHING: A LOT

## 2024-11-18 ASSESSMENT — PAIN SCALES - GENERAL
PAINLEVEL_OUTOF10: 0 - NO PAIN
PAINLEVEL_OUTOF10: 0 - NO PAIN

## 2024-11-18 ASSESSMENT — PAIN - FUNCTIONAL ASSESSMENT: PAIN_FUNCTIONAL_ASSESSMENT: 0-10

## 2024-11-18 NOTE — PROGRESS NOTES
Physical Therapy    Physical Therapy Treatment    Patient Name: Apoorva Sanchez  MRN: [id number]  Department: [address]  Room: [address] [place], [place] [address]  Today's Date: 11/18/2024  Time Calculation  Start Time: 1330  Stop Time: 1345  Time Calculation (min): 15 min         Assessment/Plan   PT Assessment  Rehab Prognosis: Good  Barriers to Discharge: very [place], assist x 1, falls risk  End of Session Communication: Bedside nurse  Assessment Comment: Limted activity due to patient leaving for [place] 2:30-3:30pm  End of Session Patient Position: Bed, 3 rail up, Alarm on (Needs at reach)     PT Plan  Treatment/Interventions: Bed mobility, Transfer training, Gait training, Balance training, Neuromuscular re-education, Strengthening, Endurance training, Therapeutic exercise, Therapeutic activity  PT Plan: Ongoing PT  PT Frequency: 4 times per week  PT Discharge Recommendations: 24 hr supervision due to cognition  Equipment Recommended upon Discharge:  (has FWW at facility)  PT Recommended Transfer Status: Assist x1, Assistive device  PT - OK to Discharge: Yes      General Visit Information:   PT  Visit  PT Received On: 11/18/24  General  Reason for Referral: impaired mobility; fall  Referred By: Dr Rosa  Past Medical History Relevant to Rehab: DM, HTN, hypothyroid, nonhealing wounds bilateral LEs, PVD  Prior to Session Communication: Bedside nurse  Patient Position Received: Bed, 3 rail up, Alarm on  Preferred Learning Style: visual, verbal  General Comment: Cleared by nurse to see. Patient to be discharged 2:30-3:30pm Patient agreeable to ther ex supine    Subjective   Precautions:  Precautions  Hearing/Visual Limitations: [place] - has bilateral aides but currently no   Medical Precautions: Fall precautions    Vital Signs (Past 2hrs)                 Objective   Pain:  Pain Assessment  Pain Assessment: 0-10  0-10 (Numeric) Pain Score: 0 - No pain  Cognition:  Cognition  Orientation Level: Disoriented to time  Cognition Comments: Pleasant  and cooperative [place]  Coordination:     Postural Control:     Extremity/Trunk Assessments:    Activity Tolerance:     Treatments:  Therapeutic Exercise  Therapeutic Exercise Performed: Yes  Therapeutic Exercise Activity 1: B LE supine ther ex: ankle pumps.quad/gluteal sets, heelslides limited,hip abduction/adduction,SAQs x 15    Outcome Measures:  [facility] Basic Mobility  Turning from your back to your side while in a flat bed without using bedrails: A lot  Moving from lying on your back to sitting on the side of a flat bed without using bedrails: A lot  Moving to and from bed to chair (including a wheelchair): A lot  Standing up from a chair using your arms (e.g. wheelchair or bedside chair): A lot  To walk in hospital room: A lot  Climbing 3-5 steps with railing: Total  Basic Mobility - Total Score: 11    Education Documentation  Mobility Training, taught by Daly Reddy PTA at 11/18/2024  1:55 PM.  Learner: Patient  Readiness: Acceptance  Method: Explanation  Response: Verbalizes Understanding, Needs Reinforcement    Education Comments  No comments found.        OP EDUCATION:       Encounter Problems       Encounter Problems (Active)       Balance       sitting (Progressing)       Start:  11/15/24    Expected End:  11/29/24       Pt will sit on EOB with supervision and perform dynamic activities without LOB         standing (Progressing)       Start:  11/15/24    Expected End:  11/29/24       Pt will stand with UE support of walker and CGA            Mobility       bed mobility (Progressing)       Start:  11/15/24    Expected End:  11/29/24       Pt will perform sup to/from sit transfer with CGA         ambulation (Progressing)       Start:  11/15/24    Expected End:  11/29/24       Pt will amb > 60  ft with wheeled walker and CGA            PT Transfers       sit to stand (Progressing)       Start:  11/15/24    Expected End:  11/29/24       Pt will perform sit to stand transfer with walker and supervision          bed to chair (Progressing)       Start:  11/15/24    Expected End:  11/29/24       Pt will perform bed to chair transfer with walker and CGA            Pain - Adult          Safety       LTG - Patient will utilize safety techniques (Progressing)       Start:  11/15/24    Expected End:  11/29/24

## 2024-11-18 NOTE — PROGRESS NOTES
"Music Therapy Note    Apoorva Sanchez     Therapy Session  Referral Type: New referral this admission  Visit Type: New visit  Session Start Time: 1451  Session End Time: 1454  Intervention Delivery: In-person  Conflict of Service: Declined treatment     Pre-assessment  Unable to Assess Reason: Outcomes not applicable, Outcomes not assessed         Treatment/Interventions  Areas of Focus: Relaxation, Self-expression, Emotional support  Music Therapy Interventions: Assessment    Post-assessment  Total Session Time (min): 3 minutes    Narrative  Assessment Detail: Pt found in bed. MT introduced self and services, educating Pt on benefits of music therapy. Pt declined session stating, \"Because I'm hard of hearing, music doesn't sound the same.\"  Follow-up: MT will follow-up if Pt remains admitted.    Education Documentation  No documentation found.          "

## 2024-11-18 NOTE — PROGRESS NOTES
11/18/24 0845   Discharge Planning   Expected Discharge Disposition SNF   Does the patient need discharge transport arranged? Yes   RoundTrip coordination needed? Yes   Has discharge transport been arranged? No     [facility] about [facility]; they were made aware of plan for discharge this date. Requested they confirm bed availability and time for today if a specific time is needed.     12:55 PM  [facility] advised that they do not have a bed for the patient until the earliest of Wednesday. Called patient's daughter, Hanh, and left a message. Advised that [facility] has no beds and requested a return call to discuss alternate facilities. Contact information provided.     1:10 PM  [facility] advised that they now have a bed or the patient and can accept her today. Medical team and patient's daughter updated.     1:25 PM [facility] and [facility] sent to [facility] for their records. [id number] completed and submitted via [employer]. Transportation arranged for 2:30 PM. Nurse and facility updated.    1:38 PM  Nurse requested transportation be pushed back. Transport changed to 3:30 PM pick-up at this time. Called the patient's daughter, Hanh, and updated her to the same.

## 2024-11-18 NOTE — PROGRESS NOTES
Patient recently relocated to [facility] & will not be continuing care with PCP, Dr. Mccracken. Recent admission to [facility] after a fall on 11/14. Per review of medical records, likely discharge today to SNF. Based on the above information, will close patient out of CCM program. Notified MANSOOR MCKEON, Lina Mojica.

## 2024-11-18 NOTE — PROGRESS NOTES
Apoorva Sanchez is a 89 y.o. female on day 3 of admission presenting with Hyponatremia.    Subjective   Interval History:   Afebrile, no chills  Denies right leg pain  Denies chest pain or shortness of breath  Denies nausea vomiting or diarrhea          Objective   Range of Vitals (last 24 hours)  Heart Rate:  [55-66]   Temp:  [36.7 °C (98.1 °F)-37 °C (98.6 °F)]   Resp:  [18-20]   BP: (128-196)/(58-76)   SpO2:  [94 %-98 %]   Daily Weight  11/14/24 : 58.8 kg (129 lb 10.1 oz)    Body mass index is 20.92 kg/m².    Physical Exam  Constitutional:       Appearance: Normal appearance.   HENT:      Head: Normocephalic and atraumatic.   Cardiovascular:      Rate and Rhythm: Normal rate and regular rhythm.      Heart sounds: Normal heart sounds.   Pulmonary:      Effort: Pulmonary effort is normal.      Breath sounds: Normal breath sounds.   Abdominal:      General: Bowel sounds are normal.      Palpations: Abdomen is soft.      Tenderness: There is no abdominal tenderness.   Musculoskeletal:      Cervical back: Normal range of motion and neck supple.      Right lower leg: No edema.      Left lower leg: No edema.   Skin:     General: Skin is warm and dry.      Comments: Bilateral leg dressing   Neurological:      Mental Status: She is alert and oriented to person, place, and time.   Psychiatric:         Behavior: Behavior normal.     Antibiotics  ciprofloxacin - 500 mg  doxycycline - 100 mg  piperacillin-tazobactam - 3.375 gram/50 mL  PIPERACILLIN-TAZOBACTAM IV 3.375 G IN 50 ML D5W (ADV/MBP)  vancomycin - 1.5 gram/300 mL  vancomycin in 0.9 % sodium chl solution - 1.5 gram/250 mL    Relevant Results  Labs  Results from last 72 hours   Lab Units 11/18/24  0417 11/17/24  0418 11/16/24  0432   WBC AUTO x10*3/uL 7.3 6.6 7.1   HEMOGLOBIN g/dL 9.7* 9.1* 9.0*   HEMATOCRIT % 31.2* 29.2* 28.9*   PLATELETS AUTO x10*3/uL 222 208 214   NEUTROS PCT AUTO %  --  75.3  --    LYMPHS PCT AUTO %  --  10.4  --    MONOS PCT AUTO %  --  8.9  --    EOS  "PCT AUTO %  --  3.8  --      Results from last 72 hours   Lab Units 11/18/24 0417 11/17/24 0417 11/16/24  0432   SODIUM mmol/L 130* 130* 131*   POTASSIUM mmol/L 3.7 3.8 3.9   CHLORIDE mmol/L 98 99 101   CO2 mmol/L 24 25 24   BUN mg/dL 9 8 10   CREATININE mg/dL 0.56 0.54 0.52   GLUCOSE mg/dL 208* 148* 56*   CALCIUM mg/dL 8.0* 7.9* 8.0*   ANION GAP mmol/L 12 10 10   EGFR mL/min/1.73m*2 84 85 86   PHOSPHORUS mg/dL 2.0* 1.8*  --      Results from last 72 hours   Lab Units 11/18/24 0417 11/17/24 0417   ALBUMIN g/dL 2.9* 3.0*     Estimated Creatinine Clearance: 55.8 mL/min (by C-G formula based on SCr of 0.56 mg/dL).  No results found for: \"CRP\"  Microbiology  Susceptibility data from last 14 days.  Collected Specimen Info Organism Aztreonam Cefepime Ceftazidime Ciprofloxacin Clindamycin Erythromycin Levofloxacin Oxacillin Piperacillin/Tazobactam Tetracycline Tobramycin Trimethoprim/Sulfamethoxazole Vancomycin   11/05/24 Tissue/Biopsy from Wound/Tissue Pseudomonas aeruginosa  S  S  S  S    I   S   S       Methicillin Resistant Staphylococcus aureus (MRSA)      R  R   R   R   R  S     Mixed Gram-Negative Bacteria                   Imaging  XR abdomen 1 view    Result Date: 11/15/2024  Interpreted By:  Sarah Sanchez, STUDY: XR ABDOMEN 1 VIEW;  11/14/2024 6:45 pm   INDICATION: Signs/Symptoms:r/o ileus.   COMPARISON: None.   ACCESSION NUMBER(S): [id number]   ORDERING CLINICIAN: JC TAN   TECHNIQUE: 2 AP images of the abdomen were obtained.   FINDINGS: There is no significant bowel distention. There is a moderate amount of fecal material.   The patient is scoliotic. There degenerative changes. There is a fracture of the right superior pubic ramus. There also appears be a fracture of the inferior right pubic ramus. The age   COMPARISON OF FINDINGS:       Nonspecific bowel gas pattern. Moderate amount of fecal material.     MACRO: none   Signed by: Sarah Sanchez 11/15/2024 8:23 AM Dictation workstation:   " [id number]    CT cervical spine wo IV contrast    Result Date: 11/14/2024  * * *Final Report* * * DATE OF EXAM: Nov 14 2024 10:15AM   MYC   0505  -  CT CERVICAL SPINE WO IVCON  / ACCESSION #  [id number] PROCEDURE REASON: Spine fracture, cervical, traumatic      * * * * Physician Interpretation * * * *  EXAMINATION: CT BRAIN WO IVCON, CT CERVICAL SPINE WO IVCON CLINICAL HISTORY: Injury TECHNIQUE:  Serial axial images without IV contrast were obtained from the vertex to the foramen magnum. MQ:  CTBWO_3 CT Radiation dose: Integrated Dose-Length Product (DLP) for this visit =   1005  mGy*cm CT Dose Reduction Employed: Automated exposure control(AEC) and iterative recon COMPARISON: None. RESULT: CT head Post-operative change: None. Acute change: No evidence of an acute infarct or other acute parenchymal process. Hemorrhage: No evidence of acute intracranial hemorrhage. ECASS hemorrhagic transformation score: Not Applicable Mass Lesion / Mass Effect: There is no evidence of an intracranial mass or extraaxial fluid collection.  No significant mass effect. Chronic change: Scattered patchy foci of low attenuation are present within supratentorial white matter which is a nonspecific finding but likely represents mild microvascular ischemia. Parenchyma: There is no significant volume loss.  The brain parenchyma is otherwise within normal limits for age. Ventricles: The ventricles are within normal limits of size and configuration for age. Paranasal sinuses and skull base: No calvarial fracture.  Calcification in the posterior wall of the left globe.  Unremarkable sinuses.  1 cm soft tissue scalp nodule on the right side near the skull vertex.  1.2 cm osteoma/meningioma arising from the inner cortex of the left temporal bone (6:13). Localizer images: Unremarkable. CT cervical spine No acute fracture or dislocation.  No focal skeletal abnormality.   Multilevel facet arthropathy, spurring and disc space narrowing consistent with  degenerative change.  Bony productive change adjacent to the atlantooccipital joint.  No acute soft tissue changes. 2.2 cm left lobe thyroid nodule with microcalcifications. Acuity: Actionable Findings: Endocrine (thyroid) Routing Code:  EMI_1 Recommendation: US THYROID/PARATHYROID TimeFrame: at the discretion of the clinical team. --END OF FINDING--    IMPRESSION: CT head No acute intracranial abnormality. Incidental findings described in the result section. Cervical spine No acute fracture or dislocation. Degenerative change. 2.2 cm left lobe thyroid nodule.  Elective sonographic characterization is recommended. Acuity: Actionable Findings: Endocrine (thyroid) Routing Code:  EMI_1 Recommendation: US THYROID/PARATHYROID TimeFrame: at the discretion of the clinical team. --END OF FINDING-- : JOSS   Transcribe Date/Time: Nov 14 2024 11:04A Dictated by : CLARENCE MARTINEZ MD This examination was interpreted and the report reviewed and electronically signed by: CLARENCE MARTINEZ MD on Nov 14 2024 11:31AM  EST    CT head wo IV contrast    Result Date: 11/14/2024  * * *Final Report* * * DATE OF EXAM: Nov 14 2024 10:15AM   MYC   0504  -  CT BRAIN WO IVCON   / ACCESSION #  [id number] PROCEDURE REASON: Head trauma, moderate-severe      * * * * Physician Interpretation * * * *  EXAMINATION: CT BRAIN WO IVCON, CT CERVICAL SPINE WO IVCON CLINICAL HISTORY: Injury TECHNIQUE:  Serial axial images without IV contrast were obtained from the vertex to the foramen magnum. MQ:  CTBWO_3 CT Radiation dose: Integrated Dose-Length Product (DLP) for this visit =   1005  mGy*cm CT Dose Reduction Employed: Automated exposure control(AEC) and iterative recon COMPARISON: None. RESULT: CT head Post-operative change: None. Acute change: No evidence of an acute infarct or other acute parenchymal process. Hemorrhage: No evidence of acute intracranial hemorrhage. ECASS hemorrhagic transformation score: Not Applicable Mass Lesion / Mass  Effect: There is no evidence of an intracranial mass or extraaxial fluid collection.  No significant mass effect. Chronic change: Scattered patchy foci of low attenuation are present within supratentorial white matter which is a nonspecific finding but likely represents mild microvascular ischemia. Parenchyma: There is no significant volume loss.  The brain parenchyma is otherwise within normal limits for age. Ventricles: The ventricles are within normal limits of size and configuration for age. Paranasal sinuses and skull base: No calvarial fracture.  Calcification in the posterior wall of the left globe.  Unremarkable sinuses.  1 cm soft tissue scalp nodule on the right side near the skull vertex.  1.2 cm osteoma/meningioma arising from the inner cortex of the left temporal bone (6:13). Localizer images: Unremarkable. CT cervical spine No acute fracture or dislocation.  No focal skeletal abnormality.   Multilevel facet arthropathy, spurring and disc space narrowing consistent with degenerative change.  Bony productive change adjacent to the atlantooccipital joint.  No acute soft tissue changes. 2.2 cm left lobe thyroid nodule with microcalcifications. Acuity: Actionable Findings: Endocrine (thyroid) Routing Code:  EMI_1 Recommendation: US THYROID/PARATHYROID TimeFrame: at the discretion of the clinical team. --END OF FINDING--    IMPRESSION: CT head No acute intracranial abnormality. Incidental findings described in the result section. Cervical spine No acute fracture or dislocation. Degenerative change. 2.2 cm left lobe thyroid nodule.  Elective sonographic characterization is recommended. Acuity: Actionable Findings: Endocrine (thyroid) Routing Code:  EMI_1 Recommendation: US THYROID/PARATHYROID TimeFrame: at the discretion of the clinical team. --END OF FINDING-- : JOSS   Transcribe Date/Time: Nov 14 2024 11:04A Dictated by : CLARENCE MARTINEZ MD This examination was interpreted and the report  reviewed and electronically signed by: CLARENCE MARTINEZ MD on Nov 14 2024 11:31AM  EST    XR hip right with pelvis when performed 2 or 3 views    Result Date: 10/25/2024  Interpreted By:  Marty Morris, STUDY: XR HIP RIGHT WITH PELVIS WHEN PERFORMED 2 OR 3 VIEWS; ;  10/24/2024 11:31 pm   INDICATION: Signs/Symptoms:fall, pain.   COMPARISON: 06/24/2022   ACCESSION NUMBER(S): [id number]   ORDERING CLINICIAN: NAHID CA   FINDINGS: AP radiograph of the pelvis and two views of the right hip: There is severe osteopenia which limits evaluation for subtle fractures. There are chronic healed bilateral inferior pubic ramus fractures with similar alignment compared to 06/20/2022. Deformity of the bilateral superior pubic rami also appears similar, likely sequelae of remote fractures. No acute right proximal femur fracture or dislocation. There is moderate bilateral hip joint space narrowing and marginal osteophyte formation. Periarticular calcification about the bilateral hips. Diffuse vascular calcifications. Moderate to severe lower lumbar degenerative change.       1. No acute osseous abnormality of the right hip. 2. Chronic bilateral obturator ring fractures. 3. Moderate bilateral hip osteoarthritis. 4. Severe osteopenia limits evaluation for subtle fractures. If there is strong clinical suspicion fracture, consider CT or MRI.   Signed by: Marty Morris 10/25/2024 12:21 AM Dictation workstation:   [id number]     Assessment/Plan   Infected right leg ulcer  Positive culture for MRSA, pseudomonas   Type 2 diabetes mellitus with peripheral angiopathy without gangrene  Right leg diabetic ulcer     IV vancomycin  IV Zosyn  Follow-up blood cultures  Local care  Supportive care  Monitor temperature and WBC  PICC line placement  Long-term plan is 14 days of IV vancomycin and zosyn till -11/29/2024-see discharge rec     Total time spent caring for the patient today was 20 minutes. This includes time spent before the  visit reviewing the chart, time spent during the visit, and time spent after the visit on documentation.     Inez Brand, APRN-CNP

## 2024-11-18 NOTE — PROCEDURES
Vascular Access Team Procedure Note     Visit Date: 11/18/2024      Patient Name: Apoorva Sanchez         MRN: [id number]             Procedure:  Single lumen picc placed in Rt brachial vein without difficulty, catheter length 41 cm, arm circumference 24 cm, ext catheter at 1 cm, flushes easily and with positive blood return, curos cap applied, patient tolerated well. ECG confirms tip location, RN aware of placement and ok to use.                          Loretta Figueredo RN  11/18/2024  11:49 AM

## 2024-11-18 NOTE — PROGRESS NOTES
Apoorva Sanchez is a 89 y.o. female on day 3 of admission presenting with Hyponatremia.      Subjective   No significant complaints today.  No chest pain or shortness of breath.       Objective     Last Recorded Vitals  /58 (BP Location: Left arm, Patient Position: Lying)   Pulse 55   Temp 37 °C (98.6 °F) (Oral)   Resp 20   Wt 58.8 kg (129 lb 10.1 oz)   SpO2 98%   Intake/Output last 3 Shifts:    Intake/Output Summary (Last 24 hours) at 11/18/2024 1217  Last data filed at 11/18/2024 0521  Gross per 24 hour   Intake 400 ml   Output 2150 ml   Net -1750 ml       Admission Weight  Weight: 58.8 kg (129 lb 10.1 oz) (11/14/24 1720)    Daily Weight  11/14/24 : 58.8 kg (129 lb 10.1 oz)    Image Results  Bedside PICC Imaging  These images are not reportable by radiology and will not be interpreted   by  Radiologists.      Physical Exam  Generally no acute distress  HEENT PERRL EOMI  Cardiovascular S1 S2 regular rate rhythm  Lungs clear to auscultation  Abdomen bowel sounds present  Relevant Results  Scheduled medications  atenolol, 100 mg, oral, Daily  brimonidine, 1 drop, Both Eyes, BID  cloNIDine, 0.2 mg, oral, BID  enoxaparin, 40 mg, subcutaneous, Daily  [Held by provider] glimepiride, 2 mg, oral, BID  insulin lispro, 0-5 Units, subcutaneous, TID AC  latanoprost, 1 drop, Both Eyes, Nightly  levothyroxine, 100 mcg, oral, Daily before breakfast  lidocaine, 5 mL, infiltration, Once  [Held by provider] metFORMIN XR, 500 mg, oral, Daily with evening meal  piperacillin-tazobactam, 3.375 g, intravenous, q6h  polyethylene glycol, 17 g, oral, Daily  prednisoLONE acetate, 1 drop, Both Eyes, BID  simvastatin, 40 mg, oral, Nightly  timolol, 1 drop, Both Eyes, BID  vancomycin, 1,500 mg, intravenous, q24h      Continuous medications     PRN medications  PRN medications: acetaminophen **OR** acetaminophen **OR** acetaminophen, alteplase, dextrose, dextrose, glucagon, glucagon, ondansetron ODT **OR** ondansetron, vancomycin  Results  for orders placed or performed during the hospital encounter of 11/14/24 (from the past 24 hours)   POCT GLUCOSE   Result Value Ref Range    POCT Glucose 281 (H) 74 - 99 mg/dL   POCT GLUCOSE   Result Value Ref Range    POCT Glucose 257 (H) 74 - 99 mg/dL   Renal Function Panel   Result Value Ref Range    Glucose 208 (H) 74 - 99 mg/dL    Sodium 130 (L) 136 - 145 mmol/L    Potassium 3.7 3.5 - 5.3 mmol/L    Chloride 98 98 - 107 mmol/L    Bicarbonate 24 21 - 32 mmol/L    Anion Gap 12 10 - 20 mmol/L    Urea Nitrogen 9 6 - 23 mg/dL    Creatinine 0.56 0.50 - 1.05 mg/dL    eGFR 84 >60 mL/min/1.73m*2    Calcium 8.0 (L) 8.6 - 10.3 mg/dL    Phosphorus 2.0 (L) 2.5 - 4.9 mg/dL    Albumin 2.9 (L) 3.4 - 5.0 g/dL   CBC   Result Value Ref Range    WBC 7.3 4.4 - 11.3 x10*3/uL    nRBC 0.0 0.0 - 0.0 /100 WBCs    RBC 3.67 (L) 4.00 - 5.20 x10*6/uL    Hemoglobin 9.7 (L) 12.0 - 16.0 g/dL    Hematocrit 31.2 (L) 36.0 - 46.0 %    MCV 85 80 - 100 fL    MCH 26.4 26.0 - 34.0 pg    MCHC 31.1 (L) 32.0 - 36.0 g/dL    RDW 16.5 (H) 11.5 - 14.5 %    Platelets 222 150 - 450 x10*3/uL   Magnesium   Result Value Ref Range    Magnesium 1.71 1.60 - 2.40 mg/dL   POCT GLUCOSE   Result Value Ref Range    POCT Glucose 234 (H) 74 - 99 mg/dL   POCT GLUCOSE   Result Value Ref Range    POCT Glucose 277 (H) 74 - 99 mg/dL     Impression: 89-year-old woman admitted with hyponatremia, falls, chronic wound infection.    Plan:    1.  Chronic wound infection  -Appreciate infectious ease input, PICC line being placed today.  Patient continued on vancomycin and Zosyn till the 29th of this month.  Can be discharged to SNF.    2.  Falls/weakness  -Patient to be placed at SNF for rehab    3.  Hyponatremia  -Resolved    4.  Hypoglycemia  -Holding oral hypoglycemics as patient has a hemoglobin A1c of 6 recently.,  Continue sliding scale.           Assessment/Plan                    Assessment & Plan  Hyponatremia                  Isidro Calixto MD

## 2024-11-19 ENCOUNTER — APPOINTMENT (OUTPATIENT)
Dept: WOUND CARE | Facility: HOSPITAL | Age: 89
End: 2024-11-19
Payer: MEDICARE

## 2024-11-20 LAB — STAPHYLOCOCCUS SPEC CULT: ABNORMAL

## 2024-11-21 LAB
BACTERIA BLD CULT: NORMAL
BACTERIA BLD CULT: NORMAL

## 2024-11-21 NOTE — DISCHARGE SUMMARY
Discharge Diagnosis  Hyponatremia    Issues Requiring Follow-Up  SNF placement for rehab    Discharge Meds     Medication List      START taking these medications     dextrose 5% piggyback 50 mL with piperacillin-tazobactam 3.375 gram   recon soln 3.375 g; Infuse 3.375 g at 100 mL/hr over 0.5 hours into a   venous catheter every 6 hours for 12 days.   insulin lispro 100 unit/mL injection; Inject 0-5 Units under the skin 3   times a day before meals. Take as directed per insulin instructions.   vancomycin in 0.9 % sodium chl 1.5 gram/250 mL solution; Infuse 1.5 g   into a venous catheter once daily for 12 days.     CHANGE how you take these medications     latanoprost 0.005 % ophthalmic solution; Commonly known as: Xalatan;   Administer 1 drop into both eyes once daily at bedtime.; What changed:   additional instructions     CONTINUE taking these medications     acetaminophen 325 mg tablet; Commonly known as: Tylenol; Take 2 tablets   (650 mg) by mouth every 6 hours if needed for mild pain (1 - 3), headaches   or fever (temp greater than 38.0 C).   atenolol 100 mg tablet; Commonly known as: Tenormin; Take 1 tablet (100   mg) by mouth once daily.   brimonidine 0.2 % ophthalmic solution; Commonly known as: AlphaGAN   cloNIDine 0.2 mg tablet; Commonly known as: Catapres; Take 2 tablets   (0.4 mg) by mouth once daily at bedtime.   levothyroxine 100 mcg tablet; Commonly known as: Synthroid, Levoxyl;   Take 1 tablet (100 mcg) by mouth once daily in the morning. Take before   meals.   prednisoLONE acetate 1 % ophthalmic suspension; Commonly known as:   Pred-Forte   simvastatin 40 mg tablet; Commonly known as: Zocor; Take 1 tablet (40   mg) by mouth once daily at bedtime.   timolol 0.5 % ophthalmic solution; Commonly known as: Timoptic     STOP taking these medications     ciprofloxacin 500 mg tablet; Commonly known as: Cipro   doxycycline 100 mg capsule; Commonly known as: Vibramycin   furosemide 40 mg tablet; Commonly  known as: Lasix   glimepiride 2 mg tablet; Commonly known as: Amaryl   metFORMIN  mg 24 hr tablet; Commonly known as: Glucophage-XR       Test Results Pending At Discharge  Pending Labs       Order Current Status    Blood Culture Preliminary result    Blood Culture Preliminary result            Hospital Course   Patient was admitted to hospital with hyponatremia, falls and chronic wounds.  Infectious disease evaluated the patient and recommended vancomycin and Zosyn until the 29th of this month.  A PICC line was placed.  Patient's hyponatremia resolved and due to her weakness physical therapy recommended SNF.  Patient with history of type 2 diabetes but had been hypoglycemic with a normal A1c as of recent so oral hypoglycemics were held on discharge.  Patient was usual state of health on discharge and doing well and stable for discharge.    Pertinent Physical Exam At Time of Discharge  Physical Exam  Generally no acute distress  HEENT PERRL EOMI  Cardiovascular S1 S2 regular rate rhythm  Lungs clear to auscultation  Abdomen bowel sounds present  Outpatient Follow-Up  Future Appointments   Date Time Provider Department Center   12/12/2024  2:00 PM [id number] [place]     Total time spent in discharge was 35 minutes    Isidro Calixto MD

## 2024-11-26 ENCOUNTER — APPOINTMENT (OUTPATIENT)
Dept: WOUND CARE | Facility: HOSPITAL | Age: 89
End: 2024-11-26
Payer: MEDICARE

## 2024-12-03 ENCOUNTER — APPOINTMENT (OUTPATIENT)
Dept: WOUND CARE | Facility: HOSPITAL | Age: 89
End: 2024-12-03
Payer: MEDICARE

## 2024-12-12 ENCOUNTER — APPOINTMENT (OUTPATIENT)
Dept: VASCULAR MEDICINE | Facility: HOSPITAL | Age: 89
End: 2024-12-12
Payer: MEDICARE

## 2024-12-17 ENCOUNTER — HOSPITAL ENCOUNTER (OUTPATIENT)
Dept: VASCULAR MEDICINE | Facility: CLINIC | Age: 89
Discharge: HOME | End: 2024-12-17
Payer: MEDICARE

## 2024-12-17 DIAGNOSIS — I73.9 PERIPHERAL VASCULAR DISEASE (CMS-HCC): ICD-10-CM

## 2024-12-17 PROCEDURE — 93922 UPR/L XTREMITY ART 2 LEVELS: CPT | Performed by: INTERNAL MEDICINE

## 2024-12-17 PROCEDURE — 93922 UPR/L XTREMITY ART 2 LEVELS: CPT

## 2024-12-18 ENCOUNTER — NURSING HOME VISIT (OUTPATIENT)
Dept: POST ACUTE CARE | Facility: EXTERNAL LOCATION | Age: 89
End: 2024-12-18
Payer: MEDICARE

## 2024-12-18 DIAGNOSIS — I10 PRIMARY HYPERTENSION: Primary | ICD-10-CM

## 2024-12-18 DIAGNOSIS — E87.1 HYPONATREMIA: ICD-10-CM

## 2024-12-18 DIAGNOSIS — E08.3293 DIABETES MELLITUS DUE TO UNDERLYING CONDITION WITH BOTH EYES AFFECTED BY MILD NONPROLIFERATIVE RETINOPATHY WITHOUT MACULAR EDEMA, WITH LONG-TERM CURRENT USE OF INSULIN: ICD-10-CM

## 2024-12-18 DIAGNOSIS — Z78.9 LIVING IN ASSISTED LIVING: ICD-10-CM

## 2024-12-18 DIAGNOSIS — R53.81 PHYSICAL DEBILITY: ICD-10-CM

## 2024-12-18 DIAGNOSIS — L97.909 ULCER OF LOWER EXTREMITY, UNSPECIFIED LATERALITY, UNSPECIFIED ULCER STAGE (MULTI): ICD-10-CM

## 2024-12-18 DIAGNOSIS — Z79.4 DIABETES MELLITUS DUE TO UNDERLYING CONDITION WITH BOTH EYES AFFECTED BY MILD NONPROLIFERATIVE RETINOPATHY WITHOUT MACULAR EDEMA, WITH LONG-TERM CURRENT USE OF INSULIN: ICD-10-CM

## 2024-12-18 PROCEDURE — 99349 HOME/RES VST EST MOD MDM 40: CPT

## 2024-12-18 RX ORDER — ADHESIVE BANDAGE
30 BANDAGE TOPICAL DAILY PRN
COMMUNITY

## 2024-12-18 RX ORDER — AMMONIUM LACTATE 12 G/100G
1 LOTION TOPICAL DAILY
COMMUNITY

## 2024-12-18 ASSESSMENT — PAIN SCALES - GENERAL: PAINLEVEL_OUTOF10: 0-NO PAIN

## 2024-12-18 NOTE — LETTER
Patient: Apoorva Sanchez  : 1929    Encounter Date: 2024    Subjective  Patient ID: Apoorva Sanchez is a 89 y.o. female who is assisted living/ home patient being seen at [facility], and evaluated Re-admission to [facility].     HPI   Pt was sent to ER on  after a fall. Pt was found to be hyponatremic and with chronic wounds. Pt had a PICC line placed and was given IV antibiotics. Pt was sent to rehab for weakness and now is returning to AL. Pt visited in apartment, daughter present for visit. Pt states she is happy to be back at home, and is feeling good. Pt [place], and requires loud tone of voice. Daughter states wounds to patient's legs are finally almost healed. Pt looking forward to starting therapy again. Pt feels she didn't get enough at rehab. Pt denies headache, dizziness, congestion, fever, and chills. Pt states appetite is good. Was on insulin, and metformin and glimepiride were stopped. Daughter concerned with pt's sodium level. Pt asymptomatic at this time.    Pt denies chest pain and sob at rest/exertion. Pt denies voiding symptoms and constipation. Pt ambulating with walker, and denies any recent falls. Pt denies sleep disturbances. Pt will continue to self administer home mediations.     Current Outpatient Medications on File Prior to Visit   Medication Sig Dispense Refill   • acetaminophen (Tylenol) 325 mg tablet Take 2 tablets (650 mg) by mouth every 6 hours if needed for mild pain (1 - 3), headaches or fever (temp greater than 38.0 C). 30 tablet 0   • ammonium lactate (Lac-Hydrin) 12 % lotion Apply 1 Application topically once daily. Apply to both arms, legs, feet topically every shift for dry flaking skin.     • atenolol (Tenormin) 100 mg tablet Take 1 tablet (100 mg) by mouth once daily. 90 tablet 3   • brimonidine (AlphaGAN) 0.2 % ophthalmic solution Administer 1 drop into both eyes 2 times a day.     • cloNIDine (Catapres) 0.2 mg tablet Take 2 tablets (0.4 mg) by mouth once daily at  bedtime.     • latanoprost (Xalatan) 0.005 % ophthalmic solution Administer 1 drop into both eyes once daily at bedtime.     • levothyroxine (Synthroid, Levoxyl) 100 mcg tablet Take 1 tablet (100 mcg) by mouth once daily in the morning. Take before meals. 90 tablet 3   • magnesium hydroxide (Milk of Magnesia) 400 mg/5 mL suspension Take 30 mL by mouth once daily as needed for constipation.     • simvastatin (Zocor) 40 mg tablet Take 1 tablet (40 mg) by mouth once daily at bedtime. 90 tablet 3   • timolol (Timoptic) 0.5 % ophthalmic solution Administer 1 drop into both eyes 2 times a day.     • glimepiride (Amaryl) 2 mg tablet Take 1 tablet (2 mg) by mouth 2 times a day.     • metFORMIN  mg 24 hr tablet Take 1 tablet (500 mg) by mouth once daily in the evening. Take with meals. Do not crush, chew, or split.     • prednisoLONE acetate (Pred-Forte) 1 % ophthalmic suspension Not yet to take (Patient not taking: Reported on 12/18/2024)     • [DISCONTINUED] insulin lispro 100 unit/mL injection Inject 0-5 Units under the skin 3 times a day before meals. Take as directed per insulin instructions.       No current facility-administered medications on file prior to visit.      Review of Systems   Constitutional:  Negative for activity change, appetite change, chills, fatigue and fever.   HENT:  Negative for congestion and rhinorrhea.    Eyes:  Positive for visual disturbance.        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    Gastrointestinal:  Negative for constipation, diarrhea, nausea and vomiting.   Genitourinary:  Negative for frequency.   Musculoskeletal:  Positive for arthralgias and gait problem.        Ambulates with walker   Neurological:  Negative for dizziness and headaches.   Psychiatric/Behavioral:  Negative for sleep disturbance. The patient is not nervous/anxious.        Objective  /80 (BP Location: Right arm, Patient Position:  Sitting, BP Cuff Size: Adult)   Pulse 74   Resp 16   SpO2 96%     Physical Exam  Constitutional:       General: She is awake. She is not in acute distress.     Appearance: She is not ill-appearing.   HENT:      Head: Normocephalic.      Right Ear: Decreased hearing noted.      Left Ear: Decreased hearing noted.      Nose: No congestion or rhinorrhea.   Eyes:      Conjunctiva/sclera: Conjunctivae normal.      Pupils: Pupils are equal, round, and reactive to light.   Cardiovascular:      Rate and Rhythm: Normal rate and regular rhythm.      Pulses: Normal pulses.      Heart sounds: Normal heart sounds.   Pulmonary:      Effort: Pulmonary effort is normal. No respiratory distress.      Breath sounds: Normal breath sounds. No wheezing or rhonchi.   Abdominal:      General: Bowel sounds are normal.      Palpations: Abdomen is soft.   Musculoskeletal:         General: Normal range of motion.      Cervical back: Normal range of motion.      Right lower leg: Edema present.      Left lower leg: Edema present.      Comments: Bilateral legs wrapped with ACE wraps.    Skin:     General: Skin is warm.   Neurological:      Mental Status: She is alert and oriented to person, place, and time. Mental status is at baseline.   Psychiatric:         Speech: Speech normal.         Behavior: Behavior is cooperative.         Cognition and Memory: Cognition is not impaired. Memory is not impaired.         Assessment/Plan  Diagnoses and all orders for this visit:  Primary hypertension  Comments:  Blood pressure controlled.  Ulcer of lower extremity, unspecified laterality, unspecified ulcer stage (Multi)  Comments:  Pt to continue SN for wound care.  Physical debility  Comments:  Pt to continue PT/OT.  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  Hyponatremia  Comments:  Obtain BMP.  Living in assisted living  Comments:  Medical and facility chart reviewed, physician orders reivewd, medication reconciled and collaboration with nursing.          Electronically Signed By: LAVELL Sanz   12/21/24  1:02 PM

## 2024-12-21 VITALS
RESPIRATION RATE: 16 BRPM | DIASTOLIC BLOOD PRESSURE: 80 MMHG | OXYGEN SATURATION: 96 % | SYSTOLIC BLOOD PRESSURE: 130 MMHG | HEART RATE: 74 BPM

## 2024-12-21 RX ORDER — GLIMEPIRIDE 2 MG/1
2 TABLET ORAL 2 TIMES DAILY
COMMUNITY

## 2024-12-21 RX ORDER — METFORMIN HYDROCHLORIDE 500 MG/1
500 TABLET, EXTENDED RELEASE ORAL
COMMUNITY
Start: 2024-12-18

## 2024-12-21 ASSESSMENT — ENCOUNTER SYMPTOMS
CONSTIPATION: 0
CHILLS: 0
FREQUENCY: 0
SHORTNESS OF BREATH: 0
FATIGUE: 0
APPETITE CHANGE: 0
HEADACHES: 0
NAUSEA: 0
RHINORRHEA: 0
VOMITING: 0
ACTIVITY CHANGE: 0
DIARRHEA: 0
DIZZINESS: 0
FEVER: 0
COUGH: 0
SLEEP DISTURBANCE: 0
NERVOUS/ANXIOUS: 0
ARTHRALGIAS: 1

## 2024-12-21 NOTE — PROGRESS NOTES
Subjective   Patient ID: Apoorva Sanchez is a 89 y.o. female who is assisted living/ home patient being seen at [facility], and evaluated Re-admission to AL.     HPI   Pt was sent to ER on 11/14 after a fall. Pt was found to be hyponatremic and with chronic wounds. Pt had a PICC line placed and was given IV antibiotics. Pt was sent to rehab for weakness and now is returning to AL. Pt visited in apartment, daughter present for visit. Pt states she is happy to be back at home, and is feeling good. Pt [place], and requires loud tone of voice. Daughter states wounds to patient's legs are finally almost healed. Pt looking forward to starting therapy again. Pt feels she didn't get enough at rehab. Pt denies headache, dizziness, congestion, fever, and chills. Pt states appetite is good. Was on insulin, and metformin and glimepiride were stopped. Daughter concerned with pt's sodium level. Pt asymptomatic at this time.    Pt denies chest pain and sob at rest/exertion. Pt denies voiding symptoms and constipation. Pt ambulating with walker, and denies any recent falls. Pt denies sleep disturbances. Pt will continue to self administer home mediations.     Current Outpatient Medications on File Prior to Visit   Medication Sig Dispense Refill    acetaminophen (Tylenol) 325 mg tablet Take 2 tablets (650 mg) by mouth every 6 hours if needed for mild pain (1 - 3), headaches or fever (temp greater than 38.0 C). 30 tablet 0    ammonium lactate (Lac-Hydrin) 12 % lotion Apply 1 Application topically once daily. Apply to both arms, legs, feet topically every shift for dry flaking skin.      atenolol (Tenormin) 100 mg tablet Take 1 tablet (100 mg) by mouth once daily. 90 tablet 3    brimonidine (AlphaGAN) 0.2 % ophthalmic solution Administer 1 drop into both eyes 2 times a day.      cloNIDine (Catapres) 0.2 mg tablet Take 2 tablets (0.4 mg) by mouth once daily at bedtime.      latanoprost (Xalatan) 0.005 % ophthalmic solution  Administer 1 drop into both eyes once daily at bedtime.      levothyroxine (Synthroid, Levoxyl) 100 mcg tablet Take 1 tablet (100 mcg) by mouth once daily in the morning. Take before meals. 90 tablet 3    magnesium hydroxide (Milk of Magnesia) 400 mg/5 mL suspension Take 30 mL by mouth once daily as needed for constipation.      simvastatin (Zocor) 40 mg tablet Take 1 tablet (40 mg) by mouth once daily at bedtime. 90 tablet 3    timolol (Timoptic) 0.5 % ophthalmic solution Administer 1 drop into both eyes 2 times a day.      glimepiride (Amaryl) 2 mg tablet Take 1 tablet (2 mg) by mouth 2 times a day.      metFORMIN  mg 24 hr tablet Take 1 tablet (500 mg) by mouth once daily in the evening. Take with meals. Do not crush, chew, or split.      prednisoLONE acetate (Pred-Forte) 1 % ophthalmic suspension Not yet to take (Patient not taking: Reported on 12/18/2024)      [DISCONTINUED] insulin lispro 100 unit/mL injection Inject 0-5 Units under the skin 3 times a day before meals. Take as directed per insulin instructions.       No current facility-administered medications on file prior to visit.      Review of Systems   Constitutional:  Negative for activity change, appetite change, chills, fatigue and fever.   HENT:  Negative for congestion and rhinorrhea.    Eyes:  Positive for visual disturbance.        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    Gastrointestinal:  Negative for constipation, diarrhea, nausea and vomiting.   Genitourinary:  Negative for frequency.   Musculoskeletal:  Positive for arthralgias and gait problem.        Ambulates with walker   Neurological:  Negative for dizziness and headaches.   Psychiatric/Behavioral:  Negative for sleep disturbance. The patient is not nervous/anxious.        Objective   /80 (BP Location: Right arm, Patient Position: Sitting, BP Cuff Size: Adult)   Pulse 74   Resp 16   SpO2 96%      Physical Exam  Constitutional:       General: She is awake. She is not in acute distress.     Appearance: She is not ill-appearing.   HENT:      Head: Normocephalic.      Right Ear: Decreased hearing noted.      Left Ear: Decreased hearing noted.      Nose: No congestion or rhinorrhea.   Eyes:      Conjunctiva/sclera: Conjunctivae normal.      Pupils: Pupils are equal, round, and reactive to light.   Cardiovascular:      Rate and Rhythm: Normal rate and regular rhythm.      Pulses: Normal pulses.      Heart sounds: Normal heart sounds.   Pulmonary:      Effort: Pulmonary effort is normal. No respiratory distress.      Breath sounds: Normal breath sounds. No wheezing or rhonchi.   Abdominal:      General: Bowel sounds are normal.      Palpations: Abdomen is soft.   Musculoskeletal:         General: Normal range of motion.      Cervical back: Normal range of motion.      Right lower leg: Edema present.      Left lower leg: Edema present.      Comments: Bilateral legs wrapped with ACE wraps.    Skin:     General: Skin is warm.   Neurological:      Mental Status: She is alert and oriented to person, place, and time. Mental status is at baseline.   Psychiatric:         Speech: Speech normal.         Behavior: Behavior is cooperative.         Cognition and Memory: Cognition is not impaired. Memory is not impaired.         Assessment/Plan   Diagnoses and all orders for this visit:  Primary hypertension  Comments:  Blood pressure controlled.  Ulcer of lower extremity, unspecified laterality, unspecified ulcer stage (Multi)  Comments:  Pt to continue SN for wound care.  Physical debility  Comments:  Pt to continue PT/OT.  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  Hyponatremia  Comments:  Obtain BMP.  Living in assisted  living  Comments:  Medical and facility chart reviewed, physician orders reivewd, medication reconciled and collaboration with nursing.

## 2024-12-23 ENCOUNTER — TELEPHONE (OUTPATIENT)
Dept: PRIMARY CARE | Facility: CLINIC | Age: 89
End: 2024-12-23
Payer: MEDICARE

## 2024-12-23 NOTE — TELEPHONE ENCOUNTER
"Zlata LPN from [place] [facility] calls back to report she has assessed patient who is covid positive. Pt is asymptomatic, and stated to Sridhar \"I feel better today than yesterday\". No new orders at this time. Nursing to call back if pt experiences symptoms.   "

## 2024-12-23 NOTE — TELEPHONE ENCOUNTER
Per faxed document received, patient tested + for COVID.  Are there orders you would like to give? Thank you, Natty Combs Lpn

## 2024-12-27 ENCOUNTER — APPOINTMENT (OUTPATIENT)
Dept: RADIOLOGY | Facility: HOSPITAL | Age: 89
End: 2024-12-27
Payer: MEDICARE

## 2024-12-27 ENCOUNTER — HOSPITAL ENCOUNTER (INPATIENT)
Facility: HOSPITAL | Age: 89
DRG: 637 | End: 2024-12-27
Attending: STUDENT IN AN ORGANIZED HEALTH CARE EDUCATION/TRAINING PROGRAM | Admitting: NURSE PRACTITIONER
Payer: MEDICARE

## 2024-12-27 DIAGNOSIS — L97.529 ULCER OF LEFT FOOT, UNSPECIFIED ULCER STAGE (MULTI): Primary | ICD-10-CM

## 2024-12-27 DIAGNOSIS — I73.9 PERIPHERAL VASCULAR DISEASE (CMS-HCC): ICD-10-CM

## 2024-12-27 DIAGNOSIS — L97.913 ULCER OF RIGHT LEG, WITH NECROSIS OF MUSCLE (MULTI): ICD-10-CM

## 2024-12-27 DIAGNOSIS — L03.116 CELLULITIS OF LEFT FOOT: ICD-10-CM

## 2024-12-27 PROBLEM — R05.8 COUGH WITH CONGESTION OF PARANASAL SINUS: Status: ACTIVE | Noted: 2024-12-27

## 2024-12-27 PROBLEM — U07.1 COVID: Status: ACTIVE | Noted: 2024-12-27

## 2024-12-27 PROBLEM — Z79.899 ON DEEP VEIN THROMBOSIS (DVT) PROPHYLAXIS: Status: ACTIVE | Noted: 2024-12-27

## 2024-12-27 PROBLEM — R09.89 CHEST CONGESTION: Status: ACTIVE | Noted: 2024-12-27

## 2024-12-27 PROBLEM — R09.81 COUGH WITH CONGESTION OF PARANASAL SINUS: Status: ACTIVE | Noted: 2024-12-27

## 2024-12-27 PROBLEM — E11.59 TYPE 2 DIABETES MELLITUS WITH CIRCULATORY DISORDER, WITHOUT LONG-TERM CURRENT USE OF INSULIN: Status: ACTIVE | Noted: 2022-07-03

## 2024-12-27 LAB
ALBUMIN SERPL BCP-MCNC: 3.2 G/DL (ref 3.4–5)
ALP SERPL-CCNC: 107 U/L (ref 33–136)
ALT SERPL W P-5'-P-CCNC: 8 U/L (ref 7–45)
ANION GAP SERPL CALCULATED.3IONS-SCNC: 11 MMOL/L (ref 10–20)
APPEARANCE UR: ABNORMAL
AST SERPL W P-5'-P-CCNC: 13 U/L (ref 9–39)
BACTERIA #/AREA URNS AUTO: ABNORMAL /HPF
BASOPHILS # BLD AUTO: 0.03 X10*3/UL (ref 0–0.1)
BASOPHILS NFR BLD AUTO: 0.6 %
BILIRUB SERPL-MCNC: 0.5 MG/DL (ref 0–1.2)
BILIRUB UR STRIP.AUTO-MCNC: NEGATIVE MG/DL
BUN SERPL-MCNC: 11 MG/DL (ref 6–23)
CALCIUM SERPL-MCNC: 8.5 MG/DL (ref 8.6–10.3)
CHLORIDE SERPL-SCNC: 91 MMOL/L (ref 98–107)
CO2 SERPL-SCNC: 31 MMOL/L (ref 21–32)
COLOR UR: ABNORMAL
CREAT SERPL-MCNC: 0.76 MG/DL (ref 0.5–1.05)
CRP SERPL-MCNC: 8.56 MG/DL
EGFRCR SERPLBLD CKD-EPI 2021: 72 ML/MIN/1.73M*2
EOSINOPHIL # BLD AUTO: 0.06 X10*3/UL (ref 0–0.4)
EOSINOPHIL NFR BLD AUTO: 1.1 %
ERYTHROCYTE [DISTWIDTH] IN BLOOD BY AUTOMATED COUNT: 15.9 % (ref 11.5–14.5)
ERYTHROCYTE [SEDIMENTATION RATE] IN BLOOD BY WESTERGREN METHOD: 35 MM/H (ref 0–30)
FLUAV RNA RESP QL NAA+PROBE: NOT DETECTED
FLUBV RNA RESP QL NAA+PROBE: NOT DETECTED
GLUCOSE BLD MANUAL STRIP-MCNC: 219 MG/DL (ref 74–99)
GLUCOSE BLD MANUAL STRIP-MCNC: 404 MG/DL (ref 74–99)
GLUCOSE SERPL-MCNC: 419 MG/DL (ref 74–99)
GLUCOSE UR STRIP.AUTO-MCNC: ABNORMAL MG/DL
HCT VFR BLD AUTO: 33.5 % (ref 36–46)
HGB BLD-MCNC: 10.6 G/DL (ref 12–16)
IMM GRANULOCYTES # BLD AUTO: 0.05 X10*3/UL (ref 0–0.5)
IMM GRANULOCYTES NFR BLD AUTO: 0.9 % (ref 0–0.9)
KETONES UR STRIP.AUTO-MCNC: NEGATIVE MG/DL
LACTATE SERPL-SCNC: 2.1 MMOL/L (ref 0.4–2)
LACTATE SERPL-SCNC: 2.3 MMOL/L (ref 0.4–2)
LEUKOCYTE ESTERASE UR QL STRIP.AUTO: ABNORMAL
LYMPHOCYTES # BLD AUTO: 0.86 X10*3/UL (ref 0.8–3)
LYMPHOCYTES NFR BLD AUTO: 15.8 %
MCH RBC QN AUTO: 26.8 PG (ref 26–34)
MCHC RBC AUTO-ENTMCNC: 31.6 G/DL (ref 32–36)
MCV RBC AUTO: 85 FL (ref 80–100)
MONOCYTES # BLD AUTO: 0.41 X10*3/UL (ref 0.05–0.8)
MONOCYTES NFR BLD AUTO: 7.6 %
NEUTROPHILS # BLD AUTO: 4.02 X10*3/UL (ref 1.6–5.5)
NEUTROPHILS NFR BLD AUTO: 74 %
NITRITE UR QL STRIP.AUTO: NEGATIVE
NRBC BLD-RTO: 0 /100 WBCS (ref 0–0)
PH UR STRIP.AUTO: 7 [PH]
PLATELET # BLD AUTO: 235 X10*3/UL (ref 150–450)
POTASSIUM SERPL-SCNC: 4.6 MMOL/L (ref 3.5–5.3)
PROT SERPL-MCNC: 6.1 G/DL (ref 6.4–8.2)
PROT UR STRIP.AUTO-MCNC: ABNORMAL MG/DL
RBC # BLD AUTO: 3.96 X10*6/UL (ref 4–5.2)
RBC # UR STRIP.AUTO: ABNORMAL /UL
RBC #/AREA URNS AUTO: ABNORMAL /HPF
SARS-COV-2 RNA RESP QL NAA+PROBE: DETECTED
SODIUM SERPL-SCNC: 128 MMOL/L (ref 136–145)
SP GR UR STRIP.AUTO: 1.02
SQUAMOUS #/AREA URNS AUTO: ABNORMAL /HPF
UROBILINOGEN UR STRIP.AUTO-MCNC: NORMAL MG/DL
WBC # BLD AUTO: 5.4 X10*3/UL (ref 4.4–11.3)
WBC #/AREA URNS AUTO: >50 /HPF
WBC CLUMPS #/AREA URNS AUTO: ABNORMAL /HPF

## 2024-12-27 PROCEDURE — 87636 SARSCOV2 & INF A&B AMP PRB: CPT | Performed by: INTERNAL MEDICINE

## 2024-12-27 PROCEDURE — 82947 ASSAY GLUCOSE BLOOD QUANT: CPT

## 2024-12-27 PROCEDURE — 71046 X-RAY EXAM CHEST 2 VIEWS: CPT

## 2024-12-27 PROCEDURE — 1200000002 HC GENERAL ROOM WITH TELEMETRY DAILY

## 2024-12-27 PROCEDURE — 85025 COMPLETE CBC W/AUTO DIFF WBC: CPT | Performed by: STUDENT IN AN ORGANIZED HEALTH CARE EDUCATION/TRAINING PROGRAM

## 2024-12-27 PROCEDURE — 85652 RBC SED RATE AUTOMATED: CPT | Performed by: STUDENT IN AN ORGANIZED HEALTH CARE EDUCATION/TRAINING PROGRAM

## 2024-12-27 PROCEDURE — 83605 ASSAY OF LACTIC ACID: CPT | Performed by: STUDENT IN AN ORGANIZED HEALTH CARE EDUCATION/TRAINING PROGRAM

## 2024-12-27 PROCEDURE — 2500000002 HC RX 250 W HCPCS SELF ADMINISTERED DRUGS (ALT 637 FOR MEDICARE OP, ALT 636 FOR OP/ED): Performed by: NURSE PRACTITIONER

## 2024-12-27 PROCEDURE — 86140 C-REACTIVE PROTEIN: CPT | Performed by: STUDENT IN AN ORGANIZED HEALTH CARE EDUCATION/TRAINING PROGRAM

## 2024-12-27 PROCEDURE — 87040 BLOOD CULTURE FOR BACTERIA: CPT | Mod: TRILAB | Performed by: STUDENT IN AN ORGANIZED HEALTH CARE EDUCATION/TRAINING PROGRAM

## 2024-12-27 PROCEDURE — 80053 COMPREHEN METABOLIC PANEL: CPT | Performed by: STUDENT IN AN ORGANIZED HEALTH CARE EDUCATION/TRAINING PROGRAM

## 2024-12-27 PROCEDURE — 36415 COLL VENOUS BLD VENIPUNCTURE: CPT | Performed by: STUDENT IN AN ORGANIZED HEALTH CARE EDUCATION/TRAINING PROGRAM

## 2024-12-27 PROCEDURE — 73630 X-RAY EXAM OF FOOT: CPT | Mod: LEFT SIDE | Performed by: RADIOLOGY

## 2024-12-27 PROCEDURE — 71046 X-RAY EXAM CHEST 2 VIEWS: CPT | Performed by: RADIOLOGY

## 2024-12-27 PROCEDURE — 2500000004 HC RX 250 GENERAL PHARMACY W/ HCPCS (ALT 636 FOR OP/ED): Performed by: STUDENT IN AN ORGANIZED HEALTH CARE EDUCATION/TRAINING PROGRAM

## 2024-12-27 PROCEDURE — 73718 MRI LOWER EXTREMITY W/O DYE: CPT | Mod: LEFT SIDE | Performed by: RADIOLOGY

## 2024-12-27 PROCEDURE — 99223 1ST HOSP IP/OBS HIGH 75: CPT | Performed by: NURSE PRACTITIONER

## 2024-12-27 PROCEDURE — 73630 X-RAY EXAM OF FOOT: CPT | Mod: LT

## 2024-12-27 PROCEDURE — 2500000004 HC RX 250 GENERAL PHARMACY W/ HCPCS (ALT 636 FOR OP/ED): Performed by: NURSE PRACTITIONER

## 2024-12-27 PROCEDURE — 2500000004 HC RX 250 GENERAL PHARMACY W/ HCPCS (ALT 636 FOR OP/ED): Mod: JZ

## 2024-12-27 PROCEDURE — 73718 MRI LOWER EXTREMITY W/O DYE: CPT | Mod: LT

## 2024-12-27 PROCEDURE — 99285 EMERGENCY DEPT VISIT HI MDM: CPT | Mod: 25 | Performed by: STUDENT IN AN ORGANIZED HEALTH CARE EDUCATION/TRAINING PROGRAM

## 2024-12-27 PROCEDURE — 8E0ZXY6 ISOLATION: ICD-10-PCS | Performed by: INTERNAL MEDICINE

## 2024-12-27 PROCEDURE — 81001 URINALYSIS AUTO W/SCOPE: CPT | Performed by: NURSE PRACTITIONER

## 2024-12-27 PROCEDURE — 2500000001 HC RX 250 WO HCPCS SELF ADMINISTERED DRUGS (ALT 637 FOR MEDICARE OP): Performed by: NURSE PRACTITIONER

## 2024-12-27 PROCEDURE — 96374 THER/PROPH/DIAG INJ IV PUSH: CPT

## 2024-12-27 RX ORDER — VANCOMYCIN HYDROCHLORIDE 1 G/20ML
INJECTION, POWDER, LYOPHILIZED, FOR SOLUTION INTRAVENOUS DAILY PRN
Status: DISCONTINUED | OUTPATIENT
Start: 2024-12-27 | End: 2024-12-27

## 2024-12-27 RX ORDER — ENOXAPARIN SODIUM 100 MG/ML
40 INJECTION SUBCUTANEOUS EVERY 24 HOURS
Status: DISCONTINUED | OUTPATIENT
Start: 2024-12-27 | End: 2025-01-02 | Stop reason: HOSPADM

## 2024-12-27 RX ORDER — INSULIN GLARGINE 100 [IU]/ML
10 INJECTION, SOLUTION SUBCUTANEOUS NIGHTLY
Status: DISCONTINUED | OUTPATIENT
Start: 2024-12-27 | End: 2025-01-02 | Stop reason: HOSPADM

## 2024-12-27 RX ORDER — VANCOMYCIN 1 G/200ML
1000 INJECTION, SOLUTION INTRAVENOUS ONCE
Status: COMPLETED | OUTPATIENT
Start: 2024-12-27 | End: 2024-12-27

## 2024-12-27 RX ORDER — ACETAMINOPHEN 160 MG/5ML
650 SOLUTION ORAL EVERY 4 HOURS PRN
Status: DISCONTINUED | OUTPATIENT
Start: 2024-12-27 | End: 2025-01-02 | Stop reason: HOSPADM

## 2024-12-27 RX ORDER — DEXTROSE 50 % IN WATER (D50W) INTRAVENOUS SYRINGE
12.5
Status: DISCONTINUED | OUTPATIENT
Start: 2024-12-27 | End: 2025-01-02 | Stop reason: HOSPADM

## 2024-12-27 RX ORDER — VANCOMYCIN HYDROCHLORIDE 1 G/20ML
INJECTION, POWDER, LYOPHILIZED, FOR SOLUTION INTRAVENOUS DAILY PRN
Status: DISCONTINUED | OUTPATIENT
Start: 2024-12-27 | End: 2025-01-02 | Stop reason: HOSPADM

## 2024-12-27 RX ORDER — ACETAMINOPHEN 650 MG/1
650 SUPPOSITORY RECTAL EVERY 4 HOURS PRN
Status: DISCONTINUED | OUTPATIENT
Start: 2024-12-27 | End: 2025-01-02 | Stop reason: HOSPADM

## 2024-12-27 RX ORDER — TALC
3 POWDER (GRAM) TOPICAL NIGHTLY PRN
Status: DISCONTINUED | OUTPATIENT
Start: 2024-12-27 | End: 2025-01-02 | Stop reason: HOSPADM

## 2024-12-27 RX ORDER — ADHESIVE BANDAGE
30 BANDAGE TOPICAL DAILY PRN
Status: DISCONTINUED | OUTPATIENT
Start: 2024-12-27 | End: 2025-01-02 | Stop reason: HOSPADM

## 2024-12-27 RX ORDER — ALBUTEROL SULFATE 0.83 MG/ML
2.5 SOLUTION RESPIRATORY (INHALATION) EVERY 6 HOURS PRN
Status: DISCONTINUED | OUTPATIENT
Start: 2024-12-27 | End: 2025-01-02 | Stop reason: HOSPADM

## 2024-12-27 RX ORDER — ATENOLOL 50 MG/1
100 TABLET ORAL DAILY
Status: DISCONTINUED | OUTPATIENT
Start: 2024-12-27 | End: 2025-01-02 | Stop reason: HOSPADM

## 2024-12-27 RX ORDER — INSULIN LISPRO 100 [IU]/ML
0-10 INJECTION, SOLUTION INTRAVENOUS; SUBCUTANEOUS
Status: DISCONTINUED | OUTPATIENT
Start: 2024-12-27 | End: 2025-01-02 | Stop reason: HOSPADM

## 2024-12-27 RX ORDER — ONDANSETRON HYDROCHLORIDE 2 MG/ML
4 INJECTION, SOLUTION INTRAVENOUS EVERY 8 HOURS PRN
Status: DISCONTINUED | OUTPATIENT
Start: 2024-12-27 | End: 2025-01-02 | Stop reason: HOSPADM

## 2024-12-27 RX ORDER — VANCOMYCIN 1.5 G/300ML
25 INJECTION, SOLUTION INTRAVENOUS ONCE
Status: DISCONTINUED | OUTPATIENT
Start: 2024-12-27 | End: 2024-12-27

## 2024-12-27 RX ORDER — CLONIDINE HYDROCHLORIDE 0.2 MG/1
0.4 TABLET ORAL NIGHTLY
Status: DISCONTINUED | OUTPATIENT
Start: 2024-12-27 | End: 2025-01-02 | Stop reason: HOSPADM

## 2024-12-27 RX ORDER — DEXTROSE 50 % IN WATER (D50W) INTRAVENOUS SYRINGE
25
Status: DISCONTINUED | OUTPATIENT
Start: 2024-12-27 | End: 2025-01-02 | Stop reason: HOSPADM

## 2024-12-27 RX ORDER — ONDANSETRON 4 MG/1
4 TABLET, ORALLY DISINTEGRATING ORAL EVERY 8 HOURS PRN
Status: DISCONTINUED | OUTPATIENT
Start: 2024-12-27 | End: 2025-01-02 | Stop reason: HOSPADM

## 2024-12-27 RX ORDER — VANCOMYCIN 1 G/200ML
1 INJECTION, SOLUTION INTRAVENOUS EVERY 24 HOURS
Status: DISCONTINUED | OUTPATIENT
Start: 2024-12-28 | End: 2025-01-02 | Stop reason: HOSPADM

## 2024-12-27 RX ORDER — ACETAMINOPHEN 325 MG/1
650 TABLET ORAL EVERY 4 HOURS PRN
Status: DISCONTINUED | OUTPATIENT
Start: 2024-12-27 | End: 2025-01-02 | Stop reason: HOSPADM

## 2024-12-27 RX ADMIN — ATENOLOL 100 MG: 50 TABLET ORAL at 17:08

## 2024-12-27 RX ADMIN — TAZOBACTAM SODIUM AND PIPERACILLIN SODIUM 3.38 G: 375; 3 INJECTION, SOLUTION INTRAVENOUS at 14:05

## 2024-12-27 RX ADMIN — INSULIN LISPRO 10 UNITS: 100 INJECTION, SOLUTION INTRAVENOUS; SUBCUTANEOUS at 16:53

## 2024-12-27 RX ADMIN — INSULIN GLARGINE 10 UNITS: 100 INJECTION, SOLUTION SUBCUTANEOUS at 20:50

## 2024-12-27 RX ADMIN — PIPERACILLIN SODIUM AND TAZOBACTAM SODIUM 3.38 G: 3; .375 INJECTION, SOLUTION INTRAVENOUS at 20:49

## 2024-12-27 RX ADMIN — ENOXAPARIN SODIUM 40 MG: 40 INJECTION SUBCUTANEOUS at 17:08

## 2024-12-27 RX ADMIN — VANCOMYCIN 1000 MG: 1 INJECTION, SOLUTION INTRAVENOUS at 14:20

## 2024-12-27 RX ADMIN — CLONIDINE HYDROCHLORIDE 0.4 MG: 0.2 TABLET ORAL at 20:50

## 2024-12-27 RX ADMIN — SODIUM CHLORIDE 500 ML: 900 INJECTION, SOLUTION INTRAVENOUS at 18:10

## 2024-12-27 SDOH — ECONOMIC STABILITY: FOOD INSECURITY: WITHIN THE PAST 12 MONTHS, THE FOOD YOU BOUGHT JUST DIDN'T LAST AND YOU DIDN'T HAVE MONEY TO GET MORE.: NEVER TRUE

## 2024-12-27 SDOH — SOCIAL STABILITY: SOCIAL INSECURITY: HAS ANYONE EVER THREATENED TO HURT YOUR FAMILY OR YOUR PETS?: NO

## 2024-12-27 SDOH — SOCIAL STABILITY: SOCIAL INSECURITY: ARE THERE ANY APPARENT SIGNS OF INJURIES/BEHAVIORS THAT COULD BE RELATED TO ABUSE/NEGLECT?: NO

## 2024-12-27 SDOH — ECONOMIC STABILITY: INCOME INSECURITY: IN THE PAST 12 MONTHS HAS THE ELECTRIC, GAS, OIL, OR WATER COMPANY THREATENED TO SHUT OFF SERVICES IN YOUR HOME?: NO

## 2024-12-27 SDOH — ECONOMIC STABILITY: FOOD INSECURITY: WITHIN THE PAST 12 MONTHS, YOU WORRIED THAT YOUR FOOD WOULD RUN OUT BEFORE YOU GOT THE MONEY TO BUY MORE.: NEVER TRUE

## 2024-12-27 SDOH — SOCIAL STABILITY: SOCIAL INSECURITY: DO YOU FEEL ANYONE HAS EXPLOITED OR TAKEN ADVANTAGE OF YOU FINANCIALLY OR OF YOUR PERSONAL PROPERTY?: NO

## 2024-12-27 SDOH — SOCIAL STABILITY: SOCIAL INSECURITY: WITHIN THE LAST YEAR, HAVE YOU BEEN HUMILIATED OR EMOTIONALLY ABUSED IN OTHER WAYS BY YOUR PARTNER OR EX-PARTNER?: NO

## 2024-12-27 SDOH — SOCIAL STABILITY: SOCIAL INSECURITY: HAVE YOU HAD ANY THOUGHTS OF HARMING ANYONE ELSE?: NO

## 2024-12-27 SDOH — SOCIAL STABILITY: SOCIAL INSECURITY: HAVE YOU HAD THOUGHTS OF HARMING ANYONE ELSE?: NO

## 2024-12-27 SDOH — SOCIAL STABILITY: SOCIAL INSECURITY: WITHIN THE LAST YEAR, HAVE YOU BEEN AFRAID OF YOUR PARTNER OR EX-PARTNER?: NO

## 2024-12-27 SDOH — SOCIAL STABILITY: SOCIAL INSECURITY: ABUSE: ADULT

## 2024-12-27 SDOH — SOCIAL STABILITY: SOCIAL INSECURITY: ARE YOU OR HAVE YOU BEEN THREATENED OR ABUSED PHYSICALLY, EMOTIONALLY, OR SEXUALLY BY ANYONE?: NO

## 2024-12-27 SDOH — SOCIAL STABILITY: SOCIAL INSECURITY: DO YOU FEEL UNSAFE GOING BACK TO THE PLACE WHERE YOU ARE LIVING?: NO

## 2024-12-27 SDOH — SOCIAL STABILITY: SOCIAL INSECURITY: WERE YOU ABLE TO COMPLETE ALL THE BEHAVIORAL HEALTH SCREENINGS?: YES

## 2024-12-27 SDOH — SOCIAL STABILITY: SOCIAL INSECURITY: DOES ANYONE TRY TO KEEP YOU FROM HAVING/CONTACTING OTHER FRIENDS OR DOING THINGS OUTSIDE YOUR HOME?: NO

## 2024-12-27 ASSESSMENT — PATIENT HEALTH QUESTIONNAIRE - PHQ9
1. LITTLE INTEREST OR PLEASURE IN DOING THINGS: NOT AT ALL
2. FEELING DOWN, DEPRESSED OR HOPELESS: NOT AT ALL
SUM OF ALL RESPONSES TO PHQ9 QUESTIONS 1 & 2: 0

## 2024-12-27 ASSESSMENT — COGNITIVE AND FUNCTIONAL STATUS - GENERAL
TURNING FROM BACK TO SIDE WHILE IN FLAT BAD: A LITTLE
STANDING UP FROM CHAIR USING ARMS: A LOT
DRESSING REGULAR UPPER BODY CLOTHING: A LOT
CLIMB 3 TO 5 STEPS WITH RAILING: TOTAL
PATIENT BASELINE BEDBOUND: UNABLE TO ASSESS AT THIS TIME
WALKING IN HOSPITAL ROOM: TOTAL
WALKING IN HOSPITAL ROOM: A LOT
EATING MEALS: A LITTLE
DAILY ACTIVITIY SCORE: 10
HELP NEEDED FOR BATHING: TOTAL
TOILETING: TOTAL
MOVING TO AND FROM BED TO CHAIR: A LOT
DRESSING REGULAR LOWER BODY CLOTHING: TOTAL
STANDING UP FROM CHAIR USING ARMS: A LOT
MOBILITY SCORE: 14
DAILY ACTIVITIY SCORE: 10
PERSONAL GROOMING: A LOT
TOILETING: TOTAL
DRESSING REGULAR LOWER BODY CLOTHING: TOTAL
MOBILITY SCORE: 12
PERSONAL GROOMING: A LOT
MOVING TO AND FROM BED TO CHAIR: A LOT
HELP NEEDED FOR BATHING: TOTAL
DRESSING REGULAR UPPER BODY CLOTHING: A LOT
CLIMB 3 TO 5 STEPS WITH RAILING: TOTAL
EATING MEALS: A LITTLE
TURNING FROM BACK TO SIDE WHILE IN FLAT BAD: A LOT

## 2024-12-27 ASSESSMENT — LIFESTYLE VARIABLES
HOW OFTEN DO YOU HAVE A DRINK CONTAINING ALCOHOL: NEVER
HOW OFTEN DO YOU HAVE 6 OR MORE DRINKS ON ONE OCCASION: NEVER
HOW MANY STANDARD DRINKS CONTAINING ALCOHOL DO YOU HAVE ON A TYPICAL DAY: PATIENT DOES NOT DRINK
PRESCIPTION_ABUSE_PAST_12_MONTHS: NO
SKIP TO QUESTIONS 9-10: 1
SUBSTANCE_ABUSE_PAST_12_MONTHS: NO
AUDIT-C TOTAL SCORE: 0
AUDIT-C TOTAL SCORE: 0

## 2024-12-27 ASSESSMENT — ACTIVITIES OF DAILY LIVING (ADL)
FEEDING YOURSELF: NEEDS ASSISTANCE
ADEQUATE_TO_COMPLETE_ADL: YES
BATHING: NEEDS ASSISTANCE
JUDGMENT_ADEQUATE_SAFELY_COMPLETE_DAILY_ACTIVITIES: NO
HEARING - RIGHT EAR: HEARING AID
LACK_OF_TRANSPORTATION: NO
PATIENT'S MEMORY ADEQUATE TO SAFELY COMPLETE DAILY ACTIVITIES?: NO
TOILETING: DEPENDENT
GROOMING: NEEDS ASSISTANCE
HEARING - LEFT EAR: HEARING AID
DRESSING YOURSELF: NEEDS ASSISTANCE
WALKS IN HOME: NEEDS ASSISTANCE
ASSISTIVE_DEVICE: HEARING AID - LEFT;HEARING AID - RIGHT;WALKER

## 2024-12-27 ASSESSMENT — ENCOUNTER SYMPTOMS
NEUROLOGICAL NEGATIVE: 1
CARDIOVASCULAR NEGATIVE: 1
COUGH: 1
MUSCULOSKELETAL NEGATIVE: 1
GASTROINTESTINAL NEGATIVE: 1
EYES NEGATIVE: 1
ENDOCRINE NEGATIVE: 1
CONSTITUTIONAL NEGATIVE: 1
PSYCHIATRIC NEGATIVE: 1
WOUND: 1

## 2024-12-27 ASSESSMENT — PAIN SCALES - GENERAL
PAINLEVEL_OUTOF10: 0 - NO PAIN
PAINLEVEL_OUTOF10: 2

## 2024-12-27 ASSESSMENT — PAIN - FUNCTIONAL ASSESSMENT
PAIN_FUNCTIONAL_ASSESSMENT: 0-10
PAIN_FUNCTIONAL_ASSESSMENT: 0-10

## 2024-12-27 ASSESSMENT — COLUMBIA-SUICIDE SEVERITY RATING SCALE - C-SSRS
1. IN THE PAST MONTH, HAVE YOU WISHED YOU WERE DEAD OR WISHED YOU COULD GO TO SLEEP AND NOT WAKE UP?: NO
6. HAVE YOU EVER DONE ANYTHING, STARTED TO DO ANYTHING, OR PREPARED TO DO ANYTHING TO END YOUR LIFE?: NO
2. HAVE YOU ACTUALLY HAD ANY THOUGHTS OF KILLING YOURSELF?: NO

## 2024-12-27 ASSESSMENT — PAIN DESCRIPTION - LOCATION: LOCATION: LEG

## 2024-12-27 NOTE — NURSING NOTE
Spoke with patient dtr, Hanh, to acquire patient admission data as patient has severely impaired hearing despite bilateral hearing aides. Per dtr, patient initially tested positive for COVID Monday, Dec 23 as pt dtr was positive the Saturday before that. Her PICC also was reportedly removed the Saturday after Thanksgiving.     Patient dtr requesting to be consulted/notified of any potential surgical plan as her mother reportedly is unable to make an informed decision on her own. She is listed as a contact in the patient chart.     Pt lost IV acces with a flush upon admission to the unit. US placed IV inserted at 1810 by nursing supervisor. Bolus started; Vanco to be re-timed as bag from ED was not complete.     Photos of wounds uploaded to chart media upon admission, no wound care orders at this time.     Patient currently eating dinner in bed, bed alarm engaged, isolation precautions in place, call light and personal belongings within reach. Plan of care ongoing, no additional needs at this time.

## 2024-12-27 NOTE — ASSESSMENT & PLAN NOTE
Tested COVID-positive  Droplet plus isolation  She is not hypoxic we will wait for infectious diseases recommendations  Chest x-ray pending

## 2024-12-27 NOTE — PROGRESS NOTES
Vancomycin Dosing by Pharmacy- EMERGENCY DEPARTMENT    Apoorva Sanchez is a 89 y.o. year old female who Pharmacy has been consulted to give a ONE TIME ONLY vancomycin dose in the Emergency Department for other Diabetic Foot Infection .     Visit Vitals  BP (!) 156/111   Pulse 89   Temp 36.6 °C (97.9 °F) (Temporal)   Resp 16        Lab Results   Component Value Date    CREATININE 0.76 12/27/2024    CREATININE 0.56 11/18/2024    CREATININE 0.54 11/17/2024    CREATININE 0.52 11/16/2024        Patient weight is   Wt Readings from Last 1 Encounters:   12/27/24 59 kg (130 lb)        Assessment/Plan     Patient will be given a one time dose of 1000 mg  Pharmacy will sign off at this time. If vancomycin is to be continued, please re-consult Pharmacy.       Yohannes Simmons, PharmD

## 2024-12-27 NOTE — H&P
History Of Present Illness  Apoorva Sanchez is a 89 y.o. female presenting with left foot pain.  History of hypertension, hyperlipidemia, type 2 diabetes mellitus, peripheral vascular disease, and diabetic foot ulcers.  Patient comes in to the emergency room from nursing home complaining of left foot pain.  She was admitted on November 14 after a fall with diabetic foot ulcers.  She follows with Dr. Proctor and podiatry.  Currently, she states that she has intermittent pain in her left foot and if she is not moving around she does not have any pain it is very tender to the touch.  X-ray showed ulcerated posterior to the calcaneus 1.5 cm with no gas or osseous erosions.  In the emergency room she was started on vancomycin and Zosyn.  She had a sodium of 128 in the ER that was corrected to 133 with a glucose of 419.  Patient was coughing while I was talking to her and stated that she had no complaints of shortness of breath and she was saturating 96% on room air.  She denies headache, dizziness, nausea/vomiting.     Past Medical History  Past Medical History:   Diagnosis Date    Accidental fall 03/19/2024    At risk for falling     Cellulitis     Diabetes mellitus (Multi)     Disease of thyroid gland     Fracture of pelvis (Multi) 03/19/2024    Hand fracture     Hypertension     Hypothyroidism     Injury of head 03/19/2024    Open wound of right lower extremity     Oth fracture of left pubis, init encntr for closed fracture 07/03/2022    PVD (peripheral vascular disease) (CMS-[facility])     Syncope 03/19/2024    Urinary tract infection     UTI (urinary tract infection)     Venous insufficiency     Weakness        Surgical History  History reviewed. No pertinent surgical history.     Social History  She reports that she has never smoked. She has never been exposed to tobacco smoke. She has never used smokeless tobacco. She reports that she does not drink alcohol and does not use drugs.    Family History  Family History   Problem  "Relation Name Age of Onset    Diabetes Other      Hypertension Other          Allergies  Sulfa (sulfonamide antibiotics)    Review of Systems  All systems reviewed and negative except as noted in HPI  Physical Exam  Constitutional: No acute distress, calm, cooperative, very hard of hearing  HEENT: PERRL, normocephalic, atraumatic, mucous membranes dry  Cardiovascular: Regular rhythm and rate,   Respiratory: Scattered rhonchi  Gastrointestinal: Bowel sounds positive x 4, soft, nontender  Neurologic: Alert and oriented x 3 equal strength bilaterally  Musculoskeletal: Able to move all extremities, no edema  Skin: Right heel with necrotic tissue on heel, right shin with open wounds.  Left heel with open ulcer with necrotic tissue, left great toe ulcer.  Last Recorded Vitals  Blood pressure (!) 145/98, pulse 87, temperature 36.6 °C (97.9 °F), temperature source Temporal, resp. rate 15, height 1.676 m (5' 6\"), weight 59 kg (130 lb), SpO2 95%.     Assessment/Plan   Assessment & Plan  Ulcer of left foot, unspecified ulcer stage (Multi)  Vancomycin/Zosyn  Appreciate wound care recommendations  UA with reflex  Normal saline 500 cc over 5-hour  Follow blood cultures  Consult podiatry (Dr. Proctor)  Consult infectious diseases  Peripheral vascular disease (CMS-[facility])  PVRs done 10 days ago show noncompressibility all the way up bilateral legs  Consult vascular surgery  Hyponatremia  Sodium 128, corrected to 133 considering glucose of 419  Consult nephrology  Type 2 diabetes mellitus with circulatory disorder, without long-term current use of insulin  Glucose on admission was 419  On last admission patient was hypoglycemic  Hold oral antidiabetics  Hypoglycemia protocol  Insulin sliding scale  Call for blood sugar greater than 250  Chest congestion  No respiratory distress, saturating 96% on room air  Will obtain chest x-ray  Get sputum culture if able  Patient is on broad-spectrum antibiotics  Infectious diseases is " consulted  Primary hypertension  Continue home atenolol and clonidine  Mixed hyperlipidemia  Not currently on statin medication  On deep vein thrombosis (DVT) prophylaxis  Lovenox  COVID  Tested COVID-positive  Droplet plus isolation  She is not hypoxic we will wait for infectious diseases recommendations  Chest x-ray pending         I spent 75 minutes in the professional and overall care of this patient.      Ryan Villagran, APRN-CNP

## 2024-12-27 NOTE — CARE PLAN
The patient's goals for the shift include  comfort and safety, feel better, rest.     The clinical goals for the shift include monitor labs and VS, manage blood glucose, IV fluids and antibx, maintain safety, no falls, imaging, determine plan of care, discharge planning, maintain ISO precautions and fluid restriction.    No anticipated barriers toward meeting these goals. Plan of care ongoing, no additional needs at this time.       Problem: Pain - Adult  Goal: Verbalizes/displays adequate comfort level or baseline comfort level  Outcome: Progressing     Problem: Safety - Adult  Goal: Free from fall injury  Outcome: Progressing     Problem: Discharge Planning  Goal: Discharge to home or other facility with appropriate resources  Outcome: Progressing     Problem: Chronic Conditions and Co-morbidities  Goal: Patient's chronic conditions and co-morbidity symptoms are monitored and maintained or improved  Outcome: Progressing     Problem: Fall/Injury  Goal: Not fall by end of shift  Outcome: Progressing  Goal: Be free from injury by end of the shift  Outcome: Progressing  Goal: Verbalize understanding of personal risk factors for fall in the hospital  Outcome: Progressing  Goal: Verbalize understanding of risk factor reduction measures to prevent injury from fall in the home  Outcome: Progressing  Goal: Use assistive devices by end of the shift  Outcome: Progressing  Goal: Pace activities to prevent fatigue by end of the shift  Outcome: Progressing     Problem: Nutrition  Goal: Oral intake greater 75%  Outcome: Progressing  Goal: Consume prescribed supplement  Outcome: Progressing  Goal: Adequate PO fluid intake  Outcome: Progressing  Goal: BG  mg/dL  Outcome: Progressing  Goal: Lab values WNL  Outcome: Progressing  Goal: Electrolytes WNL  Outcome: Progressing  Goal: Promote healing  Outcome: Progressing  Goal: Maintain stable weight  Outcome: Progressing     Problem: Skin  Goal: Decreased wound size/increased  tissue granulation at next dressing change  Outcome: Progressing  Goal: Participates in plan/prevention/treatment measures  Outcome: Progressing  Goal: Prevent/manage excess moisture  Outcome: Progressing  Goal: Prevent/minimize sheer/friction injuries  Outcome: Progressing  Goal: Promote/optimize nutrition  Outcome: Progressing  Goal: Promote skin healing  Outcome: Progressing

## 2024-12-27 NOTE — ASSESSMENT & PLAN NOTE
PVRs done 10 days ago show noncompressibility all the way up bilateral legs  Consult vascular surgery

## 2024-12-27 NOTE — ASSESSMENT & PLAN NOTE
Glucose on admission was 419  On last admission patient was hypoglycemic  Hold oral antidiabetics  Hypoglycemia protocol  Insulin sliding scale  Call for blood sugar greater than 250

## 2024-12-27 NOTE — ED PROVIDER NOTES
HPI   Chief Complaint   Patient presents with    Wound Check     Patient [facility] ems for wound on her left shin and leg. Patient vss and and. Patient dnr cc       Patient is a 89-year-old female that presents to the emergency department for evaluation of a wound to the left foot and right shin.  Patient has a history of diabetes as well as a wound on her left foot.  It has been being followed however is getting worse with darkening of the skin surrounding erythema.  Patient is also been uncomfortable with worsening pain.  Patient was sent in for further evaluation due to concern for possible infection.  Patient is a very poor historian and unable provide further history.      History provided by:  Patient          Patient History   Past Medical History:   Diagnosis Date    Accidental fall 03/19/2024    At risk for falling     Cellulitis     Diabetes mellitus (Multi)     Disease of thyroid gland     Fracture of pelvis (Multi) 03/19/2024    Hand fracture     Hypertension     Hypothyroidism     Injury of head 03/19/2024    Open wound of right lower extremity     Oth fracture of left pubis, init encntr for closed fracture 07/03/2022    PVD (peripheral vascular disease) (CMS-[facility])     Syncope 03/19/2024    Urinary tract infection     UTI (urinary tract infection)     Venous insufficiency     Weakness      No past surgical history on file.  Family History   Problem Relation Name Age of Onset    Diabetes Other      Hypertension Other       Social History     Tobacco Use    Smoking status: Never     Passive exposure: Never    Smokeless tobacco: Never   Vaping Use    Vaping status: Never Used   Substance Use Topics    Alcohol use: Never    Drug use: Never       Physical Exam   ED Triage Vitals [12/27/24 1125]   Temperature Heart Rate Respirations BP   36.6 °C (97.9 °F) 89 16 (!) 156/111      Pulse Ox Temp Source Heart Rate Source Patient Position   97 % Temporal -- --      BP Location FiO2 (%)     -- --       Physical  Exam  Vitals and nursing note reviewed.   Constitutional:       Appearance: She is not ill-appearing.      Comments: Frail-appearing, uncomfortable   HENT:      Head: Normocephalic and atraumatic.      Mouth/Throat:      Mouth: Mucous membranes are moist.   Eyes:      Extraocular Movements: Extraocular movements intact.      Pupils: Pupils are equal, round, and reactive to light.   Cardiovascular:      Rate and Rhythm: Normal rate and regular rhythm.   Pulmonary:      Effort: Pulmonary effort is normal. No respiratory distress.      Breath sounds: Normal breath sounds. No stridor. No wheezing or rhonchi.   Abdominal:      General: Abdomen is flat.      Tenderness: There is no abdominal tenderness. There is no guarding or rebound.   Musculoskeletal:      Right lower leg: No edema.      Left lower leg: No edema.   Skin:     General: Skin is warm and dry.      Findings: Erythema present.      Comments: There is a pressure sore to the left heel with significant eschar, tenderness palpation and surrounding erythema.  There is also a wound to the anterior right shin however no surrounding erythema or warmth to touch no drainage noted.   Neurological:      General: No focal deficit present.      Mental Status: She is alert and oriented to person, place, and time.       Recent Results (from the past 24 hours)   CBC and Auto Differential    Collection Time: 12/27/24 12:05 PM   Result Value Ref Range    WBC 5.4 4.4 - 11.3 x10*3/uL    nRBC 0.0 0.0 - 0.0 /100 WBCs    RBC 3.96 (L) 4.00 - 5.20 x10*6/uL    Hemoglobin 10.6 (L) 12.0 - 16.0 g/dL    Hematocrit 33.5 (L) 36.0 - 46.0 %    MCV 85 80 - 100 fL    MCH 26.8 26.0 - 34.0 pg    MCHC 31.6 (L) 32.0 - 36.0 g/dL    RDW 15.9 (H) 11.5 - 14.5 %    Platelets 235 150 - 450 x10*3/uL    Neutrophils % 74.0 40.0 - 80.0 %    Immature Granulocytes %, Automated 0.9 0.0 - 0.9 %    Lymphocytes % 15.8 13.0 - 44.0 %    Monocytes % 7.6 2.0 - 10.0 %    Eosinophils % 1.1 0.0 - 6.0 %    Basophils % 0.6  0.0 - 2.0 %    Neutrophils Absolute 4.02 1.60 - 5.50 x10*3/uL    Immature Granulocytes Absolute, Automated 0.05 0.00 - 0.50 x10*3/uL    Lymphocytes Absolute 0.86 0.80 - 3.00 x10*3/uL    Monocytes Absolute 0.41 0.05 - 0.80 x10*3/uL    Eosinophils Absolute 0.06 0.00 - 0.40 x10*3/uL    Basophils Absolute 0.03 0.00 - 0.10 x10*3/uL   Comprehensive Metabolic Panel    Collection Time: 12/27/24 12:05 PM   Result Value Ref Range    Glucose 419 (H) 74 - 99 mg/dL    Sodium 128 (L) 136 - 145 mmol/L    Potassium 4.6 3.5 - 5.3 mmol/L    Chloride 91 (L) 98 - 107 mmol/L    Bicarbonate 31 21 - 32 mmol/L    Anion Gap 11 10 - 20 mmol/L    Urea Nitrogen 11 6 - 23 mg/dL    Creatinine 0.76 0.50 - 1.05 mg/dL    eGFR 72 >60 mL/min/1.73m*2    Calcium 8.5 (L) 8.6 - 10.3 mg/dL    Albumin 3.2 (L) 3.4 - 5.0 g/dL    Alkaline Phosphatase 107 33 - 136 U/L    Total Protein 6.1 (L) 6.4 - 8.2 g/dL    AST 13 9 - 39 U/L    Bilirubin, Total 0.5 0.0 - 1.2 mg/dL    ALT 8 7 - 45 U/L   C-reactive protein    Collection Time: 12/27/24 12:05 PM   Result Value Ref Range    C-Reactive Protein 8.56 (H) <1.00 mg/dL   Sedimentation rate, automated    Collection Time: 12/27/24 12:05 PM   Result Value Ref Range    Sedimentation Rate 35 (H) 0 - 30 mm/h   Lactate    Collection Time: 12/27/24 12:05 PM   Result Value Ref Range    Lactate 2.3 (H) 0.4 - 2.0 mmol/L   Lactate    Collection Time: 12/27/24  1:34 PM   Result Value Ref Range    Lactate 2.1 (H) 0.4 - 2.0 mmol/L           ED Course & MDM   Diagnoses as of 12/27/24 1526   Ulcer of left foot, unspecified ulcer stage (Multi)   Cellulitis of left foot                 No data recorded     Vielka Coma Scale Score: 15 (12/27/24 1131 : Ketty Batista RN)                           Medical Decision Making  Patient is a 89-year-old female that presents to the emergency department for wound check and concern for infection.  Patient uncomfortable appearing and frail but in no obvious distress.  She does have significant  erythema with overlying eschar of the left heel wound.  Blood work ordered including CBC, CMP, sed rate, CRP, lactate along with blood cultures.  Patient given vancomycin, Zosyn for antibiotic coverage.  Blood work was remarkable for normal white count of 5.4 however patient does have significantly elevated sed rate and CRP concerning for underlying infection.  Lactic acid also mildly elevated at 2.3 which did downtrend to 2.1 on repeat testing.  She was found to be hyperglycemic however not in DKA at this time as she has normal bicarb, normal anion gap.  X-ray shows no obvious evidence of osteomyelitis but does show the overlying edema.  I did discuss case with podiatrist Dr. Lorenzo who recommends obtaining MRI to evaluate for possible underlying osteomyelitis.  We will see patient as consult.  I discussed case with  hospitalist who accepted patient for admission.        Procedure  Procedures     Raji Jacques, DO  12/27/24 1535

## 2024-12-27 NOTE — CONSULTS
Inpatient consult to Infectious Diseases  Consult performed by: ALICIA Bush-CNP  Consult ordered by: ALICIA Ellsworth-CNP  Reason for consult: left foot infection          Primary MD: Man Mccracken MD      History Of Present Illness  Apoorva Sanchez is a 89 y.o. female with medical history of type 2 diabetes mellitus, peripheral vascular disease and diabetic foot ulcers.  She presented to the emergency room with left foot pain.  She is followed by podiatry.  She Is afebrile, no reported chills.  She is on room air saturating 96%.  She denies shortness of breath.  She does have nonproductive cough.  No reported nausea vomiting or diarrhea.  Labs With no leukocytosis.  Cultures are pending.  Left foot x-ray showed ulceration, no definite soft tissue gas or osseous erosions.  Recent hospitalization in November for infected right leg ulcer cultures positive with MRSA and Pseudomonas she was treated with IV vancomycin and Zosyn till 11/29/2024.  Patient seen and examined.  Right lower extremity with anterior wound with large amount of granulation tissue, no periwound erythema no purulent drainage noted.  Right heel pressure injury with swelling and erythema.  Left heel unstageable with eschar, left foot and leg erythema, swelling.  Multiple abrasions, ecchymosis to bilateral lower extremities.  COVID influenza RSV PCR pending.   Chest xray and MRI of left foot ordered.  She is on IV Zosyn, vancomycin.     Past Medical History  She has a past medical history of Accidental fall (03/19/2024), At risk for falling, Cellulitis, Diabetes mellitus (Multi), Disease of thyroid gland, Fracture of pelvis (Multi) (03/19/2024), Hand fracture, Hypertension, Hypothyroidism, Injury of head (03/19/2024), Open wound of right lower extremity, Oth fracture of left pubis, init encntr for closed fracture (07/03/2022), PVD (peripheral vascular disease) (CMS-[facility]), Syncope (03/19/2024), Urinary tract infection, UTI (urinary tract  infection), Venous insufficiency, and Weakness.    Surgical History  She has no past surgical history on file.     Social History     Occupational History    Not on file   Tobacco Use    Smoking status: Never     Passive exposure: Never    Smokeless tobacco: Never   Vaping Use    Vaping status: Never Used   Substance and Sexual Activity    Alcohol use: Never    Drug use: Never    Sexual activity: Not on file     Travel History   Travel since 11/27/24    No documented travel since 11/27/24              Family History  Family History   Problem Relation Name Age of Onset    Diabetes Other      Hypertension Other       Allergies  Sulfa (sulfonamide antibiotics)     Immunization History   Administered Date(s) Administered    COVID-19, mRNA, LNP-S, PF, 30 mcg/0.3 mL dose 12/20/2021    Flu vaccine, quadrivalent, high-dose, preservative free, age 65y+ (FLUZONE) 10/22/2020, 10/20/2021, 11/07/2022, 09/18/2023    Flu vaccine, trivalent, preservative free, HIGH-DOSE, age 65y+ (Fluzone) 10/31/2018, 10/03/2019    Influenza, injectable, quadrivalent 11/22/2016, 11/28/2017    Influenza, seasonal, injectable 01/07/2011, 09/21/2012, 12/03/2013, 10/07/2014, 10/01/2015    Moderna SARS-CoV-2 Vaccination 03/25/2021, 04/22/2021    Pneumococcal conjugate vaccine, 13-valent (PREVNAR 13) 11/28/2017    Pneumococcal polysaccharide vaccine, 23-valent, age 2 years and older (PNEUMOVAX 23) 10/31/2018    Td (adult), unspecified 08/23/2002    Tdap vaccine, age 7 year and older (BOOSTRIX, ADACEL) 07/01/2024     Medications  Home medications:  Medications Prior to Admission   Medication Sig Dispense Refill Last Dose/Taking    acetaminophen (Tylenol) 325 mg tablet Take 2 tablets (650 mg) by mouth every 6 hours if needed for mild pain (1 - 3), headaches or fever (temp greater than 38.0 C). 30 tablet 0 Past Week    ammonium lactate (Lac-Hydrin) 12 % lotion Apply 1 Application topically once daily. Apply to both arms, legs, feet topically every shift for  dry flaking skin.   Past Week    cloNIDine (Catapres) 0.2 mg tablet Take 2 tablets (0.4 mg) by mouth once daily at bedtime.   Past Week    glimepiride (Amaryl) 2 mg tablet Take 1 tablet (2 mg) by mouth 2 times a day.   Past Week    metFORMIN  mg 24 hr tablet Take 1 tablet (500 mg) by mouth once daily in the evening. Take with meals. Do not crush, chew, or split.   Past Week    simvastatin (Zocor) 40 mg tablet Take 1 tablet (40 mg) by mouth once daily at bedtime. 90 tablet 3 Past Week    atenolol (Tenormin) 100 mg tablet Take 1 tablet (100 mg) by mouth once daily. 90 tablet 3     latanoprost (Xalatan) 0.005 % ophthalmic solution Administer 1 drop into both eyes once daily at bedtime.       levothyroxine (Synthroid, Levoxyl) 100 mcg tablet Take 1 tablet (100 mcg) by mouth once daily in the morning. Take before meals. 90 tablet 3     magnesium hydroxide (Milk of Magnesia) 400 mg/5 mL suspension Take 30 mL by mouth once daily as needed for constipation.        Current medications:  Scheduled medications  atenolol, 100 mg, oral, Daily  cloNIDine, 0.4 mg, oral, Nightly  enoxaparin, 40 mg, subcutaneous, q24h  insulin lispro, 0-10 Units, subcutaneous, TID AC  piperacillin-tazobactam, 3.375 g, intravenous, q6h  sodium chloride, 500 mL, intravenous, Once  [START ON 12/28/2024] vancomycin, 1 g, intravenous, q24h      Continuous medications     PRN medications  PRN medications: acetaminophen **OR** acetaminophen **OR** acetaminophen, albuterol, dextrose, dextrose, glucagon, glucagon, magnesium hydroxide, melatonin, ondansetron ODT **OR** ondansetron, vancomycin    Review of Systems   Constitutional: Negative.    HENT: Negative.     Eyes: Negative.    Respiratory:  Positive for cough.    Cardiovascular: Negative.    Gastrointestinal: Negative.    Endocrine: Negative.    Genitourinary: Negative.    Musculoskeletal: Negative.    Skin:  Positive for rash and wound.   Neurological: Negative.    Psychiatric/Behavioral:  "Negative.          Objective  Range of Vitals (last 24 hours)  Heart Rate:  [87-94]   Temp:  [36.6 °C (97.9 °F)-36.9 °C (98.4 °F)]   Resp:  [15-24]   BP: (145-172)/()   Height:  [167.6 cm (5' 6\")]   Weight:  [59 kg (130 lb)]   SpO2:  [95 %-97 %]   Daily Weight  12/27/24 : 59 kg (130 lb)    Body mass index is 20.98 kg/m².     Physical Exam  Constitutional:       Comments: Frail, thin appearing    HENT:      Head: Normocephalic and atraumatic.      Comments: Hard of hearing      Nose: Nose normal.      Mouth/Throat:      Mouth: Mucous membranes are moist.      Pharynx: Oropharynx is clear.   Eyes:      General: No scleral icterus.  Cardiovascular:      Rate and Rhythm: Normal rate and regular rhythm.   Pulmonary:      Effort: Pulmonary effort is normal.      Breath sounds: Rhonchi present.   Abdominal:      General: Bowel sounds are normal.      Palpations: Abdomen is soft.   Musculoskeletal:         General: Normal range of motion.      Cervical back: Normal range of motion and neck supple.   Skin:     General: Skin is warm and dry.      Comments: Right lower extremity with anterior wound with large amount of granulation tissue, no periwound erythema no purulent drainage noted.  Right heel pressure injury with swelling and erythema.  Left heel unstageable with eschar, left foot and leg erythema, swelling.  Multiple abrasions, ecchymosis to bilateral lower extremities.     Neurological:      General: No focal deficit present.      Mental Status: She is alert.   Psychiatric:         Mood and Affect: Mood normal.         Behavior: Behavior normal.          Relevant Results  Outside Hospital Results  No  Labs  Results from last 72 hours   Lab Units 12/27/24  1205   WBC AUTO x10*3/uL 5.4   HEMOGLOBIN g/dL 10.6*   HEMATOCRIT % 33.5*   PLATELETS AUTO x10*3/uL 235   NEUTROS PCT AUTO % 74.0   LYMPHS PCT AUTO % 15.8   MONOS PCT AUTO % 7.6   EOS PCT AUTO % 1.1     Results from last 72 hours   Lab Units 12/27/24  1205 " "  SODIUM mmol/L 128*   POTASSIUM mmol/L 4.6   CHLORIDE mmol/L 91*   CO2 mmol/L 31   BUN mg/dL 11   CREATININE mg/dL 0.76   GLUCOSE mg/dL 419*   CALCIUM mg/dL 8.5*   ANION GAP mmol/L 11   EGFR mL/min/1.73m*2 72     Results from last 72 hours   Lab Units 12/27/24  1205   ALK PHOS U/L 107   BILIRUBIN TOTAL mg/dL 0.5   PROTEIN TOTAL g/dL 6.1*   ALT U/L 8   AST U/L 13   ALBUMIN g/dL 3.2*     Estimated Creatinine Clearance: 41.2 mL/min (by C-G formula based on SCr of 0.76 mg/dL).  C-Reactive Protein   Date Value Ref Range Status   12/27/2024 8.56 (H) <1.00 mg/dL Final     Sedimentation Rate   Date Value Ref Range Status   12/27/2024 35 (H) 0 - 30 mm/h Final     No results found for: \"HIV1X2\", \"HIVCONF\", \"EIDHVZ8SZ\"  No results found for: \"HEPCABINIT\", \"HEPCAB\", \"HCVPCRQUANT\"  Microbiology  Susceptibility data from last 90 days.  Collected Specimen Info Organism Aztreonam Cefepime Ceftazidime Ciprofloxacin Clindamycin Erythromycin Levofloxacin Oxacillin Piperacillin/Tazobactam Tetracycline Tobramycin Trimethoprim/Sulfamethoxazole Vancomycin   11/18/24 Swab from Anterior Nares Methicillin Resistant Staphylococcus aureus (MRSA)                11/05/24 Tissue/Biopsy from Wound/Tissue Pseudomonas aeruginosa  S  S  S  S    I   S   S       Methicillin Resistant Staphylococcus aureus (MRSA)      R  R   R   R   R  S     Mixed Gram-Negative Bacteria                       Imaging  XR foot left 3+ views    Result Date: 12/27/2024  STUDY: Foot Radiographs; 12/27/2024 12:39PM INDICATION: Foot wound, concern for osteomyelitis near the heel. COMPARISON: None available. ACCESSION NUMBER(S): [id number] ORDERING CLINICIAN: JACQUELINE THORNTON TECHNIQUE:  Three view(s) of the left foot. FINDINGS:  There is no displaced fracture.  The alignment is anatomic. Ulceration noted posterior to the calcaneus measuring 1.5 cm.  No definite soft tissue gas or osseous erosions.  Underlying osteopenia. Soft tissue swelling noted.  Vascular calcifications " noted.    Ulceration noted posterior to the calcaneus measuring 1.5 cm. No definite soft tissue gas or osseous erosions. Signed by Miles Cummings MD    Vascular US ankle brachial index (MEETA) without exercise    Result Date: 12/20/2024           [place] [address] Tel [phone] and Fax [phone]  Vascular Lab Report [facility] US ANKLE BRACHIAL INDEX (MEETA) WITHOUT EXERCISE  Patient Name:     DESTINI Marie Physician: [id number] Josh Bales MD Study Date:       12/17/2024          Ordering           [id number] LIDIA MAGAÑA                                       Physician:         LUIS MRN/PID:          [id number]            Technologist:      Nkechi Mustafa [facility] Accession#:       [id number]        Technologist 2: Date of           5/16/1929 / 89      Encounter#:        [id number] Birth/Age:        years Gender:           F Admission Status: Outpatient          Location           [facility]                                       Performed:  Diagnosis/ICD: Peripheral vascular disease, unspecified-I73.9 CPT Codes:     61733 Peripheral artery MEETA Only  CONCLUSIONS: Right Lower PVR: Evidence of mild arterial occlusive disease in the right lower extremity at rest. Right pressures of >220 mmHg suggest no compressibility of vessels and may make absolute Segmental Limb Pressures (SLP) unreliable. Decreased digital perfusion noted. Monophasic flow is noted in the right posterior tibial artery and right dorsalis pedis artery. Multiphasic flow is noted in the right common femoral artery. Disease called by PVR tracing and Doppler waveform. Left Lower PVR: Evidence of mild arterial occlusive disease in the left lower extremity at rest. Left pressures of >220 mmHg suggest no compressibility of vessels and may make absolute Segmental Limb Pressures (SLP) unreliable. Decreased digital perfusion noted. Monophasic flow is noted in the left posterior tibial artery and left dorsalis pedis  artery. Multiphasic flow is noted in the left common femoral artery. Disease called by PVR tracing and Doppler waveform.  Comparison: Compared with study from 7/24/2024, no significant change.  Imaging & Doppler Findings:  RIGHT Lower PVR                Pressures Ratios Right Posterior Tibial (Ankle) 255 mmHg  1.89 Right Dorsalis Pedis (Ankle)   255 mmHg  1.89 Right Digit (Great Toe)        78 mmHg   0.58   LEFT Lower PVR                Pressures Ratios Left Posterior Tibial (Ankle) 255 mmHg  1.89 Left Dorsalis Pedis (Ankle)   255 mmHg  1.89 Left Digit (Great Toe)        52 mmHg   0.39                     Right     Left Brachial Pressure 135 mmHg 133 mmHg   [id number] Josh Bales MD Electronically signed by [id number] Josh Bales MD on 12/20/2024 at 5:17:51 PM  ** Final **      Assessment/Plan   Unstageable left heel ulcer  Left leg cellulitis  Right anterior leg ulcer with no overt signs of infection  Type 2 diabetes mellitus  Peripheral vascular disease      IV vancomycin-monitor levels   IV Zosyn  Follow up Covid/influenza PCR  Sputum culture if able   Monitor oxygenation  Follow up MRI left foot  Follow chest xray  Monitor temp and wbc   Follow-up blood cultures  Local care  Supportive care  Podiatry consult  Vascular surgery consulted   Monitor temperature and WBC  Droplet plus precautions-rule out testing      Total time spent caring for the patient today was 35 minutes. This includes time spent before the visit reviewing the chart, time spent during the visit, and time spent after the visit on documentation.     Ienz Brand, APRN-CNP

## 2024-12-27 NOTE — CONSULTS
Vancomycin Dosing by Pharmacy- INITIAL    Apoorva Sanchez is a 89 y.o. year old female who Pharmacy has been consulted for vancomycin dosing for cellulitis, skin and soft tissue. Based on the patient's indication and renal status this patient will be dosed based on a goal AUC of 400-600.     Renal function is currently stable.    Visit Vitals  BP (!) 145/98   Pulse 87   Temp 36.6 °C (97.9 °F) (Temporal)   Resp 15        Lab Results   Component Value Date    CREATININE 0.76 2024    CREATININE 0.56 2024    CREATININE 0.54 2024    CREATININE 0.52 2024        Patient weight is as follows:   Vitals:    24 1125   Weight: 59 kg (130 lb)       Cultures:  No results found for the encounter in last 14 days.        No intake/output data recorded.  I/O during current shift:  No intake/output data recorded.    Temp (24hrs), Av.6 °C (97.9 °F), Min:36.6 °C (97.9 °F), Max:36.6 °C (97.9 °F)         Assessment/Plan     Patient will not be given a loading dose.  Will initiate vancomycin maintenance,  1000 mg every 24 hours.    This dosing regimen is predicted by [employer] to result in the following pharmacokinetic parameters:  Loading dose: N/A  Regimen: 1000 mg IV every 24 hours.  Start time: 14:20 on 2024  Exposure target: AUC24 (range)400-600 mg/L.hr   [id number]: 433 mg/L.hr  AUC24,ss: 507 mg/L.hr  Probability of AUC24 > 400: 76 %  Ctrough,ss: 15.5 mg/L  Probability of Ctrough,ss > 20: 27 %      Follow-up level will be ordered on  at 0500 unless clinically indicated sooner.  Will continue to monitor renal function daily while on vancomycin and order serum creatinine at least every 48 hours if not already ordered.  Follow for continued vancomycin needs, clinical response, and signs/symptoms of toxicity.       Binta Smith, PharmD

## 2024-12-27 NOTE — PROGRESS NOTES
Pharmacy Medication History Review    Apoorva Sanchez is a 89 y.o. female admitted for Ulcer of left foot, unspecified ulcer stage (Multi). Pharmacy reviewed the patient's nholz-hv-xnxmitzev medications and allergies for accuracy.    Medications ADDED:  N/A  Medications CHANGED:  N/A  Medications REMOVED:   Synthroid  Prednisolone drops  Latanoprost     The list below reflects the updated PTA list.   Prior to Admission Medications   Prescriptions Last Dose Informant Patient Reported? Taking?   acetaminophen (Tylenol) 325 mg tablet Past Week  No Yes   Sig: Take 2 tablets (650 mg) by mouth every 6 hours if needed for mild pain (1 - 3), headaches or fever (temp greater than 38.0 C).   ammonium lactate (Lac-Hydrin) 12 % lotion Past Week  Yes Yes   Sig: Apply 1 Application topically once daily. Apply to both arms, legs, feet topically every shift for dry flaking skin.   atenolol (Tenormin) 100 mg tablet   No No   Sig: Take 1 tablet (100 mg) by mouth once daily.   brimonidine (AlphaGAN) 0.2 % ophthalmic solution   Yes No   Sig: Administer 1 drop into both eyes 2 times a day.   cloNIDine (Catapres) 0.2 mg tablet Past Week  No Yes   Sig: Take 2 tablets (0.4 mg) by mouth once daily at bedtime.   glimepiride (Amaryl) 2 mg tablet Past Week  Yes Yes   Sig: Take 1 tablet (2 mg) by mouth 2 times a day.   latanoprost (Xalatan) 0.005 % ophthalmic solution   No No   Sig: Administer 1 drop into both eyes once daily at bedtime.   levothyroxine (Synthroid, Levoxyl) 100 mcg tablet   No No   Sig: Take 1 tablet (100 mcg) by mouth once daily in the morning. Take before meals.   magnesium hydroxide (Milk of Magnesia) 400 mg/5 mL suspension   Yes No   Sig: Take 30 mL by mouth once daily as needed for constipation.   metFORMIN  mg 24 hr tablet Past Week  Yes Yes   Sig: Take 1 tablet (500 mg) by mouth once daily in the evening. Take with meals. Do not crush, chew, or split.   simvastatin (Zocor) 40 mg tablet Past Week  No Yes   Sig: Take 1  tablet (40 mg) by mouth once daily at bedtime.      Facility-Administered Medications: None        The list below reflects the updated allergy list. Please review each documented allergy for additional clarification and justification.  Allergies  Reviewed by Kiley Guadalupe CPhT on 12/27/2024        Severity Reactions Comments    Sulfa (sulfonamide Antibiotics) Medium Hives             Patient declines M2B at discharge.     Sources:   SNF     Additional Comments:  Med rec complete via nursing and rehab facility, last doses are not confirmed at this time      KILEY GUADALUPE CPhT  Pharmacy Technician  12/27/24     Secure Chat preferred

## 2024-12-27 NOTE — ASSESSMENT & PLAN NOTE
No respiratory distress, saturating 96% on room air  Will obtain chest x-ray  Get sputum culture if able  Patient is on broad-spectrum antibiotics  Infectious diseases is consulted

## 2024-12-27 NOTE — ASSESSMENT & PLAN NOTE
Vancomycin/Zosyn  Appreciate wound care recommendations  UA with reflex  Normal saline 500 cc over 5-hour  Follow blood cultures  Consult podiatry (Dr. Proctor)  Consult infectious diseases

## 2024-12-28 ENCOUNTER — APPOINTMENT (OUTPATIENT)
Dept: RADIOLOGY | Facility: HOSPITAL | Age: 89
End: 2024-12-28
Payer: MEDICARE

## 2024-12-28 LAB
ANION GAP SERPL CALCULATED.3IONS-SCNC: 12 MMOL/L (ref 10–20)
APPEARANCE UR: ABNORMAL
BACTERIA #/AREA URNS AUTO: ABNORMAL /HPF
BILIRUB UR STRIP.AUTO-MCNC: NEGATIVE MG/DL
BUN SERPL-MCNC: 12 MG/DL (ref 6–23)
CALCIUM SERPL-MCNC: 8 MG/DL (ref 8.6–10.3)
CHLORIDE SERPL-SCNC: 98 MMOL/L (ref 98–107)
CO2 SERPL-SCNC: 27 MMOL/L (ref 21–32)
COLOR UR: ABNORMAL
CREAT SERPL-MCNC: 0.83 MG/DL (ref 0.5–1.05)
EGFRCR SERPLBLD CKD-EPI 2021: 65 ML/MIN/1.73M*2
ERYTHROCYTE [DISTWIDTH] IN BLOOD BY AUTOMATED COUNT: 15.8 % (ref 11.5–14.5)
GLUCOSE BLD MANUAL STRIP-MCNC: 158 MG/DL (ref 74–99)
GLUCOSE BLD MANUAL STRIP-MCNC: 162 MG/DL (ref 74–99)
GLUCOSE BLD MANUAL STRIP-MCNC: 186 MG/DL (ref 74–99)
GLUCOSE BLD MANUAL STRIP-MCNC: 88 MG/DL (ref 74–99)
GLUCOSE SERPL-MCNC: 77 MG/DL (ref 74–99)
GLUCOSE UR STRIP.AUTO-MCNC: ABNORMAL MG/DL
HCT VFR BLD AUTO: 29.8 % (ref 36–46)
HGB BLD-MCNC: 9.1 G/DL (ref 12–16)
KETONES UR STRIP.AUTO-MCNC: NEGATIVE MG/DL
LEUKOCYTE ESTERASE UR QL STRIP.AUTO: ABNORMAL
MCH RBC QN AUTO: 26.2 PG (ref 26–34)
MCHC RBC AUTO-ENTMCNC: 30.5 G/DL (ref 32–36)
MCV RBC AUTO: 86 FL (ref 80–100)
NITRITE UR QL STRIP.AUTO: NEGATIVE
NRBC BLD-RTO: 0 /100 WBCS (ref 0–0)
PH UR STRIP.AUTO: 7 [PH]
PLATELET # BLD AUTO: 212 X10*3/UL (ref 150–450)
POTASSIUM SERPL-SCNC: 3.7 MMOL/L (ref 3.5–5.3)
PROT UR STRIP.AUTO-MCNC: ABNORMAL MG/DL
RBC # BLD AUTO: 3.47 X10*6/UL (ref 4–5.2)
RBC # UR STRIP.AUTO: ABNORMAL /UL
RBC #/AREA URNS AUTO: >20 /HPF
SODIUM SERPL-SCNC: 133 MMOL/L (ref 136–145)
SP GR UR STRIP.AUTO: 1.02
UROBILINOGEN UR STRIP.AUTO-MCNC: NORMAL MG/DL
VANCOMYCIN SERPL-MCNC: 9.6 UG/ML (ref 5–20)
WBC # BLD AUTO: 7.4 X10*3/UL (ref 4.4–11.3)
WBC #/AREA URNS AUTO: >50 /HPF
WBC CLUMPS #/AREA URNS AUTO: ABNORMAL /HPF

## 2024-12-28 PROCEDURE — 87086 URINE CULTURE/COLONY COUNT: CPT | Mod: TRILAB | Performed by: NURSE PRACTITIONER

## 2024-12-28 PROCEDURE — 82947 ASSAY GLUCOSE BLOOD QUANT: CPT

## 2024-12-28 PROCEDURE — 87077 CULTURE AEROBIC IDENTIFY: CPT | Mod: TRILAB | Performed by: STUDENT IN AN ORGANIZED HEALTH CARE EDUCATION/TRAINING PROGRAM

## 2024-12-28 PROCEDURE — 97530 THERAPEUTIC ACTIVITIES: CPT | Mod: GP

## 2024-12-28 PROCEDURE — 80048 BASIC METABOLIC PNL TOTAL CA: CPT | Performed by: NURSE PRACTITIONER

## 2024-12-28 PROCEDURE — 1200000002 HC GENERAL ROOM WITH TELEMETRY DAILY

## 2024-12-28 PROCEDURE — 99223 1ST HOSP IP/OBS HIGH 75: CPT | Performed by: NURSE PRACTITIONER

## 2024-12-28 PROCEDURE — 2500000004 HC RX 250 GENERAL PHARMACY W/ HCPCS (ALT 636 FOR OP/ED): Performed by: NURSE PRACTITIONER

## 2024-12-28 PROCEDURE — 81001 URINALYSIS AUTO W/SCOPE: CPT | Performed by: NURSE PRACTITIONER

## 2024-12-28 PROCEDURE — XW033E5 INTRODUCTION OF REMDESIVIR ANTI-INFECTIVE INTO PERIPHERAL VEIN, PERCUTANEOUS APPROACH, NEW TECHNOLOGY GROUP 5: ICD-10-PCS | Performed by: INTERNAL MEDICINE

## 2024-12-28 PROCEDURE — 73721 MRI JNT OF LWR EXTRE W/O DYE: CPT | Mod: LEFT SIDE | Performed by: STUDENT IN AN ORGANIZED HEALTH CARE EDUCATION/TRAINING PROGRAM

## 2024-12-28 PROCEDURE — 2500000001 HC RX 250 WO HCPCS SELF ADMINISTERED DRUGS (ALT 637 FOR MEDICARE OP): Performed by: NURSE PRACTITIONER

## 2024-12-28 PROCEDURE — 97162 PT EVAL MOD COMPLEX 30 MIN: CPT | Mod: GP

## 2024-12-28 PROCEDURE — 2500000004 HC RX 250 GENERAL PHARMACY W/ HCPCS (ALT 636 FOR OP/ED): Mod: JZ

## 2024-12-28 PROCEDURE — 73721 MRI JNT OF LWR EXTRE W/O DYE: CPT | Mod: 52,LT

## 2024-12-28 PROCEDURE — 85027 COMPLETE CBC AUTOMATED: CPT | Performed by: NURSE PRACTITIONER

## 2024-12-28 PROCEDURE — 2500000002 HC RX 250 W HCPCS SELF ADMINISTERED DRUGS (ALT 637 FOR MEDICARE OP, ALT 636 FOR OP/ED): Performed by: NURSE PRACTITIONER

## 2024-12-28 PROCEDURE — 80202 ASSAY OF VANCOMYCIN: CPT

## 2024-12-28 PROCEDURE — 97166 OT EVAL MOD COMPLEX 45 MIN: CPT | Mod: GO

## 2024-12-28 PROCEDURE — 36415 COLL VENOUS BLD VENIPUNCTURE: CPT | Performed by: NURSE PRACTITIONER

## 2024-12-28 PROCEDURE — 2500000004 HC RX 250 GENERAL PHARMACY W/ HCPCS (ALT 636 FOR OP/ED): Mod: JZ | Performed by: INTERNAL MEDICINE

## 2024-12-28 PROCEDURE — 99232 SBSQ HOSP IP/OBS MODERATE 35: CPT | Performed by: NURSE PRACTITIONER

## 2024-12-28 RX ORDER — ASPIRIN 81 MG/1
81 TABLET ORAL DAILY
Status: DISCONTINUED | OUTPATIENT
Start: 2024-12-28 | End: 2025-01-02 | Stop reason: HOSPADM

## 2024-12-28 RX ORDER — LEVOTHYROXINE SODIUM 100 UG/1
100 TABLET ORAL
Status: DISCONTINUED | OUTPATIENT
Start: 2024-12-29 | End: 2025-01-02 | Stop reason: HOSPADM

## 2024-12-28 RX ORDER — SIMVASTATIN 40 MG/1
40 TABLET, FILM COATED ORAL NIGHTLY
Status: DISCONTINUED | OUTPATIENT
Start: 2024-12-28 | End: 2025-01-02 | Stop reason: HOSPADM

## 2024-12-28 RX ADMIN — PIPERACILLIN SODIUM AND TAZOBACTAM SODIUM 3.38 G: 3; .375 INJECTION, SOLUTION INTRAVENOUS at 14:04

## 2024-12-28 RX ADMIN — INSULIN GLARGINE 10 UNITS: 100 INJECTION, SOLUTION SUBCUTANEOUS at 22:11

## 2024-12-28 RX ADMIN — ATENOLOL 100 MG: 50 TABLET ORAL at 09:29

## 2024-12-28 RX ADMIN — SIMVASTATIN 40 MG: 40 TABLET, FILM COATED ORAL at 22:10

## 2024-12-28 RX ADMIN — INSULIN LISPRO 2 UNITS: 100 INJECTION, SOLUTION INTRAVENOUS; SUBCUTANEOUS at 17:09

## 2024-12-28 RX ADMIN — REMDESIVIR 200 MG: 100 INJECTION, POWDER, LYOPHILIZED, FOR SOLUTION INTRAVENOUS at 17:25

## 2024-12-28 RX ADMIN — VANCOMYCIN 1 G: 1 INJECTION, SOLUTION INTRAVENOUS at 22:12

## 2024-12-28 RX ADMIN — PIPERACILLIN SODIUM AND TAZOBACTAM SODIUM 3.38 G: 3; .375 INJECTION, SOLUTION INTRAVENOUS at 09:28

## 2024-12-28 RX ADMIN — PIPERACILLIN SODIUM AND TAZOBACTAM SODIUM 3.38 G: 3; .375 INJECTION, SOLUTION INTRAVENOUS at 20:04

## 2024-12-28 RX ADMIN — ACETAMINOPHEN 650 MG: 325 TABLET ORAL at 17:25

## 2024-12-28 RX ADMIN — ENOXAPARIN SODIUM 40 MG: 40 INJECTION SUBCUTANEOUS at 17:25

## 2024-12-28 RX ADMIN — PIPERACILLIN SODIUM AND TAZOBACTAM SODIUM 3.38 G: 3; .375 INJECTION, SOLUTION INTRAVENOUS at 02:04

## 2024-12-28 RX ADMIN — ASPIRIN 81 MG: 81 TABLET, COATED ORAL at 17:25

## 2024-12-28 RX ADMIN — INSULIN LISPRO 2 UNITS: 100 INJECTION, SOLUTION INTRAVENOUS; SUBCUTANEOUS at 13:56

## 2024-12-28 ASSESSMENT — PAIN - FUNCTIONAL ASSESSMENT
PAIN_FUNCTIONAL_ASSESSMENT: 0-10

## 2024-12-28 ASSESSMENT — COGNITIVE AND FUNCTIONAL STATUS - GENERAL
MOVING FROM LYING ON BACK TO SITTING ON SIDE OF FLAT BED WITH BEDRAILS: A LITTLE
DAILY ACTIVITIY SCORE: 13
DAILY ACTIVITIY SCORE: 10
HELP NEEDED FOR BATHING: A LOT
MOVING TO AND FROM BED TO CHAIR: A LOT
EATING MEALS: A LITTLE
EATING MEALS: A LITTLE
TURNING FROM BACK TO SIDE WHILE IN FLAT BAD: A LOT
STANDING UP FROM CHAIR USING ARMS: A LOT
WALKING IN HOSPITAL ROOM: A LOT
EATING MEALS: A LITTLE
DRESSING REGULAR LOWER BODY CLOTHING: TOTAL
CLIMB 3 TO 5 STEPS WITH RAILING: A LOT
TURNING FROM BACK TO SIDE WHILE IN FLAT BAD: A LOT
DRESSING REGULAR LOWER BODY CLOTHING: TOTAL
MOVING FROM LYING ON BACK TO SITTING ON SIDE OF FLAT BED WITH BEDRAILS: A LITTLE
PERSONAL GROOMING: A LOT
WALKING IN HOSPITAL ROOM: A LOT
PERSONAL GROOMING: A LOT
HELP NEEDED FOR BATHING: TOTAL
DRESSING REGULAR UPPER BODY CLOTHING: A LOT
WALKING IN HOSPITAL ROOM: TOTAL
DRESSING REGULAR UPPER BODY CLOTHING: A LITTLE
CLIMB 3 TO 5 STEPS WITH RAILING: TOTAL
TOILETING: TOTAL
DRESSING REGULAR UPPER BODY CLOTHING: A LOT
MOVING TO AND FROM BED TO CHAIR: A LOT
STANDING UP FROM CHAIR USING ARMS: A LOT
TURNING FROM BACK TO SIDE WHILE IN FLAT BAD: A LITTLE
HELP NEEDED FOR BATHING: A LOT
MOBILITY SCORE: 13
TOILETING: TOTAL
TOILETING: A LOT
PERSONAL GROOMING: A LITTLE
DAILY ACTIVITIY SCORE: 13
MOVING TO AND FROM BED TO CHAIR: A LOT
MOBILITY SCORE: 12
DRESSING REGULAR LOWER BODY CLOTHING: A LOT
STANDING UP FROM CHAIR USING ARMS: A LOT
CLIMB 3 TO 5 STEPS WITH RAILING: TOTAL
MOBILITY SCORE: 13

## 2024-12-28 ASSESSMENT — PAIN SCALES - GENERAL
PAINLEVEL_OUTOF10: 3
PAINLEVEL_OUTOF10: 0 - NO PAIN
PAINLEVEL_OUTOF10: 1
PAINLEVEL_OUTOF10: 0 - NO PAIN

## 2024-12-28 ASSESSMENT — PAIN DESCRIPTION - LOCATION: LOCATION: ANKLE

## 2024-12-28 ASSESSMENT — ACTIVITIES OF DAILY LIVING (ADL): BATHING_ASSISTANCE: MAXIMAL

## 2024-12-28 ASSESSMENT — PAIN DESCRIPTION - ORIENTATION: ORIENTATION: RIGHT

## 2024-12-28 NOTE — CONSULTS
Reason for Consult   PAD     History Of Present Illness    This is a 89 year old female with past medical history of type 2 diabetes peripheral vascular disease and chronic foot and leg ulcers who presented to the emergency department from a facility for further evaluation of left foot pain.  Our service was consulted for concern of PAD.  Patient had an MEETA performed on 11/13/2024 which noted noncompressible vessels making results unreliable with a right MEETA of 1.89 and TBI of 0.58, left MEETA 1.89 and TBI of 0.39.  On exam her femoral popliteal pulses are palpable.  DP nonpalpable, monophasic on Doppler.  She likely suffers from tibial disease.  She is very hard of hearing.  Currently in isolation for treatment of COVID-19.  She denies pain in the lower extremities.  Per patient she last ambulated approximately 2 weeks ago, this was confirmed with her son, Glenn as well.       Past Medical History  Past Medical History:   Diagnosis Date    Accidental fall 03/19/2024    At risk for falling     Cellulitis     Diabetes mellitus (Multi)     Disease of thyroid gland     Fracture of pelvis (Multi) 03/19/2024    Hand fracture     Hypertension     Hypothyroidism     Injury of head 03/19/2024    Open wound of right lower extremity     Oth fracture of left pubis, init encntr for closed fracture 07/03/2022    PVD (peripheral vascular disease) (CMS-[facility])     Syncope 03/19/2024    Urinary tract infection     UTI (urinary tract infection)     Venous insufficiency     Weakness          Surgical History  History reviewed. No pertinent surgical history.       Social History  Social History     Socioeconomic History    Marital status:      Spouse name: Not on file    Number of children: Not on file    Years of education: Not on file    Highest education level: Not on file   Occupational History    Not on file   Tobacco Use    Smoking status: Never     Passive exposure: Never    Smokeless tobacco: Never   Vaping Use    Vaping  status: Never Used   Substance and Sexual Activity    Alcohol use: Never    Drug use: Never    Sexual activity: Not on file   Other Topics Concern    Not on file   Social History Narrative    Not on file     Social Drivers of Health     Financial Resource Strain: Low Risk  (12/27/2024)    Overall Financial Resource Strain (CARDIA)     Difficulty of Paying Living Expenses: Not very hard   Food Insecurity: No Food Insecurity (12/27/2024)    Hunger Vital Sign     Worried About Running Out of Food in the Last Year: Never true     Ran Out of Food in the Last Year: Never true   Transportation Needs: No Transportation Needs (12/27/2024)    PRAPARE - Transportation     Lack of Transportation (Medical): No     Lack of Transportation (Non-Medical): No   Physical Activity: Inactive (11/15/2024)    Exercise Vital Sign     Days of Exercise per Week: 0 days     Minutes of Exercise per Session: 0 min   Stress: No Stress Concern Present (11/15/2024)    [employer] [employer] - Occupational Stress Questionnaire     Feeling of Stress : Not at all   Social Connections: Socially Isolated (11/15/2024)    Social Connection and Isolation Panel [NHANES]     Frequency of Communication with Friends and Family: More than three times a week     Frequency of Social Gatherings with Friends and Family: More than three times a week     Attends Yarsanism Services: Never     Active Member of Clubs or Organizations: No     Attends Club or Organization Meetings: Never     Marital Status:    Intimate Partner Violence: Not At Risk (12/27/2024)    Humiliation, Afraid, Rape, and Kick questionnaire     Fear of Current or Ex-Partner: No     Emotionally Abused: No     Physically Abused: No     Sexually Abused: No   Housing Stability: Low Risk  (12/27/2024)    Housing Stability Vital Sign     Unable to Pay for Housing in the Last Year: No     Number of Times Moved in the Last Year: 1     Homeless in the Last Year: No         Family  History  Family History   Problem Relation Name Age of Onset    Diabetes Other      Hypertension Other            Allergies  Allergies   Allergen Reactions    Sulfa (Sulfonamide Antibiotics) Hives         Relevant Results  Results for orders placed or performed during the hospital encounter of 12/27/24 (from the past 24 hours)   Lactate   Result Value Ref Range    Lactate 2.1 (H) 0.4 - 2.0 mmol/L   POCT GLUCOSE   Result Value Ref Range    POCT Glucose 404 (H) 74 - 99 mg/dL   Sars-CoV-2 and Influenza A/B PCR   Result Value Ref Range    Flu A Result Not Detected Not Detected    Flu B Result Not Detected Not Detected    Coronavirus 2019, PCR Detected (A) Not Detected   POCT GLUCOSE   Result Value Ref Range    POCT Glucose 219 (H) 74 - 99 mg/dL   Urinalysis with Reflex Microscopic   Result Value Ref Range    Color, Urine Light-Yellow Light-Yellow, Yellow, Dark-Yellow    Appearance, Urine Turbid (N) Clear    Specific Gravity, Urine 1.021 1.005 - 1.035    pH, Urine 7.0 5.0, 5.5, 6.0, 6.5, 7.0, 7.5, 8.0    Protein, Urine 30 (1+) (A) NEGATIVE, 10 (TRACE), 20 (TRACE) mg/dL    Glucose, Urine OVER (4+) (A) Normal mg/dL    Blood, Urine 0.03 (TRACE) (A) NEGATIVE    Ketones, Urine NEGATIVE NEGATIVE mg/dL    Bilirubin, Urine NEGATIVE NEGATIVE    Urobilinogen, Urine Normal Normal mg/dL    Nitrite, Urine NEGATIVE NEGATIVE    Leukocyte Esterase, Urine 500 Valerie/µL (A) NEGATIVE   Microscopic Only, Urine   Result Value Ref Range    WBC, Urine >50 (A) 1-5, NONE /HPF    WBC Clumps, Urine OCCASIONAL Reference range not established. /HPF    RBC, Urine 11-20 (A) NONE, 1-2, 3-5 /HPF    Squamous Epithelial Cells, Urine 1-9 (SPARSE) Reference range not established. /HPF    Bacteria, Urine 1+ (A) NONE SEEN /HPF   CBC   Result Value Ref Range    WBC 7.4 4.4 - 11.3 x10*3/uL    nRBC 0.0 0.0 - 0.0 /100 WBCs    RBC 3.47 (L) 4.00 - 5.20 x10*6/uL    Hemoglobin 9.1 (L) 12.0 - 16.0 g/dL    Hematocrit 29.8 (L) 36.0 - 46.0 %    MCV 86 80 - 100 fL    MCH  26.2 26.0 - 34.0 pg    MCHC 30.5 (L) 32.0 - 36.0 g/dL    RDW 15.8 (H) 11.5 - 14.5 %    Platelets 212 150 - 450 x10*3/uL   Basic metabolic panel   Result Value Ref Range    Glucose 77 74 - 99 mg/dL    Sodium 133 (L) 136 - 145 mmol/L    Potassium 3.7 3.5 - 5.3 mmol/L    Chloride 98 98 - 107 mmol/L    Bicarbonate 27 21 - 32 mmol/L    Anion Gap 12 10 - 20 mmol/L    Urea Nitrogen 12 6 - 23 mg/dL    Creatinine 0.83 0.50 - 1.05 mg/dL    eGFR 65 >60 mL/min/1.73m*2    Calcium 8.0 (L) 8.6 - 10.3 mg/dL   Vancomycin   Result Value Ref Range    Vancomycin 9.6 5.0 - 20.0 ug/mL   POCT GLUCOSE   Result Value Ref Range    POCT Glucose 88 74 - 99 mg/dL     XR chest 2 views    Result Date: 12/28/2024  Interpreted By:  Sarah Sanchez, STUDY: XR CHEST 2 VIEWS;  12/27/2024 5:26 pm   INDICATION: Signs/Symptoms:cough and congestion.   COMPARISON: 06/10/2021   ACCESSION NUMBER(S): [id number]   ORDERING CLINICIAN: LEONOR QUINTEROS   FINDINGS: The heart is normal in size. There is tortuosity of the aorta. There is a suggestion of a hiatal hernia.   The lungs are hyperinflated. There is no discrete consolidation or pleural fluid.   Upper mediastinum appears somewhat widened. This may be due to fat.   The bones are osteoporotic. There are degenerative changes of the shoulders.   There is suggestion of a small hiatal hernia.   COMPARISON OF FINDING: The chest is similar.       No acute cardiopulmonary disease.   MACRO: none   Signed by: Sarah Sanchez 12/28/2024 7:29 AM Dictation workstation:   [id number]    MR foot left wo IV contrast    Result Date: 12/27/2024  Interpreted By:  Tra Sanchez, STUDY: MR FOOT LEFT WO IV CONTRAST;   INDICATION: Signs/Symptoms:Diabetic foot wound, concern for osteomyelitis.   COMPARISON: Plain film radiographs of the left foot performed on December 27, 2024   ACCESSION NUMBER(S): [id number]   ORDERING CLINICIAN: LEONOR QUINTEROS   TECHNIQUE: Routine multiplanar multisequential MRI of the left foot without contrast.  Field-of-view limited the forefoot.     FINDINGS: Dorsal subcutaneous edema suggesting cellulitis.   There is no evidence of marrow edema or marrow replacement process.   No findings highly suggestive of osteomyelitis.   No discrete fluid collections to suggest abscess.   No evidence of acute fracture.   Mild degenerative changes.   No evidence of acute tendon tear.   Some atrophy of the intrinsic foot musculature likely chronic ischemic myopathy or disuse.   No evidence of a soft tissue mass.       MRI of the left forefoot shows no findings suggestive of osteomyelitis.   Signed by: Tra Sanchez 12/27/2024 5:12 PM Dictation workstation:   [id number]    XR foot left 3+ views    Result Date: 12/27/2024  STUDY: Foot Radiographs; 12/27/2024 12:39PM INDICATION: Foot wound, concern for osteomyelitis near the heel. COMPARISON: None available. ACCESSION NUMBER(S): [id number] ORDERING CLINICIAN: JACQUELINE THORNTON TECHNIQUE:  Three view(s) of the left foot. FINDINGS:  There is no displaced fracture.  The alignment is anatomic. Ulceration noted posterior to the calcaneus measuring 1.5 cm.  No definite soft tissue gas or osseous erosions.  Underlying osteopenia. Soft tissue swelling noted.  Vascular calcifications noted.    Ulceration noted posterior to the calcaneus measuring 1.5 cm. No definite soft tissue gas or osseous erosions. Signed by Miles Cummings MD       Physical exam  Constitutional:  Alert and oriented to person, place, date/time in no acute distress.  Hard of hearing  HEENT:  Atraumatic, normocephalic. PERRL. EOMI.  Nares patent.  Mucous membranes moist.    Neck:  Trachea midline.  Respiratory:  Clear to auscultation.  Cardiac:  Regular rate and rhythm.   Cardiovascular:  No edema of the extremities.  Pulse exam: Radial and femoral pulses palpable bilateral.  Popliteal pulses palpable.  DP pulses monophasic bilaterally  Abdominal:  Soft, nontender, nondistended, bowel sounds present.  Musculoskeletal:  Moves  extremities freely.  Dermatological: Media reviewed: Left leg with diffuse ulcers with scab formation, left hallux with discoloration and erythema, left heel with large posterior pressure ulcer with subcutaneous exposure.  Right leg: Xerosis present, diffuse scabs present, right lateral midfoot ulcer with eschar, large right anterior extremity ulceration measures approximately 9 cm with granular tissue, right posterior heel unstageable ulcer present.  Dependent rubor present.  Periwound erythema present.  Neurological: Alert and oriented to person, place, date/time  Psych:  Calm, cooperative    Assessment and Plan  PAD  Pressure ulcers of the feet  Diabetes    Patient with palpable femoral popliteal pulses, DP pulses monophasic on Doppler.  Likely suffers from tibial disease.  Reviewed ABIs from November noted in the HPI.  Last ambulated approximately 2 weeks ago.  Resides in a facility.  Seen by podiatry who recommended continue local wound care and offloading.  I had a discussion with her son, Glenn this afternoon and explained that our service would not recommend vascular intervention at this time due to the patient's frailty, age and current inability to ambulate.  Also discussed multiple admissions over the last year and to consider palliative consult to discuss goals of care, patient's son seemed resistant to this.  He said he hopes she had at least 5 years to live.  I did explain that he could have her follow-up as an outpatient if she is ambulating and medically optimized and we can reevaluate at that time.  Agree with podiatry with local wound care and offloading.  On statin.  Start aspirin 81 mg for antiplatelet therapy.  Our service will sign off.

## 2024-12-28 NOTE — PROGRESS NOTES
Inpatient consult to Podiatry  Consult performed by: Osvalod Lorenzo DPM  Consult ordered by: ALICIA Ellsworth-CNP      Reason For Consult  Bilateral lower extremity ulcerations    History Of Present Illness  Apoorva Sanchez is a 89 y.o. female with medical history of type 2 diabetes mellitus, peripheral vascular disease and hx chronic diabetic foot ulcers.  She presented to the emergency room with left foot pain, follows with . Practice partner Dr. Proctor. She was admitted on November for infected right leg ulcer cultures positive with MRSA and Pseudomonas she was treated with IV vancomycin and Zosyn till 11/29/2024. Patient was last seen in OP podiatry clinic on 12/16/24 for bilateral lower extremity wounds, no documented concerns. Over the course of last few days pain has worsened in left foot, prompting ED visit. On presentation radiographs obtained without osseous concerns. Started on empiric vanco/ zosyn. MRI was obtained of left forefoot without concerns for osteomyelitis. No hindfoot/ ankle advance imaging completed. Covid+. Flu -.     Difficulty conversing with patient secondary to hard of hearing.  Attempted to write on paper but she still seemed to have trouble communicating with me.    Past Medical History  She has a past medical history of Accidental fall (03/19/2024), At risk for falling, Cellulitis, Diabetes mellitus (Multi), Disease of thyroid gland, Fracture of pelvis (Multi) (03/19/2024), Hand fracture, Hypertension, Hypothyroidism, Injury of head (03/19/2024), Open wound of right lower extremity, Oth fracture of left pubis, init encntr for closed fracture (07/03/2022), PVD (peripheral vascular disease) (CMS-[facility]), Syncope (03/19/2024), Urinary tract infection, UTI (urinary tract infection), Venous insufficiency, and Weakness.    Surgical History  She has no past surgical history on file.     Social History  She reports that she has never smoked. She has never been exposed to tobacco smoke. She has  "never used smokeless tobacco. She reports that she does not drink alcohol and does not use drugs.    Family History  Family History   Problem Relation Name Age of Onset    Diabetes Other      Hypertension Other          Allergies  Sulfa (sulfonamide antibiotics)    Review of Systems    REVIEW OF SYSTEMS  Unable to assess    Vasc: Dorsalis pedis and posterior tibial pulses are non palpable on my exam. Warm temperature.  Pedal hair is absent. There is significant atrophy and discoloration to bilateral lower legs. Very minimal leg swelling today. Erythema minimal around b/l leg wounds.     Neuro: Light touch sensation intact. Protective sensation intact. Muscle strength 5/5. No dropfoot or paralysis. No spasticity.    Derm: Overall several wounds to bilateral lower extremities, see pics below  -right leg with anterior leg wound with mild serosanginuous drianage, right foot with posterior heel early pressure injury, stable eschar to lateral midfoot.  -left leg with resolving leg ulcerations now mosly scabbing. Posterior left heel with large unstagable heel decubitus with dry leathry eschar noted. Well adhered.    None of the leg ulcerations yield significant malodor, drainage, or clinical SOI. All appear stable.   Right lower extremity - 12/28/24        Left lower extremity - 12/28/24    Ortho: Normal muscle strength graded as 5/5 bilateral. Digital contractures noted. Pain especially to left posterior heel ulcer.   Physical Exam     Last Recorded Vitals  Blood pressure 104/56, pulse 55, temperature 36.3 °C (97.3 °F), temperature source Temporal, resp. rate 20, height 1.676 m (5' 6\"), weight 59 kg (130 lb), SpO2 97%.    Relevant Results  MRI reviewed - imaging only included forefoot, without evidence of osteomyeltiis     Assessment/Plan   Left heel decubitus ulceration, unstageable  Left leg cellulitis  Right anterior leg ulceration, fatty layer exposed, stable  Early decubitus posterior right heel " ulceration  Unstageable lateral right foot wounds  Peripheral arterial disease    Performed full evaluation of lower extremity wounds today.  Overall, all appear stable without evidence of clinical signs of bacterial infection.  I did culture the right anterior leg wound as this was the only one yielding active drainage, serosanguineous in nature.  Culture order placed.    I did review advanced imaging as well as radiographs.  No overt signs of osteomyelitis.  Unfortunately the left foot MRI did not include the hindfoot.  We need a MRI ankle to characterize the calcaneus to rule out osteomyelitis.  I will order this advanced imaging study today.    Based on severity of wounds and patient's age, likely palliative wound care.  Recommending continue antibiotics for now.  I will place nursing dressing change orders.  Offload heels at all times with heel protector boots.    Await vascular evaluation.    I spent 70 minutes in the professional and overall care of this patient.      Osvaldo Lorenzo DPM

## 2024-12-28 NOTE — CARE PLAN
Problem: Skin  Goal: Decreased wound size/increased tissue granulation at next dressing change  Outcome: Progressing  Flowsheets (Taken 12/27/2024 2100)  Decreased wound size/increased tissue granulation at next dressing change:   Promote sleep for wound healing   Utilize specialty bed per algorithm   Protective dressings over bony prominences  Goal: Participates in plan/prevention/treatment measures  Outcome: Progressing  Flowsheets (Taken 12/27/2024 2100)  Participates in plan/prevention/treatment measures:   Increase activity/out of bed for meals   Discuss with provider PT/OT consult   Elevate heels  Goal: Prevent/manage excess moisture  Outcome: Progressing  Flowsheets (Taken 12/27/2024 2100)  Prevent/manage excess moisture:   Cleanse incontinence/protect with barrier cream   Monitor for/manage infection if present   Follow provider orders for dressing changes  Goal: Prevent/minimize sheer/friction injuries  Outcome: Progressing  Flowsheets (Taken 12/27/2024 2100)  Prevent/minimize sheer/friction injuries:   Utilize specialty bed per algorithm   Use pull sheet   Turn/reposition every 2 hours/use positioning/transfer devices   HOB 30 degrees or less  Goal: Promote/optimize nutrition  Outcome: Progressing  Flowsheets (Taken 12/27/2024 2100)  Promote/optimize nutrition:   Offer water/supplements/favorite foods   Discuss with provider if NPO > 2 days   Consume > 50% meals/supplements   Monitor/record intake including meals  Goal: Promote skin healing  Outcome: Progressing    The clinical goals for the shift include monitor labs and VS, manage blood glucose, IV fluids and antibx, maintain safety, no falls, imaging, determine plan of care, discharge planning, maintain ISO precautions and fluid restriction.

## 2024-12-28 NOTE — PROGRESS NOTES
Apoorva Sanchez is a 89 y.o. female on day 1 of admission presenting with Ulcer of left foot, unspecified ulcer stage (Multi).      Subjective   Patient seen and examined. Resting in bed. She is extremely [place] and does not have her hearing aides, used writing to communicate. She denies any current pain. Afebrile.        Objective     Last Recorded Vitals  /56 (BP Location: Left arm, Patient Position: Lying)   Pulse 55   Temp 36.3 °C (97.3 °F) (Temporal)   Resp 20   Wt 59 kg (130 lb)   SpO2 97%   Intake/Output last 3 Shifts:    Intake/Output Summary (Last 24 hours) at 12/28/2024 0831  Last data filed at 12/27/2024 2200  Gross per 24 hour   Intake 533.34 ml   Output 200 ml   Net 333.34 ml       Admission Weight  Weight: 59 kg (130 lb) (12/27/24 1125)    Daily Weight  12/27/24 : 59 kg (130 lb)    Image Results  XR chest 2 views  Narrative: Interpreted By:  Sarah Sanchez,   STUDY:  XR CHEST 2 VIEWS;  12/27/2024 5:26 pm      INDICATION:  Signs/Symptoms:cough and congestion.      COMPARISON:  06/10/2021      ACCESSION NUMBER(S):  [id number]      ORDERING CLINICIAN:  LEONOR QUINTEROS      FINDINGS:  The heart is normal in size. There is tortuosity of the aorta. There  is a suggestion of a hiatal hernia.      The lungs are hyperinflated. There is no discrete consolidation or  pleural fluid.      Upper mediastinum appears somewhat widened. This may be due to fat.      The bones are osteoporotic. There are degenerative changes of the  shoulders.      There is suggestion of a small hiatal hernia.      COMPARISON OF FINDING:  The chest is similar.      Impression: No acute cardiopulmonary disease.      MACRO:  none      Signed by: Sarah Sanchez 12/28/2024 7:29 AM  Dictation workstation:   [id number]      Physical Exam    General: Alert, pleasant, in no acute distress.   Cardiac: Regular rate and rhythm, S1/S2 , no murmur.   Pulmonary: Clear to auscultation on room air.   Abdomen: Soft, round, nontender. BS +x4.   Extremities:  No edema. Warm to the touch.   Skin: Multiple wounds to BLE and feet. Erythema to the LLE. She has PRAFO boots in place.     Relevant Results    Scheduled medications  atenolol, 100 mg, oral, Daily  cloNIDine, 0.4 mg, oral, Nightly  enoxaparin, 40 mg, subcutaneous, q24h  insulin glargine, 10 Units, subcutaneous, Nightly  insulin lispro, 0-10 Units, subcutaneous, TID AC  piperacillin-tazobactam, 3.375 g, intravenous, q6h  sodium chloride, 500 mL, intravenous, Once  vancomycin, 1 g, intravenous, q24h      Continuous medications     PRN medications  PRN medications: acetaminophen **OR** acetaminophen **OR** acetaminophen, albuterol, dextrose, dextrose, glucagon, glucagon, magnesium hydroxide, melatonin, ondansetron ODT **OR** ondansetron, vancomycin     Results for orders placed or performed during the hospital encounter of 12/27/24 (from the past 24 hours)   CBC and Auto Differential   Result Value Ref Range    WBC 5.4 4.4 - 11.3 x10*3/uL    nRBC 0.0 0.0 - 0.0 /100 WBCs    RBC 3.96 (L) 4.00 - 5.20 x10*6/uL    Hemoglobin 10.6 (L) 12.0 - 16.0 g/dL    Hematocrit 33.5 (L) 36.0 - 46.0 %    MCV 85 80 - 100 fL    MCH 26.8 26.0 - 34.0 pg    MCHC 31.6 (L) 32.0 - 36.0 g/dL    RDW 15.9 (H) 11.5 - 14.5 %    Platelets 235 150 - 450 x10*3/uL    Neutrophils % 74.0 40.0 - 80.0 %    Immature Granulocytes %, Automated 0.9 0.0 - 0.9 %    Lymphocytes % 15.8 13.0 - 44.0 %    Monocytes % 7.6 2.0 - 10.0 %    Eosinophils % 1.1 0.0 - 6.0 %    Basophils % 0.6 0.0 - 2.0 %    Neutrophils Absolute 4.02 1.60 - 5.50 x10*3/uL    Immature Granulocytes Absolute, Automated 0.05 0.00 - 0.50 x10*3/uL    Lymphocytes Absolute 0.86 0.80 - 3.00 x10*3/uL    Monocytes Absolute 0.41 0.05 - 0.80 x10*3/uL    Eosinophils Absolute 0.06 0.00 - 0.40 x10*3/uL    Basophils Absolute 0.03 0.00 - 0.10 x10*3/uL   Comprehensive Metabolic Panel   Result Value Ref Range    Glucose 419 (H) 74 - 99 mg/dL    Sodium 128 (L) 136 - 145 mmol/L    Potassium 4.6 3.5 - 5.3 mmol/L     Chloride 91 (L) 98 - 107 mmol/L    Bicarbonate 31 21 - 32 mmol/L    Anion Gap 11 10 - 20 mmol/L    Urea Nitrogen 11 6 - 23 mg/dL    Creatinine 0.76 0.50 - 1.05 mg/dL    eGFR 72 >60 mL/min/1.73m*2    Calcium 8.5 (L) 8.6 - 10.3 mg/dL    Albumin 3.2 (L) 3.4 - 5.0 g/dL    Alkaline Phosphatase 107 33 - 136 U/L    Total Protein 6.1 (L) 6.4 - 8.2 g/dL    AST 13 9 - 39 U/L    Bilirubin, Total 0.5 0.0 - 1.2 mg/dL    ALT 8 7 - 45 U/L   C-reactive protein   Result Value Ref Range    C-Reactive Protein 8.56 (H) <1.00 mg/dL   Sedimentation rate, automated   Result Value Ref Range    Sedimentation Rate 35 (H) 0 - 30 mm/h   Blood Culture    Specimen: Peripheral Venipuncture; Blood culture   Result Value Ref Range    Blood Culture Loaded on Instrument - Culture in progress    Blood Culture    Specimen: Peripheral Venipuncture; Blood culture   Result Value Ref Range    Blood Culture Loaded on Instrument - Culture in progress    Lactate   Result Value Ref Range    Lactate 2.3 (H) 0.4 - 2.0 mmol/L   Lactate   Result Value Ref Range    Lactate 2.1 (H) 0.4 - 2.0 mmol/L   POCT GLUCOSE   Result Value Ref Range    POCT Glucose 404 (H) 74 - 99 mg/dL   Sars-CoV-2 and Influenza A/B PCR   Result Value Ref Range    Flu A Result Not Detected Not Detected    Flu B Result Not Detected Not Detected    Coronavirus 2019, PCR Detected (A) Not Detected   POCT GLUCOSE   Result Value Ref Range    POCT Glucose 219 (H) 74 - 99 mg/dL   Urinalysis with Reflex Microscopic   Result Value Ref Range    Color, Urine Light-Yellow Light-Yellow, Yellow, Dark-Yellow    Appearance, Urine Turbid (N) Clear    Specific Gravity, Urine 1.021 1.005 - 1.035    pH, Urine 7.0 5.0, 5.5, 6.0, 6.5, 7.0, 7.5, 8.0    Protein, Urine 30 (1+) (A) NEGATIVE, 10 (TRACE), 20 (TRACE) mg/dL    Glucose, Urine OVER (4+) (A) Normal mg/dL    Blood, Urine 0.03 (TRACE) (A) NEGATIVE    Ketones, Urine NEGATIVE NEGATIVE mg/dL    Bilirubin, Urine NEGATIVE NEGATIVE    Urobilinogen, Urine Normal Normal  mg/dL    Nitrite, Urine NEGATIVE NEGATIVE    Leukocyte Esterase, Urine 500 Valerie/µL (A) NEGATIVE   Microscopic Only, Urine   Result Value Ref Range    WBC, Urine >50 (A) 1-5, NONE /HPF    WBC Clumps, Urine OCCASIONAL Reference range not established. /HPF    RBC, Urine 11-20 (A) NONE, 1-2, 3-5 /HPF    Squamous Epithelial Cells, Urine 1-9 (SPARSE) Reference range not established. /HPF    Bacteria, Urine 1+ (A) NONE SEEN /HPF   CBC   Result Value Ref Range    WBC 7.4 4.4 - 11.3 x10*3/uL    nRBC 0.0 0.0 - 0.0 /100 WBCs    RBC 3.47 (L) 4.00 - 5.20 x10*6/uL    Hemoglobin 9.1 (L) 12.0 - 16.0 g/dL    Hematocrit 29.8 (L) 36.0 - 46.0 %    MCV 86 80 - 100 fL    MCH 26.2 26.0 - 34.0 pg    MCHC 30.5 (L) 32.0 - 36.0 g/dL    RDW 15.8 (H) 11.5 - 14.5 %    Platelets 212 150 - 450 x10*3/uL   Basic metabolic panel   Result Value Ref Range    Glucose 77 74 - 99 mg/dL    Sodium 133 (L) 136 - 145 mmol/L    Potassium 3.7 3.5 - 5.3 mmol/L    Chloride 98 98 - 107 mmol/L    Bicarbonate 27 21 - 32 mmol/L    Anion Gap 12 10 - 20 mmol/L    Urea Nitrogen 12 6 - 23 mg/dL    Creatinine 0.83 0.50 - 1.05 mg/dL    eGFR 65 >60 mL/min/1.73m*2    Calcium 8.0 (L) 8.6 - 10.3 mg/dL   Vancomycin   Result Value Ref Range    Vancomycin 9.6 5.0 - 20.0 ug/mL   POCT GLUCOSE   Result Value Ref Range    POCT Glucose 88 74 - 99 mg/dL           Assessment/Plan      Unstageable Left Heel Ulcer with LLE Cellulitis / Multiple Lower Extremity Wounds  -ID and Podiatry consults.   -Continue Vanco and Zosyn. She recently completed these abx as long term outpatient course (MRSA / Pseudomonas).   -Blood cultures pending.   -Local wound care recommendations appreciated, awaiting podiatry.   -Pain control.   -PT/OT evals.   -MRI L foot shows no evidence of OM.   -Culture L foot wound if able, will see when podiatry comes to evaluate.     PVD  -Vascular consult.   -Recent PVRs done. Appreciate vascular input.     COVID  -ID on board.   -She remains on room air with negative CXR.    -Will defer any COVID therapeutics to ID.   -Isolation precautions.   -Supportive care.     Hyponatremia  -NA improved, 133.   -Renal to see.     UTI  -UA with large leuks, needs culture sent.   -On broad spectrum IV abx.     DM 2  -HgA1c 6.0 (11/15/24).   -Continue Lantus (started here, not on at home) and SSI coverage. She had elevated glucose in the 400's on admission.   -Holding oral agents at this time.  -Monitor blood sugars. Improved.     HTN  -Continue home meds, atenolol and clonidine.   -BP was elevated, now improved.   -Monitor.     HLD  -Continue Statin.     Hypothyroidism  -Continue levothyroxine.     DVT Risk  -Lovenox subcutaneous.     Plan  ID follows. Vascular, renal and podiatry to see.   Continue IV abx.   MRI foot shows no evidence of OM.   Pain control.   Wound care per podiatry / ID.   Send urine culture.   Continue isolation for COVID, she remains stable on room air, defer any therapeutics to ID if needed.   PT/OT.   Called to update patient's son, Glenn. All questions answered.         Emelia Lorenzo, APRN-CNP

## 2024-12-28 NOTE — CARE PLAN
The patient's goals for the shift include      The clinical goals for the shift include monitor labs and VS, manage blood glucose, IV fluids and antibx, maintain safety, no falls, imaging, determine plan of care, discharge planning, maintain ISO precautions and fluid restriction.      Problem: Pain - Adult  Goal: Verbalizes/displays adequate comfort level or baseline comfort level  Outcome: Progressing     Problem: Safety - Adult  Goal: Free from fall injury  Outcome: Progressing     Problem: Discharge Planning  Goal: Discharge to home or other facility with appropriate resources  Outcome: Progressing     Problem: Chronic Conditions and Co-morbidities  Goal: Patient's chronic conditions and co-morbidity symptoms are monitored and maintained or improved  Outcome: Progressing     Problem: Fall/Injury  Goal: Not fall by end of shift  Outcome: Progressing  Goal: Be free from injury by end of the shift  Outcome: Progressing  Goal: Verbalize understanding of personal risk factors for fall in the hospital  Outcome: Progressing  Goal: Verbalize understanding of risk factor reduction measures to prevent injury from fall in the home  Outcome: Progressing  Goal: Use assistive devices by end of the shift  Outcome: Progressing  Goal: Pace activities to prevent fatigue by end of the shift  Outcome: Progressing     Problem: Nutrition  Goal: Oral intake greater 75%  Outcome: Progressing  Goal: Consume prescribed supplement  Outcome: Progressing  Goal: Adequate PO fluid intake  Outcome: Progressing  Goal: BG  mg/dL  Outcome: Progressing  Goal: Lab values WNL  Outcome: Progressing  Goal: Electrolytes WNL  Outcome: Progressing  Goal: Promote healing  Outcome: Progressing  Goal: Maintain stable weight  Outcome: Progressing     Problem: Skin  Goal: Decreased wound size/increased tissue granulation at next dressing change  Outcome: Progressing  Goal: Participates in plan/prevention/treatment measures  Outcome: Progressing  Goal:  Prevent/manage excess moisture  Outcome: Progressing  Goal: Prevent/minimize sheer/friction injuries  Outcome: Progressing  Goal: Promote/optimize nutrition  Outcome: Progressing  Goal: Promote skin healing  Outcome: Progressing

## 2024-12-28 NOTE — PROGRESS NOTES
Physical Therapy Evaluation & Treatment    Patient Name: Apoorva Sanchez  MRN: [id number]  Department: [facility]  Room: 447/447-A  Today's Date: 12/28/2024   Time Calculation  Start Time: 1526  Stop Time: 1549  Time Calculation (min): 23 min    Assessment/Plan   PT Assessment  PT Assessment Results: Decreased strength, Decreased range of motion, Decreased endurance, Impaired balance, Decreased mobility, Decreased coordination, Decreased cognition, Impaired judgement, Decreased safety awareness, Impaired hearing, Decreased skin integrity, Pain  Rehab Prognosis: Good  Barriers to Discharge Home: Caregiver assistance, Physical needs  Physical Needs: 24hr mobility assistance needed, 24hr ADL assistance needed  Evaluation/Treatment Tolerance: Patient tolerated treatment well  Medical Staff Made Aware: Yes  Strengths: Ability to acquire knowledge, Attitude of self  Barriers to Participation: Comorbidities (+[place])  End of Session Communication: Bedside nurse  Assessment Comment: 89 year old female admits from [facility] with unstageable L heel ulcer, LLE cellulitis, mulitple LE wounds, PVD, Covid, hyponatremia, UTI, DB, HTN, HLD, and hypothyroidism. Very [place] so communication occurs via white board today. Very pleasant and AxO4 with me. By report, was ambulating with a walker ~ 3 weeks ago but has gotten significantly weaker prior to admission. Pt demonstrates this weakness and loss of functional mobility warranting skilled PT care. At [place], moderate intensity rehab is recommended to restore Pt back to PLOF levels.  End of Session Patient Position: Bed, 3 rail up, Alarm on   IP OR SWING BED PT PLAN  Inpatient or Swing Bed: Inpatient  PT Plan  Treatment/Interventions: Bed mobility, Transfer training, Gait training, Neuromuscular re-education, Strengthening, Endurance training, Range of motion, Therapeutic exercise, Therapeutic activity, Home exercise program  PT Plan: Ongoing PT  PT Frequency: 4 times per week  PT Discharge Recommendations:  Moderate intensity level of continued care  Equipment Recommended upon Discharge: Wheeled walker  PT Recommended Transfer Status: Assist x2, Assistive device  PT - OK to Discharge: Yes      Subjective     General Visit Information:  General  Reason for Referral: Impaired Mobility  Referred By: Dr. Jaramillo  Past Medical History Relevant to Rehab: type 2 diabetes mellitus, peripheral vascular disease and hx chronic diabetic foot ulcers.  Family/Caregiver Present: No  Prior to Session Communication: Bedside nurse  Patient Position Received: Bed, 3 rail up, Alarm off, not on at start of session  Preferred Learning Style: written, visual  General Comment: 89 year old female admits from [facility] with unstageable L heel ulcer, LLE cellulitis, mulitple LE wounds, PVD, Covid, hyponatremia, UTI, DB, HTN, HLD, and hypothyroidism.  Home Living:  Home Living  Type of Home: Assisted living  Lives With:  (staff)  Home Adaptive Equipment: Walker rolling or standard  Bathroom Shower/Tub: Walk-in shower  Home Living Comments: From [facility]  Prior Level of Function:  Prior Function Per Pt/Caregiver Report  Level of [place]: Needs assistance with ADLs, Needs assistance with homemaking, Needs assistance with functional transfers  Receives Help From:  (staff)  ADL Assistance:  (Recent increase in assist needs per patient due to increased weakness. Assist with bathing/dressing)  Homemaking Assistance: Needs assistance  Ambulatory Assistance:  (Typically ambulatory with walker. Pt reports she suddenly became weaker, so she has been unable to ambulate for ~3 weeks)  Precautions:  Precautions  Hearing/Visual Limitations: Hearing aids--without essentially deaf.  Medical Precautions: Fall precautions  Precautions Comment: Skin Precautions, COVID+    Objective   Pain:  Pain Assessment  Pain Assessment: 0-10  0-10 (Numeric) Pain Score: 0 - No pain  Cognition:  Cognition  Overall Cognitive Status: Within Functional  Limits  Orientation Level: Oriented X4  Cognition Comments: Communication via white board today. Able to read+good understanding but unable to verbally communicate due to hearing  Insight: Mild    General Assessments:  Activity Tolerance  Endurance: Decreased tolerance for upright activites    Sensation  Light Touch: No apparent deficits    Strength  Strength Comments: Grossly 3+/5 B knee ext, 2/5 B hip flexion sitting at EOB  Functional Assessments:  Bed Mobility  Bed Mobility: Yes  Bed Mobility 1  Bed Mobility 1: Supine to sitting, Sitting to supine  Level of Assistance 1: Close supervision  Bed Mobility Comments 1: Cues written down for techniques in/out of bed. Laborous but self completed with HOB elevated    Transfers  Transfer: Yes  Transfer 1  Transfer From 1: Bed to  Transfer to 1: Stand  Technique 1: Sit to stand, Stand to sit  Transfer Device 1: Walker  Transfer Level of Assistance 1: Maximum assistance  Trials/Comments 1: x2 trials---assist for uprighting, hip extension, and trunk extension. Once standing, tolerates being upright for ~ 1min with Mod A to correct sway/balance. Cues given for BUE pushoff prior to attempt on white board.    Ambulation/Gait Training  Ambulation/Gait Training Performed: No    Treatments:  Bed Mobility  Bed Mobility: Yes  Bed Mobility 1  Bed Mobility 1: Supine to sitting, Sitting to supine  Level of Assistance 1: Close supervision  Bed Mobility Comments 1: Cues written down for techniques in/out of bed. Laborous but self completed with HOB elevated    Transfers  Transfer: Yes  Transfer 1  Transfer From 1: Bed to  Transfer to 1: Stand  Technique 1: Sit to stand, Stand to sit  Transfer Device 1: Walker  Transfer Level of Assistance 1: Maximum assistance  Trials/Comments 1: x2 trials---assist for uprighting, hip extension, and trunk extension. Once standing, tolerates being upright for ~ 1min with Mod A to correct sway/balance. Cues given for BUE pushoff prior to attempt on white  board.    Education as below. Increased time needed due to communication needs    Outcome Measures:  [facility] Basic Mobility  Turning from your back to your side while in a flat bed without using bedrails: A little  Moving from lying on your back to sitting on the side of a flat bed without using bedrails: A little  Moving to and from bed to chair (including a wheelchair): A lot  Standing up from a chair using your arms (e.g. wheelchair or bedside chair): A lot  To walk in hospital room: Total  Climbing 3-5 steps with railing: Total  Basic Mobility - Total Score: 12    Encounter Problems       Encounter Problems (Active)       Balance       Sitting Balance (Progressing)       Start:  12/28/24    Expected End:  01/11/25       Pt will demonstrate good sitting balance to promote safe engagement with out of bed activities           Standing Balance (Progressing)       Start:  12/28/24    Expected End:  01/11/25       Pt will demonstrate good static standing balance to promote safe participation with out of bed activity, transfers, and mobility              Mobility       Ambulation (Progressing)       Start:  12/28/24    Expected End:  01/11/25       Pt will ambulate 50' modified independent assist with walker to promote safe home mobility              PT Transfers       Supine to sit (Progressing)       Start:  12/28/24    Expected End:  01/11/25       Pt will transfer supine to sitting at edge of bed with modified independent assist to promote acute care out of bed activity           Sit to stand (Progressing)       Start:  12/28/24    Expected End:  01/11/25       Pt will transfer sit to standing position with modified independent assist and walker to promote safe out of bed activity           Bed to chair (Progressing)       Start:  12/28/24    Expected End:  01/11/25       Pt will transfer from sitting edge of bed to the chair with modified independent assist and walker to promote out of bed activity and reduce the  risks of prolonged acute care bedrest              Pain - Adult          Safety       Safe Mobility Techniques (Progressing)       Start:  12/28/24    Expected End:  01/11/25       Pt will correctly identify and demonstrate safe mobility techniques to reduce their risks for falls during their acute care stay                   Education Documentation  Mobility Training, taught by Montrell Loza, PT at 12/28/2024  4:23 PM.  Learner: Patient  Readiness: Acceptance  Method: Demonstration, Explanation  Response: Needs Reinforcement, Verbalizes Understanding  Comment: safe mobility techniques, importance of OOB activity, risks of bedrest    Education Comments  No comments found.

## 2024-12-28 NOTE — PROGRESS NOTES
Occupational Therapy    Evaluation    Patient Name: Apoorva Sanchez  MRN: [id number]  Department: [address]  Room: [address] [place], [place] [address]  Today's Date: 12/28/2024  Time Calculation  Start Time: 1130  Stop Time: 1145  Time Calculation (min): 15 min        Assessment:  OT Assessment: Pt. is a 89 year old female who presents with L foot pain from SNF. Pt. was found with Ulcer of the L heel, PVD, and tested positive for Covid-19. Pt. is functioning below baseline and would benefit from skilled OT services  Prognosis: Good  Barriers to Discharge Home: Caregiver assistance  Caregiver Assistance: Caregiver assistance needed per identified barriers - however, level of patient's required assistance exceeds assistance available at home  Evaluation/Treatment Tolerance: Patient tolerated treatment well  Medical Staff Made Aware: Yes  End of Session Communication: Bedside nurse  End of Session Patient Position: Bed, 3 rail up, Alarm off, not on at start of session  OT Assessment Results: Decreased ADL status, Decreased upper extremity strength, Decreased safe judgment during ADL, Decreased endurance, Decreased functional mobility  Prognosis: Good  Barriers to Discharge: Inaccessible home environment  Evaluation/Treatment Tolerance: Patient tolerated treatment well  Medical Staff Made Aware: Yes  Strengths: Attitude of self  Barriers to Participation: Premorbid level of function  Plan:  Treatment Interventions: ADL retraining, Functional transfer training, UE strengthening/ROM, Endurance training, Patient/family training, Neuromuscular reeducation  OT Frequency: 3 times per week  OT Discharge Recommendations: Moderate intensity level of continued care  Treatment Interventions: ADL retraining, Functional transfer training, UE strengthening/ROM, Endurance training, Patient/family training, Neuromuscular reeducation    Subjective   Current Problem:  1. Ulcer of left foot, unspecified ulcer stage (Multi)        2. Cellulitis of left foot        3.  Peripheral vascular disease (CMS-HCC)  CANCELED: Vascular US PVR without exercise    CANCELED: Vascular US PVR without exercise        General:  General  Reason for Referral: decreased ADL  Referred By: Dr. Jaramillo  Past Medical History Relevant to Rehab: type 2 diabetes mellitus, peripheral vascular disease and hx chronic diabetic foot ulcers.  Family/Caregiver Present: No  Prior to Session Communication: Bedside nurse  Patient Position Received: Bed, 3 rail up, Alarm off, not on at start of session  Preferred Learning Style: written, visual (pt. very [place])  General Comment: Pt. was met in supine this date and was agreeable to participate in skilled OT  Precautions:  Medical Precautions: Fall precautions and droplet plus precautions due to Covid-19+    Vital Sign (Past 2hrs)        Date/Time Vitals Session Patient Position Pulse Resp SpO2 BP MAP (mmHg)    12/28/24 1230 --  --  56  18  99 %  120/60  80                         Pain:  Pain Assessment  Pain Assessment: 0-10  0-10 (Numeric) Pain Score: 0 - No pain    Objective   Cognition:  Overall Cognitive Status: Unable to assess (Pt. very hard of hearing)           Home Living:  Type of Home: Skilled Nursing facility  Home Adaptive Equipment: Wheelchair-manual, Walker rolling or standard, Other (Comment) (Pt. reports using w/c and FWW was unable to state if she was walking PTA)  Home Living Comments: Pt. is a difficult historian due to being very hard of hearing. Communicated via whiteboard during evaluation. Pt. reported living in a SNF as well as at home in her apartment but was unable to clarify with therapist  Prior Function:  Level of Neal: Needs assistance with ADLs, Needs assistance with homemaking  Receives Help From: Other (Comment) (care staff)  IADL History:     ADL:  Eating Assistance: Stand by  Eating Deficit: Setup  Grooming Assistance: Stand by  Grooming Deficit: Setup (anticipated)  Bathing Assistance: Maximal  Bathing Deficit: Other  (Comment) (anticipated)  UE Dressing Assistance: Minimal  UE Dressing Deficit: Pull around back (anticipated)  LE Dressing Assistance: Total  LE Dressing Deficit: Thread RLE into pants, Thread LLE into pants, Increased time to complete, Pull up over hips (anticipated)  Toileting Assistance with Device: Total  Toileting Deficit: Supervison/safety, Increased time to complete, Perineal hygiene, Clothing management down, Clothing management up  Activity Tolerance:  Endurance: Tolerates less than 10 min exercise, no significant change in vital signs  Bed Mobility/Transfers: Bed Mobility  Bed Mobility: Yes  Bed Mobility 1  Bed Mobility 1: Supine to sitting  Level of Assistance 1: Close supervision  Bed Mobility 2  Bed Mobility  2: Sitting to supine  Level of Assistance 2: Close supervision    Transfers  Transfer: No (transfers deferred awating clarification on WB from podiatry 2/2 heel ulcer)      Functional Mobility:     Sitting Balance:  Static Sitting Balance  Static Sitting-Balance Support: Bilateral upper extremity supported  Static Sitting-Level of Assistance: Close supervision  Dynamic Sitting Balance  Dynamic Sitting-Balance Support: No upper extremity supported  Dynamic Sitting-Level of Assistance: Close supervision  Dynamic Sitting-Balance: Reaching across midline        Coordination:  Movements are Fluid and Coordinated: Yes   Hand Function:     Extremities: RUE   RUE : Within Functional Limits and LUE   LUE: Within Functional Limits        Outcome Measures:[facility] Daily Activity  Putting on and taking off regular lower body clothing: Total  Bathing (including washing, rinsing, drying): A lot  Putting on and taking off regular upper body clothing: A little  Toileting, which includes using toilet, bedpan or urinal: Total  Taking care of personal grooming such as brushing teeth: A little  Eating Meals: A little  Daily Activity - Total Score: 13        Education Documentation  Body Mechanics, taught by Lakshmi Angulo  BEE Gage at 12/28/2024 12:58 PM.  Learner: Patient  Readiness: Acceptance  Method: Explanation  Response: Needs Reinforcement    Precautions, taught by Lakshmi Gage OT at 12/28/2024 12:58 PM.  Learner: Patient  Readiness: Acceptance  Method: Explanation  Response: Needs Reinforcement    Home Exercise Program, taught by Lakshmi Gage OT at 12/28/2024 12:58 PM.  Learner: Patient  Readiness: Acceptance  Method: Explanation  Response: Needs Reinforcement    Education Comments  No comments found.        OP EDUCATION:  Education  Individual(s) Educated: Patient  Education Provided: Fall precautons, Risk and benefits of OT discussed with patient or other, POC discussed and agreed upon  Risk and Benefits Discussed with Patient/Caregiver/Other: yes    Goals:  Encounter Problems       Encounter Problems (Active)       OT Goals       Pt. will complete all ADL tasks with MIN A to improve overall functioning and IND (Progressing)       Start:  12/28/24    Expected End:  01/11/25            Pt. will complete functional transfers using LRD with MOD A to improve IND (Progressing)       Start:  12/28/24    Expected End:  01/11/25            Pt. will demonstrate good understanding of BUE HEP to improve strength and endurance needed for ADL tasks       Start:  12/28/24    Expected End:  01/11/25

## 2024-12-28 NOTE — PROGRESS NOTES
Vancomycin Dosing by Pharmacy- FOLLOW UP    Apoorva Sanchez is a 89 y.o. year old female who Pharmacy has been consulted for vancomycin dosing for cellulitis, skin and soft tissue. Based on the patient's indication and renal status this patient is being dosed based on a goal AUC of 400-600.     Renal function is currently stable.    Current vancomycin dose: 1000 mg given every 24 hours    Estimated vancomycin AUC on current dose: 559 mg/L.hr     Visit Vitals  /58   Pulse 59   Temp 36.5 °C (97.7 °F)   Resp 18        Lab Results   Component Value Date    CREATININE 0.83 2024    CREATININE 0.76 2024    CREATININE 0.56 2024    CREATININE 0.54 2024        Patient weight is as follows:   Vitals:    24 1125   Weight: 59 kg (130 lb)       Cultures:  No results found for the encounter in last 14 days.       I/O last 3 completed shifts:  In: 533.3 (9 mL/kg) [P.O.:240; IV Piggyback:293.3]  Out: 200 (3.4 mL/kg) [Urine:200 (0.1 mL/kg/hr)]  Weight: 59 kg   I/O during current shift:  No intake/output data recorded.    Temp (24hrs), Av.8 °C (98.2 °F), Min:36.5 °C (97.7 °F), Max:37.3 °C (99.1 °F)      Assessment/Plan    Within goal AUC range. Continue current vancomycin regimen.    This dosing regimen is predicted by [employer] to result in the following pharmacokinetic parameters:    Loading dose: N/A  Regimen: 1000 mg IV every 24 hours.  Start time: 14:20 on 2024  Exposure target: AUC24 (range)400-600 mg/L.hr   TLV79-70: 486 mg/L.hr  AUC24,ss: 559 mg/L.hr  Probability of AUC24 > 400: 98 %  Ctrough,ss: 17.1 mg/L  Probability of Ctrough,ss > 20: 25 %    The next level will be obtained on 24 at AM labs. May be obtained sooner if clinically indicated.   Will continue to monitor renal function daily while on vancomycin and order serum creatinine at least every 48 hours if not already ordered.  Follow for continued vancomycin needs, clinical response, and signs/symptoms of toxicity.        Moshe Edmonds, [facility]

## 2024-12-28 NOTE — NURSING NOTE
Notified by MRI tech that patient is not tolerating MRI and was not able to complete the imaging.

## 2024-12-29 VITALS
HEART RATE: 59 BPM | WEIGHT: 130 LBS | RESPIRATION RATE: 17 BRPM | DIASTOLIC BLOOD PRESSURE: 69 MMHG | BODY MASS INDEX: 20.89 KG/M2 | OXYGEN SATURATION: 90 % | TEMPERATURE: 97.3 F | HEIGHT: 66 IN | SYSTOLIC BLOOD PRESSURE: 153 MMHG

## 2024-12-29 LAB
ANION GAP SERPL CALCULATED.3IONS-SCNC: 14 MMOL/L (ref 10–20)
BACTERIA BLD CULT: NORMAL
BACTERIA BLD CULT: NORMAL
BACTERIA SPEC CULT: ABNORMAL
BUN SERPL-MCNC: 12 MG/DL (ref 6–23)
CALCIUM SERPL-MCNC: 7.9 MG/DL (ref 8.6–10.3)
CHLORIDE SERPL-SCNC: 101 MMOL/L (ref 98–107)
CO2 SERPL-SCNC: 24 MMOL/L (ref 21–32)
CREAT SERPL-MCNC: 0.86 MG/DL (ref 0.5–1.05)
EGFRCR SERPLBLD CKD-EPI 2021: 62 ML/MIN/1.73M*2
ERYTHROCYTE [DISTWIDTH] IN BLOOD BY AUTOMATED COUNT: 15.9 % (ref 11.5–14.5)
GLUCOSE BLD MANUAL STRIP-MCNC: 172 MG/DL (ref 74–99)
GLUCOSE BLD MANUAL STRIP-MCNC: 185 MG/DL (ref 74–99)
GLUCOSE BLD MANUAL STRIP-MCNC: 198 MG/DL (ref 74–99)
GLUCOSE BLD MANUAL STRIP-MCNC: 77 MG/DL (ref 74–99)
GLUCOSE SERPL-MCNC: 77 MG/DL (ref 74–99)
GRAM STN SPEC: ABNORMAL
GRAM STN SPEC: ABNORMAL
HCT VFR BLD AUTO: 33.1 % (ref 36–46)
HGB BLD-MCNC: 10.2 G/DL (ref 12–16)
MCH RBC QN AUTO: 26.4 PG (ref 26–34)
MCHC RBC AUTO-ENTMCNC: 30.8 G/DL (ref 32–36)
MCV RBC AUTO: 86 FL (ref 80–100)
NRBC BLD-RTO: 0 /100 WBCS (ref 0–0)
PLATELET # BLD AUTO: 245 X10*3/UL (ref 150–450)
POTASSIUM SERPL-SCNC: 3.5 MMOL/L (ref 3.5–5.3)
RBC # BLD AUTO: 3.87 X10*6/UL (ref 4–5.2)
SODIUM SERPL-SCNC: 135 MMOL/L (ref 136–145)
WBC # BLD AUTO: 6.4 X10*3/UL (ref 4.4–11.3)

## 2024-12-29 PROCEDURE — 85027 COMPLETE CBC AUTOMATED: CPT | Performed by: NURSE PRACTITIONER

## 2024-12-29 PROCEDURE — 99232 SBSQ HOSP IP/OBS MODERATE 35: CPT | Performed by: NURSE PRACTITIONER

## 2024-12-29 PROCEDURE — 2500000004 HC RX 250 GENERAL PHARMACY W/ HCPCS (ALT 636 FOR OP/ED): Mod: JZ

## 2024-12-29 PROCEDURE — 2500000001 HC RX 250 WO HCPCS SELF ADMINISTERED DRUGS (ALT 637 FOR MEDICARE OP): Performed by: NURSE PRACTITIONER

## 2024-12-29 PROCEDURE — 1200000002 HC GENERAL ROOM WITH TELEMETRY DAILY

## 2024-12-29 PROCEDURE — 2500000002 HC RX 250 W HCPCS SELF ADMINISTERED DRUGS (ALT 637 FOR MEDICARE OP, ALT 636 FOR OP/ED): Performed by: NURSE PRACTITIONER

## 2024-12-29 PROCEDURE — 80048 BASIC METABOLIC PNL TOTAL CA: CPT | Performed by: NURSE PRACTITIONER

## 2024-12-29 PROCEDURE — 2500000004 HC RX 250 GENERAL PHARMACY W/ HCPCS (ALT 636 FOR OP/ED): Performed by: INTERNAL MEDICINE

## 2024-12-29 PROCEDURE — 82947 ASSAY GLUCOSE BLOOD QUANT: CPT

## 2024-12-29 PROCEDURE — 2500000004 HC RX 250 GENERAL PHARMACY W/ HCPCS (ALT 636 FOR OP/ED): Performed by: NURSE PRACTITIONER

## 2024-12-29 PROCEDURE — 36415 COLL VENOUS BLD VENIPUNCTURE: CPT | Performed by: NURSE PRACTITIONER

## 2024-12-29 RX ADMIN — VANCOMYCIN 1 G: 1 INJECTION, SOLUTION INTRAVENOUS at 23:58

## 2024-12-29 RX ADMIN — CLONIDINE HYDROCHLORIDE 0.4 MG: 0.2 TABLET ORAL at 21:33

## 2024-12-29 RX ADMIN — REMDESIVIR 100 MG: 100 INJECTION, POWDER, LYOPHILIZED, FOR SOLUTION INTRAVENOUS at 17:42

## 2024-12-29 RX ADMIN — PIPERACILLIN SODIUM AND TAZOBACTAM SODIUM 3.38 G: 3; .375 INJECTION, SOLUTION INTRAVENOUS at 08:48

## 2024-12-29 RX ADMIN — PIPERACILLIN SODIUM AND TAZOBACTAM SODIUM 3.38 G: 3; .375 INJECTION, SOLUTION INTRAVENOUS at 21:33

## 2024-12-29 RX ADMIN — ATENOLOL 100 MG: 50 TABLET ORAL at 08:48

## 2024-12-29 RX ADMIN — SIMVASTATIN 40 MG: 40 TABLET, FILM COATED ORAL at 21:34

## 2024-12-29 RX ADMIN — INSULIN LISPRO 2 UNITS: 100 INJECTION, SOLUTION INTRAVENOUS; SUBCUTANEOUS at 17:36

## 2024-12-29 RX ADMIN — PIPERACILLIN SODIUM AND TAZOBACTAM SODIUM 3.38 G: 3; .375 INJECTION, SOLUTION INTRAVENOUS at 02:16

## 2024-12-29 RX ADMIN — ASPIRIN 81 MG: 81 TABLET, COATED ORAL at 08:49

## 2024-12-29 RX ADMIN — INSULIN LISPRO 2 UNITS: 100 INJECTION, SOLUTION INTRAVENOUS; SUBCUTANEOUS at 12:40

## 2024-12-29 RX ADMIN — INSULIN GLARGINE 10 UNITS: 100 INJECTION, SOLUTION SUBCUTANEOUS at 21:31

## 2024-12-29 RX ADMIN — ENOXAPARIN SODIUM 40 MG: 40 INJECTION SUBCUTANEOUS at 17:42

## 2024-12-29 RX ADMIN — LEVOTHYROXINE SODIUM 100 MCG: 0.1 TABLET ORAL at 06:03

## 2024-12-29 RX ADMIN — PIPERACILLIN SODIUM AND TAZOBACTAM SODIUM 3.38 G: 3; .375 INJECTION, SOLUTION INTRAVENOUS at 14:29

## 2024-12-29 ASSESSMENT — COGNITIVE AND FUNCTIONAL STATUS - GENERAL
TURNING FROM BACK TO SIDE WHILE IN FLAT BAD: A LOT
HELP NEEDED FOR BATHING: A LOT
MOVING TO AND FROM BED TO CHAIR: A LOT
MOVING FROM LYING ON BACK TO SITTING ON SIDE OF FLAT BED WITH BEDRAILS: A LITTLE
DRESSING REGULAR LOWER BODY CLOTHING: A LOT
DAILY ACTIVITIY SCORE: 15
STANDING UP FROM CHAIR USING ARMS: A LOT
DRESSING REGULAR UPPER BODY CLOTHING: A LOT
CLIMB 3 TO 5 STEPS WITH RAILING: TOTAL
PERSONAL GROOMING: A LITTLE
TOILETING: A LOT
MOBILITY SCORE: 12
WALKING IN HOSPITAL ROOM: A LOT

## 2024-12-29 ASSESSMENT — PAIN SCALES - GENERAL
PAINLEVEL_OUTOF10: 0 - NO PAIN

## 2024-12-29 ASSESSMENT — PAIN - FUNCTIONAL ASSESSMENT: PAIN_FUNCTIONAL_ASSESSMENT: 0-10

## 2024-12-29 NOTE — CARE PLAN
The patient's goals for the shift include      The clinical goals for the shift include monitor labs, encourage activity, IV antibiotics      Problem: Pain - Adult  Goal: Verbalizes/displays adequate comfort level or baseline comfort level  Outcome: Progressing     Problem: Safety - Adult  Goal: Free from fall injury  Outcome: Progressing     Problem: Discharge Planning  Goal: Discharge to home or other facility with appropriate resources  Outcome: Progressing     Problem: Chronic Conditions and Co-morbidities  Goal: Patient's chronic conditions and co-morbidity symptoms are monitored and maintained or improved  Outcome: Progressing     Problem: Fall/Injury  Goal: Not fall by end of shift  Outcome: Progressing  Goal: Be free from injury by end of the shift  Outcome: Progressing  Goal: Verbalize understanding of personal risk factors for fall in the hospital  Outcome: Progressing  Goal: Verbalize understanding of risk factor reduction measures to prevent injury from fall in the home  Outcome: Progressing  Goal: Use assistive devices by end of the shift  Outcome: Progressing  Goal: Pace activities to prevent fatigue by end of the shift  Outcome: Progressing     Problem: Nutrition  Goal: Oral intake greater 75%  Outcome: Progressing  Goal: Consume prescribed supplement  Outcome: Progressing  Goal: Adequate PO fluid intake  Outcome: Progressing  Goal: BG  mg/dL  Outcome: Progressing  Goal: Lab values WNL  Outcome: Progressing  Goal: Electrolytes WNL  Outcome: Progressing  Goal: Promote healing  Outcome: Progressing  Goal: Maintain stable weight  Outcome: Progressing     Problem: Skin  Goal: Decreased wound size/increased tissue granulation at next dressing change  Outcome: Progressing  Goal: Participates in plan/prevention/treatment measures  Outcome: Progressing  Goal: Prevent/manage excess moisture  Outcome: Progressing  Goal: Prevent/minimize sheer/friction injuries  Outcome: Progressing  Goal: Promote/optimize  nutrition  Outcome: Progressing  Goal: Promote skin healing  Outcome: Progressing

## 2024-12-29 NOTE — PROGRESS NOTES
Apoorva Sanchez is a 89 y.o. female on day 2 of admission presenting with Ulcer of left foot, unspecified ulcer stage (Multi).    Subjective   Interval History:   Room not entered-limit exposure  Patient observed, resting comfortably  Afebrile  Remains on room air    Review of Systems   All other systems reviewed and are negative.      Objective   Range of Vitals (last 24 hours)  Heart Rate:  [51-57]   Temp:  [36.2 °C (97.2 °F)-36.5 °C (97.7 °F)]   Resp:  [17-18]   BP: ()/(46-75)   SpO2:  [95 %-100 %]   Daily Weight  12/27/24 : 59 kg (130 lb)    Body mass index is 20.98 kg/m².    Physical Exam  Awake, resting comfortably    Antibiotics  piperacillin-tazobactam - 3.375 gram/50 mL  vancomycin - 1 gram/200 mL    Relevant Results  Labs  Results from last 72 hours   Lab Units 12/29/24  0532 12/28/24  0529 12/27/24  1205   WBC AUTO x10*3/uL 6.4 7.4 5.4   HEMOGLOBIN g/dL 10.2* 9.1* 10.6*   HEMATOCRIT % 33.1* 29.8* 33.5*   PLATELETS AUTO x10*3/uL 245 212 235   NEUTROS PCT AUTO %  --   --  74.0   LYMPHS PCT AUTO %  --   --  15.8   MONOS PCT AUTO %  --   --  7.6   EOS PCT AUTO %  --   --  1.1     Results from last 72 hours   Lab Units 12/29/24  0532 12/28/24  0529 12/27/24  1205   SODIUM mmol/L 135* 133* 128*   POTASSIUM mmol/L 3.5 3.7 4.6   CHLORIDE mmol/L 101 98 91*   CO2 mmol/L 24 27 31   BUN mg/dL 12 12 11   CREATININE mg/dL 0.86 0.83 0.76   GLUCOSE mg/dL 77 77 419*   CALCIUM mg/dL 7.9* 8.0* 8.5*   ANION GAP mmol/L 14 12 11   EGFR mL/min/1.73m*2 62 65 72     Results from last 72 hours   Lab Units 12/27/24  1205   ALK PHOS U/L 107   BILIRUBIN TOTAL mg/dL 0.5   PROTEIN TOTAL g/dL 6.1*   ALT U/L 8   AST U/L 13   ALBUMIN g/dL 3.2*     Estimated Creatinine Clearance: 36.4 mL/min (by C-G formula based on SCr of 0.86 mg/dL).  C-Reactive Protein   Date Value Ref Range Status   12/27/2024 8.56 (H) <1.00 mg/dL Final     Microbiology  Susceptibility data from last 90 days.  Collected Specimen Info Organism Aztreonam Cefepime  Ceftazidime Ciprofloxacin Clindamycin Erythromycin Levofloxacin Oxacillin Piperacillin/Tazobactam Tetracycline Tobramycin Trimethoprim/Sulfamethoxazole Vancomycin   12/28/24 Tissue/Biopsy from Wound/Tissue Staphylococcus aureus                11/18/24 Swab from Anterior Nares Methicillin Resistant Staphylococcus aureus (MRSA)                11/05/24 Tissue/Biopsy from Wound/Tissue Pseudomonas aeruginosa  S  S  S  S    I   S   S       Methicillin Resistant Staphylococcus aureus (MRSA)      R  R   R   R   R  S     Mixed Gram-Negative Bacteria                     Imaging  MR ankle left wo IV contrast    Result Date: 12/28/2024  Interpreted By:  Davis Portillo, STUDY: MRI of the left ankle without IV contrast;   INDICATION: Signs/Symptoms:concern for calcaneal osteomyelitis     COMPARISON: 12/27/2024   ACCESSION NUMBER(S): [id number]   ORDERING CLINICIAN: RAJESH ROBB   TECHNIQUE: Multiplanar multisequence MRI of the  left ankle was performed without IV contrast. Please note that coronal images were not obtained due to patient early making the examination.   FINDINGS: Soft tissues: There is a dorsal tissue ulcer at the level of the calcaneus with associated subjacent soft tissue edema, suggestive of cellulitis. No discrete fluid collection to suggest abscess. There is subcutaneous soft tissue edema about the posterior ankle and within Kager's fat pad.   Bones:  No abnormal loss of T1 marrow signal, edema, or enhancement to suggest reactive osteitis or osteomyelitis.   Muscles:  Edema and atrophy of the plantar foot musculature, compatible with diabetic myopathy and/or myositis.   Miscellaneous:  Visualized tendons and Lisfranc ligament are grossly intact.       Calcaneal soft tissue ulcer in cellulitis without evidence to suggest osteomyelitis.   MACRO: None   Signed by: Davis Portillo 12/28/2024 10:17 PM Dictation workstation:   [id number]    XR chest 2 views    Result Date: 12/28/2024  Interpreted By:  Sarah Sanchez,  STUDY: XR CHEST 2 VIEWS;  12/27/2024 5:26 pm   INDICATION: Signs/Symptoms:cough and congestion.   COMPARISON: 06/10/2021   ACCESSION NUMBER(S): [id number]   ORDERING CLINICIAN: LEONOR QUINTEROS   FINDINGS: The heart is normal in size. There is tortuosity of the aorta. There is a suggestion of a hiatal hernia.   The lungs are hyperinflated. There is no discrete consolidation or pleural fluid.   Upper mediastinum appears somewhat widened. This may be due to fat.   The bones are osteoporotic. There are degenerative changes of the shoulders.   There is suggestion of a small hiatal hernia.   COMPARISON OF FINDING: The chest is similar.       No acute cardiopulmonary disease.   MACRO: none   Signed by: Sarah Sanchez 12/28/2024 7:29 AM Dictation workstation:   [id number]    MR foot left wo IV contrast    Result Date: 12/27/2024  Interpreted By:  Tra Sanchez, STUDY: MR FOOT LEFT WO IV CONTRAST;   INDICATION: Signs/Symptoms:Diabetic foot wound, concern for osteomyelitis.   COMPARISON: Plain film radiographs of the left foot performed on December 27, 2024   ACCESSION NUMBER(S): [id number]   ORDERING CLINICIAN: LEONOR QUINTEROS   TECHNIQUE: Routine multiplanar multisequential MRI of the left foot without contrast. Field-of-view limited the forefoot.     FINDINGS: Dorsal subcutaneous edema suggesting cellulitis.   There is no evidence of marrow edema or marrow replacement process.   No findings highly suggestive of osteomyelitis.   No discrete fluid collections to suggest abscess.   No evidence of acute fracture.   Mild degenerative changes.   No evidence of acute tendon tear.   Some atrophy of the intrinsic foot musculature likely chronic ischemic myopathy or disuse.   No evidence of a soft tissue mass.       MRI of the left forefoot shows no findings suggestive of osteomyelitis.   Signed by: Tra Sanchez 12/27/2024 5:12 PM Dictation workstation:   [id number]    XR foot left 3+ views    Result Date: 12/27/2024  STUDY: Foot  Radiographs; 12/27/2024 12:39PM INDICATION: Foot wound, concern for osteomyelitis near the heel. COMPARISON: None available. ACCESSION NUMBER(S): [id number] ORDERING CLINICIAN: JACQUELINE THORNTON TECHNIQUE:  Three view(s) of the left foot. FINDINGS:  There is no displaced fracture.  The alignment is anatomic. Ulceration noted posterior to the calcaneus measuring 1.5 cm.  No definite soft tissue gas or osseous erosions.  Underlying osteopenia. Soft tissue swelling noted.  Vascular calcifications noted.    Ulceration noted posterior to the calcaneus measuring 1.5 cm. No definite soft tissue gas or osseous erosions. Signed by Miles Cummings MD    Vascular US ankle brachial index (MEETA) without exercise    Result Date: 12/20/2024           [place] [address] Tel [phone] and Fax [phone]  Vascular Lab Report [facility] US ANKLE BRACHIAL INDEX (MEETA) WITHOUT EXERCISE  Patient Name:     DESTINI Marie Physician: [id number] Josh Bales MD Study Date:       12/17/2024          Ordering           [id number] LIDIA MAGAÑA                                       Physician:         LUIS MRN/PID:          [id number]            Technologist:      Nkechi Mustafa [facility] Accession#:       [id number]        Technologist 2: Date of           5/16/1929 / 89      Encounter#:        [id number] Birth/Age:        years Gender:           F Admission Status: Outpatient          Location           [facility]                                       Performed:  Diagnosis/ICD: Peripheral vascular disease, unspecified-I73.9 CPT Codes:     01196 Peripheral artery MEETA Only  CONCLUSIONS: Right Lower PVR: Evidence of mild arterial occlusive disease in the right lower extremity at rest. Right pressures of >220 mmHg suggest no compressibility of vessels and may make absolute Segmental Limb Pressures (SLP) unreliable. Decreased digital perfusion noted. Monophasic flow is noted in the right posterior tibial artery  and right dorsalis pedis artery. Multiphasic flow is noted in the right common femoral artery. Disease called by PVR tracing and Doppler waveform. Left Lower PVR: Evidence of mild arterial occlusive disease in the left lower extremity at rest. Left pressures of >220 mmHg suggest no compressibility of vessels and may make absolute Segmental Limb Pressures (SLP) unreliable. Decreased digital perfusion noted. Monophasic flow is noted in the left posterior tibial artery and left dorsalis pedis artery. Multiphasic flow is noted in the left common femoral artery. Disease called by PVR tracing and Doppler waveform.  Comparison: Compared with study from 7/24/2024, no significant change.  Imaging & Doppler Findings:  RIGHT Lower PVR                Pressures Ratios Right Posterior Tibial (Ankle) 255 mmHg  1.89 Right Dorsalis Pedis (Ankle)   255 mmHg  1.89 Right Digit (Great Toe)        78 mmHg   0.58   LEFT Lower PVR                Pressures Ratios Left Posterior Tibial (Ankle) 255 mmHg  1.89 Left Dorsalis Pedis (Ankle)   255 mmHg  1.89 Left Digit (Great Toe)        52 mmHg   0.39                     Right     Left Brachial Pressure 135 mmHg 133 mmHg   [id number] Josh Bales MD Electronically signed by [id number] Josh Bales MD on 12/20/2024 at 5:17:51 PM  ** Final **         Assessment/Plan   Unstageable left heel ulcer  Left leg cellulitis-history of positive wound culture for MRSA and Pseudomonas  Right anterior leg ulcer-wound culture growing Staph aureus  Type 2 diabetes mellitus  Peripheral vascular disease  Coronavirus infection-on room air     IV vancomycin-monitor levels   IV Zosyn  Remdesivir, risk reduction  Follow-up wound culture  Monitor oxygenation  Monitor temp and wbc   Follow-up blood cultures  Local care  Supportive care  Podiatry follow-up  Monitor temperature and WBC  Droplet plus precautions      Kristian Montelongo MD

## 2024-12-29 NOTE — PROGRESS NOTES
Apoorva Sanchez is a 89 y.o. female on day 1 of admission presenting with Ulcer of left foot, unspecified ulcer stage (Multi).    Subjective   Interval History:   Patient seen and examined  Interval positive coronavirus infection  Remains on room air  Nonproductive cough  Interval evaluation by vascular surgery-note reviewed          Review of Systems   All other systems reviewed and are negative.      Objective   Range of Vitals (last 24 hours)  Heart Rate:  [53-80]   Temp:  [36.3 °C (97.3 °F)-36.6 °C (97.9 °F)]   Resp:  [18-20]   BP: ()/(46-60)   SpO2:  [93 %-99 %]   Daily Weight  12/27/24 : 59 kg (130 lb)    Body mass index is 20.98 kg/m².    Physical Exam  Constitutional:       Comments: Frail, thin appearing    HENT:      Head: Normocephalic and atraumatic.      Comments: Hard of hearing      Nose: Nose normal.      Mouth/Throat:      Mouth: Mucous membranes are moist.      Pharynx: Oropharynx is clear.   Eyes:      General: No scleral icterus.  Cardiovascular:      Rate and Rhythm: Normal rate and regular rhythm.   Pulmonary:      Effort: Pulmonary effort is normal.      Breath sounds: Rhonchi present.   Abdominal:      General: Bowel sounds are normal.      Palpations: Abdomen is soft.   Musculoskeletal:         General: Normal range of motion.      Cervical back: Normal range of motion and neck supple.   Skin:     General: Skin is warm and dry.      Comments: Right lower extremity with anterior wound with large amount of granulation tissue, no periwound erythema no purulent drainage noted.  Right heel pressure injury with swelling and erythema.  Left heel unstageable with eschar, left foot and leg erythema, swelling.  Multiple abrasions, ecchymosis to bilateral lower extremities.     Neurological:      General: No focal deficit present.      Mental Status: She is alert.   Psychiatric:         Mood and Affect: Mood normal.         Behavior: Behavior normal.     Antibiotics  piperacillin-tazobactam - 3.375  gram/50 mL  vancomycin - 1 gram/200 mL    Relevant Results  Labs  Results from last 72 hours   Lab Units 12/28/24  0529 12/27/24  1205   WBC AUTO x10*3/uL 7.4 5.4   HEMOGLOBIN g/dL 9.1* 10.6*   HEMATOCRIT % 29.8* 33.5*   PLATELETS AUTO x10*3/uL 212 235   NEUTROS PCT AUTO %  --  74.0   LYMPHS PCT AUTO %  --  15.8   MONOS PCT AUTO %  --  7.6   EOS PCT AUTO %  --  1.1     Results from last 72 hours   Lab Units 12/28/24  0529 12/27/24  1205   SODIUM mmol/L 133* 128*   POTASSIUM mmol/L 3.7 4.6   CHLORIDE mmol/L 98 91*   CO2 mmol/L 27 31   BUN mg/dL 12 11   CREATININE mg/dL 0.83 0.76   GLUCOSE mg/dL 77 419*   CALCIUM mg/dL 8.0* 8.5*   ANION GAP mmol/L 12 11   EGFR mL/min/1.73m*2 65 72     Results from last 72 hours   Lab Units 12/27/24  1205   ALK PHOS U/L 107   BILIRUBIN TOTAL mg/dL 0.5   PROTEIN TOTAL g/dL 6.1*   ALT U/L 8   AST U/L 13   ALBUMIN g/dL 3.2*     Estimated Creatinine Clearance: 37.8 mL/min (by C-G formula based on SCr of 0.83 mg/dL).  C-Reactive Protein   Date Value Ref Range Status   12/27/2024 8.56 (H) <1.00 mg/dL Final     Microbiology  Susceptibility data from last 90 days.  Collected Specimen Info Organism Aztreonam Cefepime Ceftazidime Ciprofloxacin Clindamycin Erythromycin Levofloxacin Oxacillin Piperacillin/Tazobactam Tetracycline Tobramycin Trimethoprim/Sulfamethoxazole Vancomycin   11/18/24 Swab from Anterior Nares Methicillin Resistant Staphylococcus aureus (MRSA)                11/05/24 Tissue/Biopsy from Wound/Tissue Pseudomonas aeruginosa  S  S  S  S    I   S   S       Methicillin Resistant Staphylococcus aureus (MRSA)      R  R   R   R   R  S     Mixed Gram-Negative Bacteria                     Imaging  XR chest 2 views    Result Date: 12/28/2024  Interpreted By:  Sarah Sanchez, STUDY: XR CHEST 2 VIEWS;  12/27/2024 5:26 pm   INDICATION: Signs/Symptoms:cough and congestion.   COMPARISON: 06/10/2021   ACCESSION NUMBER(S): [id number]   ORDERING CLINICIAN: LEONOR QUINTEROS   FINDINGS: The heart is  normal in size. There is tortuosity of the aorta. There is a suggestion of a hiatal hernia.   The lungs are hyperinflated. There is no discrete consolidation or pleural fluid.   Upper mediastinum appears somewhat widened. This may be due to fat.   The bones are osteoporotic. There are degenerative changes of the shoulders.   There is suggestion of a small hiatal hernia.   COMPARISON OF FINDING: The chest is similar.       No acute cardiopulmonary disease.   MACRO: none   Signed by: Sarah Sanchez 12/28/2024 7:29 AM Dictation workstation:   [id number]    MR foot left wo IV contrast    Result Date: 12/27/2024  Interpreted By:  Tra Sanchez, STUDY: MR FOOT LEFT WO IV CONTRAST;   INDICATION: Signs/Symptoms:Diabetic foot wound, concern for osteomyelitis.   COMPARISON: Plain film radiographs of the left foot performed on December 27, 2024   ACCESSION NUMBER(S): [id number]   ORDERING CLINICIAN: LEONOR QUINTEROS   TECHNIQUE: Routine multiplanar multisequential MRI of the left foot without contrast. Field-of-view limited the forefoot.     FINDINGS: Dorsal subcutaneous edema suggesting cellulitis.   There is no evidence of marrow edema or marrow replacement process.   No findings highly suggestive of osteomyelitis.   No discrete fluid collections to suggest abscess.   No evidence of acute fracture.   Mild degenerative changes.   No evidence of acute tendon tear.   Some atrophy of the intrinsic foot musculature likely chronic ischemic myopathy or disuse.   No evidence of a soft tissue mass.       MRI of the left forefoot shows no findings suggestive of osteomyelitis.   Signed by: Tra Sanchez 12/27/2024 5:12 PM Dictation workstation:   [id number]    XR foot left 3+ views    Result Date: 12/27/2024  STUDY: Foot Radiographs; 12/27/2024 12:39PM INDICATION: Foot wound, concern for osteomyelitis near the heel. COMPARISON: None available. ACCESSION NUMBER(S): [id number] ORDERING CLINICIAN: JACQUELINE THORNTON TECHNIQUE:  Three view(s) of  the left foot. FINDINGS:  There is no displaced fracture.  The alignment is anatomic. Ulceration noted posterior to the calcaneus measuring 1.5 cm.  No definite soft tissue gas or osseous erosions.  Underlying osteopenia. Soft tissue swelling noted.  Vascular calcifications noted.    Ulceration noted posterior to the calcaneus measuring 1.5 cm. No definite soft tissue gas or osseous erosions. Signed by Miles Cummings MD    Vascular US ankle brachial index (MEETA) without exercise    Result Date: 12/20/2024           [place] [address] Tel [phone] and Fax [phone]  Vascular Lab Report VASC US ANKLE BRACHIAL INDEX (MEETA) WITHOUT EXERCISE  Patient Name:     DESTINI Marie Physician: [id number] Josh Bales MD Study Date:       12/17/2024          Ordering           [id number] LIDIA MAGAÑA                                       Physician:         LUIS MRN/PID:          [id number]            Technologist:      Nkechi Mustafa [facility] Accession#:       [id number]        Technologist 2: Date of           5/16/1929 / 89      Encounter#:        [id number] Birth/Age:        years Gender:           F Admission Status: Outpatient          Location           [facility]                                       Performed:  Diagnosis/ICD: Peripheral vascular disease, unspecified-I73.9 CPT Codes:     35989 Peripheral artery MEETA Only  CONCLUSIONS: Right Lower PVR: Evidence of mild arterial occlusive disease in the right lower extremity at rest. Right pressures of >220 mmHg suggest no compressibility of vessels and may make absolute Segmental Limb Pressures (SLP) unreliable. Decreased digital perfusion noted. Monophasic flow is noted in the right posterior tibial artery and right dorsalis pedis artery. Multiphasic flow is noted in the right common femoral artery. Disease called by PVR tracing and Doppler waveform. Left Lower PVR: Evidence of mild arterial occlusive disease in the left  lower extremity at rest. Left pressures of >220 mmHg suggest no compressibility of vessels and may make absolute Segmental Limb Pressures (SLP) unreliable. Decreased digital perfusion noted. Monophasic flow is noted in the left posterior tibial artery and left dorsalis pedis artery. Multiphasic flow is noted in the left common femoral artery. Disease called by PVR tracing and Doppler waveform.  Comparison: Compared with study from 7/24/2024, no significant change.  Imaging & Doppler Findings:  RIGHT Lower PVR                Pressures Ratios Right Posterior Tibial (Ankle) 255 mmHg  1.89 Right Dorsalis Pedis (Ankle)   255 mmHg  1.89 Right Digit (Great Toe)        78 mmHg   0.58   LEFT Lower PVR                Pressures Ratios Left Posterior Tibial (Ankle) 255 mmHg  1.89 Left Dorsalis Pedis (Ankle)   255 mmHg  1.89 Left Digit (Great Toe)        52 mmHg   0.39                     Right     Left Brachial Pressure 135 mmHg 133 mmHg   [id number] Josh Bales MD Electronically signed by [id number] Josh Bales MD on 12/20/2024 at 5:17:51 PM  ** Final **      Assessment/Plan   Unstageable left heel ulcer  Left leg cellulitis-history of positive wound culture for MRSA and Pseudomonas  Right anterior leg ulcer with no overt signs of infection  Type 2 diabetes mellitus  Peripheral vascular disease  Coronavirus infection-on room air     IV vancomycin-monitor levels   IV Zosyn  Remdesivir, risk reduction  Sputum culture if able   Monitor oxygenation  Monitor temp and wbc   Follow-up blood cultures  Local care  Supportive care  Podiatry follow-up  Monitor temperature and WBC  Droplet plus precautions      Kristian Montelongo MD

## 2024-12-29 NOTE — PROGRESS NOTES
Performed chart check review of patient today and communicated with medicine team.    MRI of left foot and ankle without evidence of acute osteomyelitis.  Right lower extremity anterior leg ulceration wound culture no growth to date.  Interval vascular surgery evaluation, agrees with palliative wound care.  Wounds stable, NO plans for surgical debridement.  Continue with palliative wound care as ordered.  I will ensure nursing dressing orders are placed.  I will continue to follow peripherally this admission.  Continue offloading in heel protector boot.   Patient to follow back up with Dr. Joe Proctor DPM for outpatient care once discharged from [facility].    Osvaldo Lorenzo DPM

## 2024-12-29 NOTE — CARE PLAN
The patient's goals for the shift include      The clinical goals for the shift include monitor  labs

## 2024-12-29 NOTE — PROGRESS NOTES
Apoorva Sanchez is a 89 y.o. female on day 2 of admission presenting with Ulcer of left foot, unspecified ulcer stage (Multi).      Subjective   Patient seen and examined. Resting in bed. States she is feeling ok, no pain complaints. Afebrile.        Objective     Last Recorded Vitals  /75 (BP Location: Right arm, Patient Position: Lying)   Pulse 57   Temp 36.3 °C (97.3 °F) (Temporal)   Resp 17   Wt 59 kg (130 lb)   SpO2 98%   Intake/Output last 3 Shifts:    Intake/Output Summary (Last 24 hours) at 12/29/2024 1119  Last data filed at 12/29/2024 0928  Gross per 24 hour   Intake 1440 ml   Output 1850 ml   Net -410 ml       Admission Weight  Weight: 59 kg (130 lb) (12/27/24 1125)    Daily Weight  12/27/24 : 59 kg (130 lb)    Image Results  MR ankle left wo IV contrast  Narrative: Interpreted By:  Davis Portillo,   STUDY:  MRI of the left ankle without IV contrast;      INDICATION:  Signs/Symptoms:concern for calcaneal osteomyelitis          COMPARISON:  12/27/2024      ACCESSION NUMBER(S):  [id number]      ORDERING CLINICIAN:  RAJESH ROBB      TECHNIQUE:  Multiplanar multisequence MRI of the  left ankle was performed  without IV contrast. Please note that coronal images were not  obtained due to patient early making the examination.      FINDINGS:  Soft tissues: There is a dorsal tissue ulcer at the level of the  calcaneus with associated subjacent soft tissue edema, suggestive of  cellulitis. No discrete fluid collection to suggest abscess. There is  subcutaneous soft tissue edema about the posterior ankle and within  Kager's fat pad.      Bones:  No abnormal loss of T1 marrow signal, edema, or enhancement  to suggest reactive osteitis or osteomyelitis.      Muscles:  Edema and atrophy of the plantar foot musculature,  compatible with diabetic myopathy and/or myositis.      Miscellaneous:  Visualized tendons and Lisfranc ligament are grossly  intact.      Impression: Calcaneal soft tissue ulcer in cellulitis  without evidence to suggest  osteomyelitis.      MACRO:  None      Signed by: Davis Portillo 12/28/2024 10:17 PM  Dictation workstation:   [id number]  XR chest 2 views  Narrative: Interpreted By:  Sarah Sanchez,   STUDY:  XR CHEST 2 VIEWS;  12/27/2024 5:26 pm      INDICATION:  Signs/Symptoms:cough and congestion.      COMPARISON:  06/10/2021      ACCESSION NUMBER(S):  [id number]      ORDERING CLINICIAN:  LEONOR QUINTEROS      FINDINGS:  The heart is normal in size. There is tortuosity of the aorta. There  is a suggestion of a hiatal hernia.      The lungs are hyperinflated. There is no discrete consolidation or  pleural fluid.      Upper mediastinum appears somewhat widened. This may be due to fat.      The bones are osteoporotic. There are degenerative changes of the  shoulders.      There is suggestion of a small hiatal hernia.      COMPARISON OF FINDING:  The chest is similar.      Impression: No acute cardiopulmonary disease.      MACRO:  none      Signed by: Sarah Sanchez 12/28/2024 7:29 AM  Dictation workstation:   [id number]      Physical Exam    General: Alert, pleasant, in no acute distress. [place].  Cardiac: Regular rate and rhythm, S1/S2 , + murmur.   Pulmonary: Clear to auscultation on room air.   Abdomen: Soft, round, nontender. BS +x4.   Extremities: No edema. Warm to the touch.   Skin: Multiple wounds to BLE and feet. BLE dressings in place from foot up to the knee.     Relevant Results    Scheduled medications  aspirin, 81 mg, oral, Daily  atenolol, 100 mg, oral, Daily  cloNIDine, 0.4 mg, oral, Nightly  enoxaparin, 40 mg, subcutaneous, q24h  insulin glargine, 10 Units, subcutaneous, Nightly  insulin lispro, 0-10 Units, subcutaneous, TID AC  levothyroxine, 100 mcg, oral, Daily before breakfast  piperacillin-tazobactam, 3.375 g, intravenous, q6h  remdesivir, 100 mg, intravenous, q24h  simvastatin, 40 mg, oral, Nightly  sodium chloride, 500 mL, intravenous, Once  vancomycin, 1 g, intravenous,  q24h      Continuous medications     PRN medications  PRN medications: acetaminophen **OR** acetaminophen **OR** acetaminophen, albuterol, dextrose, dextrose, glucagon, glucagon, magnesium hydroxide, melatonin, ondansetron ODT **OR** ondansetron, vancomycin     Results for orders placed or performed during the hospital encounter of 12/27/24 (from the past 24 hours)   POCT GLUCOSE   Result Value Ref Range    POCT Glucose 186 (H) 74 - 99 mg/dL   Urinalysis with Reflex Culture and Microscopic   Result Value Ref Range    Color, Urine Light-Orange (N) Light-Yellow, Yellow, Dark-Yellow    Appearance, Urine Ex.Turbid (N) Clear    Specific Gravity, Urine 1.017 1.005 - 1.035    pH, Urine 7.0 5.0, 5.5, 6.0, 6.5, 7.0, 7.5, 8.0    Protein, Urine 50 (1+) (A) NEGATIVE, 10 (TRACE), 20 (TRACE) mg/dL    Glucose, Urine 100 (1+) (A) Normal mg/dL    Blood, Urine 0.1 (1+) (A) NEGATIVE    Ketones, Urine NEGATIVE NEGATIVE mg/dL    Bilirubin, Urine NEGATIVE NEGATIVE    Urobilinogen, Urine Normal Normal mg/dL    Nitrite, Urine NEGATIVE NEGATIVE    Leukocyte Esterase, Urine 500 Valerie/µL (A) NEGATIVE   Microscopic Only, Urine   Result Value Ref Range    WBC, Urine >50 (A) 1-5, NONE /HPF    WBC Clumps, Urine MANY Reference range not established. /HPF    RBC, Urine >20 (A) NONE, 1-2, 3-5 /HPF    Bacteria, Urine 2+ (A) NONE SEEN /HPF   POCT GLUCOSE   Result Value Ref Range    POCT Glucose 162 (H) 74 - 99 mg/dL   POCT GLUCOSE   Result Value Ref Range    POCT Glucose 158 (H) 74 - 99 mg/dL   CBC   Result Value Ref Range    WBC 6.4 4.4 - 11.3 x10*3/uL    nRBC 0.0 0.0 - 0.0 /100 WBCs    RBC 3.87 (L) 4.00 - 5.20 x10*6/uL    Hemoglobin 10.2 (L) 12.0 - 16.0 g/dL    Hematocrit 33.1 (L) 36.0 - 46.0 %    MCV 86 80 - 100 fL    MCH 26.4 26.0 - 34.0 pg    MCHC 30.8 (L) 32.0 - 36.0 g/dL    RDW 15.9 (H) 11.5 - 14.5 %    Platelets 245 150 - 450 x10*3/uL   Basic Metabolic Panel   Result Value Ref Range    Glucose 77 74 - 99 mg/dL    Sodium 135 (L) 136 - 145 mmol/L     Potassium 3.5 3.5 - 5.3 mmol/L    Chloride 101 98 - 107 mmol/L    Bicarbonate 24 21 - 32 mmol/L    Anion Gap 14 10 - 20 mmol/L    Urea Nitrogen 12 6 - 23 mg/dL    Creatinine 0.86 0.50 - 1.05 mg/dL    eGFR 62 >60 mL/min/1.73m*2    Calcium 7.9 (L) 8.6 - 10.3 mg/dL   POCT GLUCOSE   Result Value Ref Range    POCT Glucose 77 74 - 99 mg/dL           Assessment/Plan      Unstageable Left Heel Ulcer with LLE Cellulitis / Multiple Lower Extremity Wounds  -ID and Podiatry follows.    -Continue Vanco and Zosyn. She recently completed these abx as long term outpatient course (MRSA / Pseudomonas).   -Blood cultures pending.   -Local wound care, dressing change orders per podiatry.    -Pain control.   -PT/OT recommends moderate intensity therapy.    -MRI L foot shows no evidence of OM. MRI ankle shows cellulitis but no OM.   -Culture sent from the R shin wound, pending.      PVD  -Vascular saw, feels palliative wound management is best option for the patient.   -Recent PVRs done, she has poor blood flow.      COVID  -ID on board.   -She remains on room air with negative CXR.   -On Remdesivir.   -Isolation precautions.   -Supportive care.      Hyponatremia  -NA improved, up 135 today.   -Monitor.      UTI  -UA with large leuks, culture pending.   -On broad spectrum IV abx.      DM 2  -HgA1c 6.0 (11/15/24).   -Continue Lantus (started here, not on at home) and SSI coverage. She had elevated glucose in the 400's on admission.   -Holding oral agents at this time.  -Monitor blood sugars. Improved.      HTN  -Continue home meds, atenolol and clonidine.   -BP stable.  -Monitor.      HLD  -Continue Statin.      Hypothyroidism  -Continue levothyroxine.      DVT Risk  -Lovenox subcutaneous.      Plan  ID and Podiatry follows. Vascular signed off.   Continue IV abx.   MRI s with no evidence of OM.   Pain control.   Wound care per podiatry, plan for palliative wound care for now. No vascular procedure or intervention recommended.    Urine  culture pending.   Continue isolation for COVID, she remains stable on room air, now on remdesivir per ID.   PT/OT recommending moderate intensity therapy.         ALICIA Ryan-CNP

## 2024-12-30 LAB
ANION GAP SERPL CALCULATED.3IONS-SCNC: 9 MMOL/L (ref 10–20)
BACTERIA UR CULT: NORMAL
BUN SERPL-MCNC: 14 MG/DL (ref 6–23)
CALCIUM SERPL-MCNC: 7.6 MG/DL (ref 8.6–10.3)
CHLORIDE SERPL-SCNC: 100 MMOL/L (ref 98–107)
CO2 SERPL-SCNC: 29 MMOL/L (ref 21–32)
CREAT SERPL-MCNC: 0.8 MG/DL (ref 0.5–1.05)
EGFRCR SERPLBLD CKD-EPI 2021: 68 ML/MIN/1.73M*2
ERYTHROCYTE [DISTWIDTH] IN BLOOD BY AUTOMATED COUNT: 15.9 % (ref 11.5–14.5)
GLUCOSE BLD MANUAL STRIP-MCNC: 142 MG/DL (ref 74–99)
GLUCOSE BLD MANUAL STRIP-MCNC: 170 MG/DL (ref 74–99)
GLUCOSE BLD MANUAL STRIP-MCNC: 242 MG/DL (ref 74–99)
GLUCOSE BLD MANUAL STRIP-MCNC: 248 MG/DL (ref 74–99)
GLUCOSE BLD MANUAL STRIP-MCNC: 40 MG/DL (ref 74–99)
GLUCOSE SERPL-MCNC: 49 MG/DL (ref 74–99)
HCT VFR BLD AUTO: 28.5 % (ref 36–46)
HGB BLD-MCNC: 8.9 G/DL (ref 12–16)
MCH RBC QN AUTO: 26.2 PG (ref 26–34)
MCHC RBC AUTO-ENTMCNC: 31.2 G/DL (ref 32–36)
MCV RBC AUTO: 84 FL (ref 80–100)
NRBC BLD-RTO: 0 /100 WBCS (ref 0–0)
PLATELET # BLD AUTO: 288 X10*3/UL (ref 150–450)
POTASSIUM SERPL-SCNC: 3.3 MMOL/L (ref 3.5–5.3)
RBC # BLD AUTO: 3.4 X10*6/UL (ref 4–5.2)
SODIUM SERPL-SCNC: 135 MMOL/L (ref 136–145)
WBC # BLD AUTO: 7.2 X10*3/UL (ref 4.4–11.3)

## 2024-12-30 PROCEDURE — 82947 ASSAY GLUCOSE BLOOD QUANT: CPT

## 2024-12-30 PROCEDURE — 97110 THERAPEUTIC EXERCISES: CPT | Mod: GP,CQ

## 2024-12-30 PROCEDURE — 2500000004 HC RX 250 GENERAL PHARMACY W/ HCPCS (ALT 636 FOR OP/ED): Performed by: INTERNAL MEDICINE

## 2024-12-30 PROCEDURE — 97110 THERAPEUTIC EXERCISES: CPT | Mod: GO

## 2024-12-30 PROCEDURE — 1200000002 HC GENERAL ROOM WITH TELEMETRY DAILY

## 2024-12-30 PROCEDURE — 2500000001 HC RX 250 WO HCPCS SELF ADMINISTERED DRUGS (ALT 637 FOR MEDICARE OP): Performed by: NURSE PRACTITIONER

## 2024-12-30 PROCEDURE — 97530 THERAPEUTIC ACTIVITIES: CPT | Mod: GP,CQ

## 2024-12-30 PROCEDURE — 80048 BASIC METABOLIC PNL TOTAL CA: CPT | Performed by: NURSE PRACTITIONER

## 2024-12-30 PROCEDURE — 85027 COMPLETE CBC AUTOMATED: CPT | Performed by: NURSE PRACTITIONER

## 2024-12-30 PROCEDURE — 36415 COLL VENOUS BLD VENIPUNCTURE: CPT | Performed by: NURSE PRACTITIONER

## 2024-12-30 PROCEDURE — 2500000004 HC RX 250 GENERAL PHARMACY W/ HCPCS (ALT 636 FOR OP/ED): Performed by: NURSE PRACTITIONER

## 2024-12-30 PROCEDURE — 2500000005 HC RX 250 GENERAL PHARMACY W/O HCPCS: Performed by: NURSE PRACTITIONER

## 2024-12-30 PROCEDURE — 2500000002 HC RX 250 W HCPCS SELF ADMINISTERED DRUGS (ALT 637 FOR MEDICARE OP, ALT 636 FOR OP/ED): Performed by: NURSE PRACTITIONER

## 2024-12-30 PROCEDURE — 97116 GAIT TRAINING THERAPY: CPT | Mod: GP,CQ

## 2024-12-30 PROCEDURE — 97530 THERAPEUTIC ACTIVITIES: CPT | Mod: GO

## 2024-12-30 RX ADMIN — SIMVASTATIN 40 MG: 40 TABLET, FILM COATED ORAL at 20:43

## 2024-12-30 RX ADMIN — PIPERACILLIN SODIUM AND TAZOBACTAM SODIUM 3.38 G: 3; .375 INJECTION, SOLUTION INTRAVENOUS at 02:00

## 2024-12-30 RX ADMIN — ASPIRIN 81 MG: 81 TABLET, COATED ORAL at 09:00

## 2024-12-30 RX ADMIN — INSULIN LISPRO 2 UNITS: 100 INJECTION, SOLUTION INTRAVENOUS; SUBCUTANEOUS at 12:22

## 2024-12-30 RX ADMIN — PIPERACILLIN SODIUM AND TAZOBACTAM SODIUM 3.38 G: 3; .375 INJECTION, SOLUTION INTRAVENOUS at 20:43

## 2024-12-30 RX ADMIN — LEVOTHYROXINE SODIUM 100 MCG: 0.1 TABLET ORAL at 05:15

## 2024-12-30 RX ADMIN — ENOXAPARIN SODIUM 40 MG: 40 INJECTION SUBCUTANEOUS at 17:07

## 2024-12-30 RX ADMIN — CLONIDINE HYDROCHLORIDE 0.4 MG: 0.2 TABLET ORAL at 20:43

## 2024-12-30 RX ADMIN — REMDESIVIR 100 MG: 100 INJECTION, POWDER, LYOPHILIZED, FOR SOLUTION INTRAVENOUS at 17:07

## 2024-12-30 RX ADMIN — DEXTROSE MONOHYDRATE 12.5 G: 25 INJECTION, SOLUTION INTRAVENOUS at 07:15

## 2024-12-30 RX ADMIN — INSULIN LISPRO 6 UNITS: 100 INJECTION, SOLUTION INTRAVENOUS; SUBCUTANEOUS at 17:07

## 2024-12-30 RX ADMIN — PIPERACILLIN SODIUM AND TAZOBACTAM SODIUM 3.38 G: 3; .375 INJECTION, SOLUTION INTRAVENOUS at 14:28

## 2024-12-30 RX ADMIN — PIPERACILLIN SODIUM AND TAZOBACTAM SODIUM 3.38 G: 3; .375 INJECTION, SOLUTION INTRAVENOUS at 09:00

## 2024-12-30 ASSESSMENT — COGNITIVE AND FUNCTIONAL STATUS - GENERAL
CLIMB 3 TO 5 STEPS WITH RAILING: TOTAL
TOILETING: TOTAL
MOVING FROM LYING ON BACK TO SITTING ON SIDE OF FLAT BED WITH BEDRAILS: A LITTLE
DRESSING REGULAR UPPER BODY CLOTHING: A LOT
DRESSING REGULAR LOWER BODY CLOTHING: A LOT
TURNING FROM BACK TO SIDE WHILE IN FLAT BAD: A LITTLE
MOVING TO AND FROM BED TO CHAIR: A LOT
CLIMB 3 TO 5 STEPS WITH RAILING: TOTAL
MOBILITY SCORE: 12
EATING MEALS: A LITTLE
PERSONAL GROOMING: A LITTLE
DAILY ACTIVITIY SCORE: 13
DAILY ACTIVITIY SCORE: 15
STANDING UP FROM CHAIR USING ARMS: A LOT
MOBILITY SCORE: 12
DRESSING REGULAR UPPER BODY CLOTHING: A LITTLE
MOVING TO AND FROM BED TO CHAIR: A LOT
HELP NEEDED FOR BATHING: A LOT
MOBILITY SCORE: 13
MOVING FROM LYING ON BACK TO SITTING ON SIDE OF FLAT BED WITH BEDRAILS: A LITTLE
STANDING UP FROM CHAIR USING ARMS: A LOT
TURNING FROM BACK TO SIDE WHILE IN FLAT BAD: A LOT
TURNING FROM BACK TO SIDE WHILE IN FLAT BAD: A LOT
HELP NEEDED FOR BATHING: A LOT
TOILETING: A LOT
HELP NEEDED FOR BATHING: A LOT
TOILETING: A LOT
PERSONAL GROOMING: A LITTLE
DAILY ACTIVITIY SCORE: 15
CLIMB 3 TO 5 STEPS WITH RAILING: TOTAL
MOVING TO AND FROM BED TO CHAIR: A LOT
WALKING IN HOSPITAL ROOM: A LOT
WALKING IN HOSPITAL ROOM: A LOT
DRESSING REGULAR LOWER BODY CLOTHING: TOTAL
MOVING FROM LYING ON BACK TO SITTING ON SIDE OF FLAT BED WITH BEDRAILS: A LITTLE
DRESSING REGULAR UPPER BODY CLOTHING: A LOT
PERSONAL GROOMING: A LITTLE
STANDING UP FROM CHAIR USING ARMS: A LOT
DRESSING REGULAR LOWER BODY CLOTHING: A LOT
WALKING IN HOSPITAL ROOM: A LOT

## 2024-12-30 ASSESSMENT — PAIN SCALES - GENERAL
PAINLEVEL_OUTOF10: 0 - NO PAIN
PAINLEVEL_OUTOF10: 4

## 2024-12-30 ASSESSMENT — PAIN - FUNCTIONAL ASSESSMENT
PAIN_FUNCTIONAL_ASSESSMENT: 0-10
PAIN_FUNCTIONAL_ASSESSMENT: 0-10
PAIN_FUNCTIONAL_ASSESSMENT: WONG-BAKER FACES

## 2024-12-30 NOTE — PROGRESS NOTES
"Apoorva Sanchez is a 89 y.o. female on day 3 of admission presenting with Ulcer of left foot, unspecified ulcer stage (Multi).    Subjective   Patient up in chair eating lunch. Denies pain or sob.        Objective     Physical Exam  Constitutional:       Appearance: Normal appearance.   Cardiovascular:      Rate and Rhythm: Normal rate and regular rhythm.      Pulses: Normal pulses.      Heart sounds: Normal heart sounds.   Pulmonary:      Effort: Pulmonary effort is normal.      Breath sounds: Normal breath sounds.   Abdominal:      General: Bowel sounds are normal.      Palpations: Abdomen is soft.   Musculoskeletal:         General: Normal range of motion.   Skin:     General: Skin is warm and dry.   Neurological:      Mental Status: She is alert and oriented to person, place, and time.         Last Recorded Vitals  Blood pressure (!) 121/48, pulse 58, temperature 36.3 °C (97.3 °F), temperature source Temporal, resp. rate 18, height 1.676 m (5' 6\"), weight 59 kg (130 lb), SpO2 97%.  Intake/Output last 3 Shifts:  I/O last 3 completed shifts:  In: 1200 (20.4 mL/kg) [P.O.:800; IV Piggyback:400]  Out: 1650 (28 mL/kg) [Urine:1650 (0.8 mL/kg/hr)]  Weight: 59 kg     Relevant Results               Results for orders placed or performed during the hospital encounter of 12/27/24 (from the past 24 hours)   POCT GLUCOSE   Result Value Ref Range    POCT Glucose 185 (H) 74 - 99 mg/dL   CBC   Result Value Ref Range    WBC 7.2 4.4 - 11.3 x10*3/uL    nRBC 0.0 0.0 - 0.0 /100 WBCs    RBC 3.40 (L) 4.00 - 5.20 x10*6/uL    Hemoglobin 8.9 (L) 12.0 - 16.0 g/dL    Hematocrit 28.5 (L) 36.0 - 46.0 %    MCV 84 80 - 100 fL    MCH 26.2 26.0 - 34.0 pg    MCHC 31.2 (L) 32.0 - 36.0 g/dL    RDW 15.9 (H) 11.5 - 14.5 %    Platelets 288 150 - 450 x10*3/uL   Basic Metabolic Panel   Result Value Ref Range    Glucose 49 (LL) 74 - 99 mg/dL    Sodium 135 (L) 136 - 145 mmol/L    Potassium 3.3 (L) 3.5 - 5.3 mmol/L    Chloride 100 98 - 107 mmol/L    Bicarbonate " 29 21 - 32 mmol/L    Anion Gap 9 (L) 10 - 20 mmol/L    Urea Nitrogen 14 6 - 23 mg/dL    Creatinine 0.80 0.50 - 1.05 mg/dL    eGFR 68 >60 mL/min/1.73m*2    Calcium 7.6 (L) 8.6 - 10.3 mg/dL   POCT GLUCOSE   Result Value Ref Range    POCT Glucose 40 (L) 74 - 99 mg/dL   POCT GLUCOSE   Result Value Ref Range    POCT Glucose 142 (H) 74 - 99 mg/dL   POCT GLUCOSE   Result Value Ref Range    POCT Glucose 170 (H) 74 - 99 mg/dL   POCT GLUCOSE   Result Value Ref Range    POCT Glucose 242 (H) 74 - 99 mg/dL     *Note: Due to a large number of results and/or encounters for the requested time period, some results have not been displayed. A complete set of results can be found in Results Review.                      Assessment/Plan   Assessment & Plan  Ulcer of left foot, unspecified ulcer stage (Multi)  Vancomycin/Zosyn  Appreciate wound care recommendations  UA with reflex  Normal saline 500 cc over 5-hour  Follow blood cultures, no growth 2 days  Podiatry (Dr. Proctor) following  infectious diseases following  MRSA in wound culture  Peripheral vascular disease ([facility])  PVRs done 10 days ago show noncompressibility all the way up bilateral legs  Consult vascular surgery  Hyponatremia  Sodium 125 today  Consult nephrology  Type 2 diabetes mellitus with circulatory disorder, without long-term current use of insulin  Glucose on admission was 419  On last admission patient was hypoglycemic  Hold oral antidiabetics  Hypoglycemia protocol  Insulin sliding scale  Call for blood sugar greater than 250  Hypoglycemia today will hold 10 units of Lantus  Chest congestion  No respiratory distress, saturating 96% on room air  Chest Xray with no acute pulmonary disease  Get sputum culture if able  Patient is on broad-spectrum antibiotics  Infectious diseases is consulted  Primary hypertension  Continue home atenolol and clonidine  Mixed hyperlipidemia  Not currently on statin medication  On deep vein thrombosis (DVT)  prophylaxis  Lovenox  COVID  Tested COVID-positive  Droplet plus isolation  Plan of Care  Patient from SNF and plans to return there at discharge.  Plan of care discussed with patient and collaborating physician.          I spent 35 minutes in the professional and overall care of this patient.      Rea Saldivar, APRN-CNP

## 2024-12-30 NOTE — PROGRESS NOTES
12/30/24 1619   Discharge Planning   Living Arrangements Other (Comment)  (Enclave - Assisted Living)   Support Systems Children;Family members;Friends/neighbors   Assistance Needed A.L - Enclave   Type of Residence Private residence   Number of Stairs to Enter Residence 0   Number of Stairs Within Residence 0   Do you have animals or pets at home? No   Care Facility Name [facility]isted Living   Who is requesting discharge planning? Patient  (patient and family)   Home or Post Acute Services Post acute facilities (Rehab/SNF/etc)   Type of Post Acute Facility Services Rehab   Expected Discharge Disposition SNF   Does the patient need discharge transport arranged? Yes   Has discharge transport been arranged? No   Patient Choice   Provider Choice list and CMS website (https://medicare.gov/care-compare#search) for post-acute Quality and Resource Measure Data were provided and reviewed with: Family   Patient / Family choosing to utilize agency / facility established prior to hospitalization No     Moderate intensity is recommended by therapy.  Spoke to patient and daughter, Renata.   Patient is SEVERELY [place].  Both agreeable to SNF- choices received, [facility] and [facility].   Sent to [facility] for processing.   Medicare requirements have been met.   Waiting on medical clearance and acceptance from a facility.

## 2024-12-30 NOTE — ASSESSMENT & PLAN NOTE
Glucose on admission was 419  On last admission patient was hypoglycemic  Hold oral antidiabetics  Hypoglycemia protocol  Insulin sliding scale  Call for blood sugar greater than 250  Hypoglycemia today will hold 10 units of Lantus

## 2024-12-30 NOTE — PROGRESS NOTES
Apoorva Sanchez is a 89 y.o. female on day 3 of admission presenting with Ulcer of left foot, unspecified ulcer stage (Multi).    Subjective   Interval History:   Sitting up in chair  NO new complaints  Hard of hearing   Afebrile, no chills  Remains on room air    Objective   Range of Vitals (last 24 hours)  Heart Rate:  [51-62]   Temp:  [36.1 °C (97 °F)-36.6 °C (97.9 °F)]   Resp:  [17-19]   BP: ()/(45-73)   SpO2:  [90 %-100 %]   Daily Weight  12/27/24 : 59 kg (130 lb)    Body mass index is 20.98 kg/m².    Physical Exam  Constitutional:       Comments: Frail, thin appearing    HENT:      Head: Normocephalic and atraumatic.      Comments: Hard of hearing      Nose: Nose normal.      Mouth/Throat:      Mouth: Mucous membranes are moist.      Pharynx: Oropharynx is clear.   Eyes:      General: No scleral icterus.  Cardiovascular:      Rate and Rhythm: Normal rate and regular rhythm.   Pulmonary:      Effort: Pulmonary effort is normal.      Breath sounds: Rhonchi present.   Abdominal:      General: Bowel sounds are normal.      Palpations: Abdomen is soft.   Musculoskeletal:         General: Normal range of motion.      Cervical back: Normal range of motion and neck supple.   Skin:     General: Skin is warm and dry.      Comments: Right lower extremity with anterior wound with large amount of granulation tissue, no periwound erythema no purulent drainage noted.  Right heel pressure injury with swelling and erythema.  Left heel unstageable with eschar, left foot and leg erythema, swelling.  Multiple abrasions, ecchymosis to bilateral lower extremities.     Neurological:      General: No focal deficit present.      Mental Status: She is alert.   Psychiatric:         Mood and Affect: Mood normal.         Behavior: Behavior normal.     Antibiotics  piperacillin-tazobactam - 3.375 gram/50 mL  vancomycin - 1 gram/200 mL    Relevant Results  Labs  Results from last 72 hours   Lab Units 12/30/24  0611 12/29/24  8808  12/28/24  0529   WBC AUTO x10*3/uL 7.2 6.4 7.4   HEMOGLOBIN g/dL 8.9* 10.2* 9.1*   HEMATOCRIT % 28.5* 33.1* 29.8*   PLATELETS AUTO x10*3/uL 288 245 212     Results from last 72 hours   Lab Units 12/30/24  0611 12/29/24  0532 12/28/24  0529   SODIUM mmol/L 135* 135* 133*   POTASSIUM mmol/L 3.3* 3.5 3.7   CHLORIDE mmol/L 100 101 98   CO2 mmol/L 29 24 27   BUN mg/dL 14 12 12   CREATININE mg/dL 0.80 0.86 0.83   GLUCOSE mg/dL 49* 77 77   CALCIUM mg/dL 7.6* 7.9* 8.0*   ANION GAP mmol/L 9* 14 12   EGFR mL/min/1.73m*2 68 62 65           Estimated Creatinine Clearance: 39.2 mL/min (by C-G formula based on SCr of 0.8 mg/dL).  C-Reactive Protein   Date Value Ref Range Status   12/27/2024 8.56 (H) <1.00 mg/dL Final     Microbiology  Susceptibility data from last 90 days.  Collected Specimen Info Organism Aztreonam Cefepime Ceftazidime Ciprofloxacin Clindamycin Erythromycin Levofloxacin Oxacillin Piperacillin/Tazobactam Tetracycline Tobramycin Trimethoprim/Sulfamethoxazole Vancomycin   12/28/24 Tissue/Biopsy from Wound/Tissue Staphylococcus aureus                11/18/24 Swab from Anterior Nares Methicillin Resistant Staphylococcus aureus (MRSA)                11/05/24 Tissue/Biopsy from Wound/Tissue Pseudomonas aeruginosa  S  S  S  S    I   S   S       Methicillin Resistant Staphylococcus aureus (MRSA)      R  R   R   R   R  S     Mixed Gram-Negative Bacteria                     Imaging  MR ankle left wo IV contrast    Result Date: 12/28/2024  Interpreted By:  Davis Portillo, STUDY: MRI of the left ankle without IV contrast;   INDICATION: Signs/Symptoms:concern for calcaneal osteomyelitis     COMPARISON: 12/27/2024   ACCESSION NUMBER(S): [id number]   ORDERING CLINICIAN: RAJESH ROBB   TECHNIQUE: Multiplanar multisequence MRI of the  left ankle was performed without IV contrast. Please note that coronal images were not obtained due to patient early making the examination.   FINDINGS: Soft tissues: There is a dorsal tissue ulcer  at the level of the calcaneus with associated subjacent soft tissue edema, suggestive of cellulitis. No discrete fluid collection to suggest abscess. There is subcutaneous soft tissue edema about the posterior ankle and within Kager's fat pad.   Bones:  No abnormal loss of T1 marrow signal, edema, or enhancement to suggest reactive osteitis or osteomyelitis.   Muscles:  Edema and atrophy of the plantar foot musculature, compatible with diabetic myopathy and/or myositis.   Miscellaneous:  Visualized tendons and Lisfranc ligament are grossly intact.       Calcaneal soft tissue ulcer in cellulitis without evidence to suggest osteomyelitis.   MACRO: None   Signed by: Davis Portillo 12/28/2024 10:17 PM Dictation workstation:   [id number]    XR chest 2 views    Result Date: 12/28/2024  Interpreted By:  Sarah Sanchez, STUDY: XR CHEST 2 VIEWS;  12/27/2024 5:26 pm   INDICATION: Signs/Symptoms:cough and congestion.   COMPARISON: 06/10/2021   ACCESSION NUMBER(S): [id number]   ORDERING CLINICIAN: LEONOR QUINTEROS   FINDINGS: The heart is normal in size. There is tortuosity of the aorta. There is a suggestion of a hiatal hernia.   The lungs are hyperinflated. There is no discrete consolidation or pleural fluid.   Upper mediastinum appears somewhat widened. This may be due to fat.   The bones are osteoporotic. There are degenerative changes of the shoulders.   There is suggestion of a small hiatal hernia.   COMPARISON OF FINDING: The chest is similar.       No acute cardiopulmonary disease.   MACRO: none   Signed by: Sarah Sanchez 12/28/2024 7:29 AM Dictation workstation:   [id number]    MR foot left wo IV contrast    Result Date: 12/27/2024  Interpreted By:  Tra Sanchez, STUDY: MR FOOT LEFT WO IV CONTRAST;   INDICATION: Signs/Symptoms:Diabetic foot wound, concern for osteomyelitis.   COMPARISON: Plain film radiographs of the left foot performed on December 27, 2024   ACCESSION NUMBER(S): [id number]   ORDERING CLINICIAN: LEONOR  ALDAIR   TECHNIQUE: Routine multiplanar multisequential MRI of the left foot without contrast. Field-of-view limited the forefoot.     FINDINGS: Dorsal subcutaneous edema suggesting cellulitis.   There is no evidence of marrow edema or marrow replacement process.   No findings highly suggestive of osteomyelitis.   No discrete fluid collections to suggest abscess.   No evidence of acute fracture.   Mild degenerative changes.   No evidence of acute tendon tear.   Some atrophy of the intrinsic foot musculature likely chronic ischemic myopathy or disuse.   No evidence of a soft tissue mass.       MRI of the left forefoot shows no findings suggestive of osteomyelitis.   Signed by: Tra Sanchez 12/27/2024 5:12 PM Dictation workstation:   [id number]    XR foot left 3+ views    Result Date: 12/27/2024  STUDY: Foot Radiographs; 12/27/2024 12:39PM INDICATION: Foot wound, concern for osteomyelitis near the heel. COMPARISON: None available. ACCESSION NUMBER(S): [id number] ORDERING CLINICIAN: JACQUELINE THORNTON TECHNIQUE:  Three view(s) of the left foot. FINDINGS:  There is no displaced fracture.  The alignment is anatomic. Ulceration noted posterior to the calcaneus measuring 1.5 cm.  No definite soft tissue gas or osseous erosions.  Underlying osteopenia. Soft tissue swelling noted.  Vascular calcifications noted.    Ulceration noted posterior to the calcaneus measuring 1.5 cm. No definite soft tissue gas or osseous erosions. Signed by Miles Cummings MD    Vascular US ankle brachial index (MEETA) without exercise    Result Date: 12/20/2024           [address] [address] Tel [phone] and Fax [phone]  Vascular Lab Report VAS US ANKLE BRACHIAL INDEX (MEETA) WITHOUT EXERCISE  Patient Name:     DESTINI Marie Physician: [id number] Josh Bales MD Study Date:       12/17/2024          Ordering           [id number] LIDIA MAGAÑA                                       Physician:         LUIS  MRN/PID:          [id number]            Technologist:      Nkechi Mustafa [facility] Accession#:       [id number]        Technologist 2: Date of           5/16/1929 / 95      Encounter#:        [id number] Birth/Age:        years Gender:           F Admission Status: Outpatient          Location           [facility]                                       Performed:  Diagnosis/ICD: Peripheral vascular disease, unspecified-I73.9 CPT Codes:     95404 Peripheral artery MEETA Only  CONCLUSIONS: Right Lower PVR: Evidence of mild arterial occlusive disease in the right lower extremity at rest. Right pressures of >220 mmHg suggest no compressibility of vessels and may make absolute Segmental Limb Pressures (SLP) unreliable. Decreased digital perfusion noted. Monophasic flow is noted in the right posterior tibial artery and right dorsalis pedis artery. Multiphasic flow is noted in the right common femoral artery. Disease called by PVR tracing and Doppler waveform. Left Lower PVR: Evidence of mild arterial occlusive disease in the left lower extremity at rest. Left pressures of >220 mmHg suggest no compressibility of vessels and may make absolute Segmental Limb Pressures (SLP) unreliable. Decreased digital perfusion noted. Monophasic flow is noted in the left posterior tibial artery and left dorsalis pedis artery. Multiphasic flow is noted in the left common femoral artery. Disease called by PVR tracing and Doppler waveform.  Comparison: Compared with study from 7/24/2024, no significant change.  Imaging & Doppler Findings:  RIGHT Lower PVR                Pressures Ratios Right Posterior Tibial (Ankle) 255 mmHg  1.89 Right Dorsalis Pedis (Ankle)   255 mmHg  1.89 Right Digit (Great Toe)        78 mmHg   0.58   LEFT Lower PVR                Pressures Ratios Left Posterior Tibial (Ankle) 255 mmHg  1.89 Left Dorsalis Pedis (Ankle)   255 mmHg  1.89 Left Digit (Great Toe)        52 mmHg   0.39                     Right     Left  Brachial Pressure 135 mmHg 133 mmHg   [id number] Josh Bales MD Electronically signed by [id number] Josh Bales MD on 12/20/2024 at 5:17:51 PM  ** Final **         Assessment/Plan   Unstageable left heel ulcer-MRI without evidence of osteomyelitis   Left leg cellulitis-history of positive wound culture for MRSA and Pseudomonas  Right anterior leg ulcer-wound culture growing Staph aureus  Type 2 diabetes mellitus  Peripheral vascular disease  Coronavirus infection-on room air     IV vancomycin-monitor levels   IV Zosyn  Remdesivir, risk reduction  Follow-up wound culture  Monitor oxygenation  Monitor temp and wbc   Follow-up blood cultures  Local care  Supportive care  Podiatry follow-up  Monitor temperature and WBC  Droplet plus precautions      Total time spent caring for the patient today was 20 minutes. This includes time spent before the visit reviewing the chart, time spent during the visit, and time spent after the visit on documentation.   Inez Brand, APRN-CNP

## 2024-12-30 NOTE — PROGRESS NOTES
Occupational Therapy    Occupational Therapy Treatment    Name: Apoorva Sanchez  MRN: [id number]  Department: [address]  Room: [address] [place] [address]  Date: 12/30/24  Time Calculation  Start Time: 1533  Stop Time: 1556  Time Calculation (min): 23 min    Assessment:  OT Assessment: Patient will continue to benefit from skilled OT to maximize safety and independence with daily tasks.  Prognosis: Good  Barriers to Discharge Home: Caregiver assistance  Caregiver Assistance: Caregiver assistance needed per identified barriers - however, level of patient's required assistance exceeds assistance available at home  Evaluation/Treatment Tolerance: Patient tolerated treatment well  End of Session Communication: Bedside nurse  End of Session Patient Position: Bed, 3 rail up, Alarm on  Plan:  Treatment Interventions: ADL retraining, Functional transfer training, UE strengthening/ROM, Endurance training, Patient/family training, Compensatory technique education  OT Frequency: 3 times per week  OT Discharge Recommendations: Moderate intensity level of continued care  Equipment Recommended upon Discharge: Wheeled walker  OT Recommended Transfer Status: Assist of 2  OT - OK to Discharge: Yes    Subjective   Previous Visit Info:  OT Last Visit  OT Received On: 12/30/24  General:  General  Reason for Referral: decline in ADLs, COVID+, ulcer of left foot  Referred By: Dr. Jaramillo  Past Medical History Relevant to Rehab: type 2 diabetes mellitus, peripheral vascular disease and hx chronic diabetic foot ulcers.  Family/Caregiver Present: No  Prior to Session Communication: Bedside nurse  Patient Position Received: Bed, 3 rail up, Alarm on  Preferred Learning Style: written, visual  General Comment: Patient cleared by nursing for therapy. Patient just returned to bed after sitting in the chair ~4 hours, agreeable to participate  Precautions:  Hearing/Visual Limitations: Hearing aids--without essentially deaf.  Medical Precautions: Fall  precautions  Precautions Comment: Skin Precautions, COVID+    Pain Assessment:  Pain Assessment  Pain Assessment: 0-10  0-10 (Numeric) Pain Score: 0 - No pain     Objective   Cognition:  Cognition Comments: communication via white board and visual. patient pleasant and cooperative    Bed Mobility/Transfers: Bed Mobility  Bed Mobility: Yes  Bed Mobility 1  Bed Mobility 1: Supine to sitting  Level of Assistance 1: Close supervision  Bed Mobility Comments 1: head of bed elevated  Bed Mobility 2  Bed Mobility  2: Sitting to supine  Level of Assistance 2: Close supervision  Bed Mobility Comments 2: effortful, able to complete    Transfers  Transfer:  (patient had just returned to bed)    Sitting Balance:  Static Sitting Balance  Static Sitting-Balance Support: Feet supported, No upper extremity supported  Static Sitting-Level of Assistance: Close supervision  Dynamic Sitting Balance  Dynamic Sitting-Balance Support: Feet supported, No upper extremity supported  Dynamic Sitting-Level of Assistance: Close supervision  Dynamic Sitting-Balance: Reaching across midline, Forward lean    Therapy/Activity: Therapeutic Exercise  Therapeutic Exercise Activity 1: seated EOB, patient completes 1x10 B UE AROM exercises. exercises include bicep curls, chest press, shoulder flex/ext, shoulder horizontal abduction/adduction, shoulder elevation/depression, shoulder reverse rolls, forearm supination/pronation, and wrist flex/ext.    Therapeutic Activity  Therapeutic Activity Performed: Yes  Therapeutic Activity 1: patient sits EOB ~18 minutes with Close Supervision and Fair+ balance    RUE   RUE : Within Functional Limits and LUE   LUE: Within Functional Limits    Outcome Measures:  [facility] Daily Activity  Putting on and taking off regular lower body clothing: Total  Bathing (including washing, rinsing, drying): A lot  Putting on and taking off regular upper body clothing: A little  Toileting, which includes using toilet, bedpan or urinal:  Total  Taking care of personal grooming such as brushing teeth: A little  Eating Meals: A little  Daily Activity - Total Score: 13    Education Documentation  Body Mechanics, taught by Ivy Gray OT at 12/30/2024  4:03 PM.  Learner: Patient  Readiness: Acceptance  Method: Explanation, Demonstration  Response: Needs Reinforcement, Verbalizes Understanding    Precautions, taught by Ivy Gray OT at 12/30/2024  4:03 PM.  Learner: Patient  Readiness: Acceptance  Method: Explanation, Demonstration  Response: Needs Reinforcement, Verbalizes Understanding    Home Exercise Program, taught by Ivy Gray OT at 12/30/2024  4:03 PM.  Learner: Patient  Readiness: Acceptance  Method: Explanation, Demonstration  Response: Needs Reinforcement, Verbalizes Understanding    Education Comments  No comments found.      Goals:  Encounter Problems       Encounter Problems (Active)       OT Goals       Pt. will complete all ADL tasks with MIN A to improve overall functioning and IND (Progressing)       Start:  12/28/24    Expected End:  01/11/25            Pt. will complete functional transfers using LRD with MOD A to improve IND (Progressing)       Start:  12/28/24    Expected End:  01/11/25            Pt. will demonstrate good understanding of BUE HEP to improve strength and endurance needed for ADL tasks (Progressing)       Start:  12/28/24    Expected End:  01/11/25

## 2024-12-30 NOTE — ASSESSMENT & PLAN NOTE
No respiratory distress, saturating 96% on room air  Chest Xray with no acute pulmonary disease  Get sputum culture if able  Patient is on broad-spectrum antibiotics  Infectious diseases is consulted

## 2024-12-30 NOTE — CONSULTS
"Nutrition Assessement Note    Nutrition Assessment    Reason for Assessment: Admission nursing screening    Reason for Hospital Admission:  Apoorva Sanchez is a 89 y.o. female who is admitted for L heel ulcer. Multiple BLE wounds. No evidence for acute OM or wound culture growth. Covid +, very [place].     Malnutrition Screening Tool (MST)  Have you recently lost weight without trying?: No  Weight Loss Score: 0  Have you been eating poorly because of a decreased appetite?: No  Malnutrition Score: 0  Nutrition Screen  Stage 3 or 4 Pressure Injury or Multiple Non-Healing Wounds: Yes (Comment)  Home Tube Feeding or Total Parenteral Nutrition (TPN): No  Dietitian Consult Needed: Yes (Comment)  Reason for Consult: wounds/malnourished/uncontrolled diabetes    Past Medical History:   Diagnosis Date    Accidental fall 03/19/2024    At risk for falling     Cellulitis     Diabetes mellitus (Multi)     Disease of thyroid gland     Fracture of pelvis (Multi) 03/19/2024    Hand fracture     Hypertension     Hypothyroidism     Injury of head 03/19/2024    Open wound of right lower extremity     Oth fracture of left pubis, init encntr for closed fracture 07/03/2022    PVD (peripheral vascular disease) (CMS-[facility])     Syncope 03/19/2024    Urinary tract infection     UTI (urinary tract infection)     Venous insufficiency     Weakness      Nutrition History:  Food and Nutrient History: eating well since admisison. will order low carb nutrition supplements and noah to aid in wound healing.  Energy Intake: Good > 75 %    Anthropometrics:  Ht: 167.6 cm (5' 6\"), Wt: 59 kg (130 lb), BMI: 20.99    Weight Change:  Daily Weight  12/27/24 : 59 kg (130 lb)  11/14/24 : 58.8 kg (129 lb 10.1 oz)  11/14/24 : 57.2 kg (126 lb)  11/13/24 : 58.6 kg (129 lb 3 oz)  10/24/24 : 58.6 kg (129 lb 3 oz)  07/23/24 : 56.8 kg (125 lb 3.5 oz)  07/11/24 : 54.4 kg (120 lb)  07/01/24 : 54.4 kg (120 lb)  03/21/24 : 55.3 kg (122 lb)  09/18/23 : 57.6 kg (127 lb)     Weight " History / % Weight Change: no significant wt loss noted in wt hx. possible 10# (8.3%) wt gain over the past ~5 months (7/11-12/27)  Significant Weight Loss: No    Nutrition Focused Physical Exam Findings: defer: covid isolation  Physical Findings (Nutrition Deficiency/Toxicity)  Skin: Positive (BLE wounds)    Nutrition Significant Labs:  Lab Results   Component Value Date    WBC 7.2 12/30/2024    HGB 8.9 (L) 12/30/2024    HCT 28.5 (L) 12/30/2024     12/30/2024    CHOL 94 (L) 03/06/2023    TRIG 72 03/06/2023    HDL 48 (L) 03/06/2023    ALT 8 12/27/2024    AST 13 12/27/2024     (L) 12/30/2024    K 3.3 (L) 12/30/2024     12/30/2024    CREATININE 0.80 12/30/2024    BUN 14 12/30/2024    CO2 29 12/30/2024    TSH 1.70 09/07/2023    INR 1.0 04/01/2020    HGBA1C 6.0 (H) 11/15/2024     Nutrition Specific Medications:  aspirin, 81 mg, oral, Daily  atenolol, 100 mg, oral, Daily  cloNIDine, 0.4 mg, oral, Nightly  enoxaparin, 40 mg, subcutaneous, q24h  [Held by provider] insulin glargine, 10 Units, subcutaneous, Nightly  insulin lispro, 0-10 Units, subcutaneous, TID AC  levothyroxine, 100 mcg, oral, Daily before breakfast  piperacillin-tazobactam, 3.375 g, intravenous, q6h  remdesivir, 100 mg, intravenous, q24h  simvastatin, 40 mg, oral, Nightly  sodium chloride, 500 mL, intravenous, Once  vancomycin, 1 g, intravenous, q24h      Dietary Orders (From admission, onward)       Start     Ordered    12/30/24 1016  Oral nutritional supplements  Until discontinued        Question Answer Comment   Deliver with Dinner    Deliver with Lunch    Select supplement: Ensure High Protein        12/30/24 1015    12/30/24 1015  Oral nutritional supplements  Until discontinued        Question Answer Comment   Deliver with Breakfast    Deliver with Dinner    Select supplement: Papito        12/30/24 1015    12/27/24 1732  May Participate in Room Service With Assistance  ( ROOM SERVICE MAY PARTICIPATE WITH ASSISTANCE)  Once         Question:  .  Answer:  Yes    12/27/24 1731    12/27/24 1420  Adult diet Regular  Diet effective now        Question:  Diet type  Answer:  Regular    12/27/24 1420                  Estimated Needs:   Estimated Energy Needs  Total Energy Estimated Needs (kCal):  (5014-6805)  Total Estimated Energy Need per Day (kCal/kg):  (30-35)  Method for Estimating Needs: actual wt    Estimated Protein Needs  Total Protein Estimated Needs (g):  (74-89)  Total Protein Estimated Needs (g/kg):  (1.25-1.5)  Method for Estimating Needs: actual wt    Estimated Fluid Needs  Method for Estimating Needs: 1 ml/kcal or per MD        Nutrition Diagnosis   Nutrition Diagnosis:  Malnutrition Diagnosis  Patient has Malnutrition Diagnosis:  (suspect malnutrition. hx severe chronic malnutrition r/t mild/moderate subcutaneous fat loss and muscle wasting when assessed by RD on 7/29/24.)    Nutrition Diagnosis  Patient has Nutrition Diagnosis: Yes  Diagnosis Status (1): New  Nutrition Diagnosis 1: Increased nutrient needs  Related to (1): increased demand for nutrients  As Evidenced by (1): wound healing       Nutrition Interventions/Recommendations   Nutrition Interventions and Recommendations:    Nutrition Prescription:  Individualized Nutrition Prescription Provided for : 2376-8769 kcals and 74-89g protein to be provided via diet and supplements    Nutrition Interventions:   Food and/or Nutrient Delivery Interventions  Interventions: Meals and snacks, Medical food supplement  Meals and Snacks: General healthful diet  Goal: provide as ordered  Medical Food Supplement: Commercial beverage  Goal: ensure high protein BID daily to provide 160 kcals and 16g protein each, noah BID to aid in wound healing    Education Documentation  No documentation found.           Nutrition Monitoring and Evaluation   Monitoring/Evaluation:   Food/Nutrient Related History Monitoring  Monitoring and Evaluation Plan: Energy intake  Energy Intake: Estimated energy  intake  Criteria: pt to consume >/= 75% estimated needs    Body Composition/Growth/Weight History  Monitoring and Evaluation Plan: Weight  Weight: Measured weight  Criteria: pt will maintain/gain non fluid related wt       Time Spent/Follow-up:   Follow Up  Time Spent (min): 30 minutes  Last Date of Nutrition Visit: 12/30/24  Nutrition Follow-Up Needed?: 5-7 days  Follow up Comment: 1/3/25

## 2024-12-30 NOTE — PROGRESS NOTES
Physical Therapy    Physical Therapy Treatment    Patient Name: Apoorva Sanchez  MRN: [id number]  Department: [address]  Room: [address] [place] [address]  Today's Date: 12/30/2024  Time Calculation  Start Time: 1115  Stop Time: 1154  Time Calculation (min): 39 min         Assessment/Plan   PT Assessment  PT Assessment Results: Decreased strength, Decreased range of motion, Decreased endurance, Impaired balance, Decreased mobility, Decreased coordination, Decreased cognition, Impaired judgement, Decreased safety awareness, Impaired hearing, Decreased skin integrity, Pain  Rehab Prognosis: Good  Barriers to Discharge Home: Caregiver assistance, Physical needs  Physical Needs: 24hr mobility assistance needed, 24hr ADL assistance needed  Evaluation/Treatment Tolerance: Patient tolerated treatment well  Medical Staff Made Aware: Yes  Strengths: Ability to acquire knowledge  Barriers to Participation: Comorbidities  End of Session Communication: Bedside nurse, PCT/NA/CTA  End of Session Patient Position: Up in chair, Alarm on     PT Plan  Treatment/Interventions: Bed mobility, Transfer training, Gait training, Balance training, Strengthening, Endurance training, Therapeutic exercise, Therapeutic activity  PT Plan: Ongoing PT  PT Frequency: 4 times per week  PT Discharge Recommendations: Moderate intensity level of continued care  Equipment Recommended upon Discharge: Wheeled walker  PT Recommended Transfer Status: Assist x2, Assistive device  PT - OK to Discharge: Yes      General Visit Information:   PT  Visit  PT Received On: 12/30/24  Response to Previous Treatment: Patient with no complaints from previous session.  General  Reason for Referral: Impaired Mobility  Referred By: Dr. Jaramillo  Past Medical History Relevant to Rehab: type 2 diabetes mellitus, peripheral vascular disease and hx chronic diabetic foot ulcers.  Prior to Session Communication: Bedside nurse  Patient Position Received: Bed, 3 rail up, Alarm on  Preferred Learning Style:  written, visual  General Comment: Pt agreeable to therapy.    Subjective   Precautions:  Precautions  Hearing/Visual Limitations: Hearing aids--without essentially deaf.  Medical Precautions: Fall precautions  Precautions Comment: Skin Precautions, COVID+      Objective   Pain:  Pain Assessment  Pain Assessment: Paula-Baker FACES  0-10 (Numeric) Pain Score: 4  Pain Type: Acute pain  Pain Location:  (Heels)  Pain Orientation: Right, Left  Pain Interventions:  (RN aware)  Response to Interventions: No change in pain  Cognition:  Cognition  Overall Cognitive Status: Within Functional Limits  Orientation Level: Oriented X4  Cognition Comments: Communication via white board.     Treatments:  Therapeutic Exercise  Therapeutic Exercise Performed: Yes  Therapeutic Exercise Activity 1: Pt performed seated bilat LE ankle pumps, heel raises, LAQ, hip flexion, mami hip adduction and resisted hip abduction x15 reps each.    Bed Mobility 1  Bed Mobility 1: Supine to sitting  Level of Assistance 1: Close supervision  Bed Mobility Comments 1: Head of bed elevated.    Ambulation/Gait Training  Ambulation/Gait Training Performed: Yes  Ambulation/Gait Training 1  Surface 1: Level tile  Device 1: Rolling walker  Assistance 1: Moderate assistance (x2)  Quality of Gait 1: Decreased step length, Inconsistent stride length, Narrow base of support, Shuffling gait  Comments/Distance (ft) 1: Pt ambulated with RW ~15' x2 to/from bathroom mod assist of 2 and assist to manage RW.    Transfer 1  Transfer From 1: Bed to  Transfer to 1: Stand  Technique 1: Sit to stand, Stand to sit  Transfer Device 1: Walker  Transfer Level of Assistance 1: Moderate assistance  Trials/Comments 1: x2 STS trials from edge of bed.  Transfers 2  Transfer From 2: Stand to  Transfer to 2: Sit, Toilet  Technique 2: Stand to sit  Transfer Device 2: Walker  Transfer Level of Assistance 2: Moderate assistance, Minimal tactile cues  Trials/Comments 2: Visual/tactile cues for  safe hand placement.  Transfers 3  Transfer From 3: Toilet to  Transfer to 3: Stand  Technique 3: Sit to stand  Transfer Device 3: Walker  Transfer Level of Assistance 3: Moderate assistance  Trials/Comments 3: Static stand at walker ~30 seconds, dependent for hygiene in standing. Min assist for static stand.  Transfers 4  Transfer From 4: Stand to  Transfer to 4: Sit, Chair with arms  Technique 4: Stand to sit  Transfer Device 4: Walker  Transfer Level of Assistance 4: Minimum assistance, Minimal tactile cues  Trials/Comments 4: Tactile cues for safe hand placement.    Outcome Measures:  [facility] Basic Mobility  Turning from your back to your side while in a flat bed without using bedrails: A little  Moving from lying on your back to sitting on the side of a flat bed without using bedrails: A little  Moving to and from bed to chair (including a wheelchair): A lot  Standing up from a chair using your arms (e.g. wheelchair or bedside chair): A lot  To walk in hospital room: A lot  Climbing 3-5 steps with railing: Total  Basic Mobility - Total Score: 13    Education Documentation  Mobility Training, taught by Shelby Benson PTA at 12/30/2024 12:56 PM.  Learner: Patient  Readiness: Acceptance  Method: Explanation  Response: Verbalizes Understanding    Education Comments  No comments found.        OP EDUCATION:       Encounter Problems       Encounter Problems (Active)       Balance       Sitting Balance (Progressing)       Start:  12/28/24    Expected End:  01/11/25       Pt will demonstrate good sitting balance to promote safe engagement with out of bed activities           Standing Balance (Progressing)       Start:  12/28/24    Expected End:  01/11/25       Pt will demonstrate good static standing balance to promote safe participation with out of bed activity, transfers, and mobility              Mobility       Ambulation (Progressing)       Start:  12/28/24    Expected End:  01/11/25       Pt will ambulate 50' modified  independent assist with walker to promote safe home mobility              PT Transfers       Supine to sit (Progressing)       Start:  12/28/24    Expected End:  01/11/25       Pt will transfer supine to sitting at edge of bed with modified independent assist to promote acute care out of bed activity           Sit to stand (Progressing)       Start:  12/28/24    Expected End:  01/11/25       Pt will transfer sit to standing position with modified independent assist and walker to promote safe out of bed activity           Bed to chair (Progressing)       Start:  12/28/24    Expected End:  01/11/25       Pt will transfer from sitting edge of bed to the chair with modified independent assist and walker to promote out of bed activity and reduce the risks of prolonged acute care bedrest              Pain - Adult          Safety       Safe Mobility Techniques (Progressing)       Start:  12/28/24    Expected End:  01/11/25       Pt will correctly identify and demonstrate safe mobility techniques to reduce their risks for falls during their acute care stay

## 2024-12-30 NOTE — CARE PLAN
Problem: Pain - Adult  Goal: Verbalizes/displays adequate comfort level or baseline comfort level  Outcome: Progressing     Problem: Safety - Adult  Goal: Free from fall injury  Outcome: Progressing     Problem: Discharge Planning  Goal: Discharge to home or other facility with appropriate resources  Outcome: Progressing     Problem: Chronic Conditions and Co-morbidities  Goal: Patient's chronic conditions and co-morbidity symptoms are monitored and maintained or improved  Outcome: Progressing     Problem: Fall/Injury  Goal: Not fall by end of shift  Outcome: Progressing  Goal: Be free from injury by end of the shift  Outcome: Progressing  Goal: Verbalize understanding of personal risk factors for fall in the hospital  Outcome: Progressing  Goal: Verbalize understanding of risk factor reduction measures to prevent injury from fall in the home  Outcome: Progressing  Goal: Use assistive devices by end of the shift  Outcome: Progressing  Goal: Pace activities to prevent fatigue by end of the shift  Outcome: Progressing     Problem: Nutrition  Goal: Oral intake greater 75%  Outcome: Progressing  Goal: Consume prescribed supplement  Outcome: Progressing  Goal: Adequate PO fluid intake  Outcome: Progressing  Goal: BG  mg/dL  Outcome: Progressing  Goal: Lab values WNL  Outcome: Progressing  Goal: Electrolytes WNL  Outcome: Progressing  Goal: Promote healing  Outcome: Progressing  Goal: Maintain stable weight  Outcome: Progressing     Problem: Skin  Goal: Decreased wound size/increased tissue granulation at next dressing change  Outcome: Progressing  Flowsheets (Taken 12/30/2024 0745)  Decreased wound size/increased tissue granulation at next dressing change: Promote sleep for wound healing  Goal: Participates in plan/prevention/treatment measures  Outcome: Progressing  Flowsheets (Taken 12/30/2024 0745)  Participates in plan/prevention/treatment measures: Elevate heels  Goal: Prevent/manage excess  moisture  Outcome: Progressing  Flowsheets (Taken 12/30/2024 0745)  Prevent/manage excess moisture: Monitor for/manage infection if present  Goal: Prevent/minimize sheer/friction injuries  Outcome: Progressing  Flowsheets (Taken 12/30/2024 0745)  Prevent/minimize sheer/friction injuries: Complete micro-shifts as needed if patient unable. Adjust patient position to relieve pressure points, not a full turn  Goal: Promote/optimize nutrition  Outcome: Progressing  Flowsheets (Taken 12/30/2024 0745)  Promote/optimize nutrition: Consume > 50% meals/supplements  Goal: Promote skin healing  Outcome: Progressing  Flowsheets (Taken 12/30/2024 0745)  Promote skin healing: Protective dressings over bony prominences     Problem: Diabetes  Goal: Achieve decreasing blood glucose levels by end of shift  Outcome: Progressing  Goal: Increase stability of blood glucose readings by end of shift  Outcome: Progressing  Goal: Maintain electrolyte levels within acceptable range throughout shift  Outcome: Progressing  Goal: Maintain glucose levels >70mg/dl to <250mg/dl throughout shift  Outcome: Progressing  Goal: No changes in neurological exam by end of shift  Outcome: Progressing  Goal: Learn about and adhere to nutrition recommendations by end of shift  Outcome: Progressing  Goal: Vital signs within normal range for age by end of shift  Outcome: Progressing

## 2024-12-30 NOTE — CONSULTS
Wound Care Consult     Visit Date: 12/30/2024      Patient Name: Apoorva Sanchez         MRN: [id number]           YOB: 1929    Consulted for wound care. A 89 y.o. female patient admitted for   Cellulitis of left foot [L03.116]  Ulcer of left foot, unspecified ulcer stage (Multi) [L97.529]   Past Medical History:   Diagnosis Date    Accidental fall 03/19/2024    At risk for falling     Cellulitis     Diabetes mellitus (Multi)     Disease of thyroid gland     Fracture of pelvis (Multi) 03/19/2024    Hand fracture     Hypertension     Hypothyroidism     Injury of head 03/19/2024    Open wound of right lower extremity     Oth fracture of left pubis, init encntr for closed fracture 07/03/2022    PVD (peripheral vascular disease) (CMS-[facility])     Syncope 03/19/2024    Urinary tract infection     UTI (urinary tract infection)     Venous insufficiency     Weakness       History reviewed. No pertinent surgical history.   Scheduled medications  aspirin, 81 mg, oral, Daily  atenolol, 100 mg, oral, Daily  cloNIDine, 0.4 mg, oral, Nightly  enoxaparin, 40 mg, subcutaneous, q24h  [Held by provider] insulin glargine, 10 Units, subcutaneous, Nightly  insulin lispro, 0-10 Units, subcutaneous, TID AC  levothyroxine, 100 mcg, oral, Daily before breakfast  piperacillin-tazobactam, 3.375 g, intravenous, q6h  remdesivir, 100 mg, intravenous, q24h  simvastatin, 40 mg, oral, Nightly  sodium chloride, 500 mL, intravenous, Once  vancomycin, 1 g, intravenous, q24h      Continuous medications     PRN medications  PRN medications: acetaminophen **OR** acetaminophen **OR** acetaminophen, albuterol, dextrose, dextrose, glucagon, glucagon, magnesium hydroxide, melatonin, ondansetron ODT **OR** ondansetron, vancomycin     Allergies   Allergen Reactions    Sulfa (Sulfonamide Antibiotics) Hives        Susceptibility data from last 90 days.  Collected Specimen Info Organism Aztreonam Cefepime Ceftazidime Ciprofloxacin Clindamycin Erythromycin  Levofloxacin Oxacillin Piperacillin/Tazobactam Tetracycline Tobramycin Trimethoprim/Sulfamethoxazole Vancomycin   12/28/24 Tissue/Biopsy from Wound/Tissue Staphylococcus aureus                11/18/24 Swab from Anterior Nares Methicillin Resistant Staphylococcus aureus (MRSA)                11/05/24 Tissue/Biopsy from Wound/Tissue Pseudomonas aeruginosa  S  S  S  S    I   S   S       Methicillin Resistant Staphylococcus aureus (MRSA)      R  R   R   R   R  S     Mixed Gram-Negative Bacteria                        Pertinent Labs:   Albumin   Date Value Ref Range Status   12/27/2024 3.2 (L) 3.4 - 5.0 g/dL Final       Wound Assessment:  Wound 07/23/24 Traumatic Pretibial Right (Active)   Wound Image    12/30/24 1300   Site Assessment Red;Purple 12/30/24 1152   Fior-Wound Assessment Clean;Moist  12/30/24 1152   Non-staged Wound Description Not applicable 12/30/24 0827   State of Healing Healing ridge 12/30/24 0827   Margins Unable to assess 12/30/24 0827   Drainage Description Serous 12/30/24 1152   Drainage Amount Scant 12/30/24 1152   Dressing ABD 12/30/24 0827   Dressing Changed Changed 12/30/24 1152   Dressing Status Dry;Clean;Occlusive 12/30/24 1152       Wound 12/30/24 Heel Left (Active)   Wound Image    12/30/24 1300   Drainage Description Serous 12/30/24 1300   Drainage Amount None 12/30/24 1300   Dressing Kerlix/rolled gauze 12/30/24 1300   Dressing Changed New 12/30/24 1300   Dressing Status Clean;Dry 12/30/24 1300       Wound 12/30/24 Tibial Dorsal;Left;Proximal;Lower (Active)   Wound Image    12/30/24 1300   Drainage Description Serous 12/30/24 1300   Drainage Amount Scant 12/30/24 1300   Dressing Kerlix/rolled gauze 12/30/24 1300   Dressing Changed Changed 12/30/24 1300   Dressing Status Dry;Clean 12/30/24 1300       Wound 12/27/24 Other (comment) Heel Right (Active)   Wound Image    12/30/24 1300   Drainage Description None 12/30/24 1300   Drainage Amount None 12/30/24 1300   Dressing Semi-permeable  membrane 12/30/24 1300   Dressing Changed Changed 12/30/24 1300   Dressing Status Dry;Clean 12/30/24 1300       Reason for Consult: wound assessment and dressing recommendations        Wound History: wounds are present on admission, Fall on November 14 resulted in wounds, patient is from a skilled nursing facility    Wound Team Assessment: Discussed case with Nikos Bill RN bedside nurse, all wounds are being managed by podiatry at this time. Avatar was cleaned up to reflect patient condition. See podiatry notes for more details.      Recommendations: defer to podiatry       Nikos Bill RN bedside nurse updated, continue pressure injury prevention interventions and nursing to continue to follow provider orders. Re consult wound RN PRN.    Liz MORALESN RN [facility]  12/30/2024  2:48 PM

## 2024-12-30 NOTE — ASSESSMENT & PLAN NOTE
Vancomycin/Zosyn  Appreciate wound care recommendations  UA with reflex  Normal saline 500 cc over 5-hour  Follow blood cultures, no growth 2 days  Podiatry (Dr. Proctor) following  infectious diseases following  MRSA in wound culture

## 2024-12-30 NOTE — CARE PLAN
The patient's goals for the shift include      The clinical goals for the shift include safety awareness, monitor O2 stats and respiratory efforts      Problem: Safety - Adult  Goal: Free from fall injury  Outcome: Progressing     Problem: Chronic Conditions and Co-morbidities  Goal: Patient's chronic conditions and co-morbidity symptoms are monitored and maintained or improved  Outcome: Progressing     Problem: Skin  Goal: Decreased wound size/increased tissue granulation at next dressing change  Outcome: Progressing  Flowsheets (Taken 12/29/2024 2258)  Decreased wound size/increased tissue granulation at next dressing change:   Promote sleep for wound healing   Protective dressings over bony prominences  Goal: Participates in plan/prevention/treatment measures  Outcome: Progressing  Flowsheets (Taken 12/28/2024 2019 by Santa Nath RN)  Participates in plan/prevention/treatment measures: Increase activity/out of bed for meals  Goal: Prevent/manage excess moisture  Outcome: Progressing  Flowsheets (Taken 12/29/2024 2258)  Prevent/manage excess moisture:   Cleanse incontinence/protect with barrier cream   Follow provider orders for dressing changes   Moisturize dry skin   Monitor for/manage infection if present  Goal: Prevent/minimize sheer/friction injuries  Outcome: Progressing  Flowsheets (Taken 12/29/2024 2258)  Prevent/minimize sheer/friction injuries:   Use pull sheet   Increase activity/out of bed for meals  Goal: Promote/optimize nutrition  Outcome: Progressing  Flowsheets (Taken 12/29/2024 2258)  Promote/optimize nutrition:   Monitor/record intake including meals   Offer water/supplements/favorite foods  Goal: Promote skin healing  Outcome: Progressing  Flowsheets (Taken 12/29/2024 2258)  Promote skin healing:   Assess skin/pad under line(s)/device(s)   Protective dressings over bony prominences

## 2024-12-30 NOTE — ASSESSMENT & PLAN NOTE
Tested COVID-positive  Droplet plus isolation  Plan of Care  Patient from SNF and plans to return there at discharge.  Plan of care discussed with patient and collaborating physician.

## 2024-12-31 ENCOUNTER — APPOINTMENT (OUTPATIENT)
Dept: CARDIOLOGY | Facility: HOSPITAL | Age: 89
End: 2024-12-31
Payer: MEDICARE

## 2024-12-31 LAB
ANION GAP SERPL CALCULATED.3IONS-SCNC: 11 MMOL/L (ref 10–20)
BACTERIA BLD CULT: NORMAL
BACTERIA BLD CULT: NORMAL
BACTERIA SPEC CULT: ABNORMAL
BACTERIA SPEC CULT: ABNORMAL
BUN SERPL-MCNC: 22 MG/DL (ref 6–23)
CALCIUM SERPL-MCNC: 7.9 MG/DL (ref 8.6–10.3)
CHLORIDE SERPL-SCNC: 101 MMOL/L (ref 98–107)
CO2 SERPL-SCNC: 26 MMOL/L (ref 21–32)
CREAT SERPL-MCNC: 0.83 MG/DL (ref 0.5–1.05)
EGFRCR SERPLBLD CKD-EPI 2021: 65 ML/MIN/1.73M*2
ERYTHROCYTE [DISTWIDTH] IN BLOOD BY AUTOMATED COUNT: 15.9 % (ref 11.5–14.5)
GLUCOSE BLD MANUAL STRIP-MCNC: 109 MG/DL (ref 74–99)
GLUCOSE BLD MANUAL STRIP-MCNC: 167 MG/DL (ref 74–99)
GLUCOSE BLD MANUAL STRIP-MCNC: 325 MG/DL (ref 74–99)
GLUCOSE BLD MANUAL STRIP-MCNC: 344 MG/DL (ref 74–99)
GLUCOSE SERPL-MCNC: 115 MG/DL (ref 74–99)
GRAM STN SPEC: ABNORMAL
GRAM STN SPEC: ABNORMAL
HCT VFR BLD AUTO: 31 % (ref 36–46)
HGB BLD-MCNC: 9.7 G/DL (ref 12–16)
MCH RBC QN AUTO: 26.6 PG (ref 26–34)
MCHC RBC AUTO-ENTMCNC: 31.3 G/DL (ref 32–36)
MCV RBC AUTO: 85 FL (ref 80–100)
NRBC BLD-RTO: 0 /100 WBCS (ref 0–0)
PLATELET # BLD AUTO: 311 X10*3/UL (ref 150–450)
POTASSIUM SERPL-SCNC: 4.1 MMOL/L (ref 3.5–5.3)
RBC # BLD AUTO: 3.65 X10*6/UL (ref 4–5.2)
SODIUM SERPL-SCNC: 134 MMOL/L (ref 136–145)
VANCOMYCIN SERPL-MCNC: 21.6 UG/ML (ref 5–20)
WBC # BLD AUTO: 6.9 X10*3/UL (ref 4.4–11.3)

## 2024-12-31 PROCEDURE — 87081 CULTURE SCREEN ONLY: CPT | Mod: TRILAB | Performed by: NURSE PRACTITIONER

## 2024-12-31 PROCEDURE — 02HV33Z INSERTION OF INFUSION DEVICE INTO SUPERIOR VENA CAVA, PERCUTANEOUS APPROACH: ICD-10-PCS | Performed by: INTERNAL MEDICINE

## 2024-12-31 PROCEDURE — 36573 INSJ PICC RS&I 5 YR+: CPT

## 2024-12-31 PROCEDURE — 82947 ASSAY GLUCOSE BLOOD QUANT: CPT

## 2024-12-31 PROCEDURE — 97110 THERAPEUTIC EXERCISES: CPT | Mod: GO,CO

## 2024-12-31 PROCEDURE — 2500000002 HC RX 250 W HCPCS SELF ADMINISTERED DRUGS (ALT 637 FOR MEDICARE OP, ALT 636 FOR OP/ED): Performed by: NURSE PRACTITIONER

## 2024-12-31 PROCEDURE — 2500000004 HC RX 250 GENERAL PHARMACY W/ HCPCS (ALT 636 FOR OP/ED): Mod: JZ

## 2024-12-31 PROCEDURE — 80048 BASIC METABOLIC PNL TOTAL CA: CPT | Performed by: NURSE PRACTITIONER

## 2024-12-31 PROCEDURE — 36415 COLL VENOUS BLD VENIPUNCTURE: CPT | Performed by: NURSE PRACTITIONER

## 2024-12-31 PROCEDURE — 2500000001 HC RX 250 WO HCPCS SELF ADMINISTERED DRUGS (ALT 637 FOR MEDICARE OP): Performed by: NURSE PRACTITIONER

## 2024-12-31 PROCEDURE — 97535 SELF CARE MNGMENT TRAINING: CPT | Mod: GO,CO

## 2024-12-31 PROCEDURE — 1200000002 HC GENERAL ROOM WITH TELEMETRY DAILY

## 2024-12-31 PROCEDURE — 80202 ASSAY OF VANCOMYCIN: CPT | Performed by: NURSE PRACTITIONER

## 2024-12-31 PROCEDURE — 2780000003 HC OR 278 NO HCPCS

## 2024-12-31 PROCEDURE — 2500000004 HC RX 250 GENERAL PHARMACY W/ HCPCS (ALT 636 FOR OP/ED): Performed by: NURSE PRACTITIONER

## 2024-12-31 PROCEDURE — C1751 CATH, INF, PER/CENT/MIDLINE: HCPCS

## 2024-12-31 PROCEDURE — 85027 COMPLETE CBC AUTOMATED: CPT | Performed by: NURSE PRACTITIONER

## 2024-12-31 RX ORDER — LIDOCAINE HYDROCHLORIDE 10 MG/ML
5 INJECTION, SOLUTION INFILTRATION; PERINEURAL ONCE
Status: DISCONTINUED | OUTPATIENT
Start: 2024-12-31 | End: 2025-01-02 | Stop reason: HOSPADM

## 2024-12-31 RX ADMIN — ASPIRIN 81 MG: 81 TABLET, COATED ORAL at 09:55

## 2024-12-31 RX ADMIN — VANCOMYCIN 1 G: 1 INJECTION, SOLUTION INTRAVENOUS at 00:04

## 2024-12-31 RX ADMIN — CLONIDINE HYDROCHLORIDE 0.4 MG: 0.2 TABLET ORAL at 20:42

## 2024-12-31 RX ADMIN — ENOXAPARIN SODIUM 40 MG: 40 INJECTION SUBCUTANEOUS at 16:08

## 2024-12-31 RX ADMIN — PIPERACILLIN SODIUM AND TAZOBACTAM SODIUM 3.38 G: 3; .375 INJECTION, SOLUTION INTRAVENOUS at 09:55

## 2024-12-31 RX ADMIN — SIMVASTATIN 40 MG: 40 TABLET, FILM COATED ORAL at 20:43

## 2024-12-31 RX ADMIN — PIPERACILLIN SODIUM AND TAZOBACTAM SODIUM 3.38 G: 3; .375 INJECTION, SOLUTION INTRAVENOUS at 14:22

## 2024-12-31 RX ADMIN — INSULIN LISPRO 8 UNITS: 100 INJECTION, SOLUTION INTRAVENOUS; SUBCUTANEOUS at 17:04

## 2024-12-31 RX ADMIN — PIPERACILLIN SODIUM AND TAZOBACTAM SODIUM 3.38 G: 3; .375 INJECTION, SOLUTION INTRAVENOUS at 02:32

## 2024-12-31 RX ADMIN — VANCOMYCIN 1 G: 1 INJECTION, SOLUTION INTRAVENOUS at 22:51

## 2024-12-31 RX ADMIN — ATENOLOL 100 MG: 50 TABLET ORAL at 09:55

## 2024-12-31 RX ADMIN — PIPERACILLIN SODIUM AND TAZOBACTAM SODIUM 3.38 G: 3; .375 INJECTION, SOLUTION INTRAVENOUS at 20:43

## 2024-12-31 RX ADMIN — LEVOTHYROXINE SODIUM 100 MCG: 0.1 TABLET ORAL at 05:52

## 2024-12-31 ASSESSMENT — COGNITIVE AND FUNCTIONAL STATUS - GENERAL
TOILETING: A LITTLE
PERSONAL GROOMING: A LITTLE
TOILETING: A LOT
DRESSING REGULAR LOWER BODY CLOTHING: A LITTLE
DRESSING REGULAR UPPER BODY CLOTHING: A LOT
CLIMB 3 TO 5 STEPS WITH RAILING: A LOT
MOVING TO AND FROM BED TO CHAIR: A LOT
DAILY ACTIVITIY SCORE: 15
STANDING UP FROM CHAIR USING ARMS: A LOT
DAILY ACTIVITIY SCORE: 20
STANDING UP FROM CHAIR USING ARMS: A LITTLE
DAILY ACTIVITIY SCORE: 13
MOBILITY SCORE: 20
TURNING FROM BACK TO SIDE WHILE IN FLAT BAD: A LOT
EATING MEALS: A LITTLE
MOBILITY SCORE: 11
DRESSING REGULAR LOWER BODY CLOTHING: A LOT
PERSONAL GROOMING: A LOT
WALKING IN HOSPITAL ROOM: A LOT
HELP NEEDED FOR BATHING: A LOT
EATING MEALS: A LITTLE
WALKING IN HOSPITAL ROOM: A LITTLE
TOILETING: A LOT
HELP NEEDED FOR BATHING: A LITTLE
CLIMB 3 TO 5 STEPS WITH RAILING: TOTAL
DRESSING REGULAR UPPER BODY CLOTHING: A LITTLE
DRESSING REGULAR LOWER BODY CLOTHING: A LOT
DRESSING REGULAR UPPER BODY CLOTHING: A LITTLE
HELP NEEDED FOR BATHING: A LOT
MOVING FROM LYING ON BACK TO SITTING ON SIDE OF FLAT BED WITH BEDRAILS: A LOT

## 2024-12-31 ASSESSMENT — PAIN - FUNCTIONAL ASSESSMENT
PAIN_FUNCTIONAL_ASSESSMENT: 0-10
PAIN_FUNCTIONAL_ASSESSMENT: 0-10

## 2024-12-31 ASSESSMENT — PAIN SCALES - GENERAL
PAINLEVEL_OUTOF10: 0 - NO PAIN

## 2024-12-31 NOTE — PROGRESS NOTES
12/31/24 1121   Discharge Planning   Expected Discharge Disposition SNF   Does the patient need discharge transport arranged? Yes   RoundTrip coordination needed? Yes   Has discharge transport been arranged? No     Pending ID - antibiotics.    Facilities won't accept until they know which antibiotics patient will discharge on.  Medicare requirements have been met.    Waiting on ID/ antibiotics and medical clearance.    1534- ATX= vanco and zosyn - [facility].  Bed available Friday.   If one comes available sooner they will notify us.  Medicare requirements have been met.    DC FRIDAY to [facility]

## 2024-12-31 NOTE — CARE PLAN
Problem: Skin  Goal: Decreased wound size/increased tissue granulation at next dressing change  Outcome: Progressing  Flowsheets (Taken 12/30/2024 2217)  Decreased wound size/increased tissue granulation at next dressing change:   Promote sleep for wound healing   Utilize specialty bed per algorithm   Protective dressings over bony prominences  Goal: Participates in plan/prevention/treatment measures  Outcome: Progressing  Flowsheets (Taken 12/30/2024 2217)  Participates in plan/prevention/treatment measures:   Increase activity/out of bed for meals   Discuss with provider PT/OT consult   Elevate heels  Goal: Prevent/manage excess moisture  Outcome: Progressing  Flowsheets (Taken 12/30/2024 2217)  Prevent/manage excess moisture:   Moisturize dry skin   Monitor for/manage infection if present   Follow provider orders for dressing changes   Cleanse incontinence/protect with barrier cream  Goal: Prevent/minimize sheer/friction injuries  Outcome: Progressing  Flowsheets (Taken 12/30/2024 2217)  Prevent/minimize sheer/friction injuries:   Utilize specialty bed per algorithm   Use pull sheet   Turn/reposition every 2 hours/use positioning/transfer devices   Increase activity/out of bed for meals   HOB 30 degrees or less   Complete micro-shifts as needed if patient unable. Adjust patient position to relieve pressure points, not a full turn  Goal: Promote/optimize nutrition  Outcome: Progressing  Flowsheets (Taken 12/30/2024 2217)  Promote/optimize nutrition:   Discuss with provider if NPO > 2 days   Monitor/record intake including meals   Consume > 50% meals/supplements  Goal: Promote skin healing  Outcome: Progressing  Flowsheets (Taken 12/30/2024 2217)  Promote skin healing:   Turn/reposition every 2 hours/use positioning/transfer devices   Rotate device position/do not position patient on device   Protective dressings over bony prominences   Assess skin/pad under line(s)/device(s)   Ensure correct size (line/device)  and apply per  instructions       The clinical goals for the shift include monitor , vitals and blood sugar

## 2024-12-31 NOTE — CARE PLAN
The patient's goals for the shift include      The clinical goals for the shift include monitor , vitals and blood sugar      Problem: Pain - Adult  Goal: Verbalizes/displays adequate comfort level or baseline comfort level  Outcome: Progressing     Problem: Safety - Adult  Goal: Free from fall injury  Outcome: Progressing     Problem: Fall/Injury  Goal: Not fall by end of shift  Outcome: Progressing  Goal: Be free from injury by end of the shift  Outcome: Progressing  Goal: Verbalize understanding of personal risk factors for fall in the hospital  Outcome: Progressing  Goal: Verbalize understanding of risk factor reduction measures to prevent injury from fall in the home  Outcome: Progressing  Goal: Use assistive devices by end of the shift  Outcome: Progressing  Goal: Pace activities to prevent fatigue by end of the shift  Outcome: Progressing

## 2024-12-31 NOTE — CARE PLAN
The patient's goals for the shift include      The clinical goals for the shift include monitor , vitals and blood sugar      Problem: Pain - Adult  Goal: Verbalizes/displays adequate comfort level or baseline comfort level  Outcome: Progressing

## 2024-12-31 NOTE — PROGRESS NOTES
Occupational Therapy    OT Treatment    Patient Name: Apoorva Sanchez  MRN: [id number]  Department: [address]  Room: [address] [place] [address]  Today's Date: 12/31/2024  Time Calculation  Start Time: 1110  Stop Time: 1141  Time Calculation (min): 31 min        Assessment:  OT Assessment: Session limited to chair, continue to advance mobility and self care tasks with next session.  Prognosis: Good  Barriers to Discharge Home: Caregiver assistance  Caregiver Assistance: Caregiver assistance needed per identified barriers - however, level of patient's required assistance exceeds assistance available at home  Evaluation/Treatment Tolerance: Patient tolerated treatment well  End of Session Communication: PCT/NA/CTA  End of Session Patient Position: Up in chair, Alarm on (all needs in reach)  OT Assessment Results: Decreased ADL status, Decreased upper extremity strength, Decreased safe judgment during ADL, Decreased endurance, Decreased functional mobility  Prognosis: Good  Barriers to Discharge: Inaccessible home environment  Evaluation/Treatment Tolerance: Patient tolerated treatment well  Strengths: Attitude of self, Ability to acquire knowledge  Plan:  Treatment Interventions: UE strengthening/ROM  OT Frequency: 3 times per week  OT Discharge Recommendations: Moderate intensity level of continued care  Equipment Recommended upon Discharge: Wheeled walker  OT Recommended Transfer Status: Assist of 2  OT - OK to Discharge: Yes  Treatment Interventions: UE strengthening/ROM    Subjective   Previous Visit Info:  OT Last Visit  OT Received On: 12/31/24  General:  General  Reason for Referral: decline in ADLs, COVID+, ulcer of left foot  Referred By: Dr. Jaramillo  Past Medical History Relevant to Rehab: type 2 diabetes mellitus, peripheral vascular disease and hx chronic diabetic foot ulcers.  Patient Position Received: Up in chair, Alarm on  General Comment: Agreeable to treatment, states self care completed earlier this  date.  Precautions:  Hearing/Visual Limitations: Hearing aids--without essentially deaf.  Medical Precautions: Fall precautions, Infection precautions  Precautions Comment: Skin Precautions, COVID+    Vital Signs (Past 2hrs)        Date/Time Vitals Session Patient Position Pulse Resp SpO2 BP MAP (mmHg)    12/31/24 1140 --  --  55  17  98 %  133/63  86                     Pain:  Pain Assessment  Pain Assessment: 0-10  0-10 (Numeric) Pain Score: 0 - No pain    Objective    Cognition:  Cognition  Overall Cognitive Status: Within Functional Limits  Bed Mobility/Transfers: Bed Mobility 1  Bed Mobility Comments 1: declined back to bed  Communication:  Communication: Yes  Phone Management: assist to order lunch due to pt unable to hear on phone.     Therapy/Activity: Therapeutic Exercise  Therapeutic Exercise Activity 1: scap protraction/retraction  Therapeutic Exercise Activity 2: shoulder IR/ER  Therapeutic Exercise Activity 3: bicep curls  Therapeutic Exercise Activity 4: 1 set x 15 reps, AROM, light resistance from therapist. Pt completes with good form, cues for correct joint alignment, moving through full ROM  Outcome Measures:[facility] Daily Activity  Putting on and taking off regular lower body clothing: A lot  Bathing (including washing, rinsing, drying): A lot  Putting on and taking off regular upper body clothing: A little  Toileting, which includes using toilet, bedpan or urinal: A lot  Taking care of personal grooming such as brushing teeth: A little  Eating Meals: A little  Daily Activity - Total Score: 15    Education Documentation  Home Exercise Program, taught by YULIA Michelle at 12/31/2024 11:59 AM.  Learner: Patient  Readiness: Acceptance  Method: Explanation, Demonstration  Response: Verbalizes Understanding, Demonstrated Understanding    Education Comments  No comments found.      Goals:  Encounter Problems       Encounter Problems (Active)       OT Goals       Pt. will complete all ADL tasks with MIN  A to improve overall functioning and IND (Progressing)       Start:  12/28/24    Expected End:  01/11/25            Pt. will complete functional transfers using LRD with MOD A to improve IND (Progressing)       Start:  12/28/24    Expected End:  01/11/25            Pt. will demonstrate good understanding of BUE HEP to improve strength and endurance needed for ADL tasks (Progressing)       Start:  12/28/24    Expected End:  01/11/25

## 2024-12-31 NOTE — PROGRESS NOTES
Apoorva Sanchez is a 89 y.o. female on day 4 of admission presenting with Ulcer of left foot, unspecified ulcer stage (Multi).    Subjective   Interval History:   Afebrile, no chills  Remains on room air  No new complaints   Wound culture reviewed   PICC line placed     Objective   Range of Vitals (last 24 hours)  Heart Rate:  [49-64]   Temp:  [36.3 °C (97.3 °F)-36.5 °C (97.7 °F)]   Resp:  [16-18]   BP: ()/(48-69)   SpO2:  [97 %-100 %]   Daily Weight  12/27/24 : 59 kg (130 lb)    Body mass index is 20.98 kg/m².    Physical Exam  Constitutional:       Comments: Frail, thin appearing    HENT:      Head: Normocephalic and atraumatic.      Comments: Hard of hearing      Nose: Nose normal.      Mouth/Throat:      Mouth: Mucous membranes are moist.      Pharynx: Oropharynx is clear.   Eyes:      General: No scleral icterus.  Cardiovascular:      Rate and Rhythm: Normal rate and regular rhythm.   Pulmonary:      Effort: Pulmonary effort is normal.      Breath sounds present.   Abdominal:      General: Bowel sounds are normal.      Palpations: Abdomen is soft.   Musculoskeletal:         General: Normal range of motion.      Cervical back: Normal range of motion and neck supple.   Skin:     General: Skin is warm and dry.      Comments: Right lower extremity dressing intact  Right heel pressure injury with swelling and erythema.  Left heel unstageable with eschar  Multiple abrasions, ecchymosis to bilateral lower extremities.     Neurological:      General: No focal deficit present.      Mental Status: She is alert.   Psychiatric:         Mood and Affect: Mood normal.         Behavior: Behavior normal.     Antibiotics  piperacillin-tazobactam - 3.375 gram/50 mL  vancomycin - 1 gram/200 mL    Relevant Results  Labs  Results from last 72 hours   Lab Units 12/31/24  0613 12/30/24  0611 12/29/24  0532   WBC AUTO x10*3/uL 6.9 7.2 6.4   HEMOGLOBIN g/dL 9.7* 8.9* 10.2*   HEMATOCRIT % 31.0* 28.5* 33.1*   PLATELETS AUTO x10*3/uL 311  288 245     Results from last 72 hours   Lab Units 12/31/24  0613 12/30/24  0611 12/29/24  0532   SODIUM mmol/L 134* 135* 135*   POTASSIUM mmol/L 4.1 3.3* 3.5   CHLORIDE mmol/L 101 100 101   CO2 mmol/L 26 29 24   BUN mg/dL 22 14 12   CREATININE mg/dL 0.83 0.80 0.86   GLUCOSE mg/dL 115* 49* 77   CALCIUM mg/dL 7.9* 7.6* 7.9*   ANION GAP mmol/L 11 9* 14   EGFR mL/min/1.73m*2 65 68 62           Estimated Creatinine Clearance: 37.8 mL/min (by C-G formula based on SCr of 0.83 mg/dL).  C-Reactive Protein   Date Value Ref Range Status   12/27/2024 8.56 (H) <1.00 mg/dL Final     Microbiology  Susceptibility data from last 90 days.  Collected Specimen Info Organism Aztreonam Cefepime Ceftazidime Ciprofloxacin Clindamycin Erythromycin Levofloxacin Oxacillin Piperacillin/Tazobactam Tetracycline Tobramycin Trimethoprim/Sulfamethoxazole Vancomycin   12/28/24 Tissue/Biopsy from Wound/Tissue Methicillin Resistant Staphylococcus aureus (MRSA)      S  R   R   R   R  S     Mixed Skin Microorganisms                 11/18/24 Swab from Anterior Nares Methicillin Resistant Staphylococcus aureus (MRSA)                11/05/24 Tissue/Biopsy from Wound/Tissue Pseudomonas aeruginosa  S  S  S  S    I   S   S       Methicillin Resistant Staphylococcus aureus (MRSA)      R  R   R   R   R  S     Mixed Gram-Negative Bacteria                     Imaging  MR ankle left wo IV contrast    Result Date: 12/28/2024  Interpreted By:  Davis Portillo, STUDY: MRI of the left ankle without IV contrast;   INDICATION: Signs/Symptoms:concern for calcaneal osteomyelitis     COMPARISON: 12/27/2024   ACCESSION NUMBER(S): [id number]   ORDERING CLINICIAN: RAJESH ROBB   TECHNIQUE: Multiplanar multisequence MRI of the  left ankle was performed without IV contrast. Please note that coronal images were not obtained due to patient early making the examination.   FINDINGS: Soft tissues: There is a dorsal tissue ulcer at the level of the calcaneus with associated  subjacent soft tissue edema, suggestive of cellulitis. No discrete fluid collection to suggest abscess. There is subcutaneous soft tissue edema about the posterior ankle and within Kager's fat pad.   Bones:  No abnormal loss of T1 marrow signal, edema, or enhancement to suggest reactive osteitis or osteomyelitis.   Muscles:  Edema and atrophy of the plantar foot musculature, compatible with diabetic myopathy and/or myositis.   Miscellaneous:  Visualized tendons and Lisfranc ligament are grossly intact.       Calcaneal soft tissue ulcer in cellulitis without evidence to suggest osteomyelitis.   MACRO: None   Signed by: Davis Portillo 12/28/2024 10:17 PM Dictation workstation:   [id number]    XR chest 2 views    Result Date: 12/28/2024  Interpreted By:  Sarah Sanchez, STUDY: XR CHEST 2 VIEWS;  12/27/2024 5:26 pm   INDICATION: Signs/Symptoms:cough and congestion.   COMPARISON: 06/10/2021   ACCESSION NUMBER(S): [id number]   ORDERING CLINICIAN: LEONOR QUINTEROS   FINDINGS: The heart is normal in size. There is tortuosity of the aorta. There is a suggestion of a hiatal hernia.   The lungs are hyperinflated. There is no discrete consolidation or pleural fluid.   Upper mediastinum appears somewhat widened. This may be due to fat.   The bones are osteoporotic. There are degenerative changes of the shoulders.   There is suggestion of a small hiatal hernia.   COMPARISON OF FINDING: The chest is similar.       No acute cardiopulmonary disease.   MACRO: none   Signed by: Sarah Sanchez 12/28/2024 7:29 AM Dictation workstation:   [id number]    MR foot left wo IV contrast    Result Date: 12/27/2024  Interpreted By:  Tra Sanchez, STUDY: MR FOOT LEFT WO IV CONTRAST;   INDICATION: Signs/Symptoms:Diabetic foot wound, concern for osteomyelitis.   COMPARISON: Plain film radiographs of the left foot performed on December 27, 2024   ACCESSION NUMBER(S): [id number]   ORDERING CLINICIAN: LEONOR QUINTEROS   TECHNIQUE: Routine multiplanar  multisequential MRI of the left foot without contrast. Field-of-view limited the forefoot.     FINDINGS: Dorsal subcutaneous edema suggesting cellulitis.   There is no evidence of marrow edema or marrow replacement process.   No findings highly suggestive of osteomyelitis.   No discrete fluid collections to suggest abscess.   No evidence of acute fracture.   Mild degenerative changes.   No evidence of acute tendon tear.   Some atrophy of the intrinsic foot musculature likely chronic ischemic myopathy or disuse.   No evidence of a soft tissue mass.       MRI of the left forefoot shows no findings suggestive of osteomyelitis.   Signed by: Tra Sanchez 12/27/2024 5:12 PM Dictation workstation:   [id number]    XR foot left 3+ views    Result Date: 12/27/2024  STUDY: Foot Radiographs; 12/27/2024 12:39PM INDICATION: Foot wound, concern for osteomyelitis near the heel. COMPARISON: None available. ACCESSION NUMBER(S): [id number] ORDERING CLINICIAN: JACQUELINE THORNTON TECHNIQUE:  Three view(s) of the left foot. FINDINGS:  There is no displaced fracture.  The alignment is anatomic. Ulceration noted posterior to the calcaneus measuring 1.5 cm.  No definite soft tissue gas or osseous erosions.  Underlying osteopenia. Soft tissue swelling noted.  Vascular calcifications noted.    Ulceration noted posterior to the calcaneus measuring 1.5 cm. No definite soft tissue gas or osseous erosions. Signed by Miles Cummings MD    Vascular US ankle brachial index (MEETA) without exercise    Result Date: 12/20/2024           [place] [id number] Tel [phone] and Fax [phone]  Vascular Lab Report [facility] US ANKLE BRACHIAL INDEX (MEETA) WITHOUT EXERCISE  Patient Name:     DESTINI Marie Physician: [id number] Josh Bales MD Study Date:       12/17/2024          Ordering           [id number] LIDIA MAGAÑA                                       Physician:         LUIS MRN/PID:          [id number]             Technologist:      Nkechi Mustafa RUST Accession#:       [id number]        Technologist 2: Date of           5/16/1929 / 89      Encounter#:        [id number] Birth/Age:        years Gender:           F Admission Status: Outpatient          Location           [facility]                                       Performed:  Diagnosis/ICD: Peripheral vascular disease, unspecified-I73.9 CPT Codes:     97977 Peripheral artery MEETA Only  CONCLUSIONS: Right Lower PVR: Evidence of mild arterial occlusive disease in the right lower extremity at rest. Right pressures of >220 mmHg suggest no compressibility of vessels and may make absolute Segmental Limb Pressures (SLP) unreliable. Decreased digital perfusion noted. Monophasic flow is noted in the right posterior tibial artery and right dorsalis pedis artery. Multiphasic flow is noted in the right common femoral artery. Disease called by PVR tracing and Doppler waveform. Left Lower PVR: Evidence of mild arterial occlusive disease in the left lower extremity at rest. Left pressures of >220 mmHg suggest no compressibility of vessels and may make absolute Segmental Limb Pressures (SLP) unreliable. Decreased digital perfusion noted. Monophasic flow is noted in the left posterior tibial artery and left dorsalis pedis artery. Multiphasic flow is noted in the left common femoral artery. Disease called by PVR tracing and Doppler waveform.  Comparison: Compared with study from 7/24/2024, no significant change.  Imaging & Doppler Findings:  RIGHT Lower PVR                Pressures Ratios Right Posterior Tibial (Ankle) 255 mmHg  1.89 Right Dorsalis Pedis (Ankle)   255 mmHg  1.89 Right Digit (Great Toe)        78 mmHg   0.58   LEFT Lower PVR                Pressures Ratios Left Posterior Tibial (Ankle) 255 mmHg  1.89 Left Dorsalis Pedis (Ankle)   255 mmHg  1.89 Left Digit (Great Toe)        52 mmHg   0.39                     Right     Left Brachial Pressure 135 mmHg 133 mmHg    [id number] Josh Bales MD Electronically signed by [id number] Josh Bales MD on 12/20/2024 at 5:17:51 PM  ** Final **         Assessment/Plan   Unstageable left heel ulcer-MRI without evidence of osteomyelitis   Left leg cellulitis-history of positive wound culture for MRSA and Pseudomonas  Right anterior leg ulcer-wound culture growing MRSA -resistant to tetracycline and bactrim   Type 2 diabetes mellitus  Peripheral vascular disease  Coronavirus infection-on room air     IV vancomycin-monitor levels   IV Zosyn  Remdesivir, risk reduction-completed   Monitor oxygenation  Monitor temp and wbc   Follow-up blood cultures  Local care  Supportive care  Podiatry follow-up  Monitor temperature and WBC  Droplet plus precautions  PICC line placement    Long term plan IV vancomycin 1 gram every 24 hours and IV zosyn 3.375 grams every 6 hours for total 14 days till 1/11/2025-see discharge rec   Weekly CBC with diff, CMP, vancomycin trough     Total time spent caring for the patient today was 25 minutes. This includes time spent before the visit reviewing the chart, time spent during the visit, and time spent after the visit on documentation.     Inez Brand, APRN-CNP

## 2024-12-31 NOTE — ASSESSMENT & PLAN NOTE
Vancomycin/Zosyn for several weeks post discharge  PICC placed today  Follow blood cultures, no growth 3 days  Podiatry (Dr. Proctor) following  infectious diseases following  MRSA in wound culture

## 2024-12-31 NOTE — PROGRESS NOTES
Vancomycin Dosing by Pharmacy- FOLLOW UP    Apoorva Sanchez is a 89 y.o. year old female who Pharmacy has been consulted for vancomycin dosing for cellulitis, skin and soft tissue. Based on the patient's indication and renal status this patient is being dosed based on a goal AUC of 400-600.     Renal function is currently stable.    Current vancomycin dose: 1000 mg given every 24 hours    Estimated vancomycin AUC on current dose: 535 mg/L.hr     Visit Vitals  /69 (BP Location: Right arm, Patient Position: Lying)   Pulse 52   Temp 36.3 °C (97.3 °F) (Temporal)   Resp 18        Lab Results   Component Value Date    CREATININE 0.83 2024    CREATININE 0.80 2024    CREATININE 0.86 2024    CREATININE 0.83 2024        Patient weight is as follows:   Vitals:    24 1125   Weight: 59 kg (130 lb)       Cultures:  Susceptibility data for the encounter in last 14 days.  Collected Specimen Info Organism Clindamycin Erythromycin Oxacillin Tetracycline Trimethoprim/Sulfamethoxazole Vancomycin   24 Tissue/Biopsy from Wound/Tissue Methicillin Resistant Staphylococcus aureus (MRSA)  S  R  R  R  R  S     Mixed Skin Microorganisms               I/O last 3 completed shifts:  In: 1020 (17.3 mL/kg) [P.O.:500; IV Piggyback:520]  Out: 600 (10.2 mL/kg) [Urine:600 (0.3 mL/kg/hr)]  Weight: 59 kg   I/O during current shift:  No intake/output data recorded.    Temp (24hrs), Av.4 °C (97.5 °F), Min:36.3 °C (97.3 °F), Max:36.5 °C (97.7 °F)      Assessment/Plan    Within goal AUC range. Continue current vancomycin regimen.    This dosing regimen is predicted by [employer] to result in the following pharmacokinetic parameters:  Loading dose: N/A  Regimen: 1000 mg IV every 24 hours.  Start time: 00:04 on 2025  Exposure target: AUC24 (range)400-600 mg/L.hr   CTC62-09: 512 mg/L.hr  AUC24,ss: 535 mg/L.hr  Probability of AUC24 > 400: 94 %  Ctrough,ss: 16.3 mg/L  Probability of Ctrough,ss > 20: 24 %      The next  level will be obtained on 1-3-25 at 0500 hr. May be obtained sooner if clinically indicated.   Will continue to monitor renal function daily while on vancomycin and order serum creatinine at least every 48 hours if not already ordered.  Follow for continued vancomycin needs, clinical response, and signs/symptoms of toxicity.       Yohannes Simmons, PharmD

## 2024-12-31 NOTE — PROGRESS NOTES
Apoorva Sanchez is a 89 y.o. female on day 4 of admission presenting with Ulcer of left foot, unspecified ulcer stage (Multi).      Subjective   Patient resting in chair at side of bed. She denies pain or sob.        Objective     Last Recorded Vitals  /63 (BP Location: Left arm, Patient Position: Sitting)   Pulse 55   Temp 36.4 °C (97.5 °F) (Temporal)   Resp 17   Wt 59 kg (130 lb)   SpO2 98%   Intake/Output last 3 Shifts:    Intake/Output Summary (Last 24 hours) at 12/31/2024 1656  Last data filed at 12/31/2024 1507  Gross per 24 hour   Intake 500 ml   Output --   Net 500 ml       Admission Weight  Weight: 59 kg (130 lb) (12/27/24 1125)    Daily Weight  12/27/24 : 59 kg (130 lb)    Image Results  MR ankle left wo IV contrast  Narrative: Interpreted By:  Davis Portillo,   STUDY:  MRI of the left ankle without IV contrast;      INDICATION:  Signs/Symptoms:concern for calcaneal osteomyelitis          COMPARISON:  12/27/2024      ACCESSION NUMBER(S):  [id number]      ORDERING CLINICIAN:  RAJESH ROBB      TECHNIQUE:  Multiplanar multisequence MRI of the  left ankle was performed  without IV contrast. Please note that coronal images were not  obtained due to patient early making the examination.      FINDINGS:  Soft tissues: There is a dorsal tissue ulcer at the level of the  calcaneus with associated subjacent soft tissue edema, suggestive of  cellulitis. No discrete fluid collection to suggest abscess. There is  subcutaneous soft tissue edema about the posterior ankle and within  Kager's fat pad.      Bones:  No abnormal loss of T1 marrow signal, edema, or enhancement  to suggest reactive osteitis or osteomyelitis.      Muscles:  Edema and atrophy of the plantar foot musculature,  compatible with diabetic myopathy and/or myositis.      Miscellaneous:  Visualized tendons and Lisfranc ligament are grossly  intact.      Impression: Calcaneal soft tissue ulcer in cellulitis without evidence to  suggest  osteomyelitis.      MACRO:  None      Signed by: Davis Cynthiabetty 12/28/2024 10:17 PM  Dictation workstation:   [id number]  XR chest 2 views  Narrative: Interpreted By:  Sarah Sanchez,   STUDY:  XR CHEST 2 VIEWS;  12/27/2024 5:26 pm      INDICATION:  Signs/Symptoms:cough and congestion.      COMPARISON:  06/10/2021      ACCESSION NUMBER(S):  [id number]      ORDERING CLINICIAN:  LEONOR QUINTEROS      FINDINGS:  The heart is normal in size. There is tortuosity of the aorta. There  is a suggestion of a hiatal hernia.      The lungs are hyperinflated. There is no discrete consolidation or  pleural fluid.      Upper mediastinum appears somewhat widened. This may be due to fat.      The bones are osteoporotic. There are degenerative changes of the  shoulders.      There is suggestion of a small hiatal hernia.      COMPARISON OF FINDING:  The chest is similar.      Impression: No acute cardiopulmonary disease.      MACRO:  none      Signed by: Sarah Sanchez 12/28/2024 7:29 AM  Dictation workstation:   [id number]      Physical Exam  Constitutional:       Appearance: Normal appearance.   Cardiovascular:      Rate and Rhythm: Normal rate and regular rhythm.      Pulses: Normal pulses.      Heart sounds: Normal heart sounds.   Pulmonary:      Effort: Pulmonary effort is normal.      Breath sounds: Normal breath sounds.   Abdominal:      General: Bowel sounds are normal.      Palpations: Abdomen is soft.   Musculoskeletal:         General: Normal range of motion.   Skin:     General: Skin is warm and dry.   Neurological:      Mental Status: She is alert and oriented to person, place, and time.         Relevant Results             Results for orders placed or performed during the hospital encounter of 12/27/24 (from the past 24 hours)   POCT GLUCOSE   Result Value Ref Range    POCT Glucose 248 (H) 74 - 99 mg/dL   Vancomycin   Result Value Ref Range    Vancomycin 21.6 (H) 5.0 - 20.0 ug/mL   CBC   Result Value Ref Range    WBC  6.9 4.4 - 11.3 x10*3/uL    nRBC 0.0 0.0 - 0.0 /100 WBCs    RBC 3.65 (L) 4.00 - 5.20 x10*6/uL    Hemoglobin 9.7 (L) 12.0 - 16.0 g/dL    Hematocrit 31.0 (L) 36.0 - 46.0 %    MCV 85 80 - 100 fL    MCH 26.6 26.0 - 34.0 pg    MCHC 31.3 (L) 32.0 - 36.0 g/dL    RDW 15.9 (H) 11.5 - 14.5 %    Platelets 311 150 - 450 x10*3/uL   Basic Metabolic Panel   Result Value Ref Range    Glucose 115 (H) 74 - 99 mg/dL    Sodium 134 (L) 136 - 145 mmol/L    Potassium 4.1 3.5 - 5.3 mmol/L    Chloride 101 98 - 107 mmol/L    Bicarbonate 26 21 - 32 mmol/L    Anion Gap 11 10 - 20 mmol/L    Urea Nitrogen 22 6 - 23 mg/dL    Creatinine 0.83 0.50 - 1.05 mg/dL    eGFR 65 >60 mL/min/1.73m*2    Calcium 7.9 (L) 8.6 - 10.3 mg/dL   POCT GLUCOSE   Result Value Ref Range    POCT Glucose 109 (H) 74 - 99 mg/dL   POCT GLUCOSE   Result Value Ref Range    POCT Glucose 167 (H) 74 - 99 mg/dL       Assessment/Plan        This patient has a central line   Reason for the central line remaining today? Parenteral medication            Assessment & Plan  Ulcer of left foot, unspecified ulcer stage (Multi)  Vancomycin/Zosyn for several weeks post discharge  PICC placed today  Follow blood cultures, no growth 3 days  Podiatry (Dr. Proctor) following  infectious diseases following  MRSA in wound culture  Peripheral vascular disease (CMS-HCC)  PVRs done 10 days ago show noncompressibility all the way up bilateral legs  Consult vascular surgery  Hyponatremia  Sodium 134 today  Consult nephrology  Type 2 diabetes mellitus with circulatory disorder, without long-term current use of insulin  Glucose on admission was 419  On last admission patient was hypoglycemic  Hold oral antidiabetics  Hypoglycemia protocol  Insulin sliding scale  Call for blood sugar greater than 250  Hypoglycemia today will hold 10 units of Lantus  Chest congestion  No respiratory distress, saturating 96% on room air  Chest Xray with no acute pulmonary disease  Get sputum culture if able  Patient is on  broad-spectrum antibiotics  Infectious diseases is consulted  Primary hypertension  Continue home atenolol and clonidine  Mixed hyperlipidemia  Not currently on statin medication  On deep vein thrombosis (DVT) prophylaxis  Lovenox  COVID  Tested COVID-positive  Droplet plus isolation  Plan of Care  Patient from SNF and plans to return there at discharge.  Plan of care discussed with patient and collaborating physician.                 Rea Saldivar, APRN-CNP

## 2024-12-31 NOTE — PROCEDURES
Vascular Access Team Procedure Note     Visit Date: 12/31/2024      Patient Name: Apoorva Sanchez         MRN: [id number]             Procedure: 5FR dual lumen picc line inserted to R brachial vein brisk blood return noted in both lumens and they both flush easily with NS, curos caps applied. Length 33cm, Arm Circumference 22cm, External length 1 cm. Picc tip confirmation per [employer], RN aware that picc line is good to use.                           Marlyn Alston RN  12/31/2024  4:40 PM

## 2024-12-31 NOTE — PROGRESS NOTES
Apoorva Sanchez is a 89 y.o. female on day 4 of admission presenting with Ulcer of left foot, unspecified ulcer stage (Multi).    Subjective   In chair.  No new complaints.  Has hearing aids today much easier time communicating.  Afebrile currently denies fevers chills nausea vomiting.  Breathing comfortably on room air.       Physical Exam    Objective     REVIEW OF SYSTEMS  GENERAL:  Negative for malaise, significant weight loss, fever  HEENT:  No changes in hearing or vision, no nose bleeds or other nasal problems and Negative for frequent or significant headaches  NECK:  Negative for lumps, goiter, pain and significant neck swelling  RESPIRATORY:  Negative for cough, wheezing and shortness of breath  CARDIOVASCULAR:  Negative for chest pain, leg swelling and palpitations  GI:  Negative for abdominal discomfort, blood in stools or black stools and change in bowel habits  :  Negative for dysuria, frequency and incontinence  MUSCULOSKELETAL:  Negative for joint pain or swelling, back pain, and muscle pain.  SKIN:  Negative for lesions, rash, and itching  PSYCH:  Negative for sleep disturbance, mood disorder and recent psychosocial stressors  HEMATOLOGY/LYMPHOLOGY:  Negative for prolonged bleeding, bruising easily, and swollen nodes.  ENDOCRINE:  Negative for cold or heat intolerance, polyuria, polydipsia and goiter  NEURO: Negative, denies any burning, tingling or numbness     Objective:   Vasc: Dorsalis pedis and posterior tibial pulses are non palpable on my exam. Warm temperature.  Pedal hair is absent. There is significant atrophy and discoloration to bilateral lower legs. Very minimal leg swelling today. Erythema minimal around b/l leg wounds.     Neuro: Light touch sensation intact. Protective sensation intact. Muscle strength 5/5. No dropfoot or paralysis. No spasticity.    Derm: Overall several wounds to bilateral lower extremities, see pics below  -right leg with anterior leg wound with mild serosanginuous  "june, right foot with posterior heel early pressure injury, stable eschar to lateral midfoot.  -left leg with resolving leg ulcerations now mosly scabbing. Posterior left heel with large unstagable heel decubitus with dry leathry eschar noted. Well adhered.     None of the leg ulcerations yield significant malodor, drainage, or clinical SOI. All appear stable.     Last Recorded Vitals  Blood pressure 143/69, pulse 52, temperature 36.3 °C (97.3 °F), temperature source Temporal, resp. rate 18, height 1.676 m (5' 6\"), weight 59 kg (130 lb), SpO2 100%.    Intake/Output last 3 Shifts:  I/O last 3 completed shifts:  In: 1020 (17.3 mL/kg) [P.O.:500; IV Piggyback:520]  Out: 600 (10.2 mL/kg) [Urine:600 (0.3 mL/kg/hr)]  Weight: 59 kg     Relevant Results           Assessment/Plan   Left heel decubitus ulceration, unstageable  Left leg cellulitis  Right anterior leg ulceration, fatty layer exposed, stable  Early decubitus posterior right heel ulceration  Unstageable lateral right foot wounds  Peripheral arterial disease     Overall, all lower extremity wounds appear stable without evidence of clinical signs of bacterial infection.  Previous right leg ulcer wound culture with rare methicillin-resistant Staphylococcus aureus.     MRI of left foot and ankle without evidence of acute osteomyelitis.    Vascular surgery evaluation, agrees with palliative wound care.    Wounds stable, NO plans for surgical debridement.    Continue with palliative wound care as ordered. Continue offloading in heel protector boot.  I will continue to follow peripherally this admission.    Patient to follow back up with Dr. Joe Proctor DPM for outpatient care once discharged from [facility].      Osvaldo Lorenzo DPM      "

## 2025-01-01 LAB
GLUCOSE BLD MANUAL STRIP-MCNC: 221 MG/DL (ref 74–99)
GLUCOSE BLD MANUAL STRIP-MCNC: 291 MG/DL (ref 74–99)
GLUCOSE BLD MANUAL STRIP-MCNC: 296 MG/DL (ref 74–99)

## 2025-01-01 PROCEDURE — 2500000002 HC RX 250 W HCPCS SELF ADMINISTERED DRUGS (ALT 637 FOR MEDICARE OP, ALT 636 FOR OP/ED): Performed by: NURSE PRACTITIONER

## 2025-01-01 PROCEDURE — 82947 ASSAY GLUCOSE BLOOD QUANT: CPT

## 2025-01-01 PROCEDURE — 2500000001 HC RX 250 WO HCPCS SELF ADMINISTERED DRUGS (ALT 637 FOR MEDICARE OP): Performed by: NURSE PRACTITIONER

## 2025-01-01 PROCEDURE — 2500000004 HC RX 250 GENERAL PHARMACY W/ HCPCS (ALT 636 FOR OP/ED): Mod: JZ

## 2025-01-01 PROCEDURE — 2500000004 HC RX 250 GENERAL PHARMACY W/ HCPCS (ALT 636 FOR OP/ED): Performed by: NURSE PRACTITIONER

## 2025-01-01 PROCEDURE — 1200000002 HC GENERAL ROOM WITH TELEMETRY DAILY

## 2025-01-01 RX ADMIN — PIPERACILLIN SODIUM AND TAZOBACTAM SODIUM 3.38 G: 3; .375 INJECTION, SOLUTION INTRAVENOUS at 19:53

## 2025-01-01 RX ADMIN — SIMVASTATIN 40 MG: 40 TABLET, FILM COATED ORAL at 19:54

## 2025-01-01 RX ADMIN — PIPERACILLIN SODIUM AND TAZOBACTAM SODIUM 3.38 G: 3; .375 INJECTION, SOLUTION INTRAVENOUS at 09:14

## 2025-01-01 RX ADMIN — PIPERACILLIN SODIUM AND TAZOBACTAM SODIUM 3.38 G: 3; .375 INJECTION, SOLUTION INTRAVENOUS at 14:37

## 2025-01-01 RX ADMIN — VANCOMYCIN 1 G: 1 INJECTION, SOLUTION INTRAVENOUS at 22:38

## 2025-01-01 RX ADMIN — INSULIN LISPRO 4 UNITS: 100 INJECTION, SOLUTION INTRAVENOUS; SUBCUTANEOUS at 09:14

## 2025-01-01 RX ADMIN — ENOXAPARIN SODIUM 40 MG: 40 INJECTION SUBCUTANEOUS at 17:43

## 2025-01-01 RX ADMIN — ATENOLOL 100 MG: 50 TABLET ORAL at 09:14

## 2025-01-01 RX ADMIN — INSULIN LISPRO 6 UNITS: 100 INJECTION, SOLUTION INTRAVENOUS; SUBCUTANEOUS at 17:44

## 2025-01-01 RX ADMIN — LEVOTHYROXINE SODIUM 100 MCG: 0.1 TABLET ORAL at 06:13

## 2025-01-01 RX ADMIN — INSULIN LISPRO 6 UNITS: 100 INJECTION, SOLUTION INTRAVENOUS; SUBCUTANEOUS at 11:43

## 2025-01-01 RX ADMIN — PIPERACILLIN SODIUM AND TAZOBACTAM SODIUM 3.38 G: 3; .375 INJECTION, SOLUTION INTRAVENOUS at 02:29

## 2025-01-01 RX ADMIN — ASPIRIN 81 MG: 81 TABLET, COATED ORAL at 09:14

## 2025-01-01 RX ADMIN — CLONIDINE HYDROCHLORIDE 0.4 MG: 0.2 TABLET ORAL at 19:54

## 2025-01-01 ASSESSMENT — PAIN SCALES - GENERAL
PAINLEVEL_OUTOF10: 0 - NO PAIN

## 2025-01-01 ASSESSMENT — PAIN - FUNCTIONAL ASSESSMENT
PAIN_FUNCTIONAL_ASSESSMENT: 0-10
PAIN_FUNCTIONAL_ASSESSMENT: 0-10

## 2025-01-01 NOTE — PROGRESS NOTES
Apoorva Sanchez is a 89 y.o. female on day 5 of admission presenting with Ulcer of left foot, unspecified ulcer stage (Multi).      Subjective   Patient doing very well; minimal coughing and no shortness of breath.        Objective     Last Recorded Vitals  /67 (BP Location: Left arm, Patient Position: Lying)   Pulse 71   Temp 37.4 °C (99.3 °F) (Temporal)   Resp 16   Wt 59 kg (130 lb)   SpO2 97%   Intake/Output last 3 Shifts:    Intake/Output Summary (Last 24 hours) at 1/1/2025 1817  Last data filed at 1/1/2025 1507  Gross per 24 hour   Intake 700 ml   Output 0 ml   Net 700 ml       Admission Weight  Weight: 59 kg (130 lb) (12/27/24 1125)    Daily Weight  12/27/24 : 59 kg (130 lb)    Image Results  Bedside PICC Imaging  These images are not reportable by radiology and will not be interpreted   by  Radiologists.      Physical Exam  Constitutional:       Appearance: Normal appearance.   Cardiovascular:      Rate and Rhythm: Normal rate and regular rhythm.      Pulses: Normal pulses.      Heart sounds: Normal heart sounds.   Pulmonary:      Effort: Pulmonary effort is normal.      Breath sounds: Normal breath sounds.   Abdominal:      General: Bowel sounds are normal.      Palpations: Abdomen is soft.   Musculoskeletal:         General: Normal range of motion.   Skin:     General: Skin is warm and dry.      Comments: Bilateral legs with dressing dry and intact     Neurological:      Mental Status: She is alert and oriented to person, place, and time.         Relevant Results             Results for orders placed or performed during the hospital encounter of 12/27/24 (from the past 24 hours)   POCT GLUCOSE   Result Value Ref Range    POCT Glucose 325 (H) 74 - 99 mg/dL   POCT GLUCOSE   Result Value Ref Range    POCT Glucose 221 (H) 74 - 99 mg/dL   POCT GLUCOSE   Result Value Ref Range    POCT Glucose 291 (H) 74 - 99 mg/dL   POCT GLUCOSE   Result Value Ref Range    POCT Glucose 296 (H) 74 - 99 mg/dL        Assessment/Plan                  Assessment & Plan  Ulcer of left foot, unspecified ulcer stage (Multi)  Vancomycin/Zosyn until /1/2025  PICC placed  Follow blood cultures, no growth 3 days  Podiatry (Dr. Proctor) following  infectious diseases following  MRSA in wound culture  Peripheral vascular disease (CMS-[facility])  PVRs done 10 days ago show noncompressibility all the way up bilateral legs  Consult vascular surgery  Hyponatremia  Sodium 134 today  Consult nephrology  Type 2 diabetes mellitus with circulatory disorder, without long-term current use of insulin  Glucose on admission was 419  On last admission patient was hypoglycemic  Hold oral antidiabetics  Hypoglycemia protocol  Insulin sliding scale  Call for blood sugar greater than 250  Hypoglycemia today will hold 10 units of Lantus  Chest congestion  No respiratory distress, saturating 96% on room air  Chest Xray with no acute pulmonary disease  Get sputum culture if able  Patient is on broad-spectrum antibiotics  Infectious diseases is consulted  Primary hypertension  Continue home atenolol and clonidine  Mixed hyperlipidemia  Not currently on statin medication  On deep vein thrombosis (DVT) prophylaxis  Lovenox  COVID  Tested COVID-positive  Droplet plus isolation  Plan of Care  Patient from SNF and plans to return there at discharge.  Plan of care discussed with patient and collaborating physician.                 Rea Saldivar, APRN-CNP

## 2025-01-01 NOTE — CARE PLAN
The patient's goals for the shift include      The clinical goals for the shift include Patient safety

## 2025-01-01 NOTE — PROGRESS NOTES
Apoorva Sanchez is a 89 y.o. female on day 5 of admission presenting with Ulcer of left foot, unspecified ulcer stage (Multi).    Subjective   Interval History:   Room not entered-limit exposure  Observe for glass window  Discussed with nursing  Awake resting comfortably in bed  Afebrile, on room air    Objective   Range of Vitals (last 24 hours)  Heart Rate:  [54-77]   Temp:  [36.4 °C (97.5 °F)-37.1 °C (98.8 °F)]   Resp:  [14-18]   BP: ()/(48-68)   SpO2:  [92 %-98 %]   Daily Weight  12/27/24 : 59 kg (130 lb)    Body mass index is 20.98 kg/m².    Physical Exam  Vital signs reviewed      Antibiotics  piperacillin-tazobactam - 3.375 gram/50 mL  vancomycin - 1 gram/200 mL    Relevant Results  Labs  Results from last 72 hours   Lab Units 12/31/24  0613 12/30/24  0611   WBC AUTO x10*3/uL 6.9 7.2   HEMOGLOBIN g/dL 9.7* 8.9*   HEMATOCRIT % 31.0* 28.5*   PLATELETS AUTO x10*3/uL 311 288     Results from last 72 hours   Lab Units 12/31/24  0613 12/30/24  0611   SODIUM mmol/L 134* 135*   POTASSIUM mmol/L 4.1 3.3*   CHLORIDE mmol/L 101 100   CO2 mmol/L 26 29   BUN mg/dL 22 14   CREATININE mg/dL 0.83 0.80   GLUCOSE mg/dL 115* 49*   CALCIUM mg/dL 7.9* 7.6*   ANION GAP mmol/L 11 9*   EGFR mL/min/1.73m*2 65 68           Estimated Creatinine Clearance: 37.8 mL/min (by C-G formula based on SCr of 0.83 mg/dL).  C-Reactive Protein   Date Value Ref Range Status   12/27/2024 8.56 (H) <1.00 mg/dL Final     Microbiology  Susceptibility data from last 90 days.  Collected Specimen Info Organism Aztreonam Cefepime Ceftazidime Ciprofloxacin Clindamycin Erythromycin Levofloxacin Oxacillin Piperacillin/Tazobactam Tetracycline Tobramycin Trimethoprim/Sulfamethoxazole Vancomycin   12/28/24 Tissue/Biopsy from Wound/Tissue Methicillin Resistant Staphylococcus aureus (MRSA)      S  R   R   R   R  S     Mixed Skin Microorganisms                 11/18/24 Swab from Anterior Nares Methicillin Resistant Staphylococcus aureus (MRSA)                11/05/24  Tissue/Biopsy from Wound/Tissue Pseudomonas aeruginosa  S  S  S  S    I   S   S       Methicillin Resistant Staphylococcus aureus (MRSA)      R  R   R   R   R  S     Mixed Gram-Negative Bacteria                     Imaging  MR ankle left wo IV contrast    Result Date: 12/28/2024  Interpreted By:  Davis Portillo, STUDY: MRI of the left ankle without IV contrast;   INDICATION: Signs/Symptoms:concern for calcaneal osteomyelitis     COMPARISON: 12/27/2024   ACCESSION NUMBER(S): [id number]   ORDERING CLINICIAN: RAJESH ROBB   TECHNIQUE: Multiplanar multisequence MRI of the  left ankle was performed without IV contrast. Please note that coronal images were not obtained due to patient early making the examination.   FINDINGS: Soft tissues: There is a dorsal tissue ulcer at the level of the calcaneus with associated subjacent soft tissue edema, suggestive of cellulitis. No discrete fluid collection to suggest abscess. There is subcutaneous soft tissue edema about the posterior ankle and within Kager's fat pad.   Bones:  No abnormal loss of T1 marrow signal, edema, or enhancement to suggest reactive osteitis or osteomyelitis.   Muscles:  Edema and atrophy of the plantar foot musculature, compatible with diabetic myopathy and/or myositis.   Miscellaneous:  Visualized tendons and Lisfranc ligament are grossly intact.       Calcaneal soft tissue ulcer in cellulitis without evidence to suggest osteomyelitis.   MACRO: None   Signed by: Davis Portillo 12/28/2024 10:17 PM Dictation workstation:   [id number]    XR chest 2 views    Result Date: 12/28/2024  Interpreted By:  Sarah Sanchez, STUDY: XR CHEST 2 VIEWS;  12/27/2024 5:26 pm   INDICATION: Signs/Symptoms:cough and congestion.   COMPARISON: 06/10/2021   ACCESSION NUMBER(S): [id number]   ORDERING CLINICIAN: LEONOR QUINTEROS   FINDINGS: The heart is normal in size. There is tortuosity of the aorta. There is a suggestion of a hiatal hernia.   The lungs are hyperinflated. There is no  discrete consolidation or pleural fluid.   Upper mediastinum appears somewhat widened. This may be due to fat.   The bones are osteoporotic. There are degenerative changes of the shoulders.   There is suggestion of a small hiatal hernia.   COMPARISON OF FINDING: The chest is similar.       No acute cardiopulmonary disease.   MACRO: none   Signed by: Sarah Sanchez 12/28/2024 7:29 AM Dictation workstation:   [id number]    MR foot left wo IV contrast    Result Date: 12/27/2024  Interpreted By:  Tra Sanchez, STUDY: MR FOOT LEFT WO IV CONTRAST;   INDICATION: Signs/Symptoms:Diabetic foot wound, concern for osteomyelitis.   COMPARISON: Plain film radiographs of the left foot performed on December 27, 2024   ACCESSION NUMBER(S): [id number]   ORDERING CLINICIAN: LEONOR QUINTEROS   TECHNIQUE: Routine multiplanar multisequential MRI of the left foot without contrast. Field-of-view limited the forefoot.     FINDINGS: Dorsal subcutaneous edema suggesting cellulitis.   There is no evidence of marrow edema or marrow replacement process.   No findings highly suggestive of osteomyelitis.   No discrete fluid collections to suggest abscess.   No evidence of acute fracture.   Mild degenerative changes.   No evidence of acute tendon tear.   Some atrophy of the intrinsic foot musculature likely chronic ischemic myopathy or disuse.   No evidence of a soft tissue mass.       MRI of the left forefoot shows no findings suggestive of osteomyelitis.   Signed by: Tra Sanchez 12/27/2024 5:12 PM Dictation workstation:   [id number]    XR foot left 3+ views    Result Date: 12/27/2024  STUDY: Foot Radiographs; 12/27/2024 12:39PM INDICATION: Foot wound, concern for osteomyelitis near the heel. COMPARISON: None available. ACCESSION NUMBER(S): [id number] ORDERING CLINICIAN: JACQUELINE THORNTON TECHNIQUE:  Three view(s) of the left foot. FINDINGS:  There is no displaced fracture.  The alignment is anatomic. Ulceration noted posterior to the calcaneus  measuring 1.5 cm.  No definite soft tissue gas or osseous erosions.  Underlying osteopenia. Soft tissue swelling noted.  Vascular calcifications noted.    Ulceration noted posterior to the calcaneus measuring 1.5 cm. No definite soft tissue gas or osseous erosions. Signed by Miles Cummings MD    Vascular US ankle brachial index (MEETA) without exercise    Result Date: 12/20/2024           [address] [address] Tel [phone] and Fax [phone]  Vascular Lab Report VAS US ANKLE BRACHIAL INDEX (MEETA) WITHOUT EXERCISE  Patient Name:     DESTINI Marie Physician: [id number] Josh Bales MD Study Date:       12/17/2024          Ordering           [id number] LIDIA MAGAÑA                                       Physician:         LUIS MRN/PID:          [id number]            Technologist:      Nkechi Mustafa [facility] Accession#:       [id number]        Technologist 2: Date of           5/16/1929 / 89      Encounter#:        [id number] Birth/Age:        years Gender:           F Admission Status: Outpatient          Location           [facility]                                       Performed:  Diagnosis/ICD: Peripheral vascular disease, unspecified-I73.9 CPT Codes:     16792 Peripheral artery MEETA Only  CONCLUSIONS: Right Lower PVR: Evidence of mild arterial occlusive disease in the right lower extremity at rest. Right pressures of >220 mmHg suggest no compressibility of vessels and may make absolute Segmental Limb Pressures (SLP) unreliable. Decreased digital perfusion noted. Monophasic flow is noted in the right posterior tibial artery and right dorsalis pedis artery. Multiphasic flow is noted in the right common femoral artery. Disease called by PVR tracing and Doppler waveform. Left Lower PVR: Evidence of mild arterial occlusive disease in the left lower extremity at rest. Left pressures of >220 mmHg suggest no compressibility of vessels and may make absolute Segmental Limb  Pressures (SLP) unreliable. Decreased digital perfusion noted. Monophasic flow is noted in the left posterior tibial artery and left dorsalis pedis artery. Multiphasic flow is noted in the left common femoral artery. Disease called by PVR tracing and Doppler waveform.  Comparison: Compared with study from 7/24/2024, no significant change.  Imaging & Doppler Findings:  RIGHT Lower PVR                Pressures Ratios Right Posterior Tibial (Ankle) 255 mmHg  1.89 Right Dorsalis Pedis (Ankle)   255 mmHg  1.89 Right Digit (Great Toe)        78 mmHg   0.58   LEFT Lower PVR                Pressures Ratios Left Posterior Tibial (Ankle) 255 mmHg  1.89 Left Dorsalis Pedis (Ankle)   255 mmHg  1.89 Left Digit (Great Toe)        52 mmHg   0.39                     Right     Left Brachial Pressure 135 mmHg 133 mmHg   [id number] Josh Bales MD Electronically signed by [id number] Josh Bales MD on 12/20/2024 at 5:17:51 PM  ** Final **         Assessment/Plan   Unstageable left heel ulcer-MRI without evidence of osteomyelitis   Left leg cellulitis-history of positive wound culture for MRSA and Pseudomonas  Right anterior leg ulcer-wound culture growing MRSA -resistant to tetracycline and bactrim   Type 2 diabetes mellitus  Peripheral vascular disease  Coronavirus infection-on room air     IV vancomycin-monitor levels   IV Zosyn  Remdesivir, risk reduction-completed   Monitor oxygenation  Monitor temp and wbc   Follow-up blood cultures  Local care  Supportive care  Podiatry follow-up  Monitor temperature and WBC  Droplet plus precautions  PICC line placement    Long term plan IV vancomycin 1 gram every 24 hours and IV zosyn 3.375 grams every 6 hours for total 14 days till 1/11/2025-see discharge rec   Weekly CBC with diff, CMP, vancomycin trough     Total time spent caring for the patient today was 20 minutes. This includes time spent before the visit reviewing the chart, time spent during the visit, and time spent after the visit on documentation.      Inez Brand, APRN-CNP

## 2025-01-01 NOTE — ASSESSMENT & PLAN NOTE
Vancomycin/Zosyn until /1/2025  PICC placed  Follow blood cultures, no growth 3 days  Podiatry (Dr. Proctor) following  infectious diseases following  MRSA in wound culture

## 2025-01-01 NOTE — NURSING NOTE
Agree with previous assessment. Patient lying comfortable in bed. Not in any acute distress. Denies any needs. Call light and possessions within reach. Bed alarm on.

## 2025-01-01 NOTE — CARE PLAN
The patient's goals for the shift include safety; comfort    The clinical goals for the shift include Safety, monitor labs and vitals    Problem: Pain - Adult  Goal: Verbalizes/displays adequate comfort level or baseline comfort level  Outcome: Progressing     Problem: Safety - Adult  Goal: Free from fall injury  Outcome: Progressing     Problem: Discharge Planning  Goal: Discharge to home or other facility with appropriate resources  Outcome: Progressing     Problem: Chronic Conditions and Co-morbidities  Goal: Patient's chronic conditions and co-morbidity symptoms are monitored and maintained or improved  Outcome: Progressing     Problem: Fall/Injury  Goal: Not fall by end of shift  Outcome: Progressing  Goal: Be free from injury by end of the shift  Outcome: Progressing  Goal: Verbalize understanding of personal risk factors for fall in the hospital  Outcome: Progressing  Goal: Verbalize understanding of risk factor reduction measures to prevent injury from fall in the home  Outcome: Progressing  Goal: Use assistive devices by end of the shift  Outcome: Progressing  Goal: Pace activities to prevent fatigue by end of the shift  Outcome: Progressing     Problem: Nutrition  Goal: Oral intake greater 75%  Outcome: Progressing  Goal: Consume prescribed supplement  Outcome: Progressing  Goal: Adequate PO fluid intake  Outcome: Progressing  Goal: BG  mg/dL  Outcome: Progressing  Goal: Lab values WNL  Outcome: Progressing  Goal: Electrolytes WNL  Outcome: Progressing  Goal: Promote healing  Outcome: Progressing  Goal: Maintain stable weight  Outcome: Progressing     Problem: Skin  Goal: Decreased wound size/increased tissue granulation at next dressing change  Outcome: Progressing  Goal: Participates in plan/prevention/treatment measures  Outcome: Progressing  Goal: Prevent/manage excess moisture  Outcome: Progressing  Goal: Prevent/minimize sheer/friction injuries  Outcome: Progressing  Goal: Promote/optimize  nutrition  Outcome: Progressing  Goal: Promote skin healing  Outcome: Progressing

## 2025-01-02 VITALS
RESPIRATION RATE: 17 BRPM | SYSTOLIC BLOOD PRESSURE: 139 MMHG | BODY MASS INDEX: 20.89 KG/M2 | HEART RATE: 61 BPM | OXYGEN SATURATION: 99 % | WEIGHT: 130 LBS | TEMPERATURE: 97.2 F | HEIGHT: 66 IN | DIASTOLIC BLOOD PRESSURE: 72 MMHG

## 2025-01-02 LAB
ALBUMIN SERPL BCP-MCNC: 2.5 G/DL (ref 3.4–5)
ALP SERPL-CCNC: 79 U/L (ref 33–136)
ALT SERPL W P-5'-P-CCNC: 9 U/L (ref 7–45)
ANION GAP SERPL CALCULATED.3IONS-SCNC: 10 MMOL/L (ref 10–20)
AST SERPL W P-5'-P-CCNC: 15 U/L (ref 9–39)
BASOPHILS # BLD AUTO: 0.06 X10*3/UL (ref 0–0.1)
BASOPHILS NFR BLD AUTO: 0.8 %
BILIRUB SERPL-MCNC: 0.3 MG/DL (ref 0–1.2)
BUN SERPL-MCNC: 24 MG/DL (ref 6–23)
CALCIUM SERPL-MCNC: 8 MG/DL (ref 8.6–10.3)
CHLORIDE SERPL-SCNC: 100 MMOL/L (ref 98–107)
CO2 SERPL-SCNC: 27 MMOL/L (ref 21–32)
CREAT SERPL-MCNC: 0.78 MG/DL (ref 0.5–1.05)
EGFRCR SERPLBLD CKD-EPI 2021: 70 ML/MIN/1.73M*2
EOSINOPHIL # BLD AUTO: 0.18 X10*3/UL (ref 0–0.4)
EOSINOPHIL NFR BLD AUTO: 2.4 %
ERYTHROCYTE [DISTWIDTH] IN BLOOD BY AUTOMATED COUNT: 16.1 % (ref 11.5–14.5)
GLUCOSE BLD MANUAL STRIP-MCNC: 253 MG/DL (ref 74–99)
GLUCOSE SERPL-MCNC: 268 MG/DL (ref 74–99)
HCT VFR BLD AUTO: 31.5 % (ref 36–46)
HGB BLD-MCNC: 9.8 G/DL (ref 12–16)
IMM GRANULOCYTES # BLD AUTO: 0.14 X10*3/UL (ref 0–0.5)
IMM GRANULOCYTES NFR BLD AUTO: 1.8 % (ref 0–0.9)
LYMPHOCYTES # BLD AUTO: 1.21 X10*3/UL (ref 0.8–3)
LYMPHOCYTES NFR BLD AUTO: 15.9 %
MCH RBC QN AUTO: 26.7 PG (ref 26–34)
MCHC RBC AUTO-ENTMCNC: 31.1 G/DL (ref 32–36)
MCV RBC AUTO: 86 FL (ref 80–100)
MONOCYTES # BLD AUTO: 0.43 X10*3/UL (ref 0.05–0.8)
MONOCYTES NFR BLD AUTO: 5.7 %
NEUTROPHILS # BLD AUTO: 5.59 X10*3/UL (ref 1.6–5.5)
NEUTROPHILS NFR BLD AUTO: 73.4 %
NRBC BLD-RTO: 0 /100 WBCS (ref 0–0)
PLATELET # BLD AUTO: 373 X10*3/UL (ref 150–450)
POTASSIUM SERPL-SCNC: 4.6 MMOL/L (ref 3.5–5.3)
PROT SERPL-MCNC: 5.2 G/DL (ref 6.4–8.2)
RBC # BLD AUTO: 3.67 X10*6/UL (ref 4–5.2)
SODIUM SERPL-SCNC: 132 MMOL/L (ref 136–145)
STAPHYLOCOCCUS SPEC CULT: NORMAL
WBC # BLD AUTO: 7.6 X10*3/UL (ref 4.4–11.3)

## 2025-01-02 PROCEDURE — 85025 COMPLETE CBC W/AUTO DIFF WBC: CPT | Performed by: NURSE PRACTITIONER

## 2025-01-02 PROCEDURE — 82947 ASSAY GLUCOSE BLOOD QUANT: CPT

## 2025-01-02 PROCEDURE — 2500000001 HC RX 250 WO HCPCS SELF ADMINISTERED DRUGS (ALT 637 FOR MEDICARE OP): Performed by: NURSE PRACTITIONER

## 2025-01-02 PROCEDURE — 2500000002 HC RX 250 W HCPCS SELF ADMINISTERED DRUGS (ALT 637 FOR MEDICARE OP, ALT 636 FOR OP/ED): Performed by: NURSE PRACTITIONER

## 2025-01-02 PROCEDURE — 80053 COMPREHEN METABOLIC PANEL: CPT

## 2025-01-02 PROCEDURE — 97116 GAIT TRAINING THERAPY: CPT | Mod: GP,CQ

## 2025-01-02 PROCEDURE — 97110 THERAPEUTIC EXERCISES: CPT | Mod: GP,CQ

## 2025-01-02 PROCEDURE — 2500000004 HC RX 250 GENERAL PHARMACY W/ HCPCS (ALT 636 FOR OP/ED): Performed by: NURSE PRACTITIONER

## 2025-01-02 RX ORDER — VANCOMYCIN/0.9 % SOD CHLORIDE 1 G/100 ML
1 PLASTIC BAG, INJECTION (ML) INTRAVENOUS EVERY 24 HOURS
Start: 2025-01-02 | End: 2025-01-13

## 2025-01-02 RX ORDER — ACETAMINOPHEN 325 MG/1
650 TABLET ORAL EVERY 4 HOURS PRN
Qty: 30 TABLET | Refills: 0 | Status: SHIPPED | OUTPATIENT
Start: 2025-01-02

## 2025-01-02 RX ORDER — ASPIRIN 81 MG/1
81 TABLET ORAL DAILY
Start: 2025-01-03

## 2025-01-02 RX ORDER — TALC
3 POWDER (GRAM) TOPICAL NIGHTLY PRN
Start: 2025-01-02

## 2025-01-02 RX ADMIN — PIPERACILLIN SODIUM AND TAZOBACTAM SODIUM 3.38 G: 3; .375 INJECTION, SOLUTION INTRAVENOUS at 08:20

## 2025-01-02 RX ADMIN — ASPIRIN 81 MG: 81 TABLET, COATED ORAL at 08:20

## 2025-01-02 RX ADMIN — ATENOLOL 100 MG: 50 TABLET ORAL at 08:20

## 2025-01-02 RX ADMIN — INSULIN LISPRO 6 UNITS: 100 INJECTION, SOLUTION INTRAVENOUS; SUBCUTANEOUS at 08:16

## 2025-01-02 RX ADMIN — PIPERACILLIN SODIUM AND TAZOBACTAM SODIUM 3.38 G: 3; .375 INJECTION, SOLUTION INTRAVENOUS at 02:30

## 2025-01-02 RX ADMIN — LEVOTHYROXINE SODIUM 100 MCG: 0.1 TABLET ORAL at 05:34

## 2025-01-02 ASSESSMENT — PAIN SCALES - GENERAL
PAINLEVEL_OUTOF10: 0 - NO PAIN
PAINLEVEL_OUTOF10: 0 - NO PAIN

## 2025-01-02 ASSESSMENT — COGNITIVE AND FUNCTIONAL STATUS - GENERAL
MOBILITY SCORE: 15
MOBILITY SCORE: 18
DRESSING REGULAR UPPER BODY CLOTHING: A LITTLE
WALKING IN HOSPITAL ROOM: A LITTLE
MOVING TO AND FROM BED TO CHAIR: A LITTLE
CLIMB 3 TO 5 STEPS WITH RAILING: A LITTLE
PERSONAL GROOMING: A LITTLE
TURNING FROM BACK TO SIDE WHILE IN FLAT BAD: A LITTLE
HELP NEEDED FOR BATHING: A LITTLE
EATING MEALS: A LITTLE
CLIMB 3 TO 5 STEPS WITH RAILING: TOTAL
STANDING UP FROM CHAIR USING ARMS: A LOT
WALKING IN HOSPITAL ROOM: A LITTLE
STANDING UP FROM CHAIR USING ARMS: A LITTLE
DRESSING REGULAR LOWER BODY CLOTHING: A LITTLE
MOVING FROM LYING ON BACK TO SITTING ON SIDE OF FLAT BED WITH BEDRAILS: A LITTLE
MOVING TO AND FROM BED TO CHAIR: A LITTLE
MOVING FROM LYING ON BACK TO SITTING ON SIDE OF FLAT BED WITH BEDRAILS: A LITTLE
DAILY ACTIVITIY SCORE: 18
TURNING FROM BACK TO SIDE WHILE IN FLAT BAD: A LITTLE
TOILETING: A LITTLE

## 2025-01-02 ASSESSMENT — PAIN - FUNCTIONAL ASSESSMENT: PAIN_FUNCTIONAL_ASSESSMENT: 0-10

## 2025-01-02 NOTE — CARE PLAN
The patient's goals for the shift include safety; comfort    The clinical goals for the shift include Safety/Comfort      Problem: Pain - Adult  Goal: Verbalizes/displays adequate comfort level or baseline comfort level  Outcome: Progressing     Problem: Chronic Conditions and Co-morbidities  Goal: Patient's chronic conditions and co-morbidity symptoms are monitored and maintained or improved  Outcome: Progressing     Problem: Fall/Injury  Goal: Not fall by end of shift  Outcome: Progressing  Goal: Be free from injury by end of the shift  Outcome: Progressing  Goal: Verbalize understanding of personal risk factors for fall in the hospital  Outcome: Progressing  Goal: Verbalize understanding of risk factor reduction measures to prevent injury from fall in the home  Outcome: Progressing  Goal: Use assistive devices by end of the shift  Outcome: Progressing  Goal: Pace activities to prevent fatigue by end of the shift  Outcome: Progressing

## 2025-01-02 NOTE — PROGRESS NOTES
"Physical Therapy    Physical Therapy Treatment    Patient Name: Apoorva Sanchez  MRN: [id number]  Department: [address]  Room: [address] [place] [address]  Today's Date: 1/2/2025  Time Calculation  Start Time: 0937  Stop Time: 1005  Time Calculation (min): 28 min         Assessment/Plan   PT Assessment  PT Assessment Results: Decreased strength, Decreased range of motion, Decreased endurance, Impaired balance, Decreased mobility, Decreased coordination, Decreased cognition, Impaired judgement, Decreased safety awareness, Impaired hearing, Decreased skin integrity, Pain  Rehab Prognosis: Good  Barriers to Discharge Home: Caregiver assistance, Physical needs  Physical Needs: 24hr mobility assistance needed, 24hr ADL assistance needed  Evaluation/Treatment Tolerance: Patient limited by fatigue  Medical Staff Made Aware: Yes  Barriers to Participation: Comorbidities  End of Session Communication: Bedside nurse  End of Session Patient Position: Up in chair, Alarm on     PT Plan  Treatment/Interventions: Transfer training, Gait training, Balance training, Strengthening, Endurance training, Therapeutic exercise, Therapeutic activity  PT Plan: Ongoing PT  PT Frequency: 4 times per week  PT Discharge Recommendations: Moderate intensity level of continued care  Equipment Recommended upon Discharge: Wheeled walker  PT Recommended Transfer Status: Assist x1, Assistive device  PT - OK to Discharge: Yes      General Visit Information:   PT  Visit  PT Received On: 01/02/25  Response to Previous Treatment: Patient with no complaints from previous session.  General  Reason for Referral: Impaired mobility  Referred By: Dr. Jaramillo  Past Medical History Relevant to Rehab: type 2 diabetes mellitus, peripheral vascular disease and hx chronic diabetic foot ulcers.  Prior to Session Communication: Bedside nurse, PCT/NA/CTA  Patient Position Received: Up in chair, Alarm on  Preferred Learning Style: written, visual  General Comment: Pt agreeable to therapy. \"I " "think I may have had an accident\" Pt incontinent of BM.    Subjective   Precautions:  Precautions  Hearing/Visual Limitations: Hearing aids--without essentially deaf. (White board used for communication.)  Medical Precautions: Fall precautions, Infection precautions  Precautions Comment: Skin Precautions, COVID+    Objective   Pain:  Pain Assessment  Pain Assessment: 0-10  0-10 (Numeric) Pain Score: 0 - No pain    Cognition:  Cognition  Overall Cognitive Status: Within Functional Limits  Orientation Level: Oriented X4  Cognition Comments: Follows commands well. Using white board for communication due to pt very [place]. Visual cues also used as needed.     Treatments:  Therapeutic Exercise  Therapeutic Exercise Activity 1: Pt performed seated bilat LE ankle pumps, LAQ, hip flexion, mami hip adduction and resisted hip abduction x20 reps each.    Ambulation/Gait Training 1  Surface 1: Level tile  Device 1: Rolling walker  Assistance 1: Minimum assistance  Quality of Gait 1: Decreased step length, Diminished heel strike  Comments/Distance (ft) 1: Pt ambulated with RW ~15' x2 to/from bathroom min assist. Min assist to manage walker. Slow pace, mildly unsteady but no actual LOB. Fatigue noted.    Transfer 1  Transfer From 1: Chair with arms to  Transfer to 1: Stand  Technique 1: Sit to stand  Transfer Device 1: Walker  Transfer Level of Assistance 1: Minimum assistance  Transfers 2  Transfer From 2: Stand to  Transfer to 2: Sit, Toilet  Technique 2: Stand to sit  Transfer Device 2: Walker  Transfer Level of Assistance 2: Minimum assistance, Minimal tactile cues  Trials/Comments 2: tactile cues for proper hand placement.  Transfers 3  Transfer From 3: Toilet to  Transfer to 3: Stand  Technique 3: Sit to stand  Transfer Device 3: Walker  Transfer Level of Assistance 3: Moderate assistance, Minimal tactile cues  Trials/Comments 3: Tactile cues for safe hand placement. Pt able to static stand at walker with min/CGA of 1. " Dependent for hygiene in standing.  Transfers 4  Transfer From 4: Stand to  Transfer to 4: Sit, Chair with arms  Technique 4: Stand to sit  Transfer Device 4: Walker  Transfer Level of Assistance 4: Minimum assistance    Outcome Measures:  [facility] Basic Mobility  Turning from your back to your side while in a flat bed without using bedrails: A little  Moving from lying on your back to sitting on the side of a flat bed without using bedrails: A little  Moving to and from bed to chair (including a wheelchair): A little  Standing up from a chair using your arms (e.g. wheelchair or bedside chair): A lot  To walk in hospital room: A little  Climbing 3-5 steps with railing: Total  Basic Mobility - Total Score: 15    Education Documentation  Mobility Training, taught by Shelby Benson PTA at 1/2/2025 10:20 AM.  Learner: Patient  Readiness: Acceptance  Method: Explanation  Response: Needs Reinforcement, Verbalizes Understanding    Education Comments  No comments found.        OP EDUCATION:       Encounter Problems       Encounter Problems (Active)       Balance       Sitting Balance (Progressing)       Start:  12/28/24    Expected End:  01/11/25       Pt will demonstrate good sitting balance to promote safe engagement with out of bed activities           Standing Balance (Progressing)       Start:  12/28/24    Expected End:  01/11/25       Pt will demonstrate good static standing balance to promote safe participation with out of bed activity, transfers, and mobility              Mobility       Ambulation (Progressing)       Start:  12/28/24    Expected End:  01/11/25       Pt will ambulate 50' modified independent assist with walker to promote safe home mobility              PT Transfers       Supine to sit (Progressing)       Start:  12/28/24    Expected End:  01/11/25       Pt will transfer supine to sitting at edge of bed with modified independent assist to promote acute care out of bed activity           Sit to stand  (Progressing)       Start:  12/28/24    Expected End:  01/11/25       Pt will transfer sit to standing position with modified independent assist and walker to promote safe out of bed activity           Bed to chair (Progressing)       Start:  12/28/24    Expected End:  01/11/25       Pt will transfer from sitting edge of bed to the chair with modified independent assist and walker to promote out of bed activity and reduce the risks of prolonged acute care bedrest              Pain - Adult          Safety       Safe Mobility Techniques (Progressing)       Start:  12/28/24    Expected End:  01/11/25       Pt will correctly identify and demonstrate safe mobility techniques to reduce their risks for falls during their acute care stay

## 2025-01-02 NOTE — CARE PLAN
Problem: Fall/Injury  Goal: Not fall by end of shift  Outcome: Progressing  Goal: Be free from injury by end of the shift  Outcome: Progressing  Goal: Verbalize understanding of personal risk factors for fall in the hospital  Outcome: Progressing  Goal: Verbalize understanding of risk factor reduction measures to prevent injury from fall in the home  Outcome: Progressing   The patient's goals for the shift include safety; comfort    The clinical goals for the shift include Safety/Comfort

## 2025-01-02 NOTE — PROGRESS NOTES
Apoorva Sanchez is a 89 y.o. female on day 6 of admission presenting with Ulcer of left foot, unspecified ulcer stage (Multi).    Subjective   Interval History:   Room not entered-limit exposure  Observe for glass window  Discussed with nursing  Awake resting comfortably in bed  Afebrile, remains on room air    Objective   Range of Vitals (last 24 hours)  Heart Rate:  [57-73]   Temp:  [36.1 °C (97 °F)-37.4 °C (99.3 °F)]   Resp:  [15-17]   BP: (127-144)/(60-72)   SpO2:  [97 %-99 %]   Daily Weight  12/27/24 : 59 kg (130 lb)    Body mass index is 20.98 kg/m².    Physical Exam  Vital signs reviewed      Antibiotics  piperacillin-tazobactam - 3.375 gram/50 mL  PIPERACILLIN-TAZOBACTAM IV 3.375 G IN 50 ML D5W (ADV/MBP)  vancomycin - 1 gram/200 mL  vancomycin solution - 1 gram/250 mL    Relevant Results  Labs  Results from last 72 hours   Lab Units 01/02/25  1024 12/31/24  0613   WBC AUTO x10*3/uL 7.6 6.9   HEMOGLOBIN g/dL 9.8* 9.7*   HEMATOCRIT % 31.5* 31.0*   PLATELETS AUTO x10*3/uL 373 311   NEUTROS PCT AUTO % 73.4  --    LYMPHS PCT AUTO % 15.9  --    MONOS PCT AUTO % 5.7  --    EOS PCT AUTO % 2.4  --      Results from last 72 hours   Lab Units 01/02/25  0811 12/31/24  0613   SODIUM mmol/L 132* 134*   POTASSIUM mmol/L 4.6 4.1   CHLORIDE mmol/L 100 101   CO2 mmol/L 27 26   BUN mg/dL 24* 22   CREATININE mg/dL 0.78 0.83   GLUCOSE mg/dL 268* 115*   CALCIUM mg/dL 8.0* 7.9*   ANION GAP mmol/L 10 11   EGFR mL/min/1.73m*2 70 65     Results from last 72 hours   Lab Units 01/02/25  0811   ALK PHOS U/L 79   BILIRUBIN TOTAL mg/dL 0.3   PROTEIN TOTAL g/dL 5.2*   ALT U/L 9   AST U/L 15   ALBUMIN g/dL 2.5*       Estimated Creatinine Clearance: 40.2 mL/min (by C-G formula based on SCr of 0.78 mg/dL).  C-Reactive Protein   Date Value Ref Range Status   12/27/2024 8.56 (H) <1.00 mg/dL Final     Microbiology  Susceptibility data from last 90 days.  Collected Specimen Info Organism Aztreonam Cefepime Ceftazidime Ciprofloxacin Clindamycin  Erythromycin Levofloxacin Oxacillin Piperacillin/Tazobactam Tetracycline Tobramycin Trimethoprim/Sulfamethoxazole Vancomycin   12/28/24 Tissue/Biopsy from Wound/Tissue Methicillin Resistant Staphylococcus aureus (MRSA)      S  R   R   R   R  S     Mixed Skin Microorganisms                 11/18/24 Swab from Anterior Nares Methicillin Resistant Staphylococcus aureus (MRSA)                11/05/24 Tissue/Biopsy from Wound/Tissue Pseudomonas aeruginosa  S  S  S  S    I   S   S       Methicillin Resistant Staphylococcus aureus (MRSA)      R  R   R   R   R  S     Mixed Gram-Negative Bacteria                     Imaging  MR ankle left wo IV contrast    Result Date: 12/28/2024  Interpreted By:  Davis Portillo, STUDY: MRI of the left ankle without IV contrast;   INDICATION: Signs/Symptoms:concern for calcaneal osteomyelitis     COMPARISON: 12/27/2024   ACCESSION NUMBER(S): [id number]   ORDERING CLINICIAN: RAJESH ROBB   TECHNIQUE: Multiplanar multisequence MRI of the  left ankle was performed without IV contrast. Please note that coronal images were not obtained due to patient early making the examination.   FINDINGS: Soft tissues: There is a dorsal tissue ulcer at the level of the calcaneus with associated subjacent soft tissue edema, suggestive of cellulitis. No discrete fluid collection to suggest abscess. There is subcutaneous soft tissue edema about the posterior ankle and within Kager's fat pad.   Bones:  No abnormal loss of T1 marrow signal, edema, or enhancement to suggest reactive osteitis or osteomyelitis.   Muscles:  Edema and atrophy of the plantar foot musculature, compatible with diabetic myopathy and/or myositis.   Miscellaneous:  Visualized tendons and Lisfranc ligament are grossly intact.       Calcaneal soft tissue ulcer in cellulitis without evidence to suggest osteomyelitis.   MACRO: None   Signed by: Davis Portillo 12/28/2024 10:17 PM Dictation workstation:   [id number]    XR chest 2 views    Result  Date: 12/28/2024  Interpreted By:  Sarah Sanchez, STUDY: XR CHEST 2 VIEWS;  12/27/2024 5:26 pm   INDICATION: Signs/Symptoms:cough and congestion.   COMPARISON: 06/10/2021   ACCESSION NUMBER(S): [id number]   ORDERING CLINICIAN: LEONOR QUINTEROS   FINDINGS: The heart is normal in size. There is tortuosity of the aorta. There is a suggestion of a hiatal hernia.   The lungs are hyperinflated. There is no discrete consolidation or pleural fluid.   Upper mediastinum appears somewhat widened. This may be due to fat.   The bones are osteoporotic. There are degenerative changes of the shoulders.   There is suggestion of a small hiatal hernia.   COMPARISON OF FINDING: The chest is similar.       No acute cardiopulmonary disease.   MACRO: none   Signed by: Sarah Sanchez 12/28/2024 7:29 AM Dictation workstation:   [id number]    MR foot left wo IV contrast    Result Date: 12/27/2024  Interpreted By:  Tra Sanchez, STUDY: MR FOOT LEFT WO IV CONTRAST;   INDICATION: Signs/Symptoms:Diabetic foot wound, concern for osteomyelitis.   COMPARISON: Plain film radiographs of the left foot performed on December 27, 2024   ACCESSION NUMBER(S): [id number]   ORDERING CLINICIAN: LEONOR QUINTEROS   TECHNIQUE: Routine multiplanar multisequential MRI of the left foot without contrast. Field-of-view limited the forefoot.     FINDINGS: Dorsal subcutaneous edema suggesting cellulitis.   There is no evidence of marrow edema or marrow replacement process.   No findings highly suggestive of osteomyelitis.   No discrete fluid collections to suggest abscess.   No evidence of acute fracture.   Mild degenerative changes.   No evidence of acute tendon tear.   Some atrophy of the intrinsic foot musculature likely chronic ischemic myopathy or disuse.   No evidence of a soft tissue mass.       MRI of the left forefoot shows no findings suggestive of osteomyelitis.   Signed by: Tra Sanchez 12/27/2024 5:12 PM Dictation workstation:   [id number]    XR foot left 3+  views    Result Date: 12/27/2024  STUDY: Foot Radiographs; 12/27/2024 12:39PM INDICATION: Foot wound, concern for osteomyelitis near the heel. COMPARISON: None available. ACCESSION NUMBER(S): [id number] ORDERING CLINICIAN: JACQUELINE THORNTON TECHNIQUE:  Three view(s) of the left foot. FINDINGS:  There is no displaced fracture.  The alignment is anatomic. Ulceration noted posterior to the calcaneus measuring 1.5 cm.  No definite soft tissue gas or osseous erosions.  Underlying osteopenia. Soft tissue swelling noted.  Vascular calcifications noted.    Ulceration noted posterior to the calcaneus measuring 1.5 cm. No definite soft tissue gas or osseous erosions. Signed by Miles Cummings MD    Vascular US ankle brachial index (MEETA) without exercise    Result Date: 12/20/2024           [place] [address] Tel [phone] and Fax [phone]  Vascular Lab Report [facility] US ANKLE BRACHIAL INDEX (MEETA) WITHOUT EXERCISE  Patient Name:     DESTINI Marie Physician: [id number] Josh Bales MD Study Date:       12/17/2024          Ordering           [id number] LIDIA MAGAÑA                                       Physician:         LUIS MRN/PID:          [id number]            Technologist:      Nkechi Mustafa [facility] Accession#:       [id number]        Technologist 2: Date of           5/16/1929 / 89      Encounter#:        [id number] Birth/Age:        years Gender:           F Admission Status: Outpatient          Location           [facility]                                       Performed:  Diagnosis/ICD: Peripheral vascular disease, unspecified-I73.9 CPT Codes:     37482 Peripheral artery MEETA Only  CONCLUSIONS: Right Lower PVR: Evidence of mild arterial occlusive disease in the right lower extremity at rest. Right pressures of >220 mmHg suggest no compressibility of vessels and may make absolute Segmental Limb Pressures (SLP) unreliable. Decreased digital perfusion noted. Monophasic flow  is noted in the right posterior tibial artery and right dorsalis pedis artery. Multiphasic flow is noted in the right common femoral artery. Disease called by PVR tracing and Doppler waveform. Left Lower PVR: Evidence of mild arterial occlusive disease in the left lower extremity at rest. Left pressures of >220 mmHg suggest no compressibility of vessels and may make absolute Segmental Limb Pressures (SLP) unreliable. Decreased digital perfusion noted. Monophasic flow is noted in the left posterior tibial artery and left dorsalis pedis artery. Multiphasic flow is noted in the left common femoral artery. Disease called by PVR tracing and Doppler waveform.  Comparison: Compared with study from 7/24/2024, no significant change.  Imaging & Doppler Findings:  RIGHT Lower PVR                Pressures Ratios Right Posterior Tibial (Ankle) 255 mmHg  1.89 Right Dorsalis Pedis (Ankle)   255 mmHg  1.89 Right Digit (Great Toe)        78 mmHg   0.58   LEFT Lower PVR                Pressures Ratios Left Posterior Tibial (Ankle) 255 mmHg  1.89 Left Dorsalis Pedis (Ankle)   255 mmHg  1.89 Left Digit (Great Toe)        52 mmHg   0.39                     Right     Left Brachial Pressure 135 mmHg 133 mmHg   [id number] Josh Bales MD Electronically signed by [id number] Josh Bales MD on 12/20/2024 at 5:17:51 PM  ** Final **         Assessment/Plan   Unstageable left heel ulcer-MRI without evidence of osteomyelitis   Left leg cellulitis-history of positive wound culture for MRSA and Pseudomonas  Right anterior leg ulcer-wound culture growing MRSA -resistant to tetracycline and bactrim   Type 2 diabetes mellitus  Peripheral vascular disease  Coronavirus infection-on room air     IV vancomycin-monitor levels   IV Zosyn  Remdesivir, risk reduction-completed   Monitor oxygenation  Monitor temp and wbc   Local care  Supportive care  Podiatry follow-up  Monitor temperature and WBC  Droplet plus precautions  PICC line placement    Long term plan IV vancomycin 1  gram every 24 hours and IV zosyn 3.375 grams every 6 hours for total 14 days till 1/11/2025-see discharge rec   Weekly CBC with diff, CMP, vancomycin trough     Total time spent caring for the patient today was 20 minutes. This includes time spent before the visit reviewing the chart, time spent during the visit, and time spent after the visit on documentation.     Inez Brand, APRN-CNP

## 2025-01-02 NOTE — PROGRESS NOTES
01/02/25 1014   Discharge Planning   Expected Discharge Disposition SNF  ([facility])   Does the patient need discharge transport arranged? Yes   RoundTrip coordination needed? Yes   Has discharge transport been arranged? Yes   What day is the transport expected? 01/02/25     Waiting on cosign of Emeka Garcia, when complete, will set up transport.    12:30 pm

## 2025-01-02 NOTE — DISCHARGE SUMMARY
Discharge Diagnosis  Ulcer of left foot, unspecified ulcer stage (Multi)    Issues Requiring Follow-Up  Follow up PCP, ID and podiatry     Discharge Meds     Medication List      ASK your doctor about these medications     acetaminophen 325 mg tablet; Commonly known as: Tylenol; Take 2 tablets   (650 mg) by mouth every 6 hours if needed for mild pain (1 - 3), headaches   or fever (temp greater than 38.0 C).   ammonium lactate 12 % lotion; Commonly known as: Lac-Hydrin   atenolol 100 mg tablet; Commonly known as: Tenormin; Take 1 tablet (100   mg) by mouth once daily.   cloNIDine 0.2 mg tablet; Commonly known as: Catapres; Take 2 tablets   (0.4 mg) by mouth once daily at bedtime.   glimepiride 2 mg tablet; Commonly known as: Amaryl   latanoprost 0.005 % ophthalmic solution; Commonly known as: Xalatan;   Administer 1 drop into both eyes once daily at bedtime.   levothyroxine 100 mcg tablet; Commonly known as: Synthroid, Levoxyl;   Take 1 tablet (100 mcg) by mouth once daily in the morning. Take before   meals.   magnesium hydroxide 400 mg/5 mL suspension; Commonly known as: Milk of   Magnesia   metFORMIN  mg 24 hr tablet; Commonly known as: Glucophage-XR   simvastatin 40 mg tablet; Commonly known as: Zocor; Take 1 tablet (40   mg) by mouth once daily at bedtime.       Test Results Pending At Discharge  Pending Labs       Order Current Status    Extra Urine Gray Tube Collected (12/28/24 1318)    Staphylococcus Aureus/MRSA Colonization, Culture - PICC Only In process    Urinalysis with Reflex Culture and Microscopic In process            Hospital Course   Patient is a 89 year old female who presented from SNF for left foot pain and was incidentally found to have COVID. Patient had a previous hospital admission for falls and diabetic foot ulcers, 11/14/2025. Patient was seen by podiatry and have evaluated MRI and all wound. They have decided with patient and family, based on severity of wounds and patient's age to  use palliative care. No surgery; debridement or vascular. Patient will be given IV antibiotics until 1/11/2025. Culture of wound has MRSA. Patient was given Remdesivir IV for COVID to reduce the risk of worsening. She is free of COVID symptoms currently. Patient will be discharged to SNF today in stable condition.     Pertinent Physical Exam At Time of Discharge  Physical Exam  Constitutional:       Appearance: Normal appearance.   Cardiovascular:      Rate and Rhythm: Normal rate and regular rhythm.      Pulses: Normal pulses.      Heart sounds: Normal heart sounds.   Pulmonary:      Effort: Pulmonary effort is normal.      Breath sounds: Normal breath sounds.   Abdominal:      General: Bowel sounds are normal.      Palpations: Abdomen is soft.   Musculoskeletal:         General: Normal range of motion.   Skin:     General: Skin is warm and dry.      Comments: Bilateral foot and leg wounds with dressings dry and intact.    Neurological:      Mental Status: She is alert and oriented to person, place, and time.         Outpatient Follow-Up  No future appointments.      Rea Saldivar, APRN-CNP

## 2025-03-11 ENCOUNTER — OFFICE VISIT (OUTPATIENT)
Dept: WOUND CARE | Facility: HOSPITAL | Age: OVER 89
End: 2025-03-11
Payer: MEDICARE

## 2025-03-11 PROCEDURE — 11042 DBRDMT SUBQ TIS 1ST 20SQCM/<: CPT

## 2025-03-11 PROCEDURE — 11045 DBRDMT SUBQ TISS EACH ADDL: CPT

## 2025-03-11 PROCEDURE — 99213 OFFICE O/P EST LOW 20 MIN: CPT | Mod: 25

## (undated) DEVICE — BANDAGE, COMPRESSION, KNITTED, ELASTIC, SNGL VELCRO, 3IN, WHITE

## (undated) DEVICE — SUTURE, ETHILON, 3-0, 18 IN, PS1, BLACK

## (undated) DEVICE — COVER, XRAY, CASSETTE, 21 X 40 IN, STERILE

## (undated) DEVICE — WOUND VAC KIT, W/CANISTER, 500ML (5/CS)

## (undated) DEVICE — Device

## (undated) DEVICE — NEEDLE, BIOPSY, T- HANDLE, 8G X 4

## (undated) DEVICE — SLEEVE, VASO PRESS, CALF GARMENT, MEDIUM, GREEN

## (undated) DEVICE — BLADE, SURGICAL, POLYMER COATED P15, STERILE, DISP

## (undated) DEVICE — GLOVE, SURGICAL, PROTEXIS PI BLUE W/NEUTHERA, 8.5, PF, LF

## (undated) DEVICE — GLOVE, SURGICAL, PROTEXIS PI , 8.0, PF, LF

## (undated) DEVICE — DRESSING, NON-ADHERENT, 3 X 3 IN, STERILE

## (undated) DEVICE — INTERPULSE HANDPIECE SET W/ 10FT SUCTION TUBING

## (undated) DEVICE — STAPLER, SKIN, DISPOSABLE, 35 COUNT, WIDE, STERILE

## (undated) DEVICE — SUTURE, VICRYL, 3-0, 27 IN, SH, VIOLET

## (undated) DEVICE — SUTURE, VICRYL, 4-0, 27 IN, SH-1, UNDYED